# Patient Record
Sex: FEMALE | Race: WHITE | Employment: PART TIME | URBAN - METROPOLITAN AREA
[De-identification: names, ages, dates, MRNs, and addresses within clinical notes are randomized per-mention and may not be internally consistent; named-entity substitution may affect disease eponyms.]

---

## 2017-01-10 ENCOUNTER — OFFICE VISIT (OUTPATIENT)
Dept: INTERNAL MEDICINE | Age: 42
End: 2017-01-10

## 2017-01-10 VITALS
WEIGHT: 251 LBS | SYSTOLIC BLOOD PRESSURE: 172 MMHG | HEART RATE: 73 BPM | HEIGHT: 65 IN | TEMPERATURE: 97.1 F | DIASTOLIC BLOOD PRESSURE: 88 MMHG | BODY MASS INDEX: 41.82 KG/M2

## 2017-01-10 DIAGNOSIS — I10 HYPERTENSION, UNCONTROLLED: Primary | Chronic | ICD-10-CM

## 2017-01-10 DIAGNOSIS — E78.5 HYPERLIPIDEMIA WITH TARGET LDL LESS THAN 130: ICD-10-CM

## 2017-01-10 PROCEDURE — 99213 OFFICE O/P EST LOW 20 MIN: CPT | Performed by: INTERNAL MEDICINE

## 2017-01-10 RX ORDER — LORATADINE 10 MG/1
TABLET ORAL
Qty: 30 TABLET | Refills: 3 | Status: SHIPPED | OUTPATIENT
Start: 2017-01-10 | End: 2017-06-12

## 2017-01-10 RX ORDER — ALBUTEROL SULFATE 90 UG/1
2 AEROSOL, METERED RESPIRATORY (INHALATION) EVERY 6 HOURS PRN
Qty: 1 INHALER | Refills: 3 | Status: SHIPPED | OUTPATIENT
Start: 2017-01-10 | End: 2017-06-09 | Stop reason: SDUPTHER

## 2017-01-10 RX ORDER — LOSARTAN POTASSIUM AND HYDROCHLOROTHIAZIDE 25; 100 MG/1; MG/1
TABLET ORAL
Qty: 30 TABLET | Refills: 3 | Status: SHIPPED | OUTPATIENT
Start: 2017-01-10 | End: 2017-06-08 | Stop reason: SDUPTHER

## 2017-01-10 ASSESSMENT — ENCOUNTER SYMPTOMS
RESPIRATORY NEGATIVE: 1
TROUBLE SWALLOWING: 0
ABDOMINAL PAIN: 0
BLOOD IN STOOL: 0
GASTROINTESTINAL NEGATIVE: 1
SHORTNESS OF BREATH: 0

## 2017-01-12 DIAGNOSIS — I10 HYPERTENSION, UNCONTROLLED: Chronic | ICD-10-CM

## 2017-01-12 LAB
ANION GAP SERPL CALCULATED.3IONS-SCNC: 13 MEQ/L (ref 7–13)
BILIRUBIN URINE: NEGATIVE
BLOOD, URINE: NEGATIVE
BUN BLDV-MCNC: 13 MG/DL (ref 6–20)
CALCIUM SERPL-MCNC: 9.1 MG/DL (ref 8.6–10.2)
CHLORIDE BLD-SCNC: 99 MEQ/L (ref 98–107)
CHOLESTEROL, TOTAL: 227 MG/DL (ref 0–199)
CLARITY: CLEAR
CO2: 23 MEQ/L (ref 22–29)
COLOR: YELLOW
CREAT SERPL-MCNC: 0.69 MG/DL (ref 0.5–0.9)
GFR AFRICAN AMERICAN: >60
GFR NON-AFRICAN AMERICAN: >60
GLUCOSE BLD-MCNC: 98 MG/DL (ref 74–109)
GLUCOSE URINE: NEGATIVE MG/DL
HDLC SERPL-MCNC: 34 MG/DL (ref 40–59)
KETONES, URINE: NEGATIVE MG/DL
LDL CHOLESTEROL CALCULATED: ABNORMAL MG/DL (ref 0–129)
LEUKOCYTE ESTERASE, URINE: NEGATIVE
NITRITE, URINE: NEGATIVE
PH UA: 6 (ref 5–9)
POTASSIUM SERPL-SCNC: 4.6 MEQ/L (ref 3.5–5.1)
PROTEIN UA: NEGATIVE MG/DL
SODIUM BLD-SCNC: 135 MEQ/L (ref 132–144)
SPECIFIC GRAVITY UA: 1 (ref 1–1.03)
TRIGL SERPL-MCNC: 590 MG/DL (ref 0–200)
TSH SERPL DL<=0.05 MIU/L-ACNC: 1.62 UIU/ML (ref 0.27–4.2)
UROBILINOGEN, URINE: 0.2 E.U./DL

## 2017-01-31 DIAGNOSIS — E78.5 HYPERLIPIDEMIA WITH TARGET LDL LESS THAN 130: Primary | ICD-10-CM

## 2017-01-31 RX ORDER — PRAVASTATIN SODIUM 40 MG
40 TABLET ORAL EVERY EVENING
Qty: 30 TABLET | Refills: 3 | Status: SHIPPED | OUTPATIENT
Start: 2017-01-31 | End: 2017-05-09

## 2017-02-07 ENCOUNTER — OFFICE VISIT (OUTPATIENT)
Dept: INTERNAL MEDICINE | Age: 42
End: 2017-02-07

## 2017-02-07 VITALS
DIASTOLIC BLOOD PRESSURE: 82 MMHG | OXYGEN SATURATION: 97 % | SYSTOLIC BLOOD PRESSURE: 138 MMHG | HEART RATE: 97 BPM | TEMPERATURE: 97.9 F | HEIGHT: 65 IN | BODY MASS INDEX: 41.15 KG/M2 | WEIGHT: 247 LBS

## 2017-02-07 DIAGNOSIS — E78.5 HYPERLIPIDEMIA WITH TARGET LDL LESS THAN 130: Primary | Chronic | ICD-10-CM

## 2017-02-07 DIAGNOSIS — F43.23 ADJUSTMENT DISORDER WITH MIXED ANXIETY AND DEPRESSED MOOD: Chronic | ICD-10-CM

## 2017-02-07 PROCEDURE — 99214 OFFICE O/P EST MOD 30 MIN: CPT | Performed by: INTERNAL MEDICINE

## 2017-02-07 RX ORDER — FLUOXETINE HYDROCHLORIDE 20 MG/1
CAPSULE ORAL
Qty: 30 CAPSULE | Refills: 5 | Status: SHIPPED | OUTPATIENT
Start: 2017-02-07 | End: 2017-07-11

## 2017-02-07 ASSESSMENT — PATIENT HEALTH QUESTIONNAIRE - PHQ9
2. FEELING DOWN, DEPRESSED OR HOPELESS: 0
1. LITTLE INTEREST OR PLEASURE IN DOING THINGS: 0
SUM OF ALL RESPONSES TO PHQ QUESTIONS 1-9: 0
SUM OF ALL RESPONSES TO PHQ9 QUESTIONS 1 & 2: 0

## 2017-02-07 ASSESSMENT — ENCOUNTER SYMPTOMS
ABDOMINAL PAIN: 0
SHORTNESS OF BREATH: 1
GASTROINTESTINAL NEGATIVE: 1
TROUBLE SWALLOWING: 0
BLOOD IN STOOL: 0

## 2017-05-09 ENCOUNTER — OFFICE VISIT (OUTPATIENT)
Dept: INTERNAL MEDICINE | Age: 42
End: 2017-05-09

## 2017-05-09 VITALS
HEIGHT: 65 IN | TEMPERATURE: 96.9 F | HEART RATE: 88 BPM | WEIGHT: 247 LBS | BODY MASS INDEX: 41.15 KG/M2 | DIASTOLIC BLOOD PRESSURE: 80 MMHG | OXYGEN SATURATION: 98 % | SYSTOLIC BLOOD PRESSURE: 130 MMHG

## 2017-05-09 DIAGNOSIS — E78.5 HYPERLIPIDEMIA WITH TARGET LDL LESS THAN 130: ICD-10-CM

## 2017-05-09 DIAGNOSIS — E78.5 HYPERLIPIDEMIA WITH TARGET LDL LESS THAN 130: Primary | Chronic | ICD-10-CM

## 2017-05-09 DIAGNOSIS — Z23 NEED FOR 23-POLYVALENT PNEUMOCOCCAL POLYSACCHARIDE VACCINE: ICD-10-CM

## 2017-05-09 DIAGNOSIS — J45.20 MILD INTERMITTENT ASTHMA WITHOUT COMPLICATION: Chronic | ICD-10-CM

## 2017-05-09 LAB
CHOLESTEROL, TOTAL: 229 MG/DL (ref 0–199)
HDLC SERPL-MCNC: 26 MG/DL (ref 40–59)
LDL CHOLESTEROL CALCULATED: ABNORMAL MG/DL (ref 0–129)
TRIGL SERPL-MCNC: 1299 MG/DL (ref 0–200)

## 2017-05-09 PROCEDURE — 90471 IMMUNIZATION ADMIN: CPT | Performed by: INTERNAL MEDICINE

## 2017-05-09 PROCEDURE — 90732 PPSV23 VACC 2 YRS+ SUBQ/IM: CPT | Performed by: INTERNAL MEDICINE

## 2017-05-09 PROCEDURE — 99214 OFFICE O/P EST MOD 30 MIN: CPT | Performed by: INTERNAL MEDICINE

## 2017-05-09 RX ORDER — ROSUVASTATIN CALCIUM 10 MG/1
10 TABLET, COATED ORAL DAILY
Qty: 30 TABLET | Refills: 3 | Status: SHIPPED | OUTPATIENT
Start: 2017-05-09 | End: 2017-07-11 | Stop reason: SDUPTHER

## 2017-05-09 RX ORDER — FLUTICASONE FUROATE AND VILANTEROL 100; 25 UG/1; UG/1
1 POWDER RESPIRATORY (INHALATION) DAILY
Qty: 28 EACH | Refills: 3 | Status: SHIPPED | OUTPATIENT
Start: 2017-05-09 | End: 2017-08-10 | Stop reason: SDUPTHER

## 2017-05-09 ASSESSMENT — ENCOUNTER SYMPTOMS
SORE THROAT: 0
GASTROINTESTINAL NEGATIVE: 1
BLOOD IN STOOL: 0
HEARTBURN: 0
SINUS PRESSURE: 0
ABDOMINAL PAIN: 0
TROUBLE SWALLOWING: 0
SHORTNESS OF BREATH: 1

## 2017-06-08 RX ORDER — LOSARTAN POTASSIUM AND HYDROCHLOROTHIAZIDE 25; 100 MG/1; MG/1
TABLET ORAL
Qty: 30 TABLET | Refills: 3 | Status: SHIPPED | OUTPATIENT
Start: 2017-06-08 | End: 2017-09-06 | Stop reason: SDUPTHER

## 2017-06-12 RX ORDER — LORATADINE 10 MG/1
TABLET ORAL
Qty: 30 TABLET | Refills: 3 | Status: SHIPPED | OUTPATIENT
Start: 2017-06-12 | End: 2017-10-07 | Stop reason: SDUPTHER

## 2017-07-11 ENCOUNTER — OFFICE VISIT (OUTPATIENT)
Dept: INTERNAL MEDICINE | Age: 42
End: 2017-07-11

## 2017-07-11 VITALS
OXYGEN SATURATION: 98 % | DIASTOLIC BLOOD PRESSURE: 90 MMHG | TEMPERATURE: 98.1 F | BODY MASS INDEX: 42.49 KG/M2 | HEART RATE: 97 BPM | SYSTOLIC BLOOD PRESSURE: 162 MMHG | HEIGHT: 65 IN | WEIGHT: 255 LBS

## 2017-07-11 DIAGNOSIS — F17.200 SMOKING: ICD-10-CM

## 2017-07-11 DIAGNOSIS — E78.5 HYPERLIPIDEMIA WITH TARGET LDL LESS THAN 130: Chronic | ICD-10-CM

## 2017-07-11 DIAGNOSIS — I10 HYPERTENSION, UNCONTROLLED: Primary | Chronic | ICD-10-CM

## 2017-07-11 DIAGNOSIS — E78.5 HYPERLIPIDEMIA WITH TARGET LDL LESS THAN 130: ICD-10-CM

## 2017-07-11 LAB
CHOLESTEROL, TOTAL: 317 MG/DL (ref 0–199)
HDLC SERPL-MCNC: 25 MG/DL (ref 40–59)
LDL CHOLESTEROL CALCULATED: ABNORMAL MG/DL (ref 0–129)
TRIGL SERPL-MCNC: 1299 MG/DL (ref 0–200)

## 2017-07-11 PROCEDURE — 99214 OFFICE O/P EST MOD 30 MIN: CPT | Performed by: INTERNAL MEDICINE

## 2017-07-11 RX ORDER — ROSUVASTATIN CALCIUM 20 MG/1
20 TABLET, COATED ORAL DAILY
Qty: 30 TABLET | Refills: 2 | Status: SHIPPED | OUTPATIENT
Start: 2017-07-11 | End: 2017-08-10

## 2017-07-11 RX ORDER — FLUOXETINE HYDROCHLORIDE 20 MG/1
CAPSULE ORAL
COMMUNITY
Start: 2017-07-04 | End: 2017-08-10

## 2017-07-11 RX ORDER — VARENICLINE TARTRATE 25 MG
KIT ORAL
Qty: 1 EACH | Refills: 0 | Status: SHIPPED | OUTPATIENT
Start: 2017-07-11 | End: 2018-09-27

## 2017-07-11 ASSESSMENT — ENCOUNTER SYMPTOMS
SORE THROAT: 0
SHORTNESS OF BREATH: 0
BLOOD IN STOOL: 0
TROUBLE SWALLOWING: 0
GASTROINTESTINAL NEGATIVE: 1
ABDOMINAL PAIN: 0
SINUS PRESSURE: 0

## 2017-08-10 RX ORDER — FLUTICASONE FUROATE AND VILANTEROL TRIFENATATE 100; 25 UG/1; UG/1
POWDER RESPIRATORY (INHALATION)
Qty: 28 EACH | Refills: 3 | Status: SHIPPED | OUTPATIENT
Start: 2017-08-10 | End: 2017-12-01 | Stop reason: SDUPTHER

## 2017-08-10 RX ORDER — FLUOXETINE HYDROCHLORIDE 20 MG/1
CAPSULE ORAL
Qty: 30 CAPSULE | Refills: 3 | Status: SHIPPED | OUTPATIENT
Start: 2017-08-10 | End: 2017-12-01 | Stop reason: SDUPTHER

## 2017-08-10 RX ORDER — ROSUVASTATIN CALCIUM 20 MG/1
20 TABLET, COATED ORAL DAILY
Qty: 30 TABLET | Refills: 3 | Status: SHIPPED | OUTPATIENT
Start: 2017-08-10 | End: 2017-09-06

## 2017-09-07 RX ORDER — LOSARTAN POTASSIUM AND HYDROCHLOROTHIAZIDE 25; 100 MG/1; MG/1
TABLET ORAL
Qty: 30 TABLET | Refills: 3 | Status: SHIPPED | OUTPATIENT
Start: 2017-09-07 | End: 2018-03-29 | Stop reason: SDUPTHER

## 2017-11-07 ENCOUNTER — OFFICE VISIT (OUTPATIENT)
Dept: INTERNAL MEDICINE | Age: 42
End: 2017-11-07

## 2017-11-07 VITALS
OXYGEN SATURATION: 99 % | TEMPERATURE: 97.7 F | HEIGHT: 65 IN | SYSTOLIC BLOOD PRESSURE: 122 MMHG | BODY MASS INDEX: 43.32 KG/M2 | HEART RATE: 78 BPM | WEIGHT: 260 LBS | DIASTOLIC BLOOD PRESSURE: 80 MMHG

## 2017-11-07 DIAGNOSIS — E78.5 HYPERLIPIDEMIA WITH TARGET LDL LESS THAN 130: Chronic | ICD-10-CM

## 2017-11-07 DIAGNOSIS — E66.01 OBESITY, MORBID (MORE THAN 100 LBS OVER IDEAL WEIGHT OR BMI > 40) (HCC): Primary | Chronic | ICD-10-CM

## 2017-11-07 DIAGNOSIS — I10 ESSENTIAL HYPERTENSION, BENIGN: Chronic | ICD-10-CM

## 2017-11-07 PROCEDURE — G8484 FLU IMMUNIZE NO ADMIN: HCPCS | Performed by: INTERNAL MEDICINE

## 2017-11-07 PROCEDURE — G8427 DOCREV CUR MEDS BY ELIG CLIN: HCPCS | Performed by: INTERNAL MEDICINE

## 2017-11-07 PROCEDURE — 99213 OFFICE O/P EST LOW 20 MIN: CPT | Performed by: INTERNAL MEDICINE

## 2017-11-07 PROCEDURE — G8417 CALC BMI ABV UP PARAM F/U: HCPCS | Performed by: INTERNAL MEDICINE

## 2017-11-07 PROCEDURE — 4004F PT TOBACCO SCREEN RCVD TLK: CPT | Performed by: INTERNAL MEDICINE

## 2017-11-07 ASSESSMENT — ENCOUNTER SYMPTOMS
GASTROINTESTINAL NEGATIVE: 1
COUGH: 0
SORE THROAT: 0
BLOOD IN STOOL: 0
SINUS PRESSURE: 0
RESPIRATORY NEGATIVE: 1
ABDOMINAL PAIN: 0
SHORTNESS OF BREATH: 0
TROUBLE SWALLOWING: 0
EYE PAIN: 0

## 2017-11-07 NOTE — PROGRESS NOTES
Irving Mansfield is a 43 y.o. female with history of migraines, asthma, who presents with     Chief Complaint   Patient presents with    Weight Management     requesting referral to [facility], states she has tried diets, exercise, all unsuccessful    Hyperlipidemia     takes statin daily, is a blood donor, no recent red flags, no side effects     Hypertension       The following laboratory reports since the past visit were reviewed (the ones pertinent to today's visit were discussed with the patient):    No visits with results within 3 Month(s) from this visit. Latest known visit with results is:   Orders Only on 07/11/2017   Component Date Value Ref Range Status    Cholesterol, Total 07/11/2017 317* 0 - 199 mg/dL Final    Triglycerides 07/11/2017 1299* 0 - 200 mg/dL Final    HDL 07/11/2017 25* 40 - 59 mg/dL Final    Comment: ATP III HDL Cholestrol Classification is low. Expected Values:    Males:    >55 = No Risk            35-55 = Moderate Risk            <35 = High Risk    Females:  >65 = No Risk            45-65 = Moderate Risk            <45 = High Risk    NCEP Guidelines: Third Report May 2001  >59 = negative risk factor for CHD  <40 = major risk factor for CHD      LDL Calculated 07/11/2017 see below  0 - 129 mg/dL Final    Comment: When the triglyceride is >400 mg/dL the calculated LDL and  VLDL are not valid. HPI:    Hyperlipidemia   This is a chronic problem. The current episode started more than 1 year ago. Recent lipid tests were reviewed and are high. Exacerbating diseases include obesity. She has no history of chronic renal disease, diabetes or hypothyroidism. Factors aggravating her hyperlipidemia include fatty foods and smoking. Pertinent negatives include no chest pain, leg pain, myalgias or shortness of breath. Current antihyperlipidemic treatment includes statins.  Compliance problems include adherence to exercise, adherence to diet and psychosocial issues. Risk factors for coronary artery disease include a sedentary lifestyle, hypertension, obesity and dyslipidemia. Hypertension   This is a chronic problem. The current episode started more than 1 year ago. The problem has been waxing and waning since onset. The problem is controlled. Associated symptoms include anxiety. Pertinent negatives include no chest pain, headaches, neck pain, palpitations, peripheral edema, PND or shortness of breath. There are no associated agents to hypertension. Risk factors for coronary artery disease include sedentary lifestyle, stress, obesity and dyslipidemia. Past treatments include angiotensin blockers and diuretics. The current treatment provides moderate improvement. Compliance problems include exercise, diet and psychosocial issues. There is no history of angina, CVA, PVD, renovascular disease or a thyroid problem. Identifiable causes of hypertension include sleep apnea. There is no history of chronic renal disease. More detail above in the chief complaint(s) and below in the review of systems. Past Medical History:   Diagnosis Date    Allergic rhinitis     several allergens    Asthma     since shildhood    Brachial neuralgia 1/23/2014    Chronic left shoulder pain     H/O colonoscopy 2014    Dr. Courtney Bruce History of sleep apnea     did not tolerate CPAP, test normalized    Hyperlipidemia     Hypertension 2010    Migraine headache     Obesity, morbid (more than 100 lbs over ideal weight or BMI > 40) ([facility] [address]        Past Surgical History:   Procedure Laterality Date    BREAST SURGERY  2007    reduction, Dr Johny Pinto  10/20/15    DR. Goldy Murillo    TUBAL LIGATION         Social History     Social History    Marital status: Single     Spouse name: N/A    Number of children: 2    Years of education: N/A     Occupational History    dog grooming business, self employed      Social History Main Topics    Smoking status: Current Every Day Smoker     Packs/day: 0.50     Types: Cigarettes    Smokeless tobacco: Never Used      Comment: uses electronic cigarettes & trying to cut back    Alcohol use 0.6 oz/week     1 Standard drinks or equivalent per week      Comment: socially    Drug use:      Frequency: 1.0 time per week      Comment: marijuana, quit July 2013    Sexual activity: Yes     Other Topics Concern    Not on file     Social History Narrative    Born in [place], has 1 brother one sister    Never , was in a bad relationship    Lives with daughter age 16 (2017) in a house in [facility]    2 daughters, one daughter with problems, one in college    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 ALLERGIES    Review of patient's allergies indicates no known allergies. Current Outpatient Prescriptions on File Prior to Visit   Medication Sig Dispense Refill    VENTOLIN  (90 Base) MCG/ACT inhaler Inhale 2 puffs into the lungs every 6 hours as needed for Wheezing or Shortness of Breath 18 g 2    loratadine (CLARITIN) 10 MG tablet Take 1 tablet by mouth daily as needed (allergies) 30 tablet 2    rosuvastatin (CRESTOR) 20 MG tablet Take 1 tablet by mouth daily  3    losartan-hydrochlorothiazide (HYZAAR) 100-25 MG per tablet Take 1 tablet by mouth daily 30 tablet 3    FLUoxetine (PROZAC) 20 MG capsule TAKE 1 CAPSULE DAILY 30 capsule 3    BREO ELLIPTA 100-25 MCG/INH AEPB inhaler Inhale 1 puff into the lungs daily 28 each 3    varenicline (CHANTIX STARTING MONTH PAK) 0.5 MG X 11 & 1 MG X 42 tablet Take po as directed on pack 1 each 0     No current facility-administered medications on file prior to visit.         Review of Systems Constitutional: Positive for unexpected weight change (weight gain). Negative for activity change, appetite change and fatigue. HENT: Negative for congestion, nosebleeds, postnasal drip, sinus pressure, sneezing, sore throat and trouble swallowing. Eyes: Negative for pain and visual disturbance. Respiratory: Negative. Negative for cough and shortness of breath. Cardiovascular: Negative. Negative for chest pain, palpitations, leg swelling and PND. Gastrointestinal: Negative. Negative for abdominal pain and blood in stool. Endocrine: Negative. Negative for cold intolerance, heat intolerance, polydipsia and polyuria. Genitourinary: Negative for hematuria. Musculoskeletal: Negative for myalgias, neck pain and neck stiffness. Skin: Negative for rash. Neurological: Negative for dizziness, weakness, light-headedness and headaches. Psychiatric/Behavioral: Negative for decreased concentration, dysphoric mood and sleep disturbance. The patient is nervous/anxious. Vitals:    11/07/17 1111   BP: 122/80   Site: Right Arm   Position: Sitting   Cuff Size: Medium Adult   Pulse: 78   Temp: 97.7 °F (36.5 °C)   TempSrc: Tympanic   SpO2: 99%   Weight: 260 lb (117.9 kg)   Height: 5' 5\" (1.651 m)       Physical Exam   Constitutional: She is oriented to person, place, and time. No distress. Obese, age appropriate, well groomed     HENT:   Head: Atraumatic. Eyes: Conjunctivae are normal. No scleral icterus. Neck: Neck supple. No JVD present. No thyromegaly present. Cardiovascular: Regular rhythm, normal heart sounds and intact distal pulses. Exam reveals no gallop. No murmur heard. Pulmonary/Chest: Effort normal and breath sounds normal. She has no wheezes. Abdominal: Soft. She exhibits no distension. Exam limited due to abdominal adiposity      Musculoskeletal: Normal range of motion. Neurological: She is alert and oriented to person, place, and time. Skin: Skin is warm.  No rash

## 2017-12-01 RX ORDER — FLUOXETINE HYDROCHLORIDE 20 MG/1
CAPSULE ORAL
Qty: 30 CAPSULE | Refills: 3 | Status: SHIPPED | OUTPATIENT
Start: 2017-12-01 | End: 2018-03-29 | Stop reason: SDUPTHER

## 2017-12-01 RX ORDER — FLUTICASONE FUROATE AND VILANTEROL TRIFENATATE 100; 25 UG/1; UG/1
POWDER RESPIRATORY (INHALATION)
Qty: 30 EACH | Refills: 3 | Status: SHIPPED | OUTPATIENT
Start: 2017-12-01 | End: 2018-03-29 | Stop reason: SDUPTHER

## 2017-12-29 RX ORDER — LORATADINE 10 MG/1
TABLET ORAL
Qty: 30 TABLET | Refills: 1 | Status: SHIPPED | OUTPATIENT
Start: 2017-12-29 | End: 2018-02-26 | Stop reason: SDUPTHER

## 2018-02-28 RX ORDER — LORATADINE 10 MG/1
TABLET ORAL
Qty: 30 TABLET | Refills: 3 | Status: SHIPPED | OUTPATIENT
Start: 2018-02-28 | End: 2018-08-24 | Stop reason: SDUPTHER

## 2018-03-08 ENCOUNTER — OFFICE VISIT (OUTPATIENT)
Dept: INTERNAL MEDICINE CLINIC | Age: 43
End: 2018-03-08
Payer: COMMERCIAL

## 2018-03-08 VITALS
DIASTOLIC BLOOD PRESSURE: 70 MMHG | OXYGEN SATURATION: 99 % | WEIGHT: 260 LBS | BODY MASS INDEX: 43.32 KG/M2 | TEMPERATURE: 98.4 F | HEIGHT: 65 IN | SYSTOLIC BLOOD PRESSURE: 140 MMHG | HEART RATE: 109 BPM

## 2018-03-08 DIAGNOSIS — E78.5 HYPERLIPIDEMIA WITH TARGET LDL LESS THAN 130: Chronic | ICD-10-CM

## 2018-03-08 DIAGNOSIS — E66.01 OBESITY, MORBID (MORE THAN 100 LBS OVER IDEAL WEIGHT OR BMI > 40) (HCC): Primary | Chronic | ICD-10-CM

## 2018-03-08 DIAGNOSIS — Z86.69 HISTORY OF OBSTRUCTIVE SLEEP APNEA: ICD-10-CM

## 2018-03-08 PROCEDURE — G8484 FLU IMMUNIZE NO ADMIN: HCPCS | Performed by: INTERNAL MEDICINE

## 2018-03-08 PROCEDURE — G8417 CALC BMI ABV UP PARAM F/U: HCPCS | Performed by: INTERNAL MEDICINE

## 2018-03-08 PROCEDURE — 99213 OFFICE O/P EST LOW 20 MIN: CPT | Performed by: INTERNAL MEDICINE

## 2018-03-08 PROCEDURE — G8427 DOCREV CUR MEDS BY ELIG CLIN: HCPCS | Performed by: INTERNAL MEDICINE

## 2018-03-08 PROCEDURE — 4004F PT TOBACCO SCREEN RCVD TLK: CPT | Performed by: INTERNAL MEDICINE

## 2018-03-08 ASSESSMENT — ENCOUNTER SYMPTOMS
WHEEZING: 0
TROUBLE SWALLOWING: 0
CHOKING: 0
ABDOMINAL PAIN: 0
CHEST TIGHTNESS: 0
SHORTNESS OF BREATH: 0
COUGH: 0
VOICE CHANGE: 0
BLOOD IN STOOL: 0
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
## 2018-03-25 RX ORDER — ROSUVASTATIN CALCIUM 20 MG/1
20 TABLET, COATED ORAL DAILY
Qty: 30 TABLET | Refills: 3 | Status: SHIPPED | OUTPATIENT
Start: 2018-03-25 | End: 2018-07-27

## 2018-03-29 DIAGNOSIS — E66.01 OBESITY, MORBID (MORE THAN 100 LBS OVER IDEAL WEIGHT OR BMI > 40) (HCC): Chronic | ICD-10-CM

## 2018-03-29 DIAGNOSIS — E78.5 HYPERLIPIDEMIA WITH TARGET LDL LESS THAN 130: Chronic | ICD-10-CM

## 2018-03-29 LAB
CHOLESTEROL, TOTAL: 176 MG/DL (ref 0–199)
HBA1C MFR BLD: 6 % (ref 4.8–5.9)
HDLC SERPL-MCNC: 32 MG/DL (ref 40–59)
LDL CHOLESTEROL CALCULATED: ABNORMAL MG/DL (ref 0–129)
TRIGL SERPL-MCNC: 603 MG/DL (ref 0–200)

## 2018-03-30 RX ORDER — FLUOXETINE HYDROCHLORIDE 20 MG/1
CAPSULE ORAL
Qty: 30 CAPSULE | Refills: 3 | Status: SHIPPED | OUTPATIENT
Start: 2018-03-30 | End: 2018-07-25 | Stop reason: SDUPTHER

## 2018-03-30 RX ORDER — LOSARTAN POTASSIUM AND HYDROCHLOROTHIAZIDE 25; 100 MG/1; MG/1
TABLET ORAL
Qty: 30 TABLET | Refills: 3 | Status: SHIPPED | OUTPATIENT
Start: 2018-03-30 | End: 2018-07-25 | Stop reason: SDUPTHER

## 2018-03-30 RX ORDER — FLUTICASONE FUROATE AND VILANTEROL TRIFENATATE 100; 25 UG/1; UG/1
POWDER RESPIRATORY (INHALATION)
Qty: 30 EACH | Refills: 3 | Status: SHIPPED | OUTPATIENT
Start: 2018-03-30 | End: 2018-07-25 | Stop reason: SDUPTHER

## 2018-04-04 DIAGNOSIS — R73.03 PREDIABETES: Primary | ICD-10-CM

## 2018-04-04 RX ORDER — ROSUVASTATIN CALCIUM 5 MG/1
5 TABLET, COATED ORAL DAILY
Qty: 30 TABLET | Refills: 3 | Status: SHIPPED | OUTPATIENT
Start: 2018-04-04 | End: 2018-05-10 | Stop reason: SDUPTHER

## 2018-05-10 ENCOUNTER — OFFICE VISIT (OUTPATIENT)
Dept: INTERNAL MEDICINE CLINIC | Age: 43
End: 2018-05-10
Payer: COMMERCIAL

## 2018-05-10 VITALS
TEMPERATURE: 98.3 F | BODY MASS INDEX: 43.65 KG/M2 | DIASTOLIC BLOOD PRESSURE: 70 MMHG | SYSTOLIC BLOOD PRESSURE: 110 MMHG | HEIGHT: 65 IN | WEIGHT: 262 LBS

## 2018-05-10 DIAGNOSIS — R10.12 ABDOMINAL PAIN, LEFT UPPER QUADRANT: Primary | ICD-10-CM

## 2018-05-10 DIAGNOSIS — Z86.69 HISTORY OF SLEEP APNEA: ICD-10-CM

## 2018-05-10 DIAGNOSIS — Z01.89 NEED FOR ASSESSMENT FOR SLEEP APNEA: ICD-10-CM

## 2018-05-10 DIAGNOSIS — I10 ESSENTIAL HYPERTENSION: ICD-10-CM

## 2018-05-10 PROCEDURE — G8417 CALC BMI ABV UP PARAM F/U: HCPCS | Performed by: INTERNAL MEDICINE

## 2018-05-10 PROCEDURE — 4004F PT TOBACCO SCREEN RCVD TLK: CPT | Performed by: INTERNAL MEDICINE

## 2018-05-10 PROCEDURE — 99213 OFFICE O/P EST LOW 20 MIN: CPT | Performed by: INTERNAL MEDICINE

## 2018-05-10 PROCEDURE — G8427 DOCREV CUR MEDS BY ELIG CLIN: HCPCS | Performed by: INTERNAL MEDICINE

## 2018-05-10 RX ORDER — ROSUVASTATIN CALCIUM 10 MG/1
TABLET, COATED ORAL
Qty: 30 TABLET | Refills: 3
Start: 2018-05-10 | End: 2018-07-25

## 2018-05-10 ASSESSMENT — ENCOUNTER SYMPTOMS
ABDOMINAL DISTENTION: 0
VOICE CHANGE: 0
HEMATOCHEZIA: 0
COUGH: 0
ABDOMINAL PAIN: 1
CONSTIPATION: 0
CHEST TIGHTNESS: 0
SHORTNESS OF BREATH: 0
WHEEZING: 0
DIARRHEA: 0
VOMITING: 0
FLATUS: 0
BLOOD IN STOOL: 0
NAUSEA: 0
TROUBLE SWALLOWING: 0
CHOKING: 0

## 2018-05-10 ASSESSMENT — PATIENT HEALTH QUESTIONNAIRE - PHQ9
1. LITTLE INTEREST OR PLEASURE IN DOING THINGS: 0
SUM OF ALL RESPONSES TO PHQ QUESTIONS 1-9: 0
2. FEELING DOWN, DEPRESSED OR HOPELESS: 0
SUM OF ALL RESPONSES TO PHQ9 QUESTIONS 1 & 2: 0

## 2018-07-25 ENCOUNTER — OFFICE VISIT (OUTPATIENT)
Dept: CARDIOLOGY CLINIC | Age: 43
End: 2018-07-25
Payer: COMMERCIAL

## 2018-07-25 VITALS
BODY MASS INDEX: 44.65 KG/M2 | WEIGHT: 268 LBS | HEIGHT: 65 IN | HEART RATE: 107 BPM | DIASTOLIC BLOOD PRESSURE: 62 MMHG | OXYGEN SATURATION: 99 % | RESPIRATION RATE: 16 BRPM | SYSTOLIC BLOOD PRESSURE: 104 MMHG

## 2018-07-25 DIAGNOSIS — R06.02 SHORTNESS OF BREATH: ICD-10-CM

## 2018-07-25 DIAGNOSIS — I10 ESSENTIAL HYPERTENSION, BENIGN: ICD-10-CM

## 2018-07-25 DIAGNOSIS — E78.5 HYPERLIPIDEMIA WITH TARGET LDL LESS THAN 130: ICD-10-CM

## 2018-07-25 DIAGNOSIS — R07.9 CHEST PAIN, UNSPECIFIED TYPE: Primary | ICD-10-CM

## 2018-07-25 PROCEDURE — G8427 DOCREV CUR MEDS BY ELIG CLIN: HCPCS | Performed by: INTERNAL MEDICINE

## 2018-07-25 PROCEDURE — G8417 CALC BMI ABV UP PARAM F/U: HCPCS | Performed by: INTERNAL MEDICINE

## 2018-07-25 PROCEDURE — 99204 OFFICE O/P NEW MOD 45 MIN: CPT | Performed by: INTERNAL MEDICINE

## 2018-07-25 PROCEDURE — 1036F TOBACCO NON-USER: CPT | Performed by: INTERNAL MEDICINE

## 2018-07-25 PROCEDURE — 93000 ELECTROCARDIOGRAM COMPLETE: CPT | Performed by: INTERNAL MEDICINE

## 2018-07-25 RX ORDER — FAMOTIDINE 20 MG/1
20 TABLET, FILM COATED ORAL 2 TIMES DAILY
COMMUNITY
End: 2019-11-13 | Stop reason: ALTCHOICE

## 2018-07-25 RX ORDER — FENOFIBRATE 145 MG/1
145 TABLET, COATED ORAL DAILY
Qty: 30 TABLET | Refills: 3 | Status: SHIPPED | OUTPATIENT
Start: 2018-07-25 | End: 2018-10-21 | Stop reason: SDUPTHER

## 2018-07-25 ASSESSMENT — ENCOUNTER SYMPTOMS
GASTROINTESTINAL NEGATIVE: 1
NAUSEA: 0
SHORTNESS OF BREATH: 1
CHEST TIGHTNESS: 0
EYES NEGATIVE: 1
STRIDOR: 0
WHEEZING: 0
BLOOD IN STOOL: 0
COUGH: 0

## 2018-07-25 NOTE — PROGRESS NOTES
NEW PATIENT        Patient: Lashanda Avilez  YOB: 1975  MRN: [id number]    Chief Complaint: preop bariatric surgery, HTN tired , cp  Chief Complaint   Patient presents with    Cardiac Clearance     Bariatric surgery       CV Data:    Subjective/HPI   recently stopped smoking 2ppd. Has rojas and had cp.  + FH   No bleed no falls    EKG: SR    Past Medical History:   Diagnosis Date    Allergic rhinitis     several allergens    Asthma     since shildhood    Brachial neuralgia 1/23/2014    Chronic left shoulder pain     H/O colonoscopy 2014    Dr. Bertrand Story History of sleep apnea     did not tolerate CPAP, test normalized    Hyperlipidemia     Hypertension 2010    Migraine headache     Obesity, morbid (more than 100 lbs over ideal weight or BMI > 40) ([facility] [address]        Past Surgical History:   Procedure Laterality Date    BREAST SURGERY  2007    reduction, Dr Partha Lind  10/20/15    DR. 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 Hematological: Negative. Psychiatric/Behavioral: Negative. Physical Examination:    /62 (Site: Left Arm, Position: Sitting, Cuff Size: Large Adult)   Pulse 107   Resp 16   Ht 5' 5\" (1.651 m)   Wt 268 lb (121.6 kg)   LMP 07/08/2018   SpO2 99%   BMI 44.60 kg/m²    Physical Exam   Constitutional: She appears healthy. No distress. HENT:   Normal cephalic and Atraumatic   Eyes: Pupils are equal, round, and reactive to light. Neck: Normal range of motion and thyroid normal. Neck supple. No JVD present. No neck adenopathy. No thyromegaly present. Cardiovascular: Normal rate, regular rhythm, normal heart sounds, intact distal pulses and normal pulses. Pulmonary/Chest: Effort normal and breath sounds normal. She has no wheezes. She has no rales. She exhibits no tenderness. Abdominal: Soft. Bowel sounds are normal. There is no tenderness. Musculoskeletal: Normal range of motion. She exhibits no edema or tenderness. Neurological: She is alert and oriented to person, place, and time. Skin: Skin is warm. No cyanosis. Nails show no clubbing.        LABS:  CBC:   Lab Results   Component Value Date    WBC 8.4 08/07/2015    RBC 4.27 08/07/2015    HGB 13.5 08/07/2015    HCT 40.0 08/07/2015    MCV 93.7 08/07/2015    MCH 31.5 08/07/2015    MCHC 33.7 08/07/2015    RDW 13.4 08/07/2015     08/07/2015    MPV 10.1 08/07/2015     Lipids:  Lab Results   Component Value Date    CHOL 176 03/29/2018    CHOL 317 (H) 07/11/2017    CHOL 229 (H) 05/09/2017     Lab Results   Component Value Date    TRIG 603 (H) 03/29/2018    TRIG 1,299 (H) 07/11/2017    TRIG 1,299 (H) 05/09/2017     Lab Results   Component Value Date    HDL 32 (L) 03/29/2018    HDL 25 (L) 07/11/2017    HDL 26 (L) 05/09/2017     Lab Results   Component Value Date    LDLCALC see below 03/29/2018    LDLCALC see below 07/11/2017    LDLCALC see below 05/09/2017     No results found for: LABVLDL,

## 2018-07-27 RX ORDER — ROSUVASTATIN CALCIUM 20 MG/1
TABLET, COATED ORAL
Qty: 30 TABLET | Refills: 3 | Status: SHIPPED | OUTPATIENT
Start: 2018-07-27 | End: 2018-11-20 | Stop reason: SDUPTHER

## 2018-07-27 RX ORDER — FLUOXETINE HYDROCHLORIDE 20 MG/1
CAPSULE ORAL
Qty: 30 CAPSULE | Refills: 3 | Status: SHIPPED | OUTPATIENT
Start: 2018-07-27 | End: 2018-11-20 | Stop reason: SDUPTHER

## 2018-07-27 RX ORDER — LOSARTAN POTASSIUM AND HYDROCHLOROTHIAZIDE 25; 100 MG/1; MG/1
TABLET ORAL
Qty: 30 TABLET | Refills: 3 | Status: SHIPPED | OUTPATIENT
Start: 2018-07-27 | End: 2018-11-20 | Stop reason: SDUPTHER

## 2018-07-27 RX ORDER — FLUTICASONE FUROATE AND VILANTEROL TRIFENATATE 100; 25 UG/1; UG/1
POWDER RESPIRATORY (INHALATION)
Qty: 30 EACH | Refills: 3 | Status: SHIPPED | OUTPATIENT
Start: 2018-07-27 | End: 2018-09-27

## 2018-08-02 ENCOUNTER — OFFICE VISIT (OUTPATIENT)
Dept: PULMONOLOGY | Age: 43
End: 2018-08-02
Payer: COMMERCIAL

## 2018-08-02 VITALS
HEIGHT: 65 IN | HEART RATE: 86 BPM | OXYGEN SATURATION: 99 % | WEIGHT: 267.8 LBS | BODY MASS INDEX: 44.62 KG/M2 | TEMPERATURE: 97.7 F | DIASTOLIC BLOOD PRESSURE: 62 MMHG | SYSTOLIC BLOOD PRESSURE: 112 MMHG

## 2018-08-02 DIAGNOSIS — G47.33 OBSTRUCTIVE SLEEP APNEA: ICD-10-CM

## 2018-08-02 DIAGNOSIS — J44.9 CHRONIC OBSTRUCTIVE PULMONARY DISEASE, UNSPECIFIED COPD TYPE (HCC): Primary | ICD-10-CM

## 2018-08-02 DIAGNOSIS — E66.3 OVERWEIGHT: ICD-10-CM

## 2018-08-02 PROCEDURE — 1036F TOBACCO NON-USER: CPT | Performed by: INTERNAL MEDICINE

## 2018-08-02 PROCEDURE — G8417 CALC BMI ABV UP PARAM F/U: HCPCS | Performed by: INTERNAL MEDICINE

## 2018-08-02 PROCEDURE — G8926 SPIRO NO PERF OR DOC: HCPCS | Performed by: INTERNAL MEDICINE

## 2018-08-02 PROCEDURE — 99204 OFFICE O/P NEW MOD 45 MIN: CPT | Performed by: INTERNAL MEDICINE

## 2018-08-02 PROCEDURE — 3023F SPIROM DOC REV: CPT | Performed by: INTERNAL MEDICINE

## 2018-08-02 PROCEDURE — G8427 DOCREV CUR MEDS BY ELIG CLIN: HCPCS | Performed by: INTERNAL MEDICINE

## 2018-08-02 ASSESSMENT — ENCOUNTER SYMPTOMS
SORE THROAT: 0
SINUS PRESSURE: 0
NAUSEA: 0
ABDOMINAL PAIN: 0
DIARRHEA: 0
VOMITING: 0
WHEEZING: 1
COUGH: 1
SHORTNESS OF BREATH: 1
RHINORRHEA: 0
CHEST TIGHTNESS: 0

## 2018-08-02 NOTE — PROGRESS NOTES
Main Topics    Smoking status: Former Smoker     Packs/day: 0.50     Types: Cigarettes     Quit date: 7/11/2018    Smokeless tobacco: Never Used      Comment: uses electronic cigarettes & trying to cut back    Alcohol use 0.6 oz/week     1 Standard drinks or equivalent per week      Comment: socially    Drug use: Yes     Frequency: 1.0 time per week      Comment: marijuana, quit July 2013    Sexual activity: Yes     Other Topics Concern    Not on file     Social History Narrative    Born in [facility], has 1 brother one sister    Never , was in a bad relationship    Lives with daughter age 16 (2017) in a house in [facility]    2 daughters, one daughter with problems, one in college    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 Negative for appetite change, chills, diaphoresis and fever. HENT: Negative for congestion, nosebleeds, postnasal drip, rhinorrhea, sinus pressure, sneezing and sore throat. Eyes: Negative for visual disturbance. Respiratory: Positive for cough, shortness of breath and wheezing. Negative for chest tightness. She has had chronic cough, shortness breath and wheezing episodes which seemed to improve since smoking cessation 3 weeks ago   Cardiovascular: Negative for chest pain, palpitations and leg swelling. Gastrointestinal: Negative for abdominal pain, diarrhea, nausea and vomiting. Genitourinary: Negative for dysuria and urgency. Musculoskeletal: Positive for arthralgias. Negative for joint swelling and myalgias. Skin: Negative for rash. Allergic/Immunologic: Negative for environmental allergies. Neurological: Negative for tremors, weakness, light-headedness and headaches. Psychiatric/Behavioral: Positive for sleep disturbance. Negative for behavioral problems.          Objective:     Vitals:    08/02/18 1036   BP: 112/62   Site: Left Arm about 4 weeks (around 8/30/2018) for after sleep study, after PFTs, re-evaluation.       Wale Franklin MD

## 2018-08-06 ENCOUNTER — HOSPITAL ENCOUNTER (OUTPATIENT)
Dept: SLEEP CENTER | Age: 43
Discharge: HOME | End: 2018-08-08
Payer: COMMERCIAL

## 2018-08-06 PROCEDURE — 95810 POLYSOM 6/> YRS 4/> PARAM: CPT

## 2018-08-16 ENCOUNTER — HOSPITAL ENCOUNTER (OUTPATIENT)
Dept: NON INVASIVE DIAGNOSTICS | Age: 43
Discharge: HOME | End: 2018-08-16
Payer: COMMERCIAL

## 2018-08-16 ENCOUNTER — HOSPITAL ENCOUNTER (OUTPATIENT)
Dept: PULMONOLOGY | Age: 43
Discharge: HOME | End: 2018-08-16
Payer: COMMERCIAL

## 2018-08-16 ENCOUNTER — HOSPITAL ENCOUNTER (OUTPATIENT)
Dept: NUCLEAR MEDICINE | Age: 43
Discharge: HOME | End: 2018-08-18
Payer: COMMERCIAL

## 2018-08-16 DIAGNOSIS — J44.9 CHRONIC OBSTRUCTIVE PULMONARY DISEASE, UNSPECIFIED COPD TYPE (HCC): ICD-10-CM

## 2018-08-16 DIAGNOSIS — R07.9 CHEST PAIN, UNSPECIFIED TYPE: ICD-10-CM

## 2018-08-16 DIAGNOSIS — R06.02 SHORTNESS OF BREATH: ICD-10-CM

## 2018-08-16 LAB
LV EF: 60 %
LVEF MODALITY: NORMAL

## 2018-08-16 PROCEDURE — 94060 EVALUATION OF WHEEZING: CPT

## 2018-08-16 PROCEDURE — 93306 TTE W/DOPPLER COMPLETE: CPT

## 2018-08-16 PROCEDURE — 6360000002 HC RX W HCPCS: Performed by: INTERNAL MEDICINE

## 2018-08-16 PROCEDURE — 94060 EVALUATION OF WHEEZING: CPT | Performed by: INTERNAL MEDICINE

## 2018-08-16 PROCEDURE — A9502 TC99M TETROFOSMIN: HCPCS | Performed by: INTERNAL MEDICINE

## 2018-08-16 PROCEDURE — 94729 DIFFUSING CAPACITY: CPT | Performed by: INTERNAL MEDICINE

## 2018-08-16 PROCEDURE — 2580000003 HC RX 258: Performed by: INTERNAL MEDICINE

## 2018-08-16 PROCEDURE — 93017 CV STRESS TEST TRACING ONLY: CPT

## 2018-08-16 PROCEDURE — 94729 DIFFUSING CAPACITY: CPT

## 2018-08-16 PROCEDURE — 94726 PLETHYSMOGRAPHY LUNG VOLUMES: CPT

## 2018-08-16 PROCEDURE — 3430000000 HC RX DIAGNOSTIC RADIOPHARMACEUTICAL: Performed by: INTERNAL MEDICINE

## 2018-08-16 PROCEDURE — 94726 PLETHYSMOGRAPHY LUNG VOLUMES: CPT | Performed by: INTERNAL MEDICINE

## 2018-08-16 RX ORDER — ALBUTEROL SULFATE 2.5 MG/3ML
2.5 SOLUTION RESPIRATORY (INHALATION) ONCE
Status: COMPLETED | OUTPATIENT
Start: 2018-08-16 | End: 2018-08-16

## 2018-08-16 RX ORDER — SODIUM CHLORIDE 0.9 % (FLUSH) 0.9 %
10 SYRINGE (ML) INJECTION PRN
Status: DISCONTINUED | OUTPATIENT
Start: 2018-08-16 | End: 2018-08-19 | Stop reason: HOSPADM

## 2018-08-16 RX ADMIN — Medication 10 ML: at 09:29

## 2018-08-16 RX ADMIN — ALBUTEROL SULFATE 2.5 MG: 2.5 SOLUTION RESPIRATORY (INHALATION) at 09:54

## 2018-08-16 RX ADMIN — TETROFOSMIN 32.5 MILLICURIE: 0.23 INJECTION, POWDER, LYOPHILIZED, FOR SOLUTION INTRAVENOUS at 09:30

## 2018-08-16 RX ADMIN — REGADENOSON 0.4 MG: 0.08 INJECTION, SOLUTION INTRAVENOUS at 09:30

## 2018-08-16 RX ADMIN — Medication 10 ML: at 09:30

## 2018-08-16 NOTE — PROGRESS NOTES
1st saline admin 929  930 desire admin  2nd saline 0930    Pt states she is feeling sob nausea    0932 nausea and sob resolved headache at this time iv to remain in at this time    129/69 hr 94 0934    0935 procedure complete pt advised headache is better and denies any other complaints at this time      [phone] pt sent for something to eat

## 2018-08-17 ENCOUNTER — TELEPHONE (OUTPATIENT)
Dept: CARDIOLOGY CLINIC | Age: 43
End: 2018-08-17

## 2018-08-17 PROCEDURE — A9502 TC99M TETROFOSMIN: HCPCS | Performed by: INTERNAL MEDICINE

## 2018-08-17 PROCEDURE — 3430000000 HC RX DIAGNOSTIC RADIOPHARMACEUTICAL: Performed by: INTERNAL MEDICINE

## 2018-08-17 RX ADMIN — TETROFOSMIN 34.4 MILLICURIE: 0.23 INJECTION, POWDER, LYOPHILIZED, FOR SOLUTION INTRAVENOUS at 08:10

## 2018-08-23 ENCOUNTER — OFFICE VISIT (OUTPATIENT)
Dept: CARDIOLOGY CLINIC | Age: 43
End: 2018-08-23
Payer: COMMERCIAL

## 2018-08-23 ENCOUNTER — OFFICE VISIT (OUTPATIENT)
Dept: INTERNAL MEDICINE CLINIC | Age: 43
End: 2018-08-23
Payer: COMMERCIAL

## 2018-08-23 VITALS
DIASTOLIC BLOOD PRESSURE: 80 MMHG | SYSTOLIC BLOOD PRESSURE: 138 MMHG | BODY MASS INDEX: 44.82 KG/M2 | HEIGHT: 65 IN | HEART RATE: 94 BPM | RESPIRATION RATE: 16 BRPM | WEIGHT: 269 LBS | OXYGEN SATURATION: 98 %

## 2018-08-23 VITALS
BODY MASS INDEX: 44.82 KG/M2 | TEMPERATURE: 97.9 F | HEIGHT: 65 IN | DIASTOLIC BLOOD PRESSURE: 82 MMHG | SYSTOLIC BLOOD PRESSURE: 138 MMHG | WEIGHT: 269 LBS | OXYGEN SATURATION: 98 % | HEART RATE: 102 BPM

## 2018-08-23 DIAGNOSIS — E78.5 HYPERLIPIDEMIA WITH TARGET LDL LESS THAN 130: Primary | ICD-10-CM

## 2018-08-23 DIAGNOSIS — R07.9 CHEST PAIN, UNSPECIFIED TYPE: ICD-10-CM

## 2018-08-23 DIAGNOSIS — F43.23 SITUATIONAL MIXED ANXIETY AND DEPRESSIVE DISORDER: Primary | ICD-10-CM

## 2018-08-23 DIAGNOSIS — K59.09 OTHER CONSTIPATION: ICD-10-CM

## 2018-08-23 DIAGNOSIS — R06.02 SHORTNESS OF BREATH: ICD-10-CM

## 2018-08-23 DIAGNOSIS — I10 ESSENTIAL HYPERTENSION, BENIGN: ICD-10-CM

## 2018-08-23 PROCEDURE — G8417 CALC BMI ABV UP PARAM F/U: HCPCS | Performed by: INTERNAL MEDICINE

## 2018-08-23 PROCEDURE — G8427 DOCREV CUR MEDS BY ELIG CLIN: HCPCS | Performed by: INTERNAL MEDICINE

## 2018-08-23 PROCEDURE — 1036F TOBACCO NON-USER: CPT | Performed by: INTERNAL MEDICINE

## 2018-08-23 PROCEDURE — 99214 OFFICE O/P EST MOD 30 MIN: CPT | Performed by: INTERNAL MEDICINE

## 2018-08-23 PROCEDURE — 99213 OFFICE O/P EST LOW 20 MIN: CPT | Performed by: INTERNAL MEDICINE

## 2018-08-23 RX ORDER — SENNA AND DOCUSATE SODIUM 50; 8.6 MG/1; MG/1
1 TABLET, FILM COATED ORAL DAILY
Qty: 30 TABLET | Refills: 2 | Status: SHIPPED | OUTPATIENT
Start: 2018-08-23 | End: 2018-09-27

## 2018-08-23 RX ORDER — BUSPIRONE HYDROCHLORIDE 5 MG/1
5 TABLET ORAL 3 TIMES DAILY PRN
Qty: 60 TABLET | Refills: 1 | Status: SHIPPED | OUTPATIENT
Start: 2018-08-23 | End: 2018-09-24 | Stop reason: SDUPTHER

## 2018-08-23 ASSESSMENT — ENCOUNTER SYMPTOMS
GASTROINTESTINAL NEGATIVE: 1
ABDOMINAL PAIN: 0
CHEST TIGHTNESS: 1
CHOKING: 0
CONSTIPATION: 1
NAUSEA: 0
COUGH: 0
CHEST TIGHTNESS: 0
NAUSEA: 1
STRIDOR: 0
SHORTNESS OF BREATH: 0
SHORTNESS OF BREATH: 1
HEMATOCHEZIA: 0
EYES NEGATIVE: 1
BLOATING: 1
WHEEZING: 0
BLOOD IN STOOL: 0

## 2018-08-23 NOTE — PROGRESS NOTES
here for evaluation of anxiety. She has the following anxiety symptoms: chest pain, difficulty concentrating, fatigue, feelings of losing control, shortness of breath, sweating. Onset of symptoms was approximately a few weeks ago. Symptoms have been gradually worsening since that time. She denies current suicidal and homicidal ideation. Family history significant for no psychiatric illness. Possible organic causes contributing are: none. Risk factors: negative life event imminent eviction and previous episode of depression Previous treatment includes medication Prozac. She complains of the following medication side effects: none. Constipation   This is a new problem. The current episode started in the past 7 days. The problem is unchanged. The patient is not on a high fiber diet. She does not exercise regularly. There has not been adequate water intake. Associated symptoms include bloating and nausea. Pertinent negatives include no abdominal pain, anorexia, difficulty urinating, fever, hematochezia, hemorrhoids or melena. Risk factors include change in medication usage/dosage, obesity and stress. She has tried nothing for the symptoms. There is no history of endocrine disease, inflammatory bowel disease, irritable bowel syndrome or psychiatric history. More detail above in the chief complaint(s), interim history and below in the review of systems.      Past Medical History:   Diagnosis Date    Allergic rhinitis     several allergens    Asthma     since shildhood    Brachial neuralgia 1/23/2014    Chronic left shoulder pain     H/O colonoscopy 2014    Dr. Shayla Garzon History of sleep apnea     did not tolerate CPAP, test normalized    Hyperlipidemia     Hypertension 2010    Migraine headache     Obesity, morbid (more than 100 lbs over ideal weight or BMI > 40) ([facility] [address]        Past Surgical History:   Procedure Laterality Date    BREAST SURGERY  2007    reduction, Dr Casi Betancourt  10/20/15 exercise habits (more aerobic exercise as tolerated), better stress management, will result in improved health and help in better management of the current chronic conditions in the long run. Side effects, adverse effects of the medication prescribed (or refilled) today, treatment plan/ rationale and result expectations have (again) been discussed with the patient who expresses understanding and consents to proceed as outlined above. Additional advise included in the \"after visit summary\". Orders Placed This Encounter   Medications    busPIRone (BUSPAR) 5 MG tablet     Sig: Take 1 tablet by mouth 3 times daily as needed (anxiety)     Dispense:  60 tablet     Refill:  1    sennosides-docusate sodium (SENOKOT-S) 8.6-50 MG tablet     Sig: Take 1 tablet by mouth daily     Dispense:  30 tablet     Refill:  2       Further workup and plan will be determined based on clinical progression and follow up test/ treatment results. Close follow up needed to evaluate treatment results and for care coordination. Return if symptoms worsen or fail to improve. I have reviewed the patient's medical and surgical, family and social history, health maintenance schedule, and updated the computerized patient record. Please note this report has been partially produced by using speech recognition hardware. It may contain errors related to the system, including grammar, punctuation and spelling as well as words and phrases that may seem inaccurate. For any questions or concerns, please feel free to contact me for clarification.         Electronically signed by Noemi Mccloud MD

## 2018-08-23 NOTE — PROGRESS NOTES
Subsequent Progress Note  Patient: Mike Duarte  YOB: 1975  MRN: [id number]    Chief Complaint: preop bariatric cp sob  Chief Complaint   Patient presents with    Chest Pain     Review Echo and Stress-not completed    Shortness of Breath    Hypertension       CV Data:  8/2018 spect- negative  8/2018 echo 60%  Subjective/HPI: still having cp and rojas despite  above   Stopped meds due to constipation     EKG:    Past Medical History:   Diagnosis Date    Allergic rhinitis     several allergens    Asthma     since shildhood    Brachial neuralgia 1/23/2014    Chronic left shoulder pain     H/O colonoscopy 2014    Dr. Alas Pulse History of sleep apnea     did not tolerate CPAP, test normalized    Hyperlipidemia     Hypertension 2010    Migraine headache     Obesity, morbid (more than 100 lbs over ideal weight or BMI > 40) ([facility])        Past Surgical History:   Procedure Laterality Date    BREAST SURGERY  2007    reduction, Dr Nell Jalloh  10/20/15    DR. 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, self employed      Social History Main Topics    Smoking status: Former Smoker     Packs/day: 0.50     Types: Cigarettes     Quit date: 7/11/2018    Smokeless tobacco: Never Used      Comment: uses electronic cigarettes & trying to cut back    Alcohol use 0.6 oz/week     1 Standard drinks or equivalent per week      Comment: socially    Drug use: Yes     Frequency: 1.0 time per week      Comment: marijuana, quit July 2013    Sexual activity: Yes     Other Topics Concern    None     Social History Narrative    Born in [place], has 1 brother one sister    Never , was in a bad relationship    Lives with daughter age 16 (2017) in a house in [place]    2 daughters, one daughter with problems, one in college    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)    Hobbies reading, puzzles, games, outdoor activities     [facility]        No Known Allergies    Current Outpatient Prescriptions   Medication Sig Dispense Refill    rosuvastatin (CRESTOR) 20 MG tablet Take 1 tab po nightly 30 tablet 3    FLUoxetine (PROZAC) 20 MG capsule TAKE 1 CAPSULE BY MOUTH DAILY 30 capsule 3    VENTOLIN  (90 Base) MCG/ACT inhaler INHALE 2 PUFFS into the lungs EVERY 6 HOURS AS NEEDED FOR WHEEZING or SHORTNESS OF BREATH 18 g 3    BREO ELLIPTA 100-25 MCG/INH AEPB inhaler INHALE 1 PUFF into the lungs DAILY 30 each 3    losartan-hydrochlorothiazide (HYZAAR) 100-25 MG per tablet TAKE 1 TABLET BY MOUTH DAILY 30 tablet 3    famotidine (PEPCID) 20 MG tablet Take 20 mg by mouth 2 times daily      fenofibrate (TRICOR) 145 MG tablet Take 1 tablet by mouth daily 30 tablet 3    metFORMIN (GLUCOPHAGE) 500 MG tablet Take 1 tablet by mouth nightly 30 tablet 5    loratadine (CLARITIN) 10 MG tablet TAKE 1 TABLET BY MOUTH DAILY AS NEEDED for allergies 30 tablet 3    varenicline (CHANTIX STARTING MONTH PAK) 0.5 MG X 11 & 1 MG X 42 tablet Take po as directed on pack 1 each 0     No current facility-administered medications for this visit. Review of Systems:   Review of Systems   Constitutional: Negative. Negative for diaphoresis and fatigue. HENT: Negative. Eyes: Negative. Respiratory: Positive for shortness of breath. Negative for cough, chest tightness, wheezing and stridor. Cardiovascular: Positive for chest pain. Negative for palpitations and leg swelling. Gastrointestinal: Negative. Negative for blood in stool and nausea. Genitourinary: Negative. Musculoskeletal: Negative.     Skin: Negative. Neurological: Negative. Negative for dizziness, syncope, weakness and light-headedness. Hematological: Negative. Psychiatric/Behavioral: Negative. Physical Examination:    /80 (Site: Left Arm, Position: Sitting, Cuff Size: Large Adult)   Pulse 94   Resp 16   Ht 5' 5\" (1.651 m)   Wt 269 lb (122 kg)   LMP 08/08/2018 (Approximate)   SpO2 98%   BMI 44.76 kg/m²    Physical Exam   Constitutional: She appears healthy. No distress. HENT:   Normal cephalic and Atraumatic   Eyes: Pupils are equal, round, and reactive to light. Neck: Normal range of motion and thyroid normal. Neck supple. No JVD present. No neck adenopathy. No thyromegaly present. Cardiovascular: Normal rate, regular rhythm, normal heart sounds, intact distal pulses and normal pulses. Pulmonary/Chest: Effort normal and breath sounds normal. She has no wheezes. She has no rales. She exhibits no tenderness. Abdominal: Soft. Bowel sounds are normal. There is no tenderness. Musculoskeletal: Normal range of motion. She exhibits no edema or tenderness. Neurological: She is alert and oriented to person, place, and time. Skin: Skin is warm. No cyanosis. Nails show no clubbing.        LABS:  CBC:   Lab Results   Component Value Date    WBC 8.4 08/07/2015    RBC 4.27 08/07/2015    HGB 13.5 08/07/2015    HCT 40.0 08/07/2015    MCV 93.7 08/07/2015    MCH 31.5 08/07/2015    MCHC 33.7 08/07/2015    RDW 13.4 08/07/2015     08/07/2015    MPV 10.1 08/07/2015     Lipids:  Lab Results   Component Value Date    CHOL 176 03/29/2018    CHOL 317 (H) 07/11/2017    CHOL 229 (H) 05/09/2017     Lab Results   Component Value Date    TRIG 603 (H) 03/29/2018    TRIG 1,299 (H) 07/11/2017    TRIG 1,299 (H) 05/09/2017     Lab Results   Component Value Date    HDL 32 (L) 03/29/2018    HDL 25 (L) 07/11/2017    HDL 26 (L) 05/09/2017     Lab Results   Component Value Date    LDLCALC see below 03/29/2018    LDLCALC see below Take Precautions to Prevent Falls, Regular Exercise and Walk Daily    Return in about 1 month (around 9/23/2018) for Cardiovascular care. .      Electronically signed by Ginny Javed MD on 8/23/2018 at 2:47 PM

## 2018-08-28 RX ORDER — LORATADINE 10 MG/1
TABLET ORAL
Qty: 30 TABLET | Refills: 3 | Status: SHIPPED | OUTPATIENT
Start: 2018-08-28 | End: 2018-12-23 | Stop reason: SDUPTHER

## 2018-09-20 ENCOUNTER — TELEPHONE (OUTPATIENT)
Dept: CARDIOLOGY CLINIC | Age: 43
End: 2018-09-20

## 2018-09-27 ENCOUNTER — OFFICE VISIT (OUTPATIENT)
Dept: CARDIOLOGY CLINIC | Age: 43
End: 2018-09-27
Payer: COMMERCIAL

## 2018-09-27 VITALS
HEART RATE: 104 BPM | SYSTOLIC BLOOD PRESSURE: 122 MMHG | BODY MASS INDEX: 44.48 KG/M2 | OXYGEN SATURATION: 99 % | WEIGHT: 267 LBS | RESPIRATION RATE: 16 BRPM | HEIGHT: 65 IN | DIASTOLIC BLOOD PRESSURE: 66 MMHG

## 2018-09-27 DIAGNOSIS — I10 ESSENTIAL HYPERTENSION, BENIGN: Primary | ICD-10-CM

## 2018-09-27 DIAGNOSIS — R07.9 CHEST PAIN, UNSPECIFIED TYPE: ICD-10-CM

## 2018-09-27 DIAGNOSIS — R06.02 SHORTNESS OF BREATH: ICD-10-CM

## 2018-09-27 DIAGNOSIS — E78.5 HYPERLIPIDEMIA WITH TARGET LDL LESS THAN 130: ICD-10-CM

## 2018-09-27 PROCEDURE — 99213 OFFICE O/P EST LOW 20 MIN: CPT | Performed by: INTERNAL MEDICINE

## 2018-09-27 PROCEDURE — G8417 CALC BMI ABV UP PARAM F/U: HCPCS | Performed by: INTERNAL MEDICINE

## 2018-09-27 PROCEDURE — 1036F TOBACCO NON-USER: CPT | Performed by: INTERNAL MEDICINE

## 2018-09-27 PROCEDURE — G8427 DOCREV CUR MEDS BY ELIG CLIN: HCPCS | Performed by: INTERNAL MEDICINE

## 2018-09-27 ASSESSMENT — ENCOUNTER SYMPTOMS
COUGH: 0
GASTROINTESTINAL NEGATIVE: 1
WHEEZING: 0
STRIDOR: 0
SHORTNESS OF BREATH: 0
NAUSEA: 0
RESPIRATORY NEGATIVE: 1
BLOOD IN STOOL: 0
CHEST TIGHTNESS: 0
EYES NEGATIVE: 1

## 2018-09-27 NOTE — PROGRESS NOTES
Subsequent Progress Note  Patient: Krystal Waldron  YOB: 1975  MRN: [id number]    Chief Complaint: Preop Bariatric CP sob  Chief Complaint   Patient presents with    Shortness of Breath     1 m f/u    Chest Pain    Hypertension       CV Data:  8/2018 spect- incomplete. Pt could not complete 2nd part  8/2018 echo 60%  Subjective/HPI:   SOB and CP gone since stopping smoking. Feels fine. EKG:SR    Past Medical History:   Diagnosis Date    Allergic rhinitis     several allergens    Asthma     since shildhood    Brachial neuralgia 1/23/2014    Chronic left shoulder pain     H/O colonoscopy 2014    Dr. Dea Cruz History of sleep apnea     did not tolerate CPAP, test normalized    Hyperlipidemia     Hypertension 2010    Migraine headache     Obesity, morbid (more than 100 lbs over ideal weight or BMI > 40) ([facility] [address]        Past Surgical History:   Procedure Laterality Date    BREAST SURGERY  2007    reduction, Dr Stefanie Campos  10/20/15    DR. 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, self employed      Social History Main Topics    Smoking status: Former Smoker     Packs/day: 0.50     Types: Cigarettes     Quit date: 7/11/2018    Smokeless tobacco: Never Used      Comment: uses electronic cigarettes & trying to cut back    Alcohol use 0.6 oz/week     1 Standard drinks or equivalent per week      Comment: socially    Drug use: Yes     Frequency: 1.0 time per week      Comment: marijuana, quit July 2013    Sexual activity: Yes     Other Topics Concern    None     Social History Narrative    Born in [place], has 1 brother one sister    Never , was in a bad relationship    Lives with daughter age 16 (2017) in a house in [place]    2 daughters, one daughter with problems, one in college    Works at [employer] ([employer])    Hobbies reading, puzzles, games, outdoor activities     [place]        No Known Allergies    Current Outpatient Prescriptions   Medication Sig Dispense Refill    busPIRone (BUSPAR) 5 MG tablet Take 1 tablet by mouth 3 times daily as needed (anxiety) 90 tablet 3    metFORMIN (GLUCOPHAGE) 500 MG tablet Take 1 tablet by mouth nightly 30 tablet 3    loratadine (CLARITIN) 10 MG tablet TAKE 1 TABLET BY MOUTH DAILY AS NEEDED for allergies 30 tablet 3    rosuvastatin (CRESTOR) 20 MG tablet Take 1 tab po nightly 30 tablet 3    FLUoxetine (PROZAC) 20 MG capsule TAKE 1 CAPSULE BY MOUTH DAILY 30 capsule 3    VENTOLIN  (90 Base) MCG/ACT inhaler INHALE 2 PUFFS into the lungs EVERY 6 HOURS AS NEEDED FOR WHEEZING or SHORTNESS OF BREATH 18 g 3    losartan-hydrochlorothiazide (HYZAAR) 100-25 MG per tablet TAKE 1 TABLET BY MOUTH DAILY 30 tablet 3    famotidine (PEPCID) 20 MG tablet Take 20 mg by mouth 2 times daily      fenofibrate (TRICOR) 145 MG tablet Take 1 tablet by mouth daily 30 tablet 3     No current facility-administered medications for this visit. Review of Systems:   Review of Systems   Constitutional: Negative. Negative for diaphoresis and fatigue. HENT: Negative. Eyes: Negative. Respiratory: Negative. Negative for cough, chest tightness, shortness of breath, wheezing and stridor. Cardiovascular: Negative. Negative for chest pain, palpitations and leg swelling. Gastrointestinal: Negative. Negative for blood in stool and nausea. Genitourinary: Negative. Musculoskeletal: Negative. Skin: Negative. Neurological: Negative. Negative for dizziness, syncope, weakness and light-headedness.

## 2018-10-10 ENCOUNTER — OFFICE VISIT (OUTPATIENT)
Dept: PULMONOLOGY | Age: 43
End: 2018-10-10
Payer: COMMERCIAL

## 2018-10-10 VITALS
SYSTOLIC BLOOD PRESSURE: 108 MMHG | BODY MASS INDEX: 44.24 KG/M2 | TEMPERATURE: 97.8 F | HEIGHT: 65 IN | WEIGHT: 265.5 LBS | OXYGEN SATURATION: 99 % | DIASTOLIC BLOOD PRESSURE: 60 MMHG | HEART RATE: 77 BPM

## 2018-10-10 DIAGNOSIS — G47.33 OBSTRUCTIVE SLEEP APNEA: Primary | ICD-10-CM

## 2018-10-10 DIAGNOSIS — E66.3 OVERWEIGHT: ICD-10-CM

## 2018-10-10 PROCEDURE — 99214 OFFICE O/P EST MOD 30 MIN: CPT | Performed by: INTERNAL MEDICINE

## 2018-10-10 PROCEDURE — 1036F TOBACCO NON-USER: CPT | Performed by: INTERNAL MEDICINE

## 2018-10-10 PROCEDURE — G8427 DOCREV CUR MEDS BY ELIG CLIN: HCPCS | Performed by: INTERNAL MEDICINE

## 2018-10-10 PROCEDURE — G8417 CALC BMI ABV UP PARAM F/U: HCPCS | Performed by: INTERNAL MEDICINE

## 2018-10-10 PROCEDURE — G8484 FLU IMMUNIZE NO ADMIN: HCPCS | Performed by: INTERNAL MEDICINE

## 2018-10-10 ASSESSMENT — ENCOUNTER SYMPTOMS
NAUSEA: 0
VOMITING: 0
COUGH: 0
WHEEZING: 0
SINUS PRESSURE: 0
RHINORRHEA: 0
SORE THROAT: 0
ABDOMINAL PAIN: 0
DIARRHEA: 0
SHORTNESS OF BREATH: 0
CHEST TIGHTNESS: 0

## 2018-10-10 NOTE — PROGRESS NOTES
Subjective:     Laureano Beebe is a 37 y.o. female who complains today of:     Chief Complaint   Patient presents with    Follow-up     surgery clearance       HPI  Patient is here for a follow-up visit regarding sleep apnea, suspected COPD, and preoperative clearance for bariatric procedure. Since her initial visit patient had a sleep study done which showed mild sleep-disordered breathing with an AHI of 5.4 per hour associated with moderate hypoxic episodes down to 84% as well as moderate snoring. PFTs were unremarkable for significant obstructive, restrictive, or diffusion impairment. Allergies:  Patient has no known allergies. Past Medical History:   Diagnosis Date    Allergic rhinitis     several allergens    Asthma     since shildhood    Brachial neuralgia 1/23/2014    Chronic left shoulder pain     H/O colonoscopy 2014    Dr. Norma Salas History of sleep apnea     did not tolerate CPAP, test normalized    Hyperlipidemia     Hypertension 2010    Migraine headache     Obesity, morbid (more than 100 lbs over ideal weight or BMI > 40) ([facility] [address]      Past Surgical History:   Procedure Laterality Date    BREAST SURGERY  2007    reduction, Dr Ana Baez  10/20/15    DR. Kaila Major Right     Dr Stella Becker, [place] Kulkarni    TUBAL LIGATION       Family History   Problem Relation Age of Onset    Osteoarthritis Mother     Other Mother         hyperlipidemia    Hypertension Mother     High Cholesterol Mother     No Known Problems Father      Social History     Social History    Marital status: Single     Spouse name: N/A    Number of children: 2    Years of education: N/A     Occupational History    dog grooming business, self employed      Social History Main Topics    Smoking status: Former Smoker     Packs/day: 0.50     Types: Cigarettes     Quit date: 7/11/2018    Smokeless tobacco: Never Used      Comment: uses electronic cigarettes & trying to cut back    Alcohol use 0.6

## 2018-10-11 DIAGNOSIS — G47.33 OBSTRUCTIVE SLEEP APNEA: Primary | ICD-10-CM

## 2018-10-19 ENCOUNTER — HOSPITAL ENCOUNTER (OUTPATIENT)
Dept: SLEEP CENTER | Age: 43
Discharge: HOME | End: 2018-10-21
Payer: COMMERCIAL

## 2018-10-19 PROCEDURE — 95811 POLYSOM 6/>YRS CPAP 4/> PARM: CPT

## 2018-10-19 PROCEDURE — 95811 POLYSOM 6/>YRS CPAP 4/> PARM: CPT | Performed by: INTERNAL MEDICINE

## 2018-10-23 DIAGNOSIS — G47.33 OBSTRUCTIVE SLEEP APNEA: ICD-10-CM

## 2018-12-24 RX ORDER — LORATADINE 10 MG/1
TABLET ORAL
Qty: 30 TABLET | Refills: 3 | Status: SHIPPED | OUTPATIENT
Start: 2018-12-24 | End: 2019-03-05 | Stop reason: SDUPTHER

## 2019-01-28 RX ORDER — BUSPIRONE HYDROCHLORIDE 5 MG/1
5 TABLET ORAL 3 TIMES DAILY PRN
Qty: 90 TABLET | Refills: 1 | Status: SHIPPED | OUTPATIENT
Start: 2019-01-28 | End: 2019-04-01 | Stop reason: SDUPTHER

## 2019-03-05 ENCOUNTER — OFFICE VISIT (OUTPATIENT)
Dept: INTERNAL MEDICINE CLINIC | Age: 44
End: 2019-03-05
Payer: COMMERCIAL

## 2019-03-05 VITALS
DIASTOLIC BLOOD PRESSURE: 78 MMHG | SYSTOLIC BLOOD PRESSURE: 152 MMHG | OXYGEN SATURATION: 98 % | WEIGHT: 281 LBS | HEART RATE: 77 BPM | BODY MASS INDEX: 46.82 KG/M2 | TEMPERATURE: 97.1 F | HEIGHT: 65 IN

## 2019-03-05 DIAGNOSIS — R63.5 WEIGHT GAIN: ICD-10-CM

## 2019-03-05 DIAGNOSIS — I10 HYPERTENSION, UNCONTROLLED: Primary | ICD-10-CM

## 2019-03-05 PROCEDURE — G8427 DOCREV CUR MEDS BY ELIG CLIN: HCPCS | Performed by: INTERNAL MEDICINE

## 2019-03-05 PROCEDURE — 99214 OFFICE O/P EST MOD 30 MIN: CPT | Performed by: INTERNAL MEDICINE

## 2019-03-05 PROCEDURE — G8484 FLU IMMUNIZE NO ADMIN: HCPCS | Performed by: INTERNAL MEDICINE

## 2019-03-05 PROCEDURE — 1036F TOBACCO NON-USER: CPT | Performed by: INTERNAL MEDICINE

## 2019-03-05 PROCEDURE — G8417 CALC BMI ABV UP PARAM F/U: HCPCS | Performed by: INTERNAL MEDICINE

## 2019-03-05 RX ORDER — FLUTICASONE FUROATE AND VILANTEROL 100; 25 UG/1; UG/1
POWDER RESPIRATORY (INHALATION)
Qty: 28 EACH | Refills: 3 | Status: SHIPPED | OUTPATIENT
Start: 2019-03-05 | End: 2019-04-09 | Stop reason: ALTCHOICE

## 2019-03-05 RX ORDER — LORATADINE 10 MG/1
TABLET ORAL
Qty: 30 TABLET | Refills: 3 | Status: SHIPPED | OUTPATIENT
Start: 2019-03-05 | End: 2019-07-01 | Stop reason: SDUPTHER

## 2019-03-05 RX ORDER — ROSUVASTATIN CALCIUM 20 MG/1
TABLET, COATED ORAL
Qty: 30 TABLET | Refills: 3 | Status: SHIPPED | OUTPATIENT
Start: 2019-03-05 | End: 2019-07-29 | Stop reason: SDUPTHER

## 2019-03-05 RX ORDER — LOSARTAN POTASSIUM AND HYDROCHLOROTHIAZIDE 25; 100 MG/1; MG/1
TABLET ORAL
Qty: 30 TABLET | Refills: 3 | Status: SHIPPED | OUTPATIENT
Start: 2019-03-05 | End: 2019-08-23 | Stop reason: SDUPTHER

## 2019-03-05 ASSESSMENT — ENCOUNTER SYMPTOMS
ABDOMINAL PAIN: 0
TROUBLE SWALLOWING: 0
SHORTNESS OF BREATH: 0
BACK PAIN: 1
BLOOD IN STOOL: 0
SORE THROAT: 0
SINUS PRESSURE: 0

## 2019-03-05 ASSESSMENT — PATIENT HEALTH QUESTIONNAIRE - PHQ9
SUM OF ALL RESPONSES TO PHQ QUESTIONS 1-9: 0
2. FEELING DOWN, DEPRESSED OR HOPELESS: 0
SUM OF ALL RESPONSES TO PHQ9 QUESTIONS 1 & 2: 0
1. LITTLE INTEREST OR PLEASURE IN DOING THINGS: 0
SUM OF ALL RESPONSES TO PHQ QUESTIONS 1-9: 0

## 2019-04-02 RX ORDER — ALBUTEROL SULFATE 90 UG/1
AEROSOL, METERED RESPIRATORY (INHALATION)
Qty: 18 G | Refills: 3 | Status: SHIPPED | OUTPATIENT
Start: 2019-04-02 | End: 2020-08-24 | Stop reason: SDUPTHER

## 2019-04-02 RX ORDER — FLUOXETINE HYDROCHLORIDE 20 MG/1
CAPSULE ORAL
Qty: 30 CAPSULE | Refills: 3 | Status: SHIPPED | OUTPATIENT
Start: 2019-04-02 | End: 2019-07-29 | Stop reason: SDUPTHER

## 2019-04-02 RX ORDER — BUSPIRONE HYDROCHLORIDE 5 MG/1
5 TABLET ORAL 3 TIMES DAILY PRN
Qty: 90 TABLET | Refills: 3 | Status: SHIPPED | OUTPATIENT
Start: 2019-04-02 | End: 2019-07-29 | Stop reason: SDUPTHER

## 2019-04-09 ENCOUNTER — OFFICE VISIT (OUTPATIENT)
Dept: INTERNAL MEDICINE CLINIC | Age: 44
End: 2019-04-09
Payer: COMMERCIAL

## 2019-04-09 VITALS
HEART RATE: 76 BPM | BODY MASS INDEX: 46.72 KG/M2 | TEMPERATURE: 97.4 F | SYSTOLIC BLOOD PRESSURE: 142 MMHG | OXYGEN SATURATION: 99 % | HEIGHT: 65 IN | WEIGHT: 280.4 LBS | RESPIRATION RATE: 16 BRPM | DIASTOLIC BLOOD PRESSURE: 92 MMHG

## 2019-04-09 DIAGNOSIS — E66.01 OBESITY, MORBID, BMI 40.0-49.9 (HCC): ICD-10-CM

## 2019-04-09 DIAGNOSIS — J44.9 CHRONIC OBSTRUCTIVE PULMONARY DISEASE, UNSPECIFIED COPD TYPE (HCC): ICD-10-CM

## 2019-04-09 DIAGNOSIS — E78.5 HYPERLIPIDEMIA, UNSPECIFIED HYPERLIPIDEMIA TYPE: ICD-10-CM

## 2019-04-09 DIAGNOSIS — I10 ESSENTIAL HYPERTENSION: Primary | ICD-10-CM

## 2019-04-09 PROCEDURE — 3023F SPIROM DOC REV: CPT | Performed by: INTERNAL MEDICINE

## 2019-04-09 PROCEDURE — 1036F TOBACCO NON-USER: CPT | Performed by: INTERNAL MEDICINE

## 2019-04-09 PROCEDURE — 99213 OFFICE O/P EST LOW 20 MIN: CPT | Performed by: INTERNAL MEDICINE

## 2019-04-09 PROCEDURE — G8417 CALC BMI ABV UP PARAM F/U: HCPCS | Performed by: INTERNAL MEDICINE

## 2019-04-09 PROCEDURE — G8926 SPIRO NO PERF OR DOC: HCPCS | Performed by: INTERNAL MEDICINE

## 2019-04-09 PROCEDURE — G8427 DOCREV CUR MEDS BY ELIG CLIN: HCPCS | Performed by: INTERNAL MEDICINE

## 2019-04-09 ASSESSMENT — ENCOUNTER SYMPTOMS
BACK PAIN: 1
ABDOMINAL PAIN: 0
TROUBLE SWALLOWING: 0
SHORTNESS OF BREATH: 0
BLOOD IN STOOL: 0
SORE THROAT: 0
SINUS PRESSURE: 0

## 2019-04-09 NOTE — PROGRESS NOTES
Radha Alfaro is a 37 y.o. female who recently quit smoking, history of morbid obesity, sleep apnea on CPAP, asthma, who presents with     Chief Complaint   Patient presents with    Hypertension    Hyperlipidemia       Interim history: Since her last office visit with me one month ago, the patient has been well, has not been able to lose weight but is planning to do so in 3 weeks since her appointment for bariatric surgery is due at that time. Medication refills requests were received by the office and done electronically. The patient remains very motivated to undergo bariatric surgery.     The following laboratory reports since the past visit were reviewed (the ones pertinent to today's visit were discussed with the patient/ caregiver):    Orders Only on 03/01/2019   Component Date Value Ref Range Status    WBC 03/01/2019 8.3  4.4 - 9.9 10*3/uL Final    RBC 03/01/2019 4.23  3.85 - 5.10 10*6/uL Final    Hemoglobin 03/01/2019 12.6  11.8 - 15.3 g/dL Final    Hematocrit 03/01/2019 38.0  36.5 - 46.6 % Final    MCV 03/01/2019 89.8  85.4 - 100.0 fL Final    MCH 03/01/2019 29.8  27.5 - 33.0 pg Final    MCHC 03/01/2019 33.2  30.1 - 35.0 g/dL Final    RDW-CV 03/01/2019 12.4  12.0 - 15.4 % Final    RDW-SD 03/01/2019 40.9  39.3 - 48.6 fL Final    Platelets 04/51/3650 286  155 - 404 10*3/uL Final    MPV 03/01/2019 10.3  9.9 - 12.1 fL Final    Neutrophils # 03/01/2019 5.15  1.95 - 6.85 10*3/uL Final    Lymphocytes # 03/01/2019 2.28  0.40 - 2.84 10*3/uL Final    Monocytes # 03/01/2019 0.56  0.25 - 0.83 10*3/uL Final    Eosinophils # 03/01/2019 0.21  0.04 - 0.50 10*3/uL Final    Basophils # 03/01/2019 0.07  0.01 - 0.07 10*3/uL Final    Immature Granulocytes 03/01/2019 0.0  % Final    Immature Grans (Abs) 03/01/2019 0.00  10*3/uL Final    Neutrophils 03/01/2019 62.3  36.8 - 73.2 % Final    Lymphocytes 03/01/2019 27.6  15.7 - 50.5 % Final    Monocytes 03/01/2019 6.8  4.8 - 12.7 % Final    Eosinophils current episode started more than 1 year ago. The problem has been waxing and waning since onset. The problem is resistant. Associated symptoms include anxiety. Pertinent negatives include no chest pain, headaches, neck pain, palpitations, peripheral edema, PND or shortness of breath. There are no associated agents to hypertension. Risk factors for coronary artery disease include sedentary lifestyle, stress, obesity and dyslipidemia. Past treatments include angiotensin blockers and diuretics. The current treatment provides moderate improvement. Compliance problems include exercise, diet and psychosocial issues. There is no history of angina, CVA or PVD. Identifiable causes of hypertension include sleep apnea. There is no history of chronic renal disease, renovascular disease or a thyroid problem. Hyperlipidemia   This is a chronic problem. The current episode started more than 1 year ago. Recent lipid tests were reviewed and are variable. Exacerbating diseases include obesity. She has no history of chronic renal disease, diabetes or hypothyroidism. Factors aggravating her hyperlipidemia include fatty foods and smoking. Pertinent negatives include no chest pain, leg pain, myalgias or shortness of breath. Current antihyperlipidemic treatment includes statins. Compliance problems include adherence to exercise, adherence to diet and psychosocial issues. Risk factors for coronary artery disease include a sedentary lifestyle, hypertension, obesity and dyslipidemia. More detail above in the chiefcomplaint(s), interim history and below in the review of systems.      Past Medical History:   Diagnosis Date    Allergic rhinitis     several allergens    Asthma     since shildhood    Brachial neuralgia 1/23/2014    Chronic left shoulder pain     H/O colonoscopy 2014    Dr. Khris Garrido History of sleep apnea     did not tolerate CPAP, test normalized    Hyperlipidemia     Hypertension 2010    Migraine headache  Obesity, morbid (more than 100 lbs over ideal weight or BMI > 40) ([facility])     SEJAL on CPAP 10/2018       Past Surgical History:   Procedure Laterality Date    BREAST SURGERY  2007    reduction, Dr Marin Hernandez  10/20/15    DR. Kathren Denver Right     Ana Anderson TUBAL LIGATION         Social History     Socioeconomic History    Marital status: Single     Spouse name: Not on file    Number of children: 2    Years of education: Not on file    Highest education level: Not on file   Occupational History    Occupation: dog grooming business, self employed   Social Needs    Financial resource strain: Not on file    Food insecurity:     Worry: Not on file     Inability: Not on file   [employer] needs:     Medical: Not on file     Non-medical: Not on file   Tobacco Use    Smoking status: Former Smoker     Packs/day: 0.50     Types: Cigarettes     Last attempt to quit: 2018     Years since quittin.7    Smokeless tobacco: Never Used    Tobacco comment: uses electronic cigarettes & trying to cut back   Substance and Sexual Activity    Alcohol use:  Yes     Alcohol/week: 0.6 oz     Types: 1 Standard drinks or equivalent per week     Comment: socially    Drug use: Yes     Frequency: 1.0 times per week     Comment: marijuana, quit 2013    Sexual activity: Yes   Lifestyle    Physical activity:     Days per week: Not on file     Minutes per session: Not on file    Stress: Not on file   Relationships    Social connections:     Talks on phone: Not on file     Gets together: Not on file     Attends Rastafari service: Not on file     Active member of club or organization: Not on file     Attends meetings of clubs or organizations: Not on file     Relationship status: Not on file    Intimate partner violence:     Fear of current or ex partner: Not on file     Emotionally abused: Not on file     Physically abused: Not on file     Forced sexual activity: Not on file unexpected weight change. HENT: Negative for congestion, nosebleeds, postnasal drip, sinus pressure, sneezing, sore throat and trouble swallowing. Eyes: Negative for visual disturbance. Respiratory: Negative for shortness of breath. Cardiovascular: Negative for chest pain, palpitations, leg swelling and PND. Gastrointestinal: Negative for abdominal pain and blood in stool. Endocrine: Negative for cold intolerance, heat intolerance, polydipsia and polyuria. Genitourinary: Negative for dysuria and hematuria. Musculoskeletal: Positive for arthralgias and back pain. Negative for myalgias, neck pain and neck stiffness. Skin: Negative for rash. Neurological: Negative for dizziness, weakness, light-headedness and headaches. Hematological: Does not bruise/bleed easily. Psychiatric/Behavioral: Positive for dysphoric mood. Negative for decreased concentration, sleep disturbance and suicidal ideas. The patient is nervous/anxious. Vitals:    04/09/19 1156 04/09/19 1209   BP: (!) 140/92 (!) 142/92   Site: Left Upper Arm    Position: Sitting    Cuff Size: Large Adult    Pulse: 76    Resp: 16    Temp: 97.4 °F (36.3 °C)    TempSrc: Tympanic    SpO2: 99%    Weight: 280 lb 6.4 oz (127.2 kg)    Height: 5' 5\" (1.651 m)        Physical Exam   Constitutional: She is oriented to person, place, and time. No distress. Obese, age appropriate, well groomed     HENT:   Head: Atraumatic. Eyes: Conjunctivae are normal. No scleral icterus. Neck: Neck supple. No thyromegaly present. Cardiovascular: Regular rhythm, normal heart sounds and intact distal pulses. Exam reveals no gallop. Pulmonary/Chest: Effort normal and breath sounds normal.   Abdominal: Soft. There is no tenderness. There is no rigidity. Exam limited due to abdominal adiposity        Musculoskeletal: Normal range of motion. Neurological: She is alert and oriented to person, place, and time. Skin: Skin is warm. No rash noted. Psychiatric: Her speech is normal. Her mood appears anxious. Her affect is not labile. She is not slowed and not withdrawn. Cognition and memory are not impaired. She does not express inappropriate judgment. She does not exhibit a depressed mood. Appears calm and all denial is resolved She is attentive. Assessment:    Trice Kirby was seen today for hypertension and hyperlipidemia. Diagnoses and all orders for this visit:    Essential hypertension              Stable on the current medication, expect further improvement as the patient becomes more able to loose weight and eventually undergoes bariatric surgery        Hyperlipidemia, unspecified hyperlipidemia type               Severe, continue statin which is well tolerated, continue to monitor lipids          Obesity, morbid, BMI 40.0-49.9 ([facility] [address]        Plan:    Reviewed with the patient (/and caregiver if present): current health status, medications, activities and diet. See also orders and comments in the assessment section. Today's diagnosis (in the context ofchronic problems) was discussed with patient (/and caregiver if present), questions answered. Patient counseled and encouraged re: daily effort to improve diet (a high fiber, low calorie, low sodium and caffeine diet, divided into 3 main meals daily) and exercise habits (more aerobic exercise as tolerated), better stress management, will result in improved health and help in better management of the current chronic conditions in the long run. The current medical regimen is effective;  continue present plan and medications. Close follow up needed to evaluate treatment results and for care coordination. Return in about 4 months (around 8/9/2019). I have reviewed the patient's medical and surgical, family and social history, health maintenance schedule, and updated the computerized patient record.     Please note this report has been partially produced by using speech recognition hardware. It may contain errors related to the system, including grammar, punctuation and spelling as well as words and phrases that may seem inaccurate. For anyquestions or concerns, please feel free to contact me for clarification.         Electronically signed by Herve Saldivar MD

## 2019-04-10 ENCOUNTER — OFFICE VISIT (OUTPATIENT)
Dept: CARDIOLOGY CLINIC | Age: 44
End: 2019-04-10
Payer: COMMERCIAL

## 2019-04-10 VITALS
HEIGHT: 65 IN | DIASTOLIC BLOOD PRESSURE: 76 MMHG | HEART RATE: 92 BPM | SYSTOLIC BLOOD PRESSURE: 138 MMHG | WEIGHT: 278 LBS | OXYGEN SATURATION: 98 % | BODY MASS INDEX: 46.32 KG/M2 | RESPIRATION RATE: 16 BRPM

## 2019-04-10 DIAGNOSIS — Z01.818 PRE-OP EXAMINATION: ICD-10-CM

## 2019-04-10 DIAGNOSIS — E78.5 HYPERLIPIDEMIA WITH TARGET LDL LESS THAN 130: ICD-10-CM

## 2019-04-10 DIAGNOSIS — R07.9 CHEST PAIN, UNSPECIFIED TYPE: ICD-10-CM

## 2019-04-10 DIAGNOSIS — R06.02 SHORTNESS OF BREATH: ICD-10-CM

## 2019-04-10 DIAGNOSIS — I10 ESSENTIAL HYPERTENSION, BENIGN: Primary | ICD-10-CM

## 2019-04-10 PROCEDURE — 99214 OFFICE O/P EST MOD 30 MIN: CPT | Performed by: INTERNAL MEDICINE

## 2019-04-10 PROCEDURE — 93000 ELECTROCARDIOGRAM COMPLETE: CPT | Performed by: INTERNAL MEDICINE

## 2019-04-10 PROCEDURE — G8417 CALC BMI ABV UP PARAM F/U: HCPCS | Performed by: INTERNAL MEDICINE

## 2019-04-10 PROCEDURE — G8427 DOCREV CUR MEDS BY ELIG CLIN: HCPCS | Performed by: INTERNAL MEDICINE

## 2019-04-10 PROCEDURE — 1036F TOBACCO NON-USER: CPT | Performed by: INTERNAL MEDICINE

## 2019-04-10 ASSESSMENT — ENCOUNTER SYMPTOMS
EYES NEGATIVE: 1
COUGH: 0
WHEEZING: 0
CHEST TIGHTNESS: 0
RESPIRATORY NEGATIVE: 1
GASTROINTESTINAL NEGATIVE: 1
STRIDOR: 0
SHORTNESS OF BREATH: 0
BLOOD IN STOOL: 0
NAUSEA: 0

## 2019-04-10 NOTE — PROGRESS NOTES
Subsequent Progress Note  Patient: Misty Loya  YOB: 1975  MRN: [id number]    Chief Complaint:Bariatric Sx  Chief Complaint   Patient presents with    Cardiac Clearance     Pre-op Bariatric surgery    Hypertension    Hyperlipidemia       CV Data:  8/2018 spect- incomplete. Pt could not complete 2nd part  8/2018 echo 60    Subjective/HPI: no cp no sob. Breathing is better since stopped smoke in July 2018. No falls no bleed. Takes meds    Stopped smoking 7/2018  Works [employer]. EKG:    Past Medical History:   Diagnosis Date    Allergic rhinitis     several allergens    Asthma     since shildhood    Brachial neuralgia 1/23/2014    Chronic left shoulder pain     COPD (chronic obstructive pulmonary disease) ([facility] 2019    Dr Clay Choudhary H/O colonoscopy 2014    Dr. Daniela German Hyperlipidemia     Hypertension 2010    Migraine headache     Obesity, morbid (more than 100 lbs over ideal weight or BMI > 40) ([facility]     SEJAL on CPAP 10/2018       Past Surgical History:   Procedure Laterality Date    BREAST SURGERY  2007    reduction, Dr Yaneth Vargas  10/20/15    DR. 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, self employed   Social Needs    Financial resource strain: None    Food insecurity:     Worry: None     Inability: None    Transportation needs:     Medical: None     Non-medical: None   Tobacco Use    Smoking status: Former Smoker     Packs/day: 0.50     Types: Cigarettes     Last attempt to quit: 2018     Years since quittin.7    Smokeless tobacco: Never Used    Tobacco comment: uses electronic cigarettes & trying to cut back   Substance and Sexual Activity    Alcohol use:  Yes     Alcohol/week: 0.6 oz     Types: 1 Standard drinks or equivalent per week     Comment: socially    Drug use: Yes     Frequency: 1.0 times per week     Comment: marijuana, quit 2013    Sexual activity: Yes   Lifestyle    Physical activity:     Days per week: None     Minutes per session: None    Stress: None   Relationships    Social connections:     Talks on phone: None     Gets together: None     Attends Restorationism service: None     Active member of club or organization: None     Attends meetings of clubs or organizations: None     Relationship status: None    Intimate partner violence:     Fear of current or ex partner: None     Emotionally abused: None     Physically abused: None     Forced sexual activity: None   Other Topics Concern    None   Social History Narrative    Born in [place], has 1 brother one sister    Never , was in a bad relationship    Lives with daughter age 16 () in a house in [facility]    2 daughters, one daughter with problems, one in college    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)    Hobbies reading, puzzles, games, outdoor activities     [place]        No Known Allergies    Current Outpatient Medications   Medication Sig Dispense Refill    busPIRone (BUSPAR) 5 MG tablet Take 1 tablet by mouth 3 times daily as needed (anxiety) 90 tablet 3    FLUoxetine (PROZAC) 20 MG capsule TAKE 1 CAPSULE BY MOUTH DAILY 30 capsule 3    albuterol sulfate  (90 Base) MCG/ACT inhaler INHALE 2 PUFFS into the lungs EVERY 6 HOURS AS NEEDED FOR WHEEZING or SHORTNESS OF BREATH 18 g 3    rosuvastatin (CRESTOR) 20 MG tablet TAKE 1 TABLET BY MOUTH NIGHTLY 30 tablet 3    losartan-hydrochlorothiazide (HYZAAR) 100-25 MG per tablet TAKE 1 TABLET BY MOUTH DAILY 30 tablet 3    metFORMIN (GLUCOPHAGE) 500 MG tablet Take 2 tablets by mouth nightly 60 tablet 2    loratadine (CLARITIN) 10 MG tablet TAKE 1 TABLET BY MOUTH DAILY AS NEEDED for allergies 30 tablet 3    fenofibrate (TRICOR) 145 MG tablet Take 1 tablet by mouth daily 30 tablet 5    CPAP Machine MISC by Does not apply route Start auto CPAP with minimum of 5 maximum of 10 1 each 0    famotidine (PEPCID) 20 MG tablet Take 20 mg by mouth 2 times daily       No current facility-administered medications for this visit. Review of Systems:   Review of Systems   Constitutional: Negative. Negative for diaphoresis and fatigue. HENT: Negative. Eyes: Negative. Respiratory: Negative. Negative for cough, chest tightness, shortness of breath, wheezing and stridor. Cardiovascular: Negative. Negative for chest pain, palpitations and leg swelling. Gastrointestinal: Negative. Negative for blood in stool and nausea. Genitourinary: Negative. Musculoskeletal: Negative. Skin: Negative. Neurological: Negative. Negative for dizziness, syncope, weakness and light-headedness. Hematological: Negative. Psychiatric/Behavioral: Negative. Physical Examination:    /76 (Site: Left Upper Arm, Position: Sitting, Cuff Size: Large Adult)   Pulse 92   Resp 16   Ht 5' 5\" (1.651 m)   Wt 278 lb (126.1 kg)   LMP 04/09/2019 (Exact Date)   SpO2 98%   BMI 46.26 kg/m²    Physical Exam   Constitutional: She appears healthy. No distress. HENT:   Normal cephalic and Atraumatic   Eyes: Pupils are equal, round, and reactive to light. Neck: Normal range of motion and thyroid normal. Neck supple. No JVD present. No neck adenopathy. No thyromegaly present. Cardiovascular: Normal rate, regular rhythm, normal heart sounds, intact distal pulses and normal pulses. Pulmonary/Chest: Effort normal and breath sounds normal. She has no wheezes. She has no rales. She exhibits no tenderness. Abdominal: Soft.  Bowel sounds are normal. There is no for: MG  TSH:  Lab Results   Component Value Date    TSH 1.620 01/12/2017       Patient Active Problem List   Diagnosis    History of sleep apnea    Obesity, morbid (more than 100 lbs over ideal weight or BMI > 40) (HCC)    Keratosis pilaris    Bilateral bunions    Chronic left shoulder pain    Brachial neuralgia    Migraine headache    Essential hypertension, benign    Hyperlipidemia with target LDL less than 130    Chest pain    Shortness of breath    Chronic obstructive pulmonary disease (HCC)    SEJAL (obstructive sleep apnea)    Overweight    Pre-op examination       There are no discontinued medications. Modified Medications    No medications on file       No orders of the defined types were placed in this encounter. Assessment/Plan:    1. Essential hypertension, benign   stable   - EKG 12 lead    2. Hyperlipidemia with target LDL less than 130   statin     3. Chest pain, unspecified type       4. Shortness of breath       5. Pre-op examination   Pt is cleared for Planned Weight reduction surgery. No further CV testing required. - EKG 12 lead       Counseling:  Heart Healthy Lifestyle, Improve BMI, Low Salt Diet, Take Precautions to Prevent Falls, Regular Exercise and Walk Daily    Return in about 6 months (around 10/10/2019) for Cardiovascular care. .      Electronically signed by Dorothy Ni MD on 4/10/2019 at 4:15 PM

## 2019-05-10 PROBLEM — Z01.818 PRE-OP EXAMINATION: Status: RESOLVED | Noted: 2019-04-10 | Resolved: 2019-05-10

## 2019-05-13 ENCOUNTER — OFFICE VISIT (OUTPATIENT)
Dept: PULMONOLOGY | Age: 44
End: 2019-05-13
Payer: COMMERCIAL

## 2019-05-13 VITALS
RESPIRATION RATE: 16 BRPM | WEIGHT: 281.4 LBS | SYSTOLIC BLOOD PRESSURE: 122 MMHG | HEIGHT: 65 IN | TEMPERATURE: 96.1 F | BODY MASS INDEX: 46.88 KG/M2 | DIASTOLIC BLOOD PRESSURE: 68 MMHG | HEART RATE: 91 BPM | OXYGEN SATURATION: 99 %

## 2019-05-13 DIAGNOSIS — G47.33 OBSTRUCTIVE SLEEP APNEA: Primary | ICD-10-CM

## 2019-05-13 PROCEDURE — 99213 OFFICE O/P EST LOW 20 MIN: CPT | Performed by: INTERNAL MEDICINE

## 2019-05-13 PROCEDURE — G8427 DOCREV CUR MEDS BY ELIG CLIN: HCPCS | Performed by: INTERNAL MEDICINE

## 2019-05-13 PROCEDURE — 1036F TOBACCO NON-USER: CPT | Performed by: INTERNAL MEDICINE

## 2019-05-13 PROCEDURE — G8417 CALC BMI ABV UP PARAM F/U: HCPCS | Performed by: INTERNAL MEDICINE

## 2019-05-13 ASSESSMENT — ENCOUNTER SYMPTOMS
CHEST TIGHTNESS: 0
RHINORRHEA: 0
WHEEZING: 0
DIARRHEA: 0
NAUSEA: 0
ABDOMINAL PAIN: 0
VOMITING: 0
SORE THROAT: 0
SHORTNESS OF BREATH: 0
SINUS PRESSURE: 0
COUGH: 0

## 2019-05-13 NOTE — PROGRESS NOTES
Subjective:     Winnie Pacheco is a 37 y.o. female who complains today of:     Chief Complaint   Patient presents with    Follow-up     PreOP Clearance Gastric Bypass       HPI  Patient is here for a follow-up visit regarding obstructive sleep apnea and for preoperative clearance. Since the last visit patient was started on auto CPAP minimum of 5 maximum of 10 which she used for about 3 months but started having nosebleeds and sinus headaches hence she stopped using it. She has gained about 15 pounds since the last visit. She is going for bariatric surgery soon. She was asked to get reevaluated for preoperative clearance. Patient reports quitting smoking as well and her bronchitic symptoms have improved very recent PFTs before her last visit here were within normal limits    Allergies:  Patient has no known allergies. Past Medical History:   Diagnosis Date    Allergic rhinitis     several allergens    Asthma     since shildhood    Brachial neuralgia 1/23/2014    Chronic left shoulder pain     COPD (chronic obstructive [facility] disease) [facility] 2019    Dr Praneeth Davis H/O colonoscopy 2014    Dr. Mario Hyperlipidemia     Hypertension 2010    Migraine headache     Obesity, morbid (more than 100 lbs over ideal weight or BMI > 40) ([facility]     SEJAL on CPAP 10/2018     Past Surgical History:   Procedure Laterality Date    BREAST SURGERY  2007    reduction, Dr Magnolia Christensen  10/20/15    DR. Florencio Juan Right     Dr Linda Alcocer    TUBAL LIGATION       Family History   Problem Relation Age of Onset    Osteoarthritis Mother     Other Mother         hyperlipidemia    Hypertension Mother     High Cholesterol Mother     No Known Problems Father      Social History     Socioeconomic History    Marital status: Single     Spouse name: Not on file    Number of children: 2    Years of education: Not on file    Highest education level: Not on file   Occupational History    Occupation: dog grooming business, self employed   Social Needs    Financial resource strain: Not on file    Food insecurity:     Worry: Not on file     Inability: Not on file   Sarbari needs:     Medical: Not on file     Non-medical: Not on file   Tobacco Use    Smoking status: Former Smoker     Packs/day: 0.50     Types: Cigarettes     Last attempt to quit: 2018     Years since quittin.8    Smokeless tobacco: Never Used    Tobacco comment: uses electronic cigarettes & trying to cut back   Substance and Sexual Activity    Alcohol use: Yes     Alcohol/week: 0.6 oz     Types: 1 Standard drinks or equivalent per week     Comment: socially    Drug use: Yes     Frequency: 1.0 times per week     Comment: marijuana, quit 2013    Sexual activity: Yes   Lifestyle    Physical activity:     Days per week: Not on file     Minutes per session: Not on file    Stress: Not on file   Relationships    Social connections:     Talks on phone: Not on file     Gets together: Not on file     Attends Voodoo service: Not on file     Active member of club or organization: Not on file     Attends meetings of clubs or organizations: Not on file     Relationship status: Not on file    Intimate partner violence:     Fear of current or ex partner: Not on file     Emotionally abused: Not on file     Physically abused: Not on file     Forced sexual activity: Not on file   Other Topics Concern    Not on file   Social History Narrative    Born in [place], has 1 brother one sister    Never , was in a bad relationship    Lives with daughter age 16 (2017) in a house in [facility]    2 daughters, one daughter with problems, one in college    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    HENT: Negative for congestion, nosebleeds, postnasal drip, rhinorrhea, sinus pressure, sneezing and sore throat. Eyes: Negative for visual disturbance. Respiratory: Negative for cough, chest tightness, shortness of breath and wheezing. Cardiovascular: Negative for chest pain, palpitations and leg swelling. Gastrointestinal: Negative for abdominal pain, diarrhea, nausea and vomiting. Genitourinary: Negative for dysuria and urgency. Musculoskeletal: Negative for arthralgias, joint swelling and myalgias. Skin: Negative for rash. Allergic/Immunologic: Negative for environmental allergies. Neurological: Negative for tremors, weakness, light-headedness and headaches. Psychiatric/Behavioral: Negative for behavioral problems and sleep disturbance. Objective:     Vitals:    05/13/19 1305   BP: 122/68   Site: Left Upper Arm   Position: Sitting   Cuff Size: Large Adult   Pulse: 91   Resp: 16   Temp: 96.1 °F (35.6 °C)   TempSrc: Tympanic   SpO2: 99%   Weight: 281 lb 6.4 oz (127.6 kg)   Height: 5' 5\" (1.651 m)         Physical Exam   Constitutional: She is oriented to person, place, and time. She appears well-developed and well-nourished. HENT:   Head: Normocephalic and atraumatic. Mouth/Throat: Oropharynx is clear and moist.   Eyes: Pupils are equal, round, and reactive to light. EOM are normal.   Neck: No JVD present. No tracheal deviation present. No thyromegaly present. Cardiovascular: Normal rate and regular rhythm. Exam reveals no gallop. No murmur heard. Pulmonary/Chest: She has no wheezes. She has no rales. She exhibits no tenderness. Abdominal: She exhibits no distension. Musculoskeletal: Normal range of motion. She exhibits no edema. Neurological: She is alert and oriented to person, place, and time. Coordination normal.   Skin: Skin is warm and dry. No rash noted. Psychiatric: She has a normal mood and affect. Assessment:      Diagnosis Orders   1.  Obstructive sleep apnea       Discussed with patient the importance of use of CPAP in the perioperative period, she has mild COPD but it would be safer to use her machine around surgery especially postop, and with weight loss she would be able to discontinue treatment. I will see her for follow-up in 6 months and as needed      Plan:     No orders of the defined types were placed in this encounter. No orders of the defined types were placed in this encounter. Patient is cleared to undergo her per reactive surgery any time with regular use of CPAP. She was given directions to use saline nasal spray and adequate humidification was CPAP      Return in about 6 months (around 11/13/2019) for re-evaluation.        Sol Rodriguez MD

## 2019-07-02 RX ORDER — LORATADINE 10 MG/1
TABLET ORAL
Qty: 30 TABLET | Refills: 3 | Status: SHIPPED | OUTPATIENT
Start: 2019-07-02 | End: 2019-10-22 | Stop reason: SDUPTHER

## 2019-07-30 RX ORDER — BUSPIRONE HYDROCHLORIDE 5 MG/1
TABLET ORAL
Qty: 90 TABLET | Refills: 3 | Status: SHIPPED | OUTPATIENT
Start: 2019-07-30 | End: 2019-11-13 | Stop reason: ALTCHOICE

## 2019-07-30 RX ORDER — ROSUVASTATIN CALCIUM 20 MG/1
TABLET, COATED ORAL
Qty: 30 TABLET | Refills: 3 | Status: SHIPPED | OUTPATIENT
Start: 2019-07-30 | End: 2019-12-16 | Stop reason: SDUPTHER

## 2019-07-30 RX ORDER — FLUOXETINE HYDROCHLORIDE 20 MG/1
CAPSULE ORAL
Qty: 30 CAPSULE | Refills: 3 | Status: SHIPPED | OUTPATIENT
Start: 2019-07-30 | End: 2019-12-16 | Stop reason: SDUPTHER

## 2019-08-27 RX ORDER — LOSARTAN POTASSIUM AND HYDROCHLOROTHIAZIDE 25; 100 MG/1; MG/1
TABLET ORAL
Qty: 30 TABLET | Refills: 3 | Status: SHIPPED | OUTPATIENT
Start: 2019-08-27 | End: 2019-12-20

## 2019-10-25 RX ORDER — LORATADINE 10 MG/1
TABLET ORAL
Qty: 30 TABLET | Refills: 3 | Status: SHIPPED | OUTPATIENT
Start: 2019-10-25 | End: 2020-02-18

## 2019-11-13 ENCOUNTER — OFFICE VISIT (OUTPATIENT)
Dept: CARDIOLOGY CLINIC | Age: 44
End: 2019-11-13
Payer: COMMERCIAL

## 2019-11-13 VITALS
OXYGEN SATURATION: 98 % | DIASTOLIC BLOOD PRESSURE: 64 MMHG | RESPIRATION RATE: 18 BRPM | SYSTOLIC BLOOD PRESSURE: 116 MMHG | HEART RATE: 105 BPM | BODY MASS INDEX: 43.73 KG/M2 | WEIGHT: 262.8 LBS

## 2019-11-13 DIAGNOSIS — E78.5 HYPERLIPIDEMIA WITH TARGET LDL LESS THAN 130: ICD-10-CM

## 2019-11-13 DIAGNOSIS — I10 ESSENTIAL HYPERTENSION, BENIGN: ICD-10-CM

## 2019-11-13 DIAGNOSIS — Z01.810 ENCOUNTER FOR PRE-OPERATIVE CARDIOVASCULAR CLEARANCE: Primary | ICD-10-CM

## 2019-11-13 PROCEDURE — G8427 DOCREV CUR MEDS BY ELIG CLIN: HCPCS | Performed by: INTERNAL MEDICINE

## 2019-11-13 PROCEDURE — 93000 ELECTROCARDIOGRAM COMPLETE: CPT | Performed by: INTERNAL MEDICINE

## 2019-11-13 PROCEDURE — 99214 OFFICE O/P EST MOD 30 MIN: CPT | Performed by: INTERNAL MEDICINE

## 2019-11-13 PROCEDURE — 1036F TOBACCO NON-USER: CPT | Performed by: INTERNAL MEDICINE

## 2019-11-13 PROCEDURE — G8417 CALC BMI ABV UP PARAM F/U: HCPCS | Performed by: INTERNAL MEDICINE

## 2019-11-13 PROCEDURE — G8484 FLU IMMUNIZE NO ADMIN: HCPCS | Performed by: INTERNAL MEDICINE

## 2019-11-13 ASSESSMENT — ENCOUNTER SYMPTOMS
GASTROINTESTINAL NEGATIVE: 1
BLOOD IN STOOL: 0
RESPIRATORY NEGATIVE: 1
STRIDOR: 0
EYES NEGATIVE: 1
CHEST TIGHTNESS: 0
NAUSEA: 0
SHORTNESS OF BREATH: 0
WHEEZING: 0
COUGH: 0

## 2019-11-27 ENCOUNTER — OFFICE VISIT (OUTPATIENT)
Dept: PULMONOLOGY | Age: 44
End: 2019-11-27
Payer: COMMERCIAL

## 2019-11-27 VITALS
TEMPERATURE: 97.1 F | SYSTOLIC BLOOD PRESSURE: 118 MMHG | WEIGHT: 260.2 LBS | HEIGHT: 65 IN | DIASTOLIC BLOOD PRESSURE: 74 MMHG | RESPIRATION RATE: 16 BRPM | BODY MASS INDEX: 43.35 KG/M2 | HEART RATE: 96 BPM | OXYGEN SATURATION: 99 %

## 2019-11-27 DIAGNOSIS — G47.33 OBSTRUCTIVE SLEEP APNEA: Primary | ICD-10-CM

## 2019-11-27 PROCEDURE — 99213 OFFICE O/P EST LOW 20 MIN: CPT | Performed by: INTERNAL MEDICINE

## 2019-11-27 PROCEDURE — G8427 DOCREV CUR MEDS BY ELIG CLIN: HCPCS | Performed by: INTERNAL MEDICINE

## 2019-11-27 PROCEDURE — G8417 CALC BMI ABV UP PARAM F/U: HCPCS | Performed by: INTERNAL MEDICINE

## 2019-11-27 PROCEDURE — G8484 FLU IMMUNIZE NO ADMIN: HCPCS | Performed by: INTERNAL MEDICINE

## 2019-11-27 PROCEDURE — 1036F TOBACCO NON-USER: CPT | Performed by: INTERNAL MEDICINE

## 2019-11-27 ASSESSMENT — ENCOUNTER SYMPTOMS
WHEEZING: 0
SINUS PRESSURE: 0
SORE THROAT: 0
ABDOMINAL PAIN: 0
CHEST TIGHTNESS: 0
COUGH: 0
NAUSEA: 0
VOMITING: 0
RHINORRHEA: 0
DIARRHEA: 0
SHORTNESS OF BREATH: 0

## 2019-12-13 PROBLEM — Z01.810 ENCOUNTER FOR PRE-OPERATIVE CARDIOVASCULAR CLEARANCE: Status: RESOLVED | Noted: 2019-11-13 | Resolved: 2019-12-13

## 2019-12-17 RX ORDER — FLUOXETINE HYDROCHLORIDE 20 MG/1
CAPSULE ORAL
Qty: 30 CAPSULE | Refills: 1 | Status: SHIPPED | OUTPATIENT
Start: 2019-12-17 | End: 2020-01-17

## 2019-12-17 RX ORDER — ROSUVASTATIN CALCIUM 20 MG/1
TABLET, COATED ORAL
Qty: 30 TABLET | Refills: 1 | Status: SHIPPED | OUTPATIENT
Start: 2019-12-17 | End: 2020-01-17

## 2019-12-17 RX ORDER — BUSPIRONE HYDROCHLORIDE 5 MG/1
TABLET ORAL
Qty: 90 TABLET | Refills: 1 | Status: SHIPPED | OUTPATIENT
Start: 2019-12-17 | End: 2020-01-17

## 2019-12-20 RX ORDER — LOSARTAN POTASSIUM AND HYDROCHLOROTHIAZIDE 25; 100 MG/1; MG/1
TABLET ORAL
Qty: 30 TABLET | Refills: 3 | Status: SHIPPED | OUTPATIENT
Start: 2019-12-20 | End: 2020-01-24

## 2020-01-17 RX ORDER — FLUOXETINE HYDROCHLORIDE 20 MG/1
CAPSULE ORAL
Qty: 30 CAPSULE | Refills: 0 | Status: SHIPPED | OUTPATIENT
Start: 2020-01-17 | End: 2020-02-18

## 2020-01-17 RX ORDER — BUSPIRONE HYDROCHLORIDE 5 MG/1
TABLET ORAL
Qty: 90 TABLET | Refills: 0 | Status: SHIPPED | OUTPATIENT
Start: 2020-01-17 | End: 2020-01-24

## 2020-01-17 RX ORDER — ROSUVASTATIN CALCIUM 20 MG/1
TABLET, COATED ORAL
Qty: 30 TABLET | Refills: 0 | Status: SHIPPED | OUTPATIENT
Start: 2020-01-17 | End: 2020-02-18

## 2020-01-24 ENCOUNTER — OFFICE VISIT (OUTPATIENT)
Dept: INTERNAL MEDICINE CLINIC | Age: 45
End: 2020-01-24
Payer: COMMERCIAL

## 2020-01-24 VITALS
TEMPERATURE: 98.1 F | HEIGHT: 65 IN | WEIGHT: 245 LBS | HEART RATE: 77 BPM | RESPIRATION RATE: 12 BRPM | DIASTOLIC BLOOD PRESSURE: 62 MMHG | BODY MASS INDEX: 40.82 KG/M2 | SYSTOLIC BLOOD PRESSURE: 94 MMHG

## 2020-01-24 PROCEDURE — 99213 OFFICE O/P EST LOW 20 MIN: CPT | Performed by: INTERNAL MEDICINE

## 2020-01-24 PROCEDURE — G8427 DOCREV CUR MEDS BY ELIG CLIN: HCPCS | Performed by: INTERNAL MEDICINE

## 2020-01-24 PROCEDURE — G8417 CALC BMI ABV UP PARAM F/U: HCPCS | Performed by: INTERNAL MEDICINE

## 2020-01-24 PROCEDURE — 1036F TOBACCO NON-USER: CPT | Performed by: INTERNAL MEDICINE

## 2020-01-24 PROCEDURE — G8484 FLU IMMUNIZE NO ADMIN: HCPCS | Performed by: INTERNAL MEDICINE

## 2020-01-24 RX ORDER — CHOLECALCIFEROL (VITAMIN D3) 125 MCG
CAPSULE ORAL
COMMUNITY
Start: 2020-01-14 | End: 2020-03-24 | Stop reason: SDUPTHER

## 2020-01-24 RX ORDER — FERROUS GLUCONATE 240(27)MG
TABLET ORAL
COMMUNITY
Start: 2020-01-14 | End: 2020-03-24 | Stop reason: SDUPTHER

## 2020-01-24 RX ORDER — LOSARTAN POTASSIUM 50 MG/1
50 TABLET ORAL DAILY
Qty: 90 TABLET | Refills: 0 | Status: SHIPPED | OUTPATIENT
Start: 2020-01-24 | End: 2020-03-24

## 2020-01-24 RX ORDER — PANTOPRAZOLE SODIUM 40 MG/1
TABLET, DELAYED RELEASE ORAL
COMMUNITY
Start: 2020-01-14 | End: 2020-03-24 | Stop reason: SDUPTHER

## 2020-01-24 RX ORDER — LOSARTAN POTASSIUM 100 MG/1
TABLET ORAL
COMMUNITY
Start: 2020-01-03 | End: 2020-01-24 | Stop reason: SDUPTHER

## 2020-01-24 ASSESSMENT — ENCOUNTER SYMPTOMS
BLOOD IN STOOL: 0
ABDOMINAL PAIN: 1
TROUBLE SWALLOWING: 0
BACK PAIN: 1
SORE THROAT: 0
SINUS PRESSURE: 0
SHORTNESS OF BREATH: 0

## 2020-01-24 ASSESSMENT — PATIENT HEALTH QUESTIONNAIRE - PHQ9
1. LITTLE INTEREST OR PLEASURE IN DOING THINGS: 0
SUM OF ALL RESPONSES TO PHQ9 QUESTIONS 1 & 2: 0
2. FEELING DOWN, DEPRESSED OR HOPELESS: 0
SUM OF ALL RESPONSES TO PHQ QUESTIONS 1-9: 0
SUM OF ALL RESPONSES TO PHQ QUESTIONS 1-9: 0

## 2020-01-24 NOTE — PROGRESS NOTES
Sherry Edmonds is a 40 y.o. female, smoker, history of asthma, COPD, hypertension, migraines, sleep apnea, who presents for evaluation of:     Chief Complaint   Patient presents with    Hyperlipidemia    Hypertension  Has been without blood pressure medication for several days    Discuss Medications  Adjustments after bariatric surgery       Interim history: Since her last office visit with me 9 months ago, the patient has been enrolled in the bariatric program which she completed and after all appropriate clearance, underwent laparoscopic sleeve gastrectomy on January 2, 2020 at [address] [place] [address], Dr. Julianne Couch. No postoperative complications so far, patient feels well but has occasional abdominal wall pains with certain movements and worries about not being able to lose weight at the rate she was hoping to. She did lose a lot of weight prior to the surgery. She has been taken off some of her medication including metformin and hydrochlorothiazide. Overall, she now feels well. The following laboratory reports since the past visit were reviewed (the ones pertinent to today's visit were discussed with the patient/ caregiver): No visits with results within 3 Month(s) from this visit.    Latest known visit with results is:   Orders Only on 03/01/2019   Component Date Value Ref Range Status    WBC 03/01/2019 8.3  4.4 - 9.9 10*3/uL Final    RBC 03/01/2019 4.23  3.85 - 5.10 10*6/uL Final    Hemoglobin 03/01/2019 12.6  11.8 - 15.3 g/dL Final    Hematocrit 03/01/2019 38.0  36.5 - 46.6 % Final    MCV 03/01/2019 89.8  85.4 - 100.0 fL Final    MCH 03/01/2019 29.8  27.5 - 33.0 pg Final    MCHC 03/01/2019 33.2  30.1 - 35.0 g/dL Final    RDW-CV 03/01/2019 12.4  12.0 - 15.4 % Final    RDW-SD 03/01/2019 40.9  39.3 - 48.6 fL Final    Platelets 49/60/5391 286  155 - 404 10*3/uL Final    MPV 03/01/2019 10.3  9.9 - 12.1 fL Final    Neutrophils Absolute 03/01/2019 5.15  1.95 - 6.85 10*3/uL Final    chiefcomplaint(s), interim history and below in the review of systems. Past Medical History:   Diagnosis Date    Allergic rhinitis     several allergens    Asthma     since shildhood    Bariatric surgery status 2020    Dr Batool Crystal     Brachial neuralgia 2014    Chronic left shoulder pain     COPD (chronic obstructive pulmonary disease) ([facility] 2019    Dr Frank López H/O colonoscopy     Dr. James Rai Hyperlipidemia     Hypertension 2010    Migraine headache     Obesity, morbid (more than 100 lbs over ideal weight or BMI > 40) [facility]     SEJAL on CPAP 10/2018       Past Surgical History:   Procedure Laterality Date    BREAST SURGERY  2007    reduction, Dr Ramirez Care  10/20/15    DR. Lakhwinder Griffin Right     Dr Moran Allelachelle    SLEEVE GASTRECTOMY  2020    [facility], Dr Gabo Maya History     Socioeconomic History    Marital status: Single     Spouse name: Not on file    Number of children: 2    Years of education: Not on file    Highest education level: Not on file   Occupational History    Occupation: dog grooming business, self employed   Social Needs    Financial resource strain: Not on file    Food insecurity:     Worry: Not on file     Inability: Not on file   [employer] needs:     Medical: Not on file     Non-medical: Not on file   Tobacco Use    Smoking status: Former Smoker     Packs/day: 0.50     Types: Cigarettes     Last attempt to quit: 2018     Years since quittin.5    Smokeless tobacco: Never Used    Tobacco comment: uses electronic cigarettes & trying to cut back   Substance and Sexual Activity    Alcohol use:  Yes     Alcohol/week: 1.0 standard drinks     Types: 1 Standard drinks or equivalent per week     Comment: socially    Drug use: Yes     Frequency: 1.0 times per week     Comment: marijuana, quit 2013    Sexual activity: Yes   Lifestyle    Physical activity:     Days per week: Not on file     Minutes per session: Not on file    Stress: Not on file   Relationships    Social connections:     Talks on phone: Not on file     Gets together: Not on file     Attends Religion service: Not on file     Active member of club or organization: Not on file     Attends meetings of clubs or organizations: Not on file     Relationship status: Not on file    Intimate partner violence:     Fear of current or ex partner: Not on file     Emotionally abused: Not on file     Physically abused: Not on file     Forced sexual activity: Not on file   Other Topics Concern    Not on file   Social History Narrative    Born in [place], has 1 brother one sister    Never , was in a bad relationship    Lives with daughter age 16 (2017) in a house in [place]    2 daughters, one daughter with problems, one in college    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 ALLERGIES    Patient has no known allergies.     Current Outpatient Medications on File Prior to Visit   Medication Sig Dispense Refill    Cholecalciferol (VITAMIN D) 125 MCG (5000 UT) CAPS TAKE 1 CAPSULE DAILY      FERATE 240 (27 Fe) MG tablet TAKE 1 TABLET TWICE DAILY      pantoprazole (PROTONIX) 40 MG tablet TAKE 1 TABLET BY MOUTH EVERY DAY      FLUoxetine (PROZAC) 20 MG capsule TAKE 1 CAPSULE BY MOUTH DAILY 30 capsule 0    rosuvastatin (CRESTOR) 20 MG tablet TAKE 1 TABLET BY MOUTH NIGHTLY 30 tablet 0    loratadine (CLARITIN) 10 MG tablet TAKE 1 TABLET BY MOUTH DAILY AS NEEDED for allergies 30 tablet 3    albuterol sulfate  (90 Base) MCG/ACT inhaler INHALE 2 PUFFS into the lungs EVERY 6 HOURS AS NEEDED FOR WHEEZING or SHORTNESS OF BREATH 18 g 3     No current facility-administered medications on file prior to visit. Review of Systems   Constitutional: Negative for activity change, appetite change, fatigue and unexpected weight change. HENT: Negative for congestion, nosebleeds, postnasal drip, sinus pressure, sneezing, sore throat and trouble swallowing. Eyes: Negative for visual disturbance. Respiratory: Negative for shortness of breath. Cardiovascular: Negative for chest pain, palpitations, leg swelling and PND. Gastrointestinal: Positive for abdominal pain (around the surgery sites). Negative for blood in stool. Endocrine: Negative for cold intolerance, heat intolerance, polydipsia and polyuria. Genitourinary: Negative for dysuria and hematuria. Musculoskeletal: Positive for arthralgias and back pain. Negative for myalgias, neck pain and neck stiffness. Skin: Negative for rash and wound. Neurological: Negative for dizziness, syncope, speech difficulty, weakness, light-headedness and headaches. Hematological: Does not bruise/bleed easily. Psychiatric/Behavioral: Negative for decreased concentration, dysphoric mood, sleep disturbance and suicidal ideas. The patient is nervous/anxious. Vitals:    01/24/20 1026   BP: 94/62   Site: Left Upper Arm   Position: Sitting   Cuff Size: Large Adult   Pulse: 77   Resp: 12   Temp: 98.1 °F (36.7 °C)   TempSrc: Oral   Weight: 245 lb (111.1 kg)   Height: 5' 5\" (1.651 m)       Physical Exam  Constitutional:       General: She is not in acute distress. Appearance: Normal appearance. She is obese. She is not ill-appearing. HENT:      Head: Atraumatic. Nose: Nose normal. No rhinorrhea. Eyes:      General: No scleral icterus. Conjunctiva/sclera: Conjunctivae normal.   Neck:      Musculoskeletal: Neck supple. Thyroid: No thyromegaly. Cardiovascular:      Rate and Rhythm: Regular rhythm.       Pulses: Normal ADVANTAGE MULTI-FORMULA, CHEW TAB); Take 1 tablet by mouth daily        Plan:    See all orders and comments in the assessment section. Reviewed with the patient (/and caregiver if present): current health status, medications, activities and diet. Today's diagnosis (in the context ofchronic problems) was discussed with patient (/and caregiver if present), questions answered. Patient given positive feedback and encouraged to continue the current healthy lifestyle, call with any concerns. Side effects, adverse effects of the medication prescribed (or refilled) today, treatment plan/ rationale and result expectations have (again) been discussed with the patient who expresses understanding and consents to proceed as outlined above. Additional advise included in the \"after visit summary\". Orders Placed This Encounter   Medications    losartan (COZAAR) 50 MG tablet     Sig: Take 1 tablet by mouth daily     Dispense:  90 tablet     Refill:  0    Multiple Vitamin (MVI, BARIATRIC ADVANTAGE MULTI-FORMULA, CHEW TAB)     Sig: Take 1 tablet by mouth daily     Dispense:  90 tablet     Refill:  1       Orders Placed This Encounter   Procedures    Lipid Panel     Standing Status:   Future     Standing Expiration Date:   1/23/2021     Order Specific Question:   Is Patient Fasting?/# of Hours     Answer:   Yes, 12 Hours       Close follow up needed to evaluate treatment results and for care coordination. Return in about 2 months (around 3/24/2020). I have reviewed the patient's medical and surgical, family and social history, health maintenance schedule, and updated the computerized patient record. Please note this report has been partially produced by using speech recognition hardware. It may contain errors related to the system, including grammar, punctuation and spelling as well as words and phrases that may seem inaccurate.  For anyquestions or concerns, please feel free to contact me for clarification.         Electronically signed by Chaya Daniel MD

## 2020-03-24 ENCOUNTER — VIRTUAL VISIT (OUTPATIENT)
Dept: INTERNAL MEDICINE CLINIC | Age: 45
End: 2020-03-24
Payer: COMMERCIAL

## 2020-03-24 VITALS — BODY MASS INDEX: 38.11 KG/M2 | WEIGHT: 229 LBS

## 2020-03-24 PROCEDURE — 99213 OFFICE O/P EST LOW 20 MIN: CPT | Performed by: INTERNAL MEDICINE

## 2020-03-24 RX ORDER — PEDI MULTIVIT NO.128/VITAMIN K 500 MCG/ML
LIQUID (ML) ORAL
COMMUNITY
Start: 2020-03-16 | End: 2020-03-24 | Stop reason: SDUPTHER

## 2020-03-24 RX ORDER — ROSUVASTATIN CALCIUM 20 MG/1
TABLET, COATED ORAL
Qty: 90 TABLET | Refills: 1 | Status: SHIPPED | OUTPATIENT
Start: 2020-03-24 | End: 2021-02-03 | Stop reason: SDUPTHER

## 2020-03-24 RX ORDER — CHOLECALCIFEROL (VITAMIN D3) 125 MCG
CAPSULE ORAL
Qty: 90 CAPSULE | Refills: 1 | Status: SHIPPED | OUTPATIENT
Start: 2020-03-24 | End: 2020-10-06

## 2020-03-24 RX ORDER — FENOFIBRATE 145 MG/1
TABLET, COATED ORAL
Qty: 90 TABLET | Refills: 1 | Status: SHIPPED | OUTPATIENT
Start: 2020-03-24 | End: 2020-09-18

## 2020-03-24 RX ORDER — PEDI MULTIVIT NO.128/VITAMIN K 500 MCG/ML
LIQUID (ML) ORAL
Qty: 90 TABLET | Refills: 3 | Status: SHIPPED | OUTPATIENT
Start: 2020-03-24 | End: 2021-05-03

## 2020-03-24 RX ORDER — PANTOPRAZOLE SODIUM 40 MG/1
TABLET, DELAYED RELEASE ORAL
Qty: 45 TABLET | Refills: 1 | Status: SHIPPED | OUTPATIENT
Start: 2020-03-24 | End: 2020-08-14

## 2020-03-24 RX ORDER — FENOFIBRATE 145 MG/1
TABLET, COATED ORAL
COMMUNITY
Start: 2020-03-11 | End: 2020-03-24 | Stop reason: SDUPTHER

## 2020-03-24 RX ORDER — FLUOXETINE HYDROCHLORIDE 20 MG/1
CAPSULE ORAL
Qty: 90 CAPSULE | Refills: 1 | Status: SHIPPED | OUTPATIENT
Start: 2020-03-24 | End: 2021-02-03 | Stop reason: SDUPTHER

## 2020-03-24 RX ORDER — LORATADINE 10 MG/1
TABLET ORAL
Qty: 90 TABLET | Refills: 1 | Status: SHIPPED | OUTPATIENT
Start: 2020-03-24 | End: 2021-02-03 | Stop reason: SDUPTHER

## 2020-03-24 RX ORDER — FERROUS GLUCONATE 240(27)MG
TABLET ORAL
Qty: 180 TABLET | Refills: 1 | Status: SHIPPED | OUTPATIENT
Start: 2020-03-24 | End: 2020-11-11

## 2020-03-24 ASSESSMENT — ENCOUNTER SYMPTOMS
ABDOMINAL DISTENTION: 0
COUGH: 0
TROUBLE SWALLOWING: 0
SHORTNESS OF BREATH: 0
ABDOMINAL PAIN: 0

## 2020-03-24 NOTE — PROGRESS NOTES
3/24/2020    TELEHEALTH EVALUATION -- Audio/Visual (During [place]IBHN-69 public health emergency)    Due to [place] outbreak, patient's office visit was converted to a virtual visit. Patient was contacted and agreed to proceed with a virtual visit via Telephone Visit  The risks and benefits of converting to a virtual visit were discussed in light of the current infectious disease epidemic. Patient also understood that insurance coverage and co-pays are up to their individual insurance plans. HPI:    Alyson Sweet (:  1975) has requested an audio/video evaluation for the following concern(s):    Chief Complaint   Patient presents with    Hypertension    Hyperlipidemia     Interim history:    Since her last office visit with me 2 months ago the patient has been well, no emergencies. She has seen the nurse practitioner covering for her bariatric surgeon in the office on . She had laboratories which were discussed to her at the time of the visit and which included cholesterol. Reportedly, her triglycerides remain slightly elevated at 178 but other fractions of the cholesterol were looking\" really good\". I requested a copy of her lipid profile to help me in managing her medication which includes rosuvastatin and fenofibrate. Once I get the copy, it may be discontinuing fenofibrate. The patient did continue to lose weight. Her last A1c was also better, 5.7 compared to 6.2 in 2019. She has been checking her blood pressure at home and finds systolics averaging 684, diastolics 70F. Review of Systems   Constitutional: Negative for fatigue and unexpected weight change. HENT: Negative for nosebleeds and trouble swallowing. Eyes: Negative for visual disturbance. Respiratory: Negative for cough and shortness of breath. Cardiovascular: Negative for chest pain, palpitations and leg swelling. Gastrointestinal: Negative for abdominal distention and abdominal pain. Endocrine: Negative for cold intolerance, heat intolerance, polydipsia and polyuria. Genitourinary: Negative for dysuria and hematuria. Musculoskeletal: Negative for gait problem and myalgias. Skin: Negative for rash. Allergic/Immunologic: Negative for immunocompromised state. Neurological: Negative for tremors, syncope, weakness and light-headedness. Hematological: Does not bruise/bleed easily. Psychiatric/Behavioral: Negative for decreased concentration and dysphoric mood. The patient is not nervous/anxious. Prior to Visit Medications    Medication Sig Taking? Authorizing Provider   pantoprazole (PROTONIX) 40 MG tablet Take 1 tab po AC, every other day Yes Chaya Daniel MD   fenofibrate (TRICOR) 145 MG tablet Take 1 tab po daily AM, AC Yes Chaya Daniel MD   Multiple Vitamins-Minerals (DEKAS BARIATRIC) CHEW CHEW AND SWALLOW 1 (ONE) TABLET BY MOUTH DAILY Yes Chaya Daniel MD   FLUoxetine (PROZAC) 20 MG capsule TAKE 1 CAPSULE BY MOUTH DAILY Yes Chaya Daniel MD   rosuvastatin (CRESTOR) 20 MG tablet TAKE 1 TABLET BY MOUTH NIGHTLY Yes Chaya Daniel MD   loratadine (CLARITIN) 10 MG tablet Take 1 tab po daily as needed for allergies Yes Chaya Daniel MD   Cholecalciferol (VITAMIN D) 125 MCG (5000 UT) CAPS TAKE 1 CAPSULE DAILY with food Yes Chaya Daniel MD   FERATE 240 (27 Fe) MG tablet TAKE 1 TABLET po bid AC Yes Chaya Daniel MD   albuterol sulfate  (90 Base) MCG/ACT inhaler INHALE 2 PUFFS into the lungs EVERY 6 HOURS AS NEEDED FOR WHEEZING or SHORTNESS OF BREATH  Chaya Daniel MD       Social History     Tobacco Use    Smoking status: Former Smoker     Packs/day: 0.50     Types: Cigarettes     Last attempt to quit: 2018     Years since quittin.7    Smokeless tobacco: Never Used    Tobacco comment: uses electronic cigarettes & trying to cut back   Substance Use Topics    Alcohol use:  Yes     Alcohol/week: 1.0 standard drinks     Types: 1 Standard drinks or diet.    Follow-up in 6 months with PCP (myself or new PCP of choice). Duration of today's phone visit was of 12 minutes. An  electronic signature was used to authenticate this note. --Evan Haile MD on 3/24/2020 at 3:28 PM        Pursuant to the emergency declaration under the [address] [place] [address] waiver authority and the [employer] Act, this Virtual  Visit was conducted, with patient's consent, to reduce the patient's risk of exposure to COVID-19 and provide continuity of care for an established patient. Services were provided through a video synchronous discussion virtually to substitute for in-person clinic visit.

## 2020-06-04 RX ORDER — BUSPIRONE HYDROCHLORIDE 5 MG/1
TABLET ORAL
COMMUNITY
Start: 2020-05-06 | End: 2020-07-07

## 2020-06-04 RX ORDER — LOSARTAN POTASSIUM 50 MG/1
TABLET ORAL
COMMUNITY
Start: 2020-05-06 | End: 2020-06-04 | Stop reason: SDUPTHER

## 2020-06-04 RX ORDER — LOSARTAN POTASSIUM 50 MG/1
TABLET ORAL
Qty: 90 TABLET | Refills: 1 | Status: SHIPPED | OUTPATIENT
Start: 2020-06-04 | End: 2020-11-03

## 2020-06-12 RX ORDER — LOSARTAN POTASSIUM 50 MG/1
50 TABLET ORAL DAILY
Qty: 90 TABLET | Refills: 0 | OUTPATIENT
Start: 2020-06-12

## 2020-08-10 ENCOUNTER — OFFICE VISIT (OUTPATIENT)
Dept: FAMILY MEDICINE CLINIC | Age: 45
End: 2020-08-10
Payer: COMMERCIAL

## 2020-08-10 ENCOUNTER — NURSE TRIAGE (OUTPATIENT)
Dept: OTHER | Facility: CLINIC | Age: 45
End: 2020-08-10

## 2020-08-10 VITALS
SYSTOLIC BLOOD PRESSURE: 108 MMHG | TEMPERATURE: 97.6 F | WEIGHT: 229 LBS | HEART RATE: 105 BPM | DIASTOLIC BLOOD PRESSURE: 78 MMHG | OXYGEN SATURATION: 98 % | BODY MASS INDEX: 38.15 KG/M2 | HEIGHT: 65 IN

## 2020-08-10 PROCEDURE — 99213 OFFICE O/P EST LOW 20 MIN: CPT | Performed by: NURSE PRACTITIONER

## 2020-08-10 PROCEDURE — 1036F TOBACCO NON-USER: CPT | Performed by: NURSE PRACTITIONER

## 2020-08-10 PROCEDURE — G8427 DOCREV CUR MEDS BY ELIG CLIN: HCPCS | Performed by: NURSE PRACTITIONER

## 2020-08-10 PROCEDURE — G8417 CALC BMI ABV UP PARAM F/U: HCPCS | Performed by: NURSE PRACTITIONER

## 2020-08-10 RX ORDER — AMOXICILLIN AND CLAVULANATE POTASSIUM 875; 125 MG/1; MG/1
1 TABLET, FILM COATED ORAL 2 TIMES DAILY
Qty: 14 TABLET | Refills: 0 | Status: SHIPPED | OUTPATIENT
Start: 2020-08-10 | End: 2020-08-17

## 2020-08-10 NOTE — PROGRESS NOTES
Subjective     Stephon Shaw 39 y.o. female presents 8/14/20 with   Chief Complaint   Patient presents with    Rectal Bleeding     Patient here today 2 days ago past a mucus looking discharge out butocks, she feels a lump on the inside        HPI     Patient noticed that she has a lump in the side of her rectum. States when she is sitting it is about a golf ball size. Patient states she had a BM yesterday, it was clear mucous, and had a foul odor. Patient states there was mucous and bloody drainage that was draining out of her rectum, when she woke up this morning noticed the drainage. Patient states she is having a lot of pain with sitting etc. Patient states she has chronic constipation. Patient thought she may have an internal hemorrhoid. Believes that passing stool would be extremely painful right now. Patient states last night she had abdominal pain on the right and left side. Denies any abdominal pain today. Last BM was 3 days ago. States she usually goes every 2-3 days. Denies fever/chills    Reviewed the following history:    Past Medical History:   Diagnosis Date    Allergic rhinitis     several allergens    Asthma     since shildChelsea    Bariatric surgery status 01/02/2020    [facility], Dr Jackelyn Dodd     Brachial neuralgia 1/23/2014    Chronic left shoulder pain     COPD [facility]ch[facility] obstructive [facility] disease) [facility] 2019    Dr Stephanie Whiet H/O colonoscopy 2014    Dr. Makayla August Hyperlipidemia     Hypertension 2010    Migraine headache     Obesity, morbid (more than 100 lbs over ideal weight or BMI > 40) ([facility]     SEJAL on CPAP 10/2018     Past Surgical History:   Procedure Laterality Date    BREAST SURGERY  2007    reduction, Dr Glenny Reardon  10/20/15    DR. Mark Carr Right     Dr Xochitl Sierra    SLEEVE GASTRECTOMY  01/20/2020    [facility], Dr Germán Lozada History   Problem Relation Age of Onset    Osteoarthritis Mother    Erikanany Beckford Other Mother         hyperlipidemia    Hypertension Mother     High Cholesterol Mother     No Known Problems Father        No Known Allergies    Current Outpatient Medications on File Prior to Visit   Medication Sig Dispense Refill    busPIRone (BUSPAR) 5 MG tablet TAKE 1 TABLET BY MOUTH THREE TIMES DAILY AS NEEDED FOR ANXIETY 90 tablet 3    metFORMIN (GLUCOPHAGE) 500 MG tablet TAKE 2 TABLETS BY MOUTH NIGHTLY      losartan (COZAAR) 50 MG tablet Take 1 tablet by mouth daily 90 tablet 1    pantoprazole (PROTONIX) 40 MG tablet Take 1 tab po AC, every other day 45 tablet 1    fenofibrate (TRICOR) 145 MG tablet Take 1 tab po daily AM, AC 90 tablet 1    Multiple Vitamins-Minerals (DEKAS BARIATRIC) CHEW CHEW AND SWALLOW 1 (ONE) TABLET BY MOUTH DAILY 90 tablet 3    FLUoxetine (PROZAC) 20 MG capsule TAKE 1 CAPSULE BY MOUTH DAILY 90 capsule 1    rosuvastatin (CRESTOR) 20 MG tablet TAKE 1 TABLET BY MOUTH NIGHTLY 90 tablet 1    loratadine (CLARITIN) 10 MG tablet Take 1 tab po daily as needed for allergies 90 tablet 1    Cholecalciferol (VITAMIN D) 125 MCG (5000 UT) CAPS TAKE 1 CAPSULE DAILY with food 90 capsule 1    FERATE 240 (27 Fe) MG tablet TAKE 1 TABLET po bid  tablet 1    albuterol sulfate  (90 Base) MCG/ACT inhaler INHALE 2 PUFFS into the lungs EVERY 6 HOURS AS NEEDED FOR WHEEZING or SHORTNESS OF BREATH 18 g 3     No current facility-administered medications on file prior to visit. Review of Systems   Constitutional: Negative for chills and fever. Skin:        Perirectal pain with hardened area       Objective    Vitals:    08/10/20 1624   BP: 108/78   Site: Right Upper Arm   Position: Sitting   Cuff Size: Large Adult   Pulse: 105   Temp: 97.6 °F (36.4 °C)   TempSrc: Temporal   SpO2: 98%   Weight: 229 lb (103.9 kg)   Height: 5' 5\" (1.651 m)       Physical Exam  Constitutional:       General: She is not in acute distress. Appearance: She is not ill-appearing or toxic-appearing.    HENT: Head: Normocephalic and atraumatic. Right Ear: Hearing and external ear normal.      Left Ear: Hearing and external ear normal.   Eyes:      General: Lids are normal.      Conjunctiva/sclera: Conjunctivae normal.   Cardiovascular:      Rate and Rhythm: Tachycardia present. Pulmonary:      Effort: Pulmonary effort is normal. No respiratory distress. Genitourinary:      Skin:     General: Skin is warm and dry. Neurological:      General: No focal deficit present. Mental Status: She is alert and oriented to person, place, and time. Assessment and Plan    Quincy frias was seen today for rectal bleeding. Diagnoses and all orders for this visit:    Perirectal abscess  -     amoxicillin-clavulanate (AUGMENTIN) 875-125 MG per tablet; Take 1 tablet by mouth 2 times daily for 7 days  -     Ambulatory referral to Jean-Pierre Witt MA present as chaperone for  exam.    Return if symptoms worsen or fail to improve, for follow up with PCP. Probable perirectal abscess. VSS. In no acute distress. Advised to call general surgery STAT tomorrow for evaluation. ED if symptoms worsen. Side effects and adverse effects of any medication prescribed today, as well as treatment plan/rationale, follow-up care, and result expectations have been discussed with the patient. Expresses understanding and desires to proceed with treatment plan. The patient was reminded that if an antibiotic has been prescribed the predicted course is improvement to cure with no persistent issues. Take antibiotics as directed. If any problems occur, an appointment should be made or ER visit if severe. Because of the risk with ANY antibiotic of C. Difficile colitis if persistent diarrhea or abdominal pain or any concerning symptoms, we should be notified. To reduce this risk, a probiotic pill, yogurt or other preparations containing active cultures should be ingested daily -particularly while on the antibiotic.  If any persistent symptoms of illness, follow up appointment should be made in a timely fashion with a physician. Discussed signs and symptoms which require immediate follow-up in ED/call to 911. Understanding verbalized. I have reviewed and updated the electronic medical record.     Nya Dumont, DIRK - CNP

## 2020-08-14 RX ORDER — PANTOPRAZOLE SODIUM 40 MG/1
TABLET, DELAYED RELEASE ORAL
Qty: 45 TABLET | Refills: 1 | Status: SHIPPED | OUTPATIENT
Start: 2020-08-14 | End: 2021-02-03 | Stop reason: SDUPTHER

## 2020-08-24 ENCOUNTER — OFFICE VISIT (OUTPATIENT)
Dept: FAMILY MEDICINE CLINIC | Age: 45
End: 2020-08-24
Payer: COMMERCIAL

## 2020-08-24 VITALS
DIASTOLIC BLOOD PRESSURE: 82 MMHG | HEART RATE: 76 BPM | BODY MASS INDEX: 38.15 KG/M2 | TEMPERATURE: 98 F | WEIGHT: 229 LBS | OXYGEN SATURATION: 98 % | SYSTOLIC BLOOD PRESSURE: 120 MMHG | HEIGHT: 65 IN

## 2020-08-24 PROCEDURE — 99214 OFFICE O/P EST MOD 30 MIN: CPT | Performed by: FAMILY MEDICINE

## 2020-08-24 PROCEDURE — G8427 DOCREV CUR MEDS BY ELIG CLIN: HCPCS | Performed by: FAMILY MEDICINE

## 2020-08-24 PROCEDURE — 1036F TOBACCO NON-USER: CPT | Performed by: FAMILY MEDICINE

## 2020-08-24 PROCEDURE — G8417 CALC BMI ABV UP PARAM F/U: HCPCS | Performed by: FAMILY MEDICINE

## 2020-08-24 RX ORDER — HYDROCODONE BITARTRATE AND ACETAMINOPHEN 5; 325 MG/1; MG/1
TABLET ORAL
COMMUNITY
Start: 2020-08-12 | End: 2020-08-24 | Stop reason: SINTOL

## 2020-08-24 RX ORDER — ALBUTEROL SULFATE 90 UG/1
AEROSOL, METERED RESPIRATORY (INHALATION)
Qty: 18 G | Refills: 0 | Status: SHIPPED | OUTPATIENT
Start: 2020-08-24 | End: 2020-10-02

## 2020-08-24 RX ORDER — CHOLECALCIFEROL (VITAMIN D3) 1250 MCG
1 CAPSULE ORAL
Qty: 24 CAPSULE | Refills: 3 | Status: CANCELLED | COMMUNITY
Start: 2020-08-24

## 2020-08-24 RX ORDER — CIPROFLOXACIN 500 MG/1
TABLET, FILM COATED ORAL
COMMUNITY
Start: 2020-08-14 | End: 2020-08-24 | Stop reason: ALTCHOICE

## 2020-08-24 ASSESSMENT — ENCOUNTER SYMPTOMS
SHORTNESS OF BREATH: 0
DIARRHEA: 0
CONSTIPATION: 1
COUGH: 0
VOMITING: 0

## 2020-08-24 ASSESSMENT — PATIENT HEALTH QUESTIONNAIRE - PHQ9
SUM OF ALL RESPONSES TO PHQ9 QUESTIONS 1 & 2: 0
SUM OF ALL RESPONSES TO PHQ QUESTIONS 1-9: 0
SUM OF ALL RESPONSES TO PHQ QUESTIONS 1-9: 0
1. LITTLE INTEREST OR PLEASURE IN DOING THINGS: 0
2. FEELING DOWN, DEPRESSED OR HOPELESS: 0

## 2020-08-24 NOTE — PROGRESS NOTES
Socioeconomic History    Marital status: Single     Spouse name: None    Number of children: 2    Years of education: None    Highest education level: None   Occupational History    Occupation: dog grooming business, self employed   Social Needs    Financial resource strain: None    Food insecurity     Worry: None     Inability: None    Transportation needs     Medical: None     Non-medical: None   Tobacco Use    Smoking status: Former Smoker     Packs/day: 0.50     Types: Cigarettes     Last attempt to quit: 2018     Years since quittin.1    Smokeless tobacco: Never Used    Tobacco comment: uses electronic cigarettes & trying to cut back   Substance and Sexual Activity    Alcohol use:  Yes     Alcohol/week: 1.0 standard drinks     Types: 1 Standard drinks or equivalent per week     Comment: socially    Drug use: Yes     Frequency: 1.0 times per week     Comment: marijuana, quit 2013    Sexual activity: Yes   Lifestyle    Physical activity     Days per week: None     Minutes per session: None    Stress: None   Relationships    Social connections     Talks on phone: None     Gets together: None     Attends Rastafarian service: None     Active member of club or organization: None     Attends meetings of clubs or organizations: None     Relationship status: None    Intimate partner violence     Fear of current or ex partner: None     Emotionally abused: None     Physically abused: None     Forced sexual activity: None   Other Topics Concern    None   Social History Narrative    Born in [place], has 1 brother one sister    Never , was in a bad relationship    Lives with daughter age 16 (2017) in a house in [place]    2 daughters, one daughter with problems, one in college    Works at [employer]    Hobbies reading, puzzles, games, outdoor activities     [facility]      Current Outpatient Medications   Medication Sig Dispense Refill    pantoprazole ([address]) 40 MG tablet TAKE 1 TABLET before a meal EVERY OTHER DAY 45 tablet 1    losartan (COZAAR) 50 MG tablet Take 1 tablet by mouth daily 90 tablet 1    fenofibrate (TRICOR) 145 MG tablet Take 1 tab po daily AM, AC 90 tablet 1    Multiple Vitamins-Minerals (DEKAS BARIATRIC) CHEW CHEW AND SWALLOW 1 (ONE) TABLET BY MOUTH DAILY 90 tablet 3    FLUoxetine (PROZAC) 20 MG capsule TAKE 1 CAPSULE BY MOUTH DAILY 90 capsule 1    rosuvastatin (CRESTOR) 20 MG tablet TAKE 1 TABLET BY MOUTH NIGHTLY 90 tablet 1    loratadine (CLARITIN) 10 MG tablet Take 1 tab po daily as needed for allergies 90 tablet 1    Cholecalciferol (VITAMIN D) 125 MCG (5000 UT) CAPS TAKE 1 CAPSULE DAILY with food 90 capsule 1    FERATE 240 (27 Fe) MG tablet TAKE 1 TABLET po bid  tablet 1    albuterol sulfate  (90 Base) MCG/ACT inhaler INHALE 2 PUFFS into the lungs EVERY 6 HOURS AS NEEDED FOR WHEEZING or SHORTNESS OF BREATH 18 g 3     No current facility-administered medications for this visit. Family History   Problem Relation Age of Onset    Osteoarthritis Mother     Other Mother         hyperlipidemia    Hypertension Mother     High Cholesterol Mother     No Known Problems Father      Past Medical History:   Diagnosis Date    Allergic rhinitis     several allergens    Asthma     since shildhood    Bariatric surgery status 01/02/2020    [facility], Dr Angelene Kanner Brachial neuralgia 1/23/2014    Chronic left shoulder pain     [facility] 2019    Dr Noemí Vazquez H/O colonoscopy 2014    Dr. Caleb Guillen Hyperlipidemia     Hypertension 2010    Migraine headache     Obesity, morbid (more than 100 lbs over ideal weight or BMI > 40) ([facility])     SEJAL on CPAP 10/2018     Objective:   /82   Pulse 76   Temp 98 °F (36.7 °C)   Ht 5' 5\" (1.651 m)   Wt 229 lb (103.9 kg)   SpO2 98%   BMI 38.11 kg/m²     Physical Exam  Neck:no carotid bruits. No masses. No adenopathy. No thyroid asymmetry. Lungs:clear and equal breath sounds. No wheezes or rales. Heart:rate reg. No murmur. No gallops   Pulses:Radials 2+ equal               Poster tib 1+ equal  Extremities:no edema in either leg  Gen: In no acute distress  Abdomen; B.S present. Soft  Non tender. No hepatosplenomegaly. No masses   Assessment:       Diagnosis Orders   1. Essential hypertension     2. Pure hypercholesterolemia     3. Gastroesophageal reflux disease without esophagitis     4. Mild asthma without complication, unspecified whether persistent     5.  History of bariatric surgery           Plan:      Orders Placed This Encounter   Medications    albuterol sulfate  (90 Base) MCG/ACT inhaler     Sig: INHALE 2 PUFFS into the lungs EVERY 6 HOURS AS NEEDED FOR WHEEZING or SHORTNESS OF BREATH     Dispense:  18 g     Refill:  0   fasting blood work soon and f/u after above

## 2020-09-11 ENCOUNTER — HOSPITAL ENCOUNTER (EMERGENCY)
Age: 45
Discharge: HOME | End: 2020-09-11
Payer: COMMERCIAL

## 2020-09-11 ENCOUNTER — APPOINTMENT (OUTPATIENT)
Dept: GENERAL RADIOLOGY | Age: 45
End: 2020-09-11
Payer: COMMERCIAL

## 2020-09-11 VITALS
HEIGHT: 65 IN | TEMPERATURE: 97.8 F | DIASTOLIC BLOOD PRESSURE: 88 MMHG | HEART RATE: 90 BPM | SYSTOLIC BLOOD PRESSURE: 155 MMHG | RESPIRATION RATE: 17 BRPM | BODY MASS INDEX: 37.32 KG/M2 | OXYGEN SATURATION: 99 % | WEIGHT: 224 LBS

## 2020-09-11 DIAGNOSIS — I10 ESSENTIAL HYPERTENSION: ICD-10-CM

## 2020-09-11 DIAGNOSIS — E78.00 PURE HYPERCHOLESTEROLEMIA: ICD-10-CM

## 2020-09-11 DIAGNOSIS — Z98.84 HISTORY OF BARIATRIC SURGERY: ICD-10-CM

## 2020-09-11 LAB
ALBUMIN SERPL-MCNC: 3.6 G/DL (ref 3.5–4.6)
ALBUMIN SERPL-MCNC: 3.8 G/DL (ref 3.5–4.6)
ALP BLD-CCNC: 51 U/L (ref 40–130)
ALP BLD-CCNC: 58 U/L (ref 40–130)
ALT SERPL-CCNC: 10 U/L (ref 0–33)
ALT SERPL-CCNC: 9 U/L (ref 0–33)
ANION GAP SERPL CALCULATED.3IONS-SCNC: 10 MEQ/L (ref 9–15)
ANION GAP SERPL CALCULATED.3IONS-SCNC: 12 MEQ/L (ref 9–15)
AST SERPL-CCNC: 6 U/L (ref 0–35)
AST SERPL-CCNC: 9 U/L (ref 0–35)
BASOPHILS ABSOLUTE: 0.1 K/UL (ref 0–0.2)
BASOPHILS ABSOLUTE: 0.1 K/UL (ref 0–0.2)
BASOPHILS RELATIVE PERCENT: 1.3 %
BASOPHILS RELATIVE PERCENT: 1.5 %
BILIRUB SERPL-MCNC: 0.7 MG/DL (ref 0.2–0.7)
BILIRUB SERPL-MCNC: 0.9 MG/DL (ref 0.2–0.7)
BUN BLDV-MCNC: 11 MG/DL (ref 6–20)
BUN BLDV-MCNC: 13 MG/DL (ref 6–20)
CALCIUM SERPL-MCNC: 8.9 MG/DL (ref 8.5–9.9)
CALCIUM SERPL-MCNC: 9.1 MG/DL (ref 8.5–9.9)
CHLORIDE BLD-SCNC: 106 MEQ/L (ref 95–107)
CHLORIDE BLD-SCNC: 106 MEQ/L (ref 95–107)
CHOLESTEROL, TOTAL: 142 MG/DL (ref 0–199)
CHP ED QC CHECK: NORMAL
CO2: 22 MEQ/L (ref 20–31)
CO2: 24 MEQ/L (ref 20–31)
CREAT SERPL-MCNC: 0.62 MG/DL (ref 0.5–0.9)
CREAT SERPL-MCNC: 0.69 MG/DL (ref 0.5–0.9)
EOSINOPHILS ABSOLUTE: 0.5 K/UL (ref 0–0.7)
EOSINOPHILS ABSOLUTE: 0.6 K/UL (ref 0–0.7)
EOSINOPHILS RELATIVE PERCENT: 6.2 %
EOSINOPHILS RELATIVE PERCENT: 6.8 %
FOLATE: >20 NG/ML (ref 7.3–26.1)
GFR AFRICAN AMERICAN: >60
GFR AFRICAN AMERICAN: >60
GFR NON-AFRICAN AMERICAN: >60
GFR NON-AFRICAN AMERICAN: >60
GLOBULIN: 2.4 G/DL (ref 2.3–3.5)
GLOBULIN: 2.5 G/DL (ref 2.3–3.5)
GLUCOSE BLD-MCNC: 133 MG/DL (ref 70–99)
GLUCOSE BLD-MCNC: 136 MG/DL (ref 70–99)
HCT VFR BLD CALC: 43.3 % (ref 37–47)
HCT VFR BLD CALC: 43.4 % (ref 37–47)
HDLC SERPL-MCNC: 31 MG/DL (ref 40–59)
HEMOGLOBIN: 14.5 G/DL (ref 12–16)
HEMOGLOBIN: 14.6 G/DL (ref 12–16)
LDL CHOLESTEROL CALCULATED: 54 MG/DL (ref 0–129)
LYMPHOCYTES ABSOLUTE: 1.9 K/UL (ref 1–4.8)
LYMPHOCYTES ABSOLUTE: 2.3 K/UL (ref 1–4.8)
LYMPHOCYTES RELATIVE PERCENT: 24.9 %
LYMPHOCYTES RELATIVE PERCENT: 25.2 %
MCH RBC QN AUTO: 29.4 PG (ref 27–31.3)
MCH RBC QN AUTO: 29.8 PG (ref 27–31.3)
MCHC RBC AUTO-ENTMCNC: 33.4 % (ref 33–37)
MCHC RBC AUTO-ENTMCNC: 33.7 % (ref 33–37)
MCV RBC AUTO: 87.3 FL (ref 82–100)
MCV RBC AUTO: 89.2 FL (ref 82–100)
MONOCYTES ABSOLUTE: 0.4 K/UL (ref 0.2–0.8)
MONOCYTES ABSOLUTE: 0.5 K/UL (ref 0.2–0.8)
MONOCYTES RELATIVE PERCENT: 5.8 %
MONOCYTES RELATIVE PERCENT: 5.9 %
NEUTROPHILS ABSOLUTE: 4.6 K/UL (ref 1.4–6.5)
NEUTROPHILS ABSOLUTE: 5.7 K/UL (ref 1.4–6.5)
NEUTROPHILS RELATIVE PERCENT: 61.1 %
NEUTROPHILS RELATIVE PERCENT: 61.3 %
PDW BLD-RTO: 12.9 % (ref 11.5–14.5)
PDW BLD-RTO: 13.1 % (ref 11.5–14.5)
PLATELET # BLD: 259 K/UL (ref 130–400)
PLATELET # BLD: 265 K/UL (ref 130–400)
POTASSIUM SERPL-SCNC: 4.1 MEQ/L (ref 3.4–4.9)
POTASSIUM SERPL-SCNC: 4.9 MEQ/L (ref 3.4–4.9)
PREGNANCY TEST URINE, POC: NORMAL
RBC # BLD: 4.87 M/UL (ref 4.2–5.4)
RBC # BLD: 4.96 M/UL (ref 4.2–5.4)
SODIUM BLD-SCNC: 140 MEQ/L (ref 135–144)
SODIUM BLD-SCNC: 140 MEQ/L (ref 135–144)
TOTAL PROTEIN: 6.1 G/DL (ref 6.3–8)
TOTAL PROTEIN: 6.2 G/DL (ref 6.3–8)
TRIGL SERPL-MCNC: 283 MG/DL (ref 0–150)
VITAMIN B-12: 758 PG/ML (ref 232–1245)
WBC # BLD: 7.5 K/UL (ref 4.8–10.8)
WBC # BLD: 9.3 K/UL (ref 4.8–10.8)

## 2020-09-11 PROCEDURE — 36415 COLL VENOUS BLD VENIPUNCTURE: CPT

## 2020-09-11 PROCEDURE — 6370000000 HC RX 637 (ALT 250 FOR IP): Performed by: PHYSICIAN ASSISTANT

## 2020-09-11 PROCEDURE — 99284 EMERGENCY DEPT VISIT MOD MDM: CPT

## 2020-09-11 PROCEDURE — 96374 THER/PROPH/DIAG INJ IV PUSH: CPT

## 2020-09-11 PROCEDURE — 87040 BLOOD CULTURE FOR BACTERIA: CPT

## 2020-09-11 PROCEDURE — 71046 X-RAY EXAM CHEST 2 VIEWS: CPT

## 2020-09-11 PROCEDURE — 6360000002 HC RX W HCPCS: Performed by: PHYSICIAN ASSISTANT

## 2020-09-11 PROCEDURE — 85025 COMPLETE CBC W/AUTO DIFF WBC: CPT

## 2020-09-11 PROCEDURE — 94640 AIRWAY INHALATION TREATMENT: CPT

## 2020-09-11 PROCEDURE — 80053 COMPREHEN METABOLIC PANEL: CPT

## 2020-09-11 RX ORDER — TRAMADOL HYDROCHLORIDE 50 MG/1
50 TABLET ORAL EVERY 6 HOURS PRN
Qty: 12 TABLET | Refills: 0 | Status: SHIPPED | OUTPATIENT
Start: 2020-09-11 | End: 2020-09-14

## 2020-09-11 RX ORDER — IPRATROPIUM BROMIDE AND ALBUTEROL SULFATE 2.5; .5 MG/3ML; MG/3ML
1 SOLUTION RESPIRATORY (INHALATION)
Status: DISCONTINUED | OUTPATIENT
Start: 2020-09-11 | End: 2020-09-11 | Stop reason: HOSPADM

## 2020-09-11 RX ORDER — AZITHROMYCIN 250 MG/1
TABLET, FILM COATED ORAL
Qty: 1 PACKET | Refills: 0 | Status: SHIPPED | OUTPATIENT
Start: 2020-09-11 | End: 2020-09-21

## 2020-09-11 RX ORDER — PREDNISONE 20 MG/1
40 TABLET ORAL DAILY
Qty: 10 TABLET | Refills: 0 | Status: SHIPPED | OUTPATIENT
Start: 2020-09-11 | End: 2020-09-16

## 2020-09-11 RX ORDER — METHYLPREDNISOLONE SODIUM SUCCINATE 125 MG/2ML
125 INJECTION, POWDER, LYOPHILIZED, FOR SOLUTION INTRAMUSCULAR; INTRAVENOUS ONCE
Status: COMPLETED | OUTPATIENT
Start: 2020-09-11 | End: 2020-09-11

## 2020-09-11 RX ADMIN — IPRATROPIUM BROMIDE AND ALBUTEROL SULFATE 1 AMPULE: .5; 3 SOLUTION RESPIRATORY (INHALATION) at 16:32

## 2020-09-11 RX ADMIN — METHYLPREDNISOLONE SODIUM SUCCINATE 125 MG: 125 INJECTION, POWDER, FOR SOLUTION INTRAMUSCULAR; INTRAVENOUS at 16:33

## 2020-09-11 ASSESSMENT — ENCOUNTER SYMPTOMS
SHORTNESS OF BREATH: 1
COLOR CHANGE: 0
SORE THROAT: 0
WHEEZING: 1
RHINORRHEA: 0
CONSTIPATION: 0
EYE DISCHARGE: 0
VOMITING: 0
COUGH: 1
NAUSEA: 0
ABDOMINAL DISTENTION: 0
DIARRHEA: 0
ABDOMINAL PAIN: 0

## 2020-09-11 NOTE — ED TRIAGE NOTES
Pt presents to ED from home with c/o SOB. Pt states that this has been ongoing for a couple of days. Pt has hx of asthma and states that she has been utilizing her inhaler more than norm. Pt did have recent surgery as well to remove perirectal abscess. Upon assessment, pt is A/Ox4, skin p/w/d, resp even and unlabored, msp's intact. Pt able to speak in full sentences. Pt denies cp, n/v/d, fever, or chills; pt admits to cough. During assessment, pt states that she visited her chiropractor who told her that she has a cracked rib.

## 2020-09-11 NOTE — ED PROVIDER NOTES
[address] [facility] ED  eMERGENCY dEPARTMENT eNCOUnter      Pt Name: Bianka León  MRN: [id number]  [place] 1975  Date of evaluation: 9/11/2020  Provider: Patricia Kearns PA-C    CHIEF COMPLAINT       Chief Complaint   Patient presents with    Shortness of Breath         HISTORY OF PRESENT ILLNESS   (Location/Symptom, Timing/Onset,Context/Setting, Quality, Duration, Modifying Factors, Severity)  Note limiting factors. Bianka León is a 39 y.o. female who presents to the emergency department with a complaint of cough and shortness of breath which patient states been ongoing for approximately 1 week. She states she does have a past history of asthma and has been using her metered-dose inhaler 7-8 times at least daily. She states this only helps briefly, she states she has a cough which is productive sometimes for thick yellow sputum. She also complains of right-sided chest pain which she isolates between her fourth and fifth ribs and anterior area does increase with deep inspiration. She rates her current pain as a 5 out of 10.  denies any fevers, no nausea vomiting. HPI    NursingNotes were reviewed. REVIEW OF SYSTEMS    (2-9 systems for level 4, 10 or more for level 5)     Review of Systems   Constitutional: Negative for activity change, appetite change, chills, fatigue and fever. HENT: Negative for congestion, ear discharge, ear pain, nosebleeds, rhinorrhea and sore throat. Eyes: Negative for discharge. Respiratory: Positive for cough, shortness of breath and wheezing. Cardiovascular: Positive for chest pain. Negative for palpitations and leg swelling. Gastrointestinal: Negative for abdominal distention, abdominal pain, constipation, diarrhea, nausea and vomiting. Genitourinary: Negative for difficulty urinating and dysuria. Musculoskeletal: Negative for arthralgias. Skin: Negative for color change, pallor, rash and wound.    Neurological: Negative for dizziness, tremors, syncope, weakness, numbness and headaches. Psychiatric/Behavioral: Negative for agitation and confusion. Except as noted above the remainder of the review of systems was reviewed and negative. PAST MEDICAL HISTORY     Past Medical History:   Diagnosis Date    Allergic rhinitis     several allergens    Asthma     since shildhood    Bariatric surgery status 01/02/2020    [facility], Dr Wayne Peters     Brachial neuralgia 1/23/2014    Chronic left shoulder pain     COPD (chronic obstructive pulmonary disease) ([facility] [address] 2019    Dr Shiv Wright H/O colonoscopy 2014    Dr. Guillermina Vences Hyperlipidemia     Hypertension 2010    Migraine headache     Obesity, morbid (more than 100 lbs over ideal weight or BMI > 40) ([facility] [address]     SEJAL on CPAP 10/2018         SURGICALHISTORY       Past Surgical History:   Procedure Laterality Date    BREAST SURGERY  2007    reduction, Dr Marianne Baxter  10/20/15    DR. Jamie Camilo Right     Dr Ta Mcnair    SLEEVE GASTRECTOMY  01/20/2020    [facility], Dr Etta Dubin       Discharge Medication List as of 9/11/2020  5:22 PM      CONTINUE these medications which have NOT CHANGED    Details   albuterol sulfate  (90 Base) MCG/ACT inhaler INHALE 2 PUFFS into the lungs EVERY 6 HOURS AS NEEDED FOR WHEEZING or SHORTNESS OF BREATH, Disp-18 g,R-0Normal      pantoprazole (PROTONIX) 40 MG tablet TAKE 1 TABLET before a meal EVERY OTHER DAY, Disp-45 tablet,R-1Normal      losartan (COZAAR) 50 MG tablet Take 1 tablet by mouth daily, Disp-90 tablet,R-1Normal      fenofibrate (TRICOR) 145 MG tablet Take 1 tab po daily AM, AC, Disp-90 tablet, R-1Normal      Multiple Vitamins-Minerals (DEKAS BARIATRIC) CHEW CHEW AND SWALLOW 1 (ONE) TABLET BY MOUTH DAILY, Disp-90 tablet, R-3Normal      FLUoxetine (PROZAC) 20 MG capsule TAKE 1 CAPSULE BY MOUTH DAILY, Disp-90 capsule, R-1Normal      rosuvastatin (CRESTOR) 20 MG tablet TAKE 1 TABLET BY MOUTH NIGHTLY, Disp-90 tablet, R-1Normal      loratadine (CLARITIN) 10 MG tablet Take 1 tab po daily as needed for allergies, Disp-90 tablet, R-1Normal      Cholecalciferol (VITAMIN D) 125 MCG (5000 UT) CAPS TAKE 1 CAPSULE DAILY with food, Disp-90 capsule, R-1Normal      FERATE 240 (27 Fe) MG tablet TAKE 1 TABLET po bid AC, Disp-180 tablet, R-1, DAWNormal             ALLERGIES     Vicodin [hydrocodone-acetaminophen]    FAMILY HISTORY       Family History   Problem Relation Age of Onset    Osteoarthritis Mother     Other Mother         hyperlipidemia    Hypertension Mother     High Cholesterol Mother     No Known Problems Father           SOCIAL HISTORY       Social History     Socioeconomic History    Marital status: Single     Spouse name: None    Number of children: 2    Years of education: None    Highest education level: None   Occupational History    Occupation: dog grooming business, self employed   Social Needs    Financial resource strain: None    Food insecurity     Worry: None     Inability: None    Transportation needs     Medical: None     Non-medical: None   Tobacco Use    Smoking status: Former Smoker     Packs/day: 0.50     Types: Cigarettes     Last attempt to quit: 2018     Years since quittin.1    Smokeless tobacco: Never Used    Tobacco comment: uses electronic cigarettes & trying to cut back   Substance and Sexual Activity    Alcohol use:  Yes     Alcohol/week: 1.0 standard drinks     Types: 1 Standard drinks or equivalent per week     Comment: socially    Drug use: Yes     Frequency: 1.0 times per week     Comment: marijuana, quit 2013    Sexual activity: Yes   Lifestyle    Physical activity     Days per week: None     Minutes per session: None    Stress: None   Relationships    Social connections     Talks on phone: None     Gets together: None     Attends Islam service: None     Active member of club or organization: None Attends meetings of clubs or organizations: None     Relationship status: None    Intimate partner violence     Fear of current or ex partner: None     Emotionally abused: None     Physically abused: None     Forced sexual activity: None   Other Topics Concern    None   Social History Narrative    Born in [place], has 1 brother one sister    Never , was in a bad relationship    Lives with daughter age 16 (2017) in a house in [facility]    2 daughters, one daughter with problems, one in college    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 Constitutional:       General: She is not in acute distress. Appearance: She is well-developed. She is not ill-appearing, toxic-appearing or diaphoretic. HENT:      Head: Normocephalic. Nose: No congestion. Mouth/Throat:      Mouth: Mucous membranes are moist.      Pharynx: No oropharyngeal exudate or posterior oropharyngeal erythema. Eyes:      Extraocular Movements: Extraocular movements intact. Conjunctiva/sclera: Conjunctivae normal.      Pupils: Pupils are equal, round, and reactive to light. Neck:      Musculoskeletal: Normal range of motion and neck supple. No neck rigidity. Vascular: No JVD. Trachea: No tracheal deviation. Cardiovascular:      Rate and Rhythm: Normal rate. Pulses: Normal pulses. Heart sounds: Normal heart sounds. No murmur. No friction rub. No gallop.     Pulmonary:      Effort: Pulmonary effort is normal. No tachypnea, accessory muscle usage, respiratory distress or retractions. Breath sounds: No stridor. Examination of the right-upper field reveals decreased breath sounds and wheezing. Examination of the left-upper field reveals decreased breath sounds and wheezing. Examination of the right-middle field reveals decreased breath sounds and wheezing. Examination of the left-middle field reveals decreased breath sounds and wheezing. Examination of the right-lower field reveals decreased breath sounds and wheezing. Examination of the left-lower field reveals decreased breath sounds and wheezing. Decreased breath sounds and wheezing present. No rhonchi or rales. Chest:      Chest wall: No tenderness. Abdominal:      General: Abdomen is flat. Bowel sounds are normal. There is no distension or abdominal bruit. Palpations: There is no shifting dullness, fluid wave, hepatomegaly, splenomegaly, mass or pulsatile mass. Tenderness: There is no abdominal tenderness. There is no right CVA tenderness, left CVA tenderness, guarding or rebound. Negative signs include Goff's sign, Rovsing's sign and McBurney's sign. Musculoskeletal: Normal range of motion. General: No deformity. Right lower leg: No edema. Left lower leg: No edema. Skin:     General: Skin is warm and dry. Capillary Refill: Capillary refill takes less than 2 seconds. Coloration: Skin is not jaundiced. Neurological:      General: No focal deficit present. Mental Status: She is alert and oriented to person, place, and time. Mental status is at baseline. Cranial Nerves: No cranial nerve deficit. Sensory: No sensory deficit. Motor: No weakness.       Coordination: Coordination normal.   Psychiatric:         Mood and Affect: Mood normal.         DIAGNOSTIC RESULTS     EKG: All EKG's are interpreted by the Emergency Department Physician who either signs or Co-signsthis chart in the absence of a cardiologist.        RADIOLOGY:   Franko Aldrich such as CT, Ultrasound and MRI are read by the radiologist. Plain radiographic images are visualized and preliminarily interpreted by the emergency physician with the below findings:    Chest x-ray shows no acute pulmonary process    Interpretation per the Radiologist below, if available at the time ofthis note:    XR CHEST (2 VW)   Final Result      No acute radiographic abnormality. ED BEDSIDE ULTRASOUND:   Performed by ED Physician - none    LABS:  Labs Reviewed   COMPREHENSIVE METABOLIC PANEL - Abnormal; Notable for the following components:       Result Value    Glucose 136 (*)     Total Protein 6.2 (*)     All other components within normal limits   POCT URINE PREGNANCY - Normal   CULTURE, BLOOD 1    Narrative:     ORDER#: [id number]                          ORDERED BY: Manny Farrell  SOURCE: Blood                              COLLECTED:  09/11/20 16:32  ANTIBIOTICS AT NUNO.:                      RECEIVED :  09/11/20 16:32   CULTURE, BLOOD 2    Narrative:     ORDER#: [id number]                          ORDERED BY: Manny Farrell  SOURCE: Blood                              COLLECTED:  09/11/20 16:47  ANTIBIOTICS AT NUNO.:                      RECEIVED :  09/11/20 16:52   CBC WITH AUTO DIFFERENTIAL       All other labs were within normal range or not returned as of this dictation. EMERGENCY DEPARTMENT COURSE and DIFFERENTIAL DIAGNOSIS/MDM:   Vitals:    Vitals:    09/11/20 1559 09/11/20 1601 09/11/20 1632 09/11/20 1635   BP:  (!) 159/84  (!) 155/88   Pulse: 104   90   Resp: 19  18 17   Temp: 97.8 °F (36.6 °C)      TempSrc: Temporal      SpO2: 95%  95% 99%   Weight: 224 lb (101.6 kg)      Height: 5' 5\" (1.651 m)               MDM    CRITICAL CARE TIME   Total Critical Care time was  minutes, excluding separately reportableprocedures.   There was a high probability of clinicallysignificant/life threatening deterioration in the patient's condition which required my urgent intervention. CONSULTS:  None    PROCEDURES:  Unless otherwise noted below, none     Procedures    FINAL IMPRESSION      1. Mild intermittent asthmatic bronchitis with acute exacerbation          DISPOSITION/PLAN   DISPOSITION Decision To Discharge 09/11/2020 05:20:10 PM      PATIENT REFERRED TO:  Vj Saavedra MD  [address] [place] [address]  [phone]    In 3 days        DISCHARGE MEDICATIONS:  Discharge Medication List as of 9/11/2020  5:22 PM      START taking these medications    Details   predniSONE (DELTASONE) 20 MG tablet Take 2 tablets by mouth daily for 5 doses, Disp-10 tablet,R-0Print      azithromycin (ZITHROMAX) 250 MG tablet Take 2 tablets (500 mg) on Day 1, followed by 1 tablet (250 mg) once daily on Days 2 through 5., Disp-1 packet,R-0Print      traMADol (ULTRAM) 50 MG tablet Take 1 tablet by mouth every 6 hours as needed for Pain for up to 3 days. , Disp-12 tablet,R-0Print                (Please note that portions of this note were completed with a voice recognition program.  Efforts were made to edit the dictations but occasionally words are mis-transcribed.)    Gissel Cano PA-C (electronically signed)  Attending Emergency Physician         Gissel Cano PA-C  09/15/20 5932

## 2020-09-12 ENCOUNTER — CARE COORDINATION (OUTPATIENT)
Dept: CARE COORDINATION | Age: 45
End: 2020-09-12

## 2020-09-12 ENCOUNTER — HOSPITAL ENCOUNTER (EMERGENCY)
Age: 45
Discharge: HOME | End: 2020-09-12
Attending: EMERGENCY MEDICINE
Payer: COMMERCIAL

## 2020-09-12 VITALS
RESPIRATION RATE: 18 BRPM | WEIGHT: 225 LBS | SYSTOLIC BLOOD PRESSURE: 115 MMHG | HEIGHT: 65 IN | DIASTOLIC BLOOD PRESSURE: 75 MMHG | HEART RATE: 91 BPM | BODY MASS INDEX: 37.49 KG/M2 | TEMPERATURE: 97.9 F | OXYGEN SATURATION: 97 %

## 2020-09-12 PROCEDURE — 6360000002 HC RX W HCPCS: Performed by: EMERGENCY MEDICINE

## 2020-09-12 PROCEDURE — 96374 THER/PROPH/DIAG INJ IV PUSH: CPT

## 2020-09-12 PROCEDURE — 99283 EMERGENCY DEPT VISIT LOW MDM: CPT

## 2020-09-12 PROCEDURE — 96375 TX/PRO/DX INJ NEW DRUG ADDON: CPT

## 2020-09-12 PROCEDURE — 6370000000 HC RX 637 (ALT 250 FOR IP): Performed by: EMERGENCY MEDICINE

## 2020-09-12 PROCEDURE — 94640 AIRWAY INHALATION TREATMENT: CPT

## 2020-09-12 PROCEDURE — 94664 DEMO&/EVAL PT USE INHALER: CPT

## 2020-09-12 PROCEDURE — 2500000003 HC RX 250 WO HCPCS: Performed by: EMERGENCY MEDICINE

## 2020-09-12 RX ORDER — METHYLPREDNISOLONE SODIUM SUCCINATE 125 MG/2ML
125 INJECTION, POWDER, LYOPHILIZED, FOR SOLUTION INTRAMUSCULAR; INTRAVENOUS ONCE
Status: COMPLETED | OUTPATIENT
Start: 2020-09-12 | End: 2020-09-12

## 2020-09-12 RX ORDER — IPRATROPIUM BROMIDE AND ALBUTEROL SULFATE 2.5; .5 MG/3ML; MG/3ML
1 SOLUTION RESPIRATORY (INHALATION)
Status: DISCONTINUED | OUTPATIENT
Start: 2020-09-12 | End: 2020-09-12 | Stop reason: HOSPADM

## 2020-09-12 RX ADMIN — IPRATROPIUM BROMIDE AND ALBUTEROL SULFATE 1 AMPULE: .5; 3 SOLUTION RESPIRATORY (INHALATION) at 19:48

## 2020-09-12 RX ADMIN — METHYLPREDNISOLONE SODIUM SUCCINATE 125 MG: 125 INJECTION, POWDER, FOR SOLUTION INTRAMUSCULAR; INTRAVENOUS at 19:45

## 2020-09-12 RX ADMIN — FAMOTIDINE 20 MG: 10 INJECTION INTRAVENOUS at 19:45

## 2020-09-12 ASSESSMENT — ENCOUNTER SYMPTOMS
ABDOMINAL PAIN: 0
CHEST TIGHTNESS: 0
NAUSEA: 0
SHORTNESS OF BREATH: 0
EYE DISCHARGE: 0
PHOTOPHOBIA: 0
COUGH: 0
VOMITING: 0
ABDOMINAL DISTENTION: 0
SORE THROAT: 0
WHEEZING: 0

## 2020-09-12 NOTE — ED PROVIDER NOTES
[address] [facility] ED  eMERGENCY dEPARTMENT eNCOUnter      Pt Name: Scarlett Pathak  MRN: [id number]  [place] 1975  Date of evaluation: 9/12/2020  Provider: Kit Hinton MD    CHIEF COMPLAINT       Chief Complaint   Patient presents with    Allergic Reaction     possible allergic reaction to either zithromax, prednisone, or tramadol; pt reports scratching in throat, chest tightness, SOB, and redness noted to face         HISTORY OF PRESENT ILLNESS   (Location/Symptom, Timing/Onset,Context/Setting, Quality, Duration, Modifying Factors, Severity)  Note limiting factors. Scarlett Pathak is a 39 y.o. female who presents to the emergency department for possible allergic reaction. Patient was seen here yesterday for what was diagnosed as asthmatic bronchitis. She was started on Zithromax along with prednisone and Ultram.  Beginning last night a couple hours after taking the medication she started getting what she thought was a mild rash to the face and a burning in her throat. Today she had a rash to her arms and felt like she was having allergic reaction. She took Benadryl at home and comes in for evaluation. HPI    NursingNotes were reviewed. REVIEW OF SYSTEMS    (2-9 systems for level 4, 10 or more for level 5)     Review of Systems   Constitutional: Negative for chills and diaphoresis. HENT: Negative for congestion, ear pain, mouth sores and sore throat. Eyes: Negative for photophobia and discharge. Respiratory: Negative for cough, chest tightness, shortness of breath and wheezing. Cardiovascular: Negative for chest pain and palpitations. Gastrointestinal: Negative for abdominal distention, abdominal pain, nausea and vomiting. Endocrine: Negative for cold intolerance. Genitourinary: Negative for difficulty urinating and dysuria. Musculoskeletal: Negative for arthralgias and myalgias. Skin: Positive for rash. Negative for pallor.    Allergic/Immunologic: Negative for (COZAAR) 50 MG TABLET    Take 1 tablet by mouth daily    MULTIPLE VITAMINS-MINERALS (DEKAS BARIATRIC) CHEW    CHEW AND SWALLOW 1 (ONE) TABLET BY MOUTH DAILY    PANTOPRAZOLE (PROTONIX) 40 MG TABLET    TAKE 1 TABLET before a meal EVERY OTHER DAY    PREDNISONE (DELTASONE) 20 MG TABLET    Take 2 tablets by mouth daily for 5 doses    ROSUVASTATIN (CRESTOR) 20 MG TABLET    TAKE 1 TABLET BY MOUTH NIGHTLY    TRAMADOL (ULTRAM) 50 MG TABLET    Take 1 tablet by mouth every 6 hours as needed for Pain for up to 3 days. ALLERGIES     Vicodin [hydrocodone-acetaminophen]    FAMILY HISTORY       Family History   Problem Relation Age of Onset    Osteoarthritis Mother     Other Mother         hyperlipidemia    Hypertension Mother     High Cholesterol Mother     No Known Problems Father           SOCIAL HISTORY       Social History     Socioeconomic History    Marital status: Single     Spouse name: None    Number of children: 2    Years of education: None    Highest education level: None   Occupational History    Occupation: dog grooming business, self employed   Social Needs    Financial resource strain: None    Food insecurity     Worry: None     Inability: None    Transportation needs     Medical: None     Non-medical: None   Tobacco Use    Smoking status: Former Smoker     Packs/day: 0.50     Types: Cigarettes     Last attempt to quit: 2018     Years since quittin.1    Smokeless tobacco: Never Used    Tobacco comment: uses electronic cigarettes & trying to cut back   Substance and Sexual Activity    Alcohol use:  Yes     Alcohol/week: 1.0 standard drinks     Types: 1 Standard drinks or equivalent per week     Comment: socially    Drug use: Yes     Frequency: 1.0 times per week     Comment: marijuana, quit 2013    Sexual activity: Yes   Lifestyle    Physical activity     Days per week: None     Minutes per session: None    Stress: None   Relationships    Social connections     Talks on phone: None     Gets together: None     Attends Christianity service: None     Active member of club or organization: None     Attends meetings of clubs or organizations: None     Relationship status: None    Intimate partner violence     Fear of current or ex partner: None     Emotionally abused: None     Physically abused: None     Forced sexual activity: None   Other Topics Concern    None   Social History Narrative    Born in [place], has 1 brother one sister    Never , was in a bad relationship    Lives with daughter age 16 (2017) in a house in [facility] [place]    2 daughters, one daughter with problems, one in college    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 Constitutional:       General: She is not in acute distress. Appearance: She is well-developed. She is not ill-appearing. HENT:      Head: Normocephalic. Right Ear: Tympanic membrane normal.      Left Ear: Tympanic membrane normal.      Nose: Nose normal.      Mouth/Throat:      Mouth: Mucous membranes are moist.   Eyes:      Conjunctiva/sclera: Conjunctivae normal.      Pupils: Pupils are equal, round, and reactive to light. Neck:      Musculoskeletal: Normal range of motion and neck supple. Cardiovascular:      Rate and Rhythm: Normal rate and regular rhythm. Heart sounds: Normal heart sounds. Pulmonary:      Effort: Pulmonary effort is normal. No respiratory distress.       Breath sounds: Normal breath sounds. No stridor. No wheezing or rhonchi. Abdominal:      General: Bowel sounds are normal.      Palpations: Abdomen is soft. Tenderness: There is no abdominal tenderness. There is no guarding. Musculoskeletal: Normal range of motion. Skin:     General: Skin is warm and dry. Capillary Refill: Capillary refill takes less than 2 seconds. Comments: Patient with mild facial redness and then left arm mild reddened slightly urticarial rash consistent with allergic reaction. Neurological:      Mental Status: She is alert and oriented to person, place, and time. Psychiatric:         Mood and Affect: Mood normal.         DIAGNOSTIC RESULTS     EKG: All EKG's are interpreted by the Emergency Department Physician who either signs or Co-signsthis chart in the absence of a cardiologist.      RADIOLOGY:   Grace Tony such as CT, Ultrasound and MRI are read by the radiologist. Plain radiographic images are visualized and preliminarily interpreted by the emergency physician with the below findings:      Interpretation per the Radiologist below, if available at the time ofthis note:    No orders to display         ED BEDSIDE ULTRASOUND:   Performed by ED Physician - none    LABS:  Labs Reviewed - No data to display    All other labs were within normal range or not returned as of this dictation. EMERGENCY DEPARTMENT COURSE and DIFFERENTIAL DIAGNOSIS/MDM:   Vitals:    Vitals:    09/12/20 1858 09/12/20 1919 09/12/20 1921 09/12/20 1948   BP: 136/83 115/75     Pulse: 98 68 91    Resp: 17 20  18   Temp: 97.9 °F (36.6 °C)      TempSrc: Temporal      SpO2: 94% 96%  97%   Weight: 225 lb (102.1 kg)      Height: 5' 5\" (1.651 m)             MDM patient will be treated with aerosol machine at home continue the prednisone and discontinue the Ultram and Zithromax. Fortunately unable to know if it was the Ultram or Zithromax as the allergic reaction.   I think neither agent is required right now.      CONSULTS:  None    PROCEDURES:  Unless otherwise noted below, none     Procedures    FINAL IMPRESSION      1. Intermittent asthma with acute exacerbation, unspecified asthma severity    2.  Allergic reaction to drug, initial encounter          DISPOSITION/PLAN   DISPOSITION Decision To Discharge 09/12/2020 08:19:23 PM      PATIENT REFERRED TO:  Sydnie Hernández MD  [address] [place] [phone]    In 3 days        DISCHARGE MEDICATIONS:  New Prescriptions    No medications on file          (Please note that portions of this note were completed with a voice recognition program.  Efforts were made to edit the dictations but occasionally words are mis-transcribed.)    Ashvin Love MD (electronically signed)  Attending Emergency Physician          Ashvin Love MD  09/12/20 2020

## 2020-09-12 NOTE — PROGRESS NOTES
Patient states their breathing has improved with 1 breathing treatment. States they do not need another one at this time and will take one at home with the home neb if she needs one. No questions about home neb at this time.

## 2020-09-13 NOTE — ED NOTES
DC instructions reviewed with patient. Patient aware when to follow up with PCP and when to come back to ED. Patient educated on prescriptions. Patient verbalized understanding. Patient states she is feeling better. No distress observed. Patient dc'd with family. Patient ambulated out of ED with steady gait.      Michael Gonzalez RN  09/12/20 2031

## 2020-09-14 ENCOUNTER — CARE COORDINATION (OUTPATIENT)
Dept: CARE COORDINATION | Age: 45
End: 2020-09-14

## 2020-09-14 NOTE — CARE COORDINATION
Patient contacted regarding recent discharge and COVID-19 risk. Discussed COVID-19 related testing which was not done at this time. Test results were not done. Patient informed of results, if available? N/A     Care Transition Nurse/ Ambulatory Care Manager contacted the patient by telephone to perform post discharge assessment. Verified name and  with patient as identifiers. Patient has following risk factors of: COPD. CTN/ACM reviewed discharge instructions, medical action plan and red flags related to discharge diagnosis. Reviewed and educated them on any new and changed medications related to discharge diagnosis. Advised obtaining a 90-day supply of all daily and as-needed medications. Education provided regarding infection prevention, and signs and symptoms of COVID-19 and when to seek medical attention with patient who verbalized understanding. Discussed exposure protocols and quarantine from [facility] you at higher risk for severe illness  and given an opportunity for questions and concerns. The patient agrees to contact the COVID-19 hotline [phone] or PCP office for questions related to their healthcare. CTN/ACM provided contact information for future reference. From CDC: Are you at higher risk for severe illness?  Wash your hands often.  Avoid close contact (6 feet, which is about two arm lengths) with people who are sick.  Put distance between yourself and other people if COVID-19 is spreading in your community.  Clean and disinfect frequently touched surfaces.  Avoid all cruise travel and non-essential air travel.  Call your healthcare professional if you have concerns about COVID-19 and your underlying condition or if you are sick. For more information on steps you can take to protect yourself, see CDC's How to [address] [place] [address] for follow-up call in 3-5 days based on severity of symptoms and risk factors.     Patient states she has not taken any of the medication prescribed at previous ER visit due to not knowing which one caused allergic reaction. Patient states she stopped Azithromycin, prednisone, and tramadol. Inquired on ER physician directions about medications, patient stated she was instructed to continue Prednisone. Instructed the patient on purpose of medication and S/S of allergic reaction.

## 2020-09-16 LAB
BLOOD CULTURE, ROUTINE: NORMAL
CULTURE, BLOOD 2: NORMAL

## 2020-09-17 ENCOUNTER — CARE COORDINATION (OUTPATIENT)
Dept: CARE COORDINATION | Age: 45
End: 2020-09-17

## 2020-09-17 NOTE — CARE COORDINATION
Patient contacted regarding recent discharge and COVID-19 risk. Discussed COVID-19 related testing which was not done at this time. Test results were not done. Patient informed of results, if available? N/A     Care Transition Nurse/ Ambulatory Care Manager contacted the patient by telephone to perform post discharge assessment. Verified name and  with patient as identifiers. Patient has following risk factors of: asthma. CTN/ACM reviewed discharge instructions, medical action plan and red flags related to discharge diagnosis. Reviewed and educated them on any new and changed medications related to discharge diagnosis. Education provided regarding infection prevention, and signs and symptoms of COVID-19 and when to seek medical attention with patient who verbalized understanding. Discussed exposure protocols and quarantine from [address] you at higher risk for severe illness  and given an opportunity for questions and concerns. The patient agrees to contact the COVID-19 hotline [phone] or PCP office for questions related to their healthcare. CTN/ACM provided contact information for future reference. From CDC: Are you at higher risk for severe illness?  Wash your hands often.  Avoid close contact (6 feet, which is about two arm lengths) with people who are sick.  Put distance between yourself and other people if COVID-19 is spreading in your community.  Clean and disinfect frequently touched surfaces.  Avoid all cruise travel and non-essential air travel.  Call your healthcare professional if you have concerns about COVID-19 and your underlying condition or if you are sick. For more information on steps you can take to protect yourself, see CDC's How to [address] [place] [address] for follow-up call in 7-14 days based on severity of symptoms and risk factors. Patient states that she felt \"sick\" after taking the prednisone.  She states she has stopped taking it as

## 2020-09-18 ENCOUNTER — VIRTUAL VISIT (OUTPATIENT)
Dept: FAMILY MEDICINE CLINIC | Age: 45
End: 2020-09-18
Payer: COMMERCIAL

## 2020-09-18 PROCEDURE — 99441 PR PHYS/QHP TELEPHONE EVALUATION 5-10 MIN: CPT | Performed by: FAMILY MEDICINE

## 2020-09-18 ASSESSMENT — ENCOUNTER SYMPTOMS: VOMITING: 0

## 2020-09-18 NOTE — PROGRESS NOTES
Subjective:      Patient ID: Manuel Rosales is a 39 y.o. female    HPI  Here in follow up from recent blood work. Admits has not been taking tricor or crestor over the last few months. Review of Systems   Constitutional: Negative for chills and fever. Cardiovascular: Negative for chest pain. Gastrointestinal: Negative for vomiting. Neurological: Negative for dizziness. Reviewed allergy, medical, social, surgical, family and med list changes and updated   Files-reviewed blood work with pre diabetes and elevated triglycerides      Social History     Socioeconomic History    Marital status: Single     Spouse name: Not on file    Number of children: 2    Years of education: Not on file    Highest education level: Not on file   Occupational History    Occupation: dog grooming business, self employed   Social Needs    Financial resource strain: Not on file    Food insecurity     Worry: Not on file     Inability: Not on file   [employer] needs     Medical: Not on file     Non-medical: Not on file   Tobacco Use    Smoking status: Former Smoker     Packs/day: 0.50     Types: Cigarettes     Last attempt to quit: 2018     Years since quittin.1    Smokeless tobacco: Never Used    Tobacco comment: uses electronic cigarettes & trying to cut back   Substance and Sexual Activity    Alcohol use:  Yes     Alcohol/week: 1.0 standard drinks     Types: 1 Standard drinks or equivalent per week     Comment: socially    Drug use: Yes     Frequency: 1.0 times per week     Comment: marijuana, quit 2013    Sexual activity: Yes   Lifestyle    Physical activity     Days per week: Not on file     Minutes per session: Not on file    Stress: Not on file   Relationships    Social connections     Talks on phone: Not on file     Gets together: Not on file     Attends Orthodoxy service: Not on file     Active member of club or organization: Not on file     Attends meetings of clubs or Pure hypercholesterolemia     3.  Chronic obstructive pulmonary disease, unspecified COPD type ([address]     4. Obesity, morbid, BMI 40.0-49.9 ([address]           Plan:   Copd still having intermittent problems-continue current tx for now   Obesity improved-continue current tx   Restart crestor 10 mg daily  Fasting blood work in 4 weeks and f/u after above

## 2020-10-01 ENCOUNTER — CARE COORDINATION (OUTPATIENT)
Dept: CARE COORDINATION | Age: 45
End: 2020-10-01

## 2020-10-01 NOTE — CARE COORDINATION
Call placed to patient for COVID-19 Risk Monitoring follow-up. Unable to reach patient by phone. Message left regarding the purpose of the call. Requested call back. Provided call back number.

## 2020-10-02 ENCOUNTER — CARE COORDINATION (OUTPATIENT)
Dept: CARE COORDINATION | Age: 45
End: 2020-10-02

## 2020-10-02 RX ORDER — ALBUTEROL SULFATE 90 UG/1
AEROSOL, METERED RESPIRATORY (INHALATION)
Qty: 18 G | Refills: 0 | Status: SHIPPED | OUTPATIENT
Start: 2020-10-02 | End: 2020-10-06

## 2020-10-02 NOTE — CARE COORDINATION
You Patient resolved from the Care Transitions episode on 10/2/2020  Discussed COVID-19 related testing which was not done at this time. Test results were not done. Patient informed of results, if available? N/A    Patient/family has been provided the following resources and education related to COVID-19:                         Signs, symptoms and red flags related to COVID-19            CDC exposure and quarantine guidelines            [facility] exposure contact - [phone]            Contact for their local Department of Health                 Patient currently reports that the following symptoms have improved:  cough, shortness of breath and no new/worsening symptoms     Patient has scheduled appointment with Pulmonologist on Monday, October 5. No further outreach scheduled with this CTN/ACM. Episode of Care resolved. Patient has this CTN/ACM contact information if future needs arise.

## 2020-10-06 RX ORDER — ALBUTEROL SULFATE 90 UG/1
AEROSOL, METERED RESPIRATORY (INHALATION)
Qty: 18 G | Refills: 0 | Status: SHIPPED | OUTPATIENT
Start: 2020-10-06 | End: 2020-11-03 | Stop reason: SDUPTHER

## 2020-10-14 DIAGNOSIS — R73.9 HYPERGLYCEMIA: ICD-10-CM

## 2020-10-14 DIAGNOSIS — E78.00 PURE HYPERCHOLESTEROLEMIA: ICD-10-CM

## 2020-10-14 LAB
ALBUMIN SERPL-MCNC: 3.8 G/DL (ref 3.5–4.6)
ALP BLD-CCNC: 54 U/L (ref 40–130)
ALT SERPL-CCNC: 11 U/L (ref 0–33)
ANION GAP SERPL CALCULATED.3IONS-SCNC: 9 MEQ/L (ref 9–15)
AST SERPL-CCNC: 8 U/L (ref 0–35)
BILIRUB SERPL-MCNC: 0.9 MG/DL (ref 0.2–0.7)
BUN BLDV-MCNC: 10 MG/DL (ref 6–20)
CALCIUM SERPL-MCNC: 8.9 MG/DL (ref 8.5–9.9)
CHLORIDE BLD-SCNC: 104 MEQ/L (ref 95–107)
CHOLESTEROL, TOTAL: 149 MG/DL (ref 0–199)
CO2: 26 MEQ/L (ref 20–31)
CREAT SERPL-MCNC: 0.73 MG/DL (ref 0.5–0.9)
GFR AFRICAN AMERICAN: >60
GFR NON-AFRICAN AMERICAN: >60
GLOBULIN: 2.1 G/DL (ref 2.3–3.5)
GLUCOSE BLD-MCNC: 129 MG/DL (ref 70–99)
HBA1C MFR BLD: 6 % (ref 4.8–5.9)
HDLC SERPL-MCNC: 37 MG/DL (ref 40–59)
LDL CHOLESTEROL CALCULATED: 67 MG/DL (ref 0–129)
POTASSIUM SERPL-SCNC: 4.3 MEQ/L (ref 3.4–4.9)
SODIUM BLD-SCNC: 139 MEQ/L (ref 135–144)
TOTAL PROTEIN: 5.9 G/DL (ref 6.3–8)
TRIGL SERPL-MCNC: 224 MG/DL (ref 0–150)

## 2020-10-20 ENCOUNTER — VIRTUAL VISIT (OUTPATIENT)
Dept: FAMILY MEDICINE CLINIC | Age: 45
End: 2020-10-20
Payer: COMMERCIAL

## 2020-10-20 PROCEDURE — 99214 OFFICE O/P EST MOD 30 MIN: CPT | Performed by: FAMILY MEDICINE

## 2020-10-20 RX ORDER — CEFUROXIME AXETIL 250 MG/1
250 TABLET ORAL 2 TIMES DAILY
Qty: 20 TABLET | Refills: 0 | Status: SHIPPED | OUTPATIENT
Start: 2020-10-20 | End: 2020-10-30

## 2020-10-20 ASSESSMENT — ENCOUNTER SYMPTOMS
ABDOMINAL PAIN: 0
COUGH: 1

## 2020-10-20 ASSESSMENT — PATIENT HEALTH QUESTIONNAIRE - PHQ9
SUM OF ALL RESPONSES TO PHQ QUESTIONS 1-9: 2
SUM OF ALL RESPONSES TO PHQ9 QUESTIONS 1 & 2: 2
2. FEELING DOWN, DEPRESSED OR HOPELESS: 1
SUM OF ALL RESPONSES TO PHQ QUESTIONS 1-9: 2
SUM OF ALL RESPONSES TO PHQ QUESTIONS 1-9: 2
1. LITTLE INTEREST OR PLEASURE IN DOING THINGS: 1

## 2020-10-20 NOTE — PROGRESS NOTES
Subjective:      Patient ID: Cici Cortez is a 39 y.o. female    Cough   This is a recurrent problem. The current episode started more than 1 month ago. Pertinent negatives include no chills or rash. She has tried a beta-agonist inhaler for the symptoms. The treatment provided mild relief. Her past medical history is significant for asthma. Hyperlipidemia   This is a chronic problem. The current episode started more than 1 year ago. The problem is resistant. Current antihyperlipidemic treatment includes statins. Risk factors for coronary artery disease include obesity, dyslipidemia, stress and a sedentary lifestyle. Other   Associated symptoms include coughing. Pertinent negatives include no abdominal pain, chills, rash or weakness. Here with cough over the last 2 weeks or so and recent blood work and lipids. Admits taking crestor 10 mg daily-not taking tricor at this point . Cough is mostly dry. No fever. Slight sore throat. Has had some headache and nasal congestion. Using albuterol which has been somewhat helpful. No known covid exposure. Does have follow up with pulmonary within 2 weeks    Review of Systems   Constitutional: Negative for chills. Respiratory: Positive for cough. Gastrointestinal: Negative for abdominal pain. Skin: Negative for rash. Neurological: Negative for weakness.      Reviewed allergy, medical, social, surgical, family and med list changes and updated   Files--reviewed labs with impending diabetes and acceptable lipids      Social History     Socioeconomic History    Marital status: Single     Spouse name: None    Number of children: 2    Years of education: None    Highest education level: None   Occupational History    Occupation: dog grooming business, self employed   Social Needs    Financial resource strain: None    Food insecurity     Worry: None     Inability: None    Transportation needs     Medical: None     Non-medical: None   Tobacco Use  Smoking status: Former Smoker     Packs/day: 0.50     Types: Cigarettes     Last attempt to quit: 2018     Years since quittin.2    Smokeless tobacco: Never Used    Tobacco comment: uses electronic cigarettes & trying to cut back   Substance and Sexual Activity    Alcohol use:  Yes     Alcohol/week: 1.0 standard drinks     Types: 1 Standard drinks or equivalent per week     Comment: socially    Drug use: Yes     Frequency: 1.0 times per week     Comment: marijuana, quit 2013    Sexual activity: Yes   Lifestyle    Physical activity     Days per week: None     Minutes per session: None    Stress: None   Relationships    Social connections     Talks on phone: None     Gets together: None     Attends Holiness service: None     Active member of club or organization: None     Attends meetings of clubs or organizations: None     Relationship status: None    Intimate partner violence     Fear of current or ex partner: None     Emotionally abused: None     Physically abused: None     Forced sexual activity: None   Other Topics Concern    None   Social History Narrative    Born in [place], has 1 brother one sister    Never , was in a bad relationship    Lives with daughter age 16 () in a house in [facility]    2 daughters, one daughter with problems, one in college    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)    Hobbies reading, puzzles, games, outdoor activities     Elia      Current Outpatient Medications   Medication Sig Dispense Refill    vitamin D (D-3-5) 125 MCG (5000 UT) CAPS capsule TAKE 1 CAPSULE DAILY with food 90 capsule 0    fenofibrate (TRICOR) 145 MG tablet TAKE 1 TABLET DAILY IN THE MORNING before a meal 90 tablet 0    albuterol sulfate  (90 Base) MCG/ACT inhaler INHALE 2 PUFFS into the lungs EVERY 6 HOURS AS NEEDED FOR WHEEZING or SHORTNESS OF BREATH 18 g 0    albuterol (PROVENTIL) (5 MG/ML) 0.5% nebulizer solution Take 0.5 mLs by nebulization every 6 hours as needed for Wheezing 30 vial 0    pantoprazole (PROTONIX) 40 MG tablet TAKE 1 TABLET before a meal EVERY OTHER DAY 45 tablet 1    losartan (COZAAR) 50 MG tablet Take 1 tablet by mouth daily 90 tablet 1    Multiple Vitamins-Minerals (DEKAS BARIATRIC) CHEW CHEW AND SWALLOW 1 (ONE) TABLET BY MOUTH DAILY 90 tablet 3    FLUoxetine (PROZAC) 20 MG capsule TAKE 1 CAPSULE BY MOUTH DAILY 90 capsule 1    rosuvastatin (CRESTOR) 20 MG tablet TAKE 1 TABLET BY MOUTH NIGHTLY 90 tablet 1    loratadine (CLARITIN) 10 MG tablet Take 1 tab po daily as needed for allergies 90 tablet 1    FERATE 240 (27 Fe) MG tablet TAKE 1 TABLET po bid  tablet 1     No current facility-administered medications for this visit. Family History   Problem Relation Age of Onset    Osteoarthritis Mother     Other Mother         hyperlipidemia    Hypertension Mother     High Cholesterol Mother     No Known Problems Father      Past Medical History:   Diagnosis Date    Allergic rhinitis     several allergens    Asthma     since shildhood    Bariatric surgery status 01/02/2020    [facility] Dukes, Dr Krystina Galvez Brachial neuralgia 1/23/2014    Chronic left shoulder pain     COPD (chronic obstructive pulmonary disease) ([facility] 2019    Dr Declan Calhoun H/O colonoscopy 2014    Dr. Eddie Julian Hyperlipidemia     Hypertension 2010    Migraine headache     Obesity, morbid (more than 100 lbs over ideal weight or BMI > 40) ([facility]     SEJAL on CPAP 10/2018   Cici Cortez is a 39 y.o. female evaluated via telephone on 10/20/2020.       Consent:  She and/or health care decision maker is aware that that she may receive a bill for this telephone service, depending on her insurance coverage, and has provided verbal consent to proceed: Yes  Patient accepts tele health phone visit and associated charges  Visit about 22 min     Documentation:  I communicated with the patient and/or health care decision maker about    Details of this discussion including any medical advice provided: This visit was a telephone encounter. Patient was located at their home. Provider was located at their ___x home or        ____ office. I affirm this is a Patient Initiated Episode with an Established Patient who has not had a related appointment within my department in the past 7 days or scheduled within the next 24 hours. Total Time: minutes: 21-30 minutes    Note: not billable if this call serves to triage the patient into an appointment for the relevant concern      [place] [place]   Objective: There were no vitals taken for this visit. Physical Exam  No exam done  Assessment:       Diagnosis Orders   1. Pre-diabetes  Glucose    Hemoglobin A1C   2. Acute bronchitis, unspecified organism     3.  Pure hypercholesterolemia           Plan:     Orders Placed This Encounter   Procedures    Glucose     Standing Status:   Future     Standing Expiration Date:   10/20/2021    Hemoglobin A1C     Standing Status:   Future     Standing Expiration Date:   10/20/2021     Orders Placed This Encounter   Medications    cefUROXime (CEFTIN) 250 MG tablet     Sig: Take 1 tablet by mouth 2 times daily for 10 days     Dispense:  20 tablet     Refill:  0   f/u with pulmonary within next 2 weeks as already arranged   Will work on diet and exercise  Fasting blood work in [facility] and f/u after that

## 2020-10-23 ENCOUNTER — APPOINTMENT (OUTPATIENT)
Dept: GENERAL RADIOLOGY | Age: 45
End: 2020-10-23
Payer: COMMERCIAL

## 2020-10-23 ENCOUNTER — HOSPITAL ENCOUNTER (EMERGENCY)
Age: 45
Discharge: HOME | End: 2020-10-23
Payer: COMMERCIAL

## 2020-10-23 VITALS
DIASTOLIC BLOOD PRESSURE: 77 MMHG | RESPIRATION RATE: 16 BRPM | HEIGHT: 65 IN | WEIGHT: 225 LBS | OXYGEN SATURATION: 94 % | HEART RATE: 93 BPM | TEMPERATURE: 98.6 F | BODY MASS INDEX: 37.49 KG/M2 | SYSTOLIC BLOOD PRESSURE: 128 MMHG

## 2020-10-23 LAB
ALBUMIN SERPL-MCNC: 3.8 G/DL (ref 3.5–4.6)
ALP BLD-CCNC: 47 U/L (ref 40–130)
ALT SERPL-CCNC: 15 U/L (ref 0–33)
ANION GAP SERPL CALCULATED.3IONS-SCNC: 11 MEQ/L (ref 9–15)
AST SERPL-CCNC: 20 U/L (ref 0–35)
BASOPHILS ABSOLUTE: 0.2 K/UL (ref 0–0.2)
BASOPHILS RELATIVE PERCENT: 1.3 %
BILIRUB SERPL-MCNC: 0.6 MG/DL (ref 0.2–0.7)
BUN BLDV-MCNC: 10 MG/DL (ref 6–20)
CALCIUM SERPL-MCNC: 8.8 MG/DL (ref 8.5–9.9)
CHLORIDE BLD-SCNC: 102 MEQ/L (ref 95–107)
CO2: 24 MEQ/L (ref 20–31)
CREAT SERPL-MCNC: 0.55 MG/DL (ref 0.5–0.9)
EOSINOPHILS ABSOLUTE: 0.6 K/UL (ref 0–0.7)
EOSINOPHILS RELATIVE PERCENT: 4.7 %
GFR AFRICAN AMERICAN: >60
GFR NON-AFRICAN AMERICAN: >60
GLOBULIN: 2.1 G/DL (ref 2.3–3.5)
GLUCOSE BLD-MCNC: 119 MG/DL (ref 70–99)
HCT VFR BLD CALC: 46.9 % (ref 37–47)
HEMOGLOBIN: 15.7 G/DL (ref 12–16)
LYMPHOCYTES ABSOLUTE: 2.6 K/UL (ref 1–4.8)
LYMPHOCYTES RELATIVE PERCENT: 21.2 %
MCH RBC QN AUTO: 29.7 PG (ref 27–31.3)
MCHC RBC AUTO-ENTMCNC: 33.5 % (ref 33–37)
MCV RBC AUTO: 88.7 FL (ref 82–100)
MONOCYTES ABSOLUTE: 0.7 K/UL (ref 0.2–0.8)
MONOCYTES RELATIVE PERCENT: 5.3 %
NEUTROPHILS ABSOLUTE: 8.3 K/UL (ref 1.4–6.5)
NEUTROPHILS RELATIVE PERCENT: 67.5 %
PDW BLD-RTO: 13.4 % (ref 11.5–14.5)
PLATELET # BLD: 295 K/UL (ref 130–400)
POTASSIUM SERPL-SCNC: 4 MEQ/L (ref 3.4–4.9)
RBC # BLD: 5.29 M/UL (ref 4.2–5.4)
SODIUM BLD-SCNC: 137 MEQ/L (ref 135–144)
TOTAL PROTEIN: 5.9 G/DL (ref 6.3–8)
WBC # BLD: 12.4 K/UL (ref 4.8–10.8)

## 2020-10-23 PROCEDURE — 94640 AIRWAY INHALATION TREATMENT: CPT

## 2020-10-23 PROCEDURE — 99284 EMERGENCY DEPT VISIT MOD MDM: CPT

## 2020-10-23 PROCEDURE — 94761 N-INVAS EAR/PLS OXIMETRY MLT: CPT

## 2020-10-23 PROCEDURE — 96375 TX/PRO/DX INJ NEW DRUG ADDON: CPT

## 2020-10-23 PROCEDURE — 85025 COMPLETE CBC W/AUTO DIFF WBC: CPT

## 2020-10-23 PROCEDURE — 96365 THER/PROPH/DIAG IV INF INIT: CPT

## 2020-10-23 PROCEDURE — 36415 COLL VENOUS BLD VENIPUNCTURE: CPT

## 2020-10-23 PROCEDURE — 6360000002 HC RX W HCPCS: Performed by: PHYSICIAN ASSISTANT

## 2020-10-23 PROCEDURE — 71045 X-RAY EXAM CHEST 1 VIEW: CPT

## 2020-10-23 PROCEDURE — 6370000000 HC RX 637 (ALT 250 FOR IP): Performed by: PHYSICIAN ASSISTANT

## 2020-10-23 PROCEDURE — 80053 COMPREHEN METABOLIC PANEL: CPT

## 2020-10-23 RX ORDER — IPRATROPIUM BROMIDE AND ALBUTEROL SULFATE 2.5; .5 MG/3ML; MG/3ML
SOLUTION RESPIRATORY (INHALATION)
Status: DISCONTINUED
Start: 2020-10-23 | End: 2020-10-23 | Stop reason: HOSPADM

## 2020-10-23 RX ORDER — IPRATROPIUM BROMIDE AND ALBUTEROL SULFATE 2.5; .5 MG/3ML; MG/3ML
1 SOLUTION RESPIRATORY (INHALATION)
Status: DISCONTINUED | OUTPATIENT
Start: 2020-10-23 | End: 2020-10-23 | Stop reason: HOSPADM

## 2020-10-23 RX ORDER — MAGNESIUM SULFATE IN WATER 40 MG/ML
2 INJECTION, SOLUTION INTRAVENOUS ONCE
Status: COMPLETED | OUTPATIENT
Start: 2020-10-23 | End: 2020-10-23

## 2020-10-23 RX ORDER — METHYLPREDNISOLONE 4 MG/1
TABLET ORAL
Qty: 1 KIT | Refills: 0 | Status: SHIPPED | OUTPATIENT
Start: 2020-10-23 | End: 2020-10-29

## 2020-10-23 RX ORDER — METHYLPREDNISOLONE SODIUM SUCCINATE 125 MG/2ML
125 INJECTION, POWDER, LYOPHILIZED, FOR SOLUTION INTRAMUSCULAR; INTRAVENOUS ONCE
Status: COMPLETED | OUTPATIENT
Start: 2020-10-23 | End: 2020-10-23

## 2020-10-23 RX ADMIN — IPRATROPIUM BROMIDE AND ALBUTEROL SULFATE 1 AMPULE: .5; 3 SOLUTION RESPIRATORY (INHALATION) at 16:48

## 2020-10-23 RX ADMIN — MAGNESIUM SULFATE HEPTAHYDRATE 2 G: 40 INJECTION, SOLUTION INTRAVENOUS at 16:50

## 2020-10-23 RX ADMIN — METHYLPREDNISOLONE SODIUM SUCCINATE 125 MG: 125 INJECTION, POWDER, FOR SOLUTION INTRAMUSCULAR; INTRAVENOUS at 16:52

## 2020-10-23 ASSESSMENT — PAIN DESCRIPTION - DESCRIPTORS: DESCRIPTORS: SHARP;STABBING

## 2020-10-23 ASSESSMENT — ENCOUNTER SYMPTOMS
NAUSEA: 0
VOMITING: 0
ANAL BLEEDING: 0
SHORTNESS OF BREATH: 1
ABDOMINAL DISTENTION: 0
BACK PAIN: 0
PHOTOPHOBIA: 0
APNEA: 0
EYE DISCHARGE: 0
COUGH: 1
WHEEZING: 1
VOICE CHANGE: 0

## 2020-10-23 ASSESSMENT — PAIN SCALES - GENERAL: PAINLEVEL_OUTOF10: 6

## 2020-10-23 ASSESSMENT — PAIN DESCRIPTION - PAIN TYPE: TYPE: ACUTE PAIN

## 2020-10-23 ASSESSMENT — PAIN DESCRIPTION - LOCATION: LOCATION: CHEST

## 2020-10-23 NOTE — ED NOTES
Pt sitting in bed with no signs of distress  Call light within reach  Denies needs  Will continue to monitor      [address] V, RN  10/23/20 6280

## 2020-10-23 NOTE — ED PROVIDER NOTES
[facility] ED  eMERGENCY dEPARTMENT eNCOUnter      Pt Name: Jean Spence  MRN: [id number]  [place] 1975  Date of evaluation: 10/23/2020  Provider: Saranya Frances PA-C    CHIEF COMPLAINT       Chief Complaint   Patient presents with    Shortness of Breath         HISTORY OF PRESENT ILLNESS   (Location/Symptom, Timing/Onset,Context/Setting, Quality, Duration, Modifying Factors, Severity)  Note limiting factors. Jean Spence is a 39 y.o. female who presents to the emergency department planes of cough shortness of breath and wheezing x1 month she does have asthma has been using her inhaler nebulizer she notes that she had an issue with Zithromax and prednisone after prior visit she is contacted her primary care physician as well and he started her on Ceftin 2 days ago. She notes that coughing makes her chest back and head hurt. she does get some relief with her inhaler but not resolution of her symptoms. Notes symptoms have been present over a month denies any known exposures to coronavirus. There is mild to moderate severity cough worsens symptoms inhaler improves symptoms    HPI    NursingNotes were reviewed. REVIEW OF SYSTEMS    (2-9 systems for level 4, 10 or more for level 5)     Review of Systems   Constitutional: Negative for activity change, appetite change, chills, fever and unexpected weight change. HENT: Positive for congestion. Negative for ear discharge, nosebleeds and voice change. Eyes: Negative for photophobia and discharge. Respiratory: Positive for cough, shortness of breath and wheezing. Negative for apnea. Cardiovascular: Negative for chest pain and leg swelling. Gastrointestinal: Negative for abdominal distention, anal bleeding, nausea and vomiting. Endocrine: Negative for cold intolerance, heat intolerance and polyphagia. Genitourinary: Negative for dysuria and genital sores.    Musculoskeletal: Negative for back pain, joint swelling and neck pain. Skin: Negative for pallor. Allergic/Immunologic: Negative for immunocompromised state. Neurological: Positive for headaches. Negative for seizures and facial asymmetry. Hematological: Does not bruise/bleed easily. Psychiatric/Behavioral: Negative for behavioral problems, self-injury and sleep disturbance. All other systems reviewed and are negative. Except as noted above the remainder of the review of systems was reviewed and negative. PAST MEDICAL HISTORY     Past Medical History:   Diagnosis Date    Allergic rhinitis     several allergens    Asthma     since shildhood    Bariatric surgery status 01/02/2020    [facility], Dr Kalyani Smith     Brachial neuralgia 1/23/2014    Chronic left shoulder pain     COPD (chronic obstructive pulmonary disease) ([facility] [address] 2019    Dr Lamar Luna H/O colonoscopy 2014    Dr. Abdiaziz Kenny Hyperlipidemia     Hypertension 2010    Migraine headache     Obesity, morbid (more than 100 lbs over ideal weight or BMI > 40) ([facility] [address]     SEJAL on CPAP 10/2018         SURGICALHISTORY       Past Surgical History:   Procedure Laterality Date    BREAST SURGERY  2007    reduction, Dr Hay Linker  10/20/15    DR. Mayela Cain Right     Dr Felix Espinosa    SLEEVE GASTRECTOMY  01/20/2020    [facility], Dr Bess Butler       Previous Medications    ALBUTEROL (PROVENTIL) (5 MG/ML) 0.5% NEBULIZER SOLUTION    Take 0.5 mLs by nebulization every 6 hours as needed for Wheezing    ALBUTEROL SULFATE  (90 BASE) MCG/ACT INHALER    INHALE 2 PUFFS into the lungs EVERY 6 HOURS AS NEEDED FOR WHEEZING or SHORTNESS OF BREATH    CEFUROXIME (CEFTIN) 250 MG TABLET    Take 1 tablet by mouth 2 times daily for 10 days    FENOFIBRATE (TRICOR) 145 MG TABLET    TAKE 1 TABLET DAILY IN THE MORNING before a meal    FERATE 240 (27 FE) MG TABLET    TAKE 1 TABLET po bid AC    FLUOXETINE (PROZAC) 20 MG CAPSULE    TAKE 1 CAPSULE BY MOUTH DAILY    LORATADINE (CLARITIN) 10 MG TABLET    Take 1 tab po daily as needed for allergies    LOSARTAN (COZAAR) 50 MG TABLET    Take 1 tablet by mouth daily    MULTIPLE VITAMINS-MINERALS (DEKAS BARIATRIC) CHEW    CHEW AND SWALLOW 1 (ONE) TABLET BY MOUTH DAILY    PANTOPRAZOLE (PROTONIX) 40 MG TABLET    TAKE 1 TABLET before a meal EVERY OTHER DAY    ROSUVASTATIN (CRESTOR) 20 MG TABLET    TAKE 1 TABLET BY MOUTH NIGHTLY       ALLERGIES     Vicodin [hydrocodone-acetaminophen]    FAMILY HISTORY       Family History   Problem Relation Age of Onset    Osteoarthritis Mother     Other Mother         hyperlipidemia    Hypertension Mother     High Cholesterol Mother     No Known Problems Father           SOCIAL HISTORY       Social History     Socioeconomic History    Marital status: Single     Spouse name: None    Number of children: 2    Years of education: None    Highest education level: None   Occupational History    Occupation: dog grooming business, self employed   Social Needs    Financial resource strain: None    Food insecurity     Worry: None     Inability: None    Transportation needs     Medical: None     Non-medical: None   Tobacco Use    Smoking status: Former Smoker     Packs/day: 0.50     Types: Cigarettes     Last attempt to quit: 2018     Years since quittin.2    Smokeless tobacco: Never Used    Tobacco comment: uses electronic cigarettes & trying to cut back   Substance and Sexual Activity    Alcohol use:  Yes     Alcohol/week: 1.0 standard drinks     Types: 1 Standard drinks or equivalent per week     Comment: socially    Drug use: Yes     Frequency: 1.0 times per week     Comment: marijuana, quit 2013    Sexual activity: Yes   Lifestyle    Physical activity     Days per week: None     Minutes per session: None    Stress: None   Relationships    Social connections     Talks on phone: None     Gets together: None     Attends Gnosticist service: None     Active member Examination of the left-middle field reveals wheezing. Examination of the right-lower field reveals wheezing. Examination of the left-lower field reveals wheezing. Wheezing present. No rhonchi or rales. Abdominal:      General: Bowel sounds are normal. There is no distension. Palpations: Abdomen is soft. Musculoskeletal: Normal range of motion. Right lower leg: No edema. Left lower leg: No edema. Skin:     General: Skin is warm. Findings: No erythema. Neurological:      Mental Status: She is alert and oriented to person, place, and time. Psychiatric:         Mood and Affect: Mood normal.         DIAGNOSTIC RESULTS     EKG: All EKG's are interpreted by the Emergency Department Physician who either signs or Co-signsthis chart in the absence of a cardiologist.         RADIOLOGY:   Veronia Mage such as CT, Ultrasound and MRI are read by the radiologist. Plain radiographic images are visualized and preliminarily interpreted by the emergency physician with the below findings:    See rad report    Interpretation per the Radiologist below, if available at the time ofthis note:    XR CHEST PORTABLE    (Results Pending)         ED BEDSIDE ULTRASOUND:   Performed by ED Physician - none    LABS:  Labs Reviewed   COMPREHENSIVE METABOLIC PANEL - Abnormal; Notable for the following components:       Result Value    Glucose 119 (*)     Total Protein 5.9 (*)     Globulin 2.1 (*)     All other components within normal limits   CBC WITH AUTO DIFFERENTIAL - Abnormal; Notable for the following components:    WBC 12.4 (*)     Neutrophils Absolute 8.3 (*)     All other components within normal limits       All other labs were within normal range or not returned as of this dictation.     EMERGENCY DEPARTMENT COURSE and DIFFERENTIAL DIAGNOSIS/MDM:   Vitals:    Vitals:    10/23/20 1650 10/23/20 1700 10/23/20 1730 10/23/20 1800   BP:  113/76 127/65 118/72   Pulse:  102 89 90   Resp:  17 16 17   Temp: TempSrc:       SpO2: 97% 93% 94% 93%   Weight:       Height:                MDM  Number of Diagnoses or Management Options  Cough:   Exacerbation of asthma, unspecified asthma severity, unspecified whether persistent:   Diagnosis management comments: Tolerated Solu-Medrol on a subsequent visit we will use Solu-Medrol magnesium. We will add respiratory treatments patient had symptoms for a month. No known exposures. Proved with medications wheezing has diminished she feels better we discussed medication we will use Medrol Dosepak as she tolerates Solu-Medrol. She is to continue the Ceftin and switch her medication to Mucinex DM as directed on packaging follow-up with her primary care and her follow-up appointment with her new pulmonary specialist return to if any symptoms worsen or new symptom develop. She is to take Tylenol only as directed on packaging for any headache or discomfort. Amount and/or Complexity of Data Reviewed  Clinical lab tests: ordered  Tests in the radiology section of CPT®: ordered        CRITICAL CARE TIME       CONSULTS:  None    PROCEDURES:  Unless otherwise noted below, none     Procedures    FINAL IMPRESSION      1. Exacerbation of asthma, unspecified asthma severity, unspecified whether persistent    2.  Cough          DISPOSITION/PLAN   DISPOSITION        PATIENT REFERRED TO:  Pura Funez MD  [address] [facility]  [phone]    Call in 2 days      Bull Hammer MD  [address] [facility]  [address] [address]  [phone]    Call in 2 days      [facility]) ED  [address] [facility]  [phone]    If symptoms worsen      DISCHARGE MEDICATIONS:  New Prescriptions    No medications on file          (Please note that portions of this note were completed with a voice recognition program.  Efforts were made to edit the dictations but occasionally words are mis-transcribed.)    Marjan Cheung PA-C (electronically

## 2020-10-23 NOTE — ED TRIAGE NOTES
Pt has been having SOB on/off for 5-7 days; worse with physical activity. HA, chest pain from coughing. States has HA currently 8/10. PA in to see pt.

## 2020-10-26 ENCOUNTER — CARE COORDINATION (OUTPATIENT)
Dept: CARE COORDINATION | Age: 45
End: 2020-10-26

## 2020-10-26 ASSESSMENT — ENCOUNTER SYMPTOMS: DYSPNEA ASSOCIATED WITH: EXERTION

## 2020-10-26 NOTE — CARE COORDINATION
Ambulatory Care Coordination Note  10/26/2020  CM Risk Score: 7  Charlson 10 Year Mortality Risk Score: 4%     ACC: Carl Quintanilla RN    Summary Note: call to [facility] to discuss care coordination. She is in agreement with the program. She reports improvement in symptoms since rx started. She states she was not feeling well and then went to clean out a barn, which really exacerbated her symptoms. She is still experiencing slight chest pain and will return to er if worsens. She has appt with pumonology on 11/3. Medications reconciled. Will mail copd zone sheet  COPD Assessment    Does the patient understand envrionmental exposure?:  Yes  Is the patient able to verbalize Rescue vs. Long Acting medications?:  Yes  Does the patient have a nebulizer?:  Yes  Does the patient use a space with inhaled medications?:  No     Shortness of breath (worse than baseline), Other symptoms causing concern         Symptoms:   COPD associated chest pain: Pos      Symptom course:  improving  Breathlessness:  exertion  Increase use of rapid acting/rescue inhaled medications?:  No  Change in chronic cough?:  Increased  Change in sputum?:  Increased  Sputum characteristics:  White, Clear  Have you had a recent diagnosis of pneumonia either by PCP or at a hospital?:  No         Ambulatory Care Coordination Assessment    Care Coordination Protocol  Program Enrollment:  Complex Care  Referral from Primary Care Provider:  No  Week 1 - Initial Assessment     Do you have all of your prescriptions and are they filled?:  Yes  Barriers to medication adherence:  None  Are you able to afford your medications?:  Yes  How often do you have trouble taking your medications the way you have been told to take them?:  I always take them as prescribed. Do you have Home O2 Therapy?:  No      Ability to seek help/take action for Emergent Urgent situations i.e. fire, crime, inclement weather or health crisis. :  Independent  Ability to ambulate to restroom: Independent  Ability handle personal hygeine needs (bathing/dressing/grooming): Independent  Ability to manage Medications: Independent  Ability to prepare Food Preparation:  Independent  Ability to maintain home (clean home, laundry): Independent  Ability to drive and/or has transportation:  Independent  Ability to do shopping:  Independent  Ability to manage finances: Independent  Is patient able to live independently?:  Yes     Current Housing:  Apartment        Per the Fall Risk Screening, did the patient have 2 or more falls or 1 fall with injury in the past year?:  No     Frequent urination at night?:  No  Do you use rails/bars?:  No  Do you have a non-slip tub mat?:  No     Are you experiencing loss of meaning?:  No  Are you experiencing loss of hope and peace?:  No     Thinking about your patient's physical health needs, are there any symptoms or problems (risk indicators) you are unsure about that require further investigation?:  No identified areas of uncertainly or problems already being investigated   Are the patients physical health problems impacting on their mental well-being?:  Mild impact on mental well-being e.g. \"\"feeling fed-up\"\", \"\"reduced enjoyment\"\"   Are there any problems with your patients lifestyle behaviors (alcohol, drugs, diet, exercise) that are impacting on physical or mental well-being?:  Some mild concern of potential negative impact on well-being   Do you have any other concerns about your patients mental well-being?  How would you rate their severity and impact on the patient?:  Mild problems - don't interfere with function   How would you rate their home environment in terms of safety and stability (including domestic violence, insecure housing, neighbor harassment)?:  Consistently safe, supportive, stable, no identified problems   How do daily activities impact on the patient's well-being? (include current or anticipated unemployment, work, caregiving, access to transportation or other):  Some general dissatisfaction but no concern   How would you rate their social network (family, work, friends)?:  Adequate participation with social networks   How would you rate their financial resources (including ability to afford all required medical care)?:  Financially secure, resources adequate, no identified problems   How wells does the patient now understand their health and well-being (symptoms, signs or risk factors) and what they need to do to manage their health?:  Reasonable to good understanding and already engages in managing health or is willing to undertake better management   How well do you think your patient can engage in healthcare discussions? (Barriers include language, deafness, aphasia, alcohol or drug problems, learning difficulties, concentration):  Clear and open communication, no identified barriers   Are current services involved with this patient well-coordinated? (Include coordination with other services you are now recommendation): All required care/services in place and well-coordinated   Suggested Interventions and Community Resources   Pharmacist:  Not Started   Pulmonary Rehab:  Not Started   Zone Management Tools: In Process         Set up/Review Goals, Set up/Review an Education Plan              Prior to Admission medications    Medication Sig Start Date End Date Taking? Authorizing Provider   methylPREDNISolone (MEDROL, SHEEBA,) 4 MG tablet Take by mouth.  10/23/20 10/29/20 Yes Malcolm Pastrana PA-C   cefUROXime (CEFTIN) 250 MG tablet Take 1 tablet by mouth 2 times daily for 10 days 10/20/20 10/30/20 Yes Debbie Alcocer MD   albuterol sulfate  (90 Base) MCG/ACT inhaler INHALE 2 PUFFS into the lungs EVERY 6 HOURS AS NEEDED FOR WHEEZING or SHORTNESS OF BREATH 10/6/20  Yes Debbie Alcocer MD   albuterol (PROVENTIL) (5 MG/ML) 0.5% nebulizer solution Take 0.5 mLs by nebulization every 6 hours as needed for Wheezing 9/12/20  Yes Lynsey ESTRELLA Jennifer Parikh MD   pantoprazole (PROTONIX) 40 MG tablet TAKE 1 TABLET before a meal EVERY OTHER DAY 8/14/20  Yes Marvin Barrientos MD   losartan (COZAAR) 50 MG tablet Take 1 tablet by mouth daily 6/4/20  Yes Marvin Barrientos MD   Multiple Vitamins-Minerals (DEKAS BARIATRIC) CHEW CHEW AND SWALLOW 1 (ONE) TABLET BY MOUTH DAILY 3/24/20  Yes Marvin Barrientos MD   FLUoxetine (PROZAC) 20 MG capsule TAKE 1 CAPSULE BY MOUTH DAILY 3/24/20  Yes Marvin Barrientos MD   rosuvastatin (CRESTOR) 20 MG tablet TAKE 1 TABLET BY MOUTH NIGHTLY 3/24/20  Yes Marvin Barrientos MD   loratadine (CLARITIN) 10 MG tablet Take 1 tab po daily as needed for allergies 3/24/20  Yes Marvin Barrientos MD   FERATE 240 (27 Fe) MG tablet TAKE 1 TABLET po bid [facility] 3/24/20  Yes Marvin Barrientos MD       Future Appointments   Date Time Provider Donald Jones   11/3/2020  8:30 AM Bethanie Man MD [facility]

## 2020-10-26 NOTE — LETTER
10/26/2020    Marquise Padilla  [facility]. [place] [address]    Dear Danika White,    I enjoyed speaking with you and wanted to send some additional information on copd zone to assist in knowing when to contact the doctor. Kirstin Baez MD and I will work together to ensure your needs are met and help you achieve your health goals. We are committed to walk with you on this journey and look forward to working with you. Please feel free to contact me with any questions or concerns. I am available by phone. You can reach me at [phone].       In good health,     Pricila Krishnamurthy RN       Encl: copd zone

## 2020-11-03 ENCOUNTER — OFFICE VISIT (OUTPATIENT)
Dept: PULMONOLOGY | Age: 45
End: 2020-11-03
Payer: COMMERCIAL

## 2020-11-03 VITALS
TEMPERATURE: 96.7 F | OXYGEN SATURATION: 99 % | SYSTOLIC BLOOD PRESSURE: 126 MMHG | HEIGHT: 65 IN | HEART RATE: 73 BPM | WEIGHT: 230.2 LBS | BODY MASS INDEX: 38.35 KG/M2 | RESPIRATION RATE: 16 BRPM | DIASTOLIC BLOOD PRESSURE: 68 MMHG

## 2020-11-03 PROCEDURE — 99214 OFFICE O/P EST MOD 30 MIN: CPT | Performed by: INTERNAL MEDICINE

## 2020-11-03 PROCEDURE — G8427 DOCREV CUR MEDS BY ELIG CLIN: HCPCS | Performed by: INTERNAL MEDICINE

## 2020-11-03 PROCEDURE — 1036F TOBACCO NON-USER: CPT | Performed by: INTERNAL MEDICINE

## 2020-11-03 PROCEDURE — 3023F SPIROM DOC REV: CPT | Performed by: INTERNAL MEDICINE

## 2020-11-03 PROCEDURE — G8417 CALC BMI ABV UP PARAM F/U: HCPCS | Performed by: INTERNAL MEDICINE

## 2020-11-03 PROCEDURE — G8926 SPIRO NO PERF OR DOC: HCPCS | Performed by: INTERNAL MEDICINE

## 2020-11-03 PROCEDURE — G8484 FLU IMMUNIZE NO ADMIN: HCPCS | Performed by: INTERNAL MEDICINE

## 2020-11-03 RX ORDER — ALBUTEROL SULFATE 90 UG/1
2 AEROSOL, METERED RESPIRATORY (INHALATION) EVERY 6 HOURS PRN
Qty: 18 G | Refills: 5 | Status: SHIPPED | OUTPATIENT
Start: 2020-11-03 | End: 2021-07-27 | Stop reason: SDUPTHER

## 2020-11-03 RX ORDER — LOSARTAN POTASSIUM 50 MG/1
TABLET ORAL
Qty: 90 TABLET | Refills: 0 | Status: SHIPPED | OUTPATIENT
Start: 2020-11-03 | End: 2021-01-27

## 2020-11-03 RX ORDER — ALBUTEROL SULFATE 90 UG/1
AEROSOL, METERED RESPIRATORY (INHALATION)
Qty: 18 G | Refills: 0 | Status: SHIPPED | OUTPATIENT
Start: 2020-11-03 | End: 2020-11-23

## 2020-11-03 ASSESSMENT — ENCOUNTER SYMPTOMS
VOMITING: 0
SORE THROAT: 0
ABDOMINAL PAIN: 0
COUGH: 1
SHORTNESS OF BREATH: 1
DIARRHEA: 0
RHINORRHEA: 0
WHEEZING: 1
SINUS PRESSURE: 0
NAUSEA: 0
CHEST TIGHTNESS: 1

## 2020-11-03 NOTE — PROGRESS NOTES
Subjective:     Eliezer Munguia is a 39 y.o. female who complains today of:     Chief Complaint   Patient presents with    Follow-up     6 Month F/U for SEJAL    Shortness of Breath       HPI  Patient is here for a follow-up evaluation regarding mild COPD and sleep apnea. Since the last visit patient reports significant increase in her breathing difficulties in the last 2 months. She states that she already had her bariatric surgery in January, 2 months ago she had another surgical procedure after which she noticed as difficulty with her breathing associated with cough and wheezing. She has had episodes where she felt extremely short of breath especially early mornings. She is already been evaluated in ER, she was started on albuterol inhaler then nebulized treatments were added. Her symptoms improve with systemic steroid therapy. She had a chest x-ray done on her last visit which was unremarkable. Compliance report from CPAP showed infrequent usage and inadequate compliance continues to have difficulty tolerating treatment but she had mild apneas before her surgery she has lost 30 pounds since her surgery. Allergies:  Vicodin [hydrocodone-acetaminophen]  Past Medical History:   Diagnosis Date    Allergic rhinitis     several allergens    Asthma     since [facility]    Bariatric surgery status 01/02/2020    Dr Gena Kirkpatrick     Brachial neuralgia 1/23/2014    Chronic left shoulder pain     COPD (chronic obstructive pulmonary disease) ([address] 2019    Dr Torie Escamilla H/O colonoscopy 2014    Dr. Bladimir Britton Hyperlipidemia     Hypertension 2010    Migraine headache     Obesity, morbid (more than 100 lbs over ideal weight or BMI > 40) ([address]     SEJAL on CPAP 10/2018     Past Surgical History:   Procedure Laterality Date    BREAST SURGERY  2007    reduction, Dr Meredith Benjamin  10/20/15    DR. Eva Vazquez [facility]     Dr Webber, [address] [place] [address] GASTRECTOMY  01/20/2020     Annette Saldana, Dr Andrew Meza History   Problem Relation Age of Onset    Osteoarthritis Mother     Other Mother         hyperlipidemia    Hypertension Mother     High Cholesterol Mother     No Known Problems Father      Social History     Socioeconomic History    Marital status: Single     Spouse name: Not on file    Number of children: 2    Years of education: Not on file    Highest education level: Not on file   Occupational History    Occupation: dog grooming business, self employed   Social Needs    Financial resource strain: Not on file    Food insecurity     Worry: Not on file     Inability: Not on file   [employer] needs     Medical: Not on file     Non-medical: Not on file   Tobacco Use    Smoking status: Former Smoker     Packs/day: 0.50     Types: Cigarettes     Last attempt to quit: 2018     Years since quittin.3    Smokeless tobacco: Never Used    Tobacco comment: uses electronic cigarettes & trying to cut back   Substance and Sexual Activity    Alcohol use:  Yes     Alcohol/week: 1.0 standard drinks     Types: 1 Standard drinks or equivalent per week     Comment: socially    Drug use: Yes     Frequency: 1.0 times per week     Comment: marijuana, quit 2013    Sexual activity: Yes   Lifestyle    Physical activity     Days per week: Not on file     Minutes per session: Not on file    Stress: Not on file   Relationships    Social connections     Talks on phone: Not on file     Gets together: Not on file     Attends Religion service: Not on file     Active member of club or organization: Not on file     Attends meetings of clubs or organizations: Not on file     Relationship status: Not on file    Intimate partner violence     Fear of current or ex partner: Not on file     Emotionally abused: Not on file     Physically abused: Not on file     Forced sexual activity: Not on file   Other Topics Concern    Not on file   Social History Narrative Born in [place], has 1 brother one sister    Never , was in a bad relationship    Lives with daughter age 16 (2017) in a house in [facility]    2 daughters, one daughter with problems, one in college    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)    Hobbies reading, puzzles, games, outdoor activities     [facility]          Review of Systems   Constitutional: Positive for fatigue. Negative for appetite change, chills, diaphoresis and fever. HENT: Positive for congestion and postnasal drip. Negative for nosebleeds, rhinorrhea, sinus pressure, sneezing and sore throat. Eyes: Negative for visual disturbance. Respiratory: Positive for cough, chest tightness, shortness of breath and wheezing. Cardiovascular: Negative for chest pain, palpitations and leg swelling. Gastrointestinal: Negative for abdominal pain, diarrhea, nausea and vomiting. Genitourinary: Negative for dysuria and urgency. Musculoskeletal: Negative for arthralgias, joint swelling and myalgias. Skin: Negative for rash. Allergic/Immunologic: Negative for environmental allergies. Neurological: Negative for tremors, weakness, light-headedness and headaches. Psychiatric/Behavioral: Positive for sleep disturbance. Negative for behavioral problems. :     Vitals:    11/03/20 0829   BP: 126/68   Site: Left Upper Arm   Position: Sitting   Cuff Size: Large Adult   Pulse: 73   Resp: 16   Temp: 96.7 °F (35.9 °C)   TempSrc: Tympanic   SpO2: 99%   Weight: 230 lb 3.2 oz (104.4 kg)   Height: 5' 5\" (1.651 m)         Physical Exam  Constitutional:       Appearance: She is well-developed. HENT:      Head: Normocephalic and atraumatic. Eyes:      Pupils: Pupils are equal, round, and reactive to light. Neck:      Thyroid: No thyromegaly. Vascular: No JVD. Trachea: No tracheal deviation. Cardiovascular:      Rate and Rhythm: Normal rate and regular rhythm. Heart sounds: No murmur. No gallop. Pulmonary:      Breath sounds:  No wheezing or rales. Comments: Diminished but clear  Chest:      Chest wall: No tenderness. Abdominal:      General: There is no distension. Musculoskeletal: Normal range of motion. Skin:     General: Skin is warm and dry. Findings: No rash. Neurological:      Mental Status: She is alert and oriented to person, place, and time. Coordination: Coordination normal.             Assessment:      Diagnosis Orders   1. Moderate persistent asthma, unspecified whether complicated  Full PFT Study With Bronchodilator   2. Chronic obstructive pulmonary disease, unspecified COPD type ([address]     3. SEJAL on CPAP       Discussed her baseline PFTs showing very mild obstructive abnormality without reversibility but current symptoms are suggestive of active asthma or persistent asthma. Patient states that she has had asthma \"all her life\" but it has not been active in the last few years. We will proceed with pulmonary function testing for evaluation, meanwhile will initiate maintenance therapy with Breo 200-25, and continue albuterol inhaler or nebulized treatments as needed we will see her for follow-up in 6 weeks      Plan:     Orders Placed This Encounter   Procedures    Full PFT Study With Bronchodilator     Standing Status:   Future     Standing Expiration Date:   12/3/2020     Orders Placed This Encounter   Medications    Fluticasone furoate-vilanterol (BREO ELLIPTA) 200-25 MCG/INH AEPB inhaler     Sig: Inhale 1 puff into the lungs daily     Dispense:  1 each     Refill:  5    albuterol sulfate  (90 Base) MCG/ACT inhaler     Sig: Inhale 2 puffs into the lungs every 6 hours as needed for Wheezing     Dispense:  18 g     Refill:  5    albuterol (PROVENTIL) (5 MG/ML) 0.5% nebulizer solution     Sig: Take 0.5 mLs by nebulization every 6 hours as needed for Wheezing     Dispense:  120 vial     Refill:  5           Return in about 6 weeks (around 12/15/2020) for re-evaluation, after PFTs.       Masood Rocha Karen Ray MD

## 2020-11-05 ENCOUNTER — CARE COORDINATION (OUTPATIENT)
Dept: CARE COORDINATION | Age: 45
End: 2020-11-05

## 2020-11-05 NOTE — CARE COORDINATION
Call to [facility] to [facility] health coaching.  Left message to return call

## 2020-11-11 RX ORDER — QUINIDINE GLUCONATE 324 MG
240 TABLET, EXTENDED RELEASE ORAL
Qty: 180 TABLET | Refills: 3 | Status: SHIPPED | OUTPATIENT
Start: 2020-11-11 | End: 2021-10-25

## 2020-11-11 RX ORDER — BUSPIRONE HYDROCHLORIDE 5 MG/1
5 TABLET ORAL 3 TIMES DAILY PRN
Qty: 270 TABLET | Refills: 1 | Status: SHIPPED | OUTPATIENT
Start: 2020-11-11 | End: 2021-04-26

## 2020-11-12 ENCOUNTER — CARE COORDINATION (OUTPATIENT)
Dept: CARE COORDINATION | Age: 45
End: 2020-11-12

## 2020-11-23 ENCOUNTER — TELEPHONE (OUTPATIENT)
Dept: PULMONOLOGY | Age: 45
End: 2020-11-23

## 2020-11-23 RX ORDER — DILTIAZEM HYDROCHLORIDE 60 MG/1
2 TABLET, FILM COATED ORAL 2 TIMES DAILY
Qty: 1 INHALER | Refills: 3 | Status: SHIPPED | OUTPATIENT
Start: 2020-11-23 | End: 2021-02-24 | Stop reason: SDUPTHER

## 2020-11-23 RX ORDER — ALBUTEROL SULFATE 90 UG/1
AEROSOL, METERED RESPIRATORY (INHALATION)
Qty: 18 G | Refills: 0 | Status: SHIPPED | OUTPATIENT
Start: 2020-11-23 | End: 2020-12-31

## 2020-11-25 ENCOUNTER — HOSPITAL ENCOUNTER (OUTPATIENT)
Dept: PULMONOLOGY | Age: 45
Discharge: HOME | End: 2020-11-25
Payer: COMMERCIAL

## 2020-11-25 PROCEDURE — 94729 DIFFUSING CAPACITY: CPT | Performed by: INTERNAL MEDICINE

## 2020-11-25 PROCEDURE — 94726 PLETHYSMOGRAPHY LUNG VOLUMES: CPT | Performed by: INTERNAL MEDICINE

## 2020-11-25 PROCEDURE — 94729 DIFFUSING CAPACITY: CPT

## 2020-11-25 PROCEDURE — 94060 EVALUATION OF WHEEZING: CPT

## 2020-11-25 PROCEDURE — 94726 PLETHYSMOGRAPHY LUNG VOLUMES: CPT

## 2020-11-25 PROCEDURE — 94060 EVALUATION OF WHEEZING: CPT | Performed by: INTERNAL MEDICINE

## 2020-11-25 PROCEDURE — 6360000002 HC RX W HCPCS: Performed by: INTERNAL MEDICINE

## 2020-11-25 RX ORDER — ALBUTEROL SULFATE 2.5 MG/3ML
2.5 SOLUTION RESPIRATORY (INHALATION) ONCE
Status: COMPLETED | OUTPATIENT
Start: 2020-11-25 | End: 2020-11-25

## 2020-11-25 RX ADMIN — ALBUTEROL SULFATE 2.5 MG: 2.5 SOLUTION RESPIRATORY (INHALATION) at 10:25

## 2020-11-29 ENCOUNTER — CARE COORDINATION (OUTPATIENT)
Dept: CARE COORDINATION | Age: 45
End: 2020-11-29

## 2020-11-29 NOTE — CARE COORDINATION
Ambulatory Care Coordination Note  11/29/2020  CM Risk Score: 7  Charlson 10 Year Mortality Risk Score: 4%     ACC: Carl Quintanilla RN    Summary Note: call to [facility] to d/c from care coordination. She reports no decline in resp status. She is compliant with medications and will follow up with pulmonology. She is co severe pain to left hip and back and today states her arms and legs are edematous. Advised eval in ER but she declined and requested appt with pcp instead. appt scheduled per her request. She is in agreement with d/c from care coordination at this time  COPD Assessment    Does the patient understand envrionmental exposure?:  Yes  Is the patient able to verbalize Rescue vs. Long Acting medications?:  Yes  Does the patient have a nebulizer?:  Yes  Does the patient use a space with inhaled medications?:  No     No patient-reported symptoms         Symptoms:      Have you had a recent diagnosis of pneumonia either by PCP or at a hospital?:  No             Care Coordination Interventions    Program Enrollment:  Complex Care  Referral from Primary Care Provider:  No  Suggested Interventions and Community Resources  Pharmacist:  Not Started  Pulmonary Rehab:  Not Started  Zone Management Tools:  Completed (Comment: copd)         Goals Addressed                 This Visit's Progress     COMPLETED: Conditions and Symptoms        I will follow my Zone Management tool to seek urgent or emergent care. I will notify my provider of any symptoms that indicate a worsening of my condition. Barriers: impairment:  physical: short of breath  Plan for overcoming my barriers: care coordination  Confidence: 7/10  Anticipated Goal Completion Date: 1/26/21              Prior to Admission medications    Medication Sig Start Date End Date Taking?  Authorizing Provider   albuterol sulfate  (90 Base) MCG/ACT inhaler Inhale 2 (TWO) puffs into the lungs EVERY 6 HOURS AS NEEDED FOR WHEEZING or SHORTNESS OF BREATH 11/23/20 Migel Martínez MD   SYMBICORT 80-4.5 MCG/ACT AERO Inhale 2 puffs into the lungs 2 times daily 11/23/20   Cece Vasquez MD   ferrous gluconate (FERATE) 240 (27 Fe) MG tablet Take 1 tablet by mouth 2 times daily (before meals) 11/11/20   Dariana Curiel MD   busPIRone (BUSPAR) 5 MG tablet Take 1 tablet by mouth 3 times daily as needed (anxiety) 11/11/20 12/11/20  Dariana Curiel MD   losartan (COZAAR) 50 MG tablet Take 1 tablet by mouth daily 11/3/20   Migel Martínez MD   Fluticasone furoate-vilanterol (BREO ELLIPTA) 200-25 MCG/INH AEPB inhaler Inhale 1 puff into the lungs daily 11/3/20   Cece Vasquez MD   albuterol sulfate  (90 Base) MCG/ACT inhaler Inhale 2 puffs into the lungs every 6 hours as needed for Wheezing 11/3/20   Cece Vasquez MD   albuterol (PROVENTIL) (5 MG/ML) 0.5% nebulizer solution Take 0.5 mLs by nebulization every 6 hours as needed for Wheezing 11/3/20 12/3/20  Cece Vasquez MD   Cholecalciferol (VITAMIN D3) 125 MCG (5000 UT) TABS Take 5,000 Units by mouth daily    Historical Provider, MD   pantoprazole (PROTONIX) 40 MG tablet TAKE 1 TABLET before a meal EVERY OTHER DAY 8/14/20   Dariana Curiel MD   Multiple Vitamins-Minerals (DEKAS BARIATRIC) CHEW CHEW AND SWALLOW 1 (ONE) TABLET BY MOUTH DAILY 3/24/20   Dariana Curiel MD   FLUoxetine (PROZAC) 20 MG capsule TAKE 1 CAPSULE BY MOUTH DAILY 3/24/20   Dariana Curiel MD   rosuvastatin (CRESTOR) 20 MG tablet TAKE 1 TABLET BY MOUTH NIGHTLY 3/24/20   Dariana Curiel MD   loratadine (CLARITIN) 10 MG tablet Take 1 tab po daily as needed for allergies 3/24/20   Dariana Curiel MD       Future Appointments   Date Time Provider Donald Jones   11/30/2020  3:30 PM Migel Martínez  [place]   1/26/2021  9:15 AM Cece Vasquez MD [facility]

## 2020-11-30 ENCOUNTER — OFFICE VISIT (OUTPATIENT)
Dept: FAMILY MEDICINE CLINIC | Age: 45
End: 2020-11-30
Payer: COMMERCIAL

## 2020-11-30 VITALS
HEART RATE: 69 BPM | SYSTOLIC BLOOD PRESSURE: 130 MMHG | HEIGHT: 65 IN | TEMPERATURE: 97.9 F | DIASTOLIC BLOOD PRESSURE: 80 MMHG | WEIGHT: 230 LBS | RESPIRATION RATE: 16 BRPM | BODY MASS INDEX: 38.32 KG/M2 | OXYGEN SATURATION: 96 %

## 2020-11-30 LAB
BILIRUBIN, POC: NORMAL
BLOOD URINE, POC: NORMAL
CLARITY, POC: CLEAR
COLOR, POC: YELLOW
GLUCOSE URINE, POC: NORMAL
KETONES, POC: NORMAL
LEUKOCYTE EST, POC: NORMAL
NITRITE, POC: NORMAL
PH, POC: 6
PROTEIN, POC: NORMAL
SPECIFIC GRAVITY, POC: 1.02
UROBILINOGEN, POC: 0.2

## 2020-11-30 PROCEDURE — G8484 FLU IMMUNIZE NO ADMIN: HCPCS | Performed by: FAMILY MEDICINE

## 2020-11-30 PROCEDURE — G8427 DOCREV CUR MEDS BY ELIG CLIN: HCPCS | Performed by: FAMILY MEDICINE

## 2020-11-30 PROCEDURE — 81002 URINALYSIS NONAUTO W/O SCOPE: CPT | Performed by: FAMILY MEDICINE

## 2020-11-30 PROCEDURE — 99213 OFFICE O/P EST LOW 20 MIN: CPT | Performed by: FAMILY MEDICINE

## 2020-11-30 PROCEDURE — G8417 CALC BMI ABV UP PARAM F/U: HCPCS | Performed by: FAMILY MEDICINE

## 2020-11-30 PROCEDURE — 1036F TOBACCO NON-USER: CPT | Performed by: FAMILY MEDICINE

## 2020-11-30 RX ORDER — HYDROCODONE BITARTRATE AND ACETAMINOPHEN 5; 325 MG/1; MG/1
1 TABLET ORAL EVERY 6 HOURS PRN
Qty: 28 TABLET | Refills: 0 | Status: SHIPPED | OUTPATIENT
Start: 2020-11-30 | End: 2020-12-07

## 2020-11-30 RX ORDER — CYCLOBENZAPRINE HCL 10 MG
10 TABLET ORAL NIGHTLY PRN
Qty: 10 TABLET | Refills: 0 | Status: SHIPPED | OUTPATIENT
Start: 2020-11-30 | End: 2020-12-14 | Stop reason: SDUPTHER

## 2020-11-30 ASSESSMENT — ENCOUNTER SYMPTOMS
BACK PAIN: 1
VOMITING: 0

## 2020-11-30 NOTE — PROGRESS NOTES
Subjective:      Patient ID: Moose Jaquez is a 39 y.o. female    HPI  Here with 1 week hx of right sided back pain. No injury. Took left over 1/2  vicodin which was not helpful. No numbness of leg. Does have appt with chiropractor on wednesday   Review of Systems   Constitutional: Negative for chills and fever. Gastrointestinal: Negative for vomiting. Musculoskeletal: Positive for arthralgias and back pain. Skin: Negative for rash. Neurological: Negative for tremors. Reviewed allergy, medical, social, surgical, family and med list changes and updated   Files   Controlled substances monitoring: no signs of potential drug abuse or diversion identified and OARRS report reviewed today- activity consistent with treatment plan. Social History     Socioeconomic History    Marital status: Single     Spouse name: None    Number of children: 2    Years of education: None    Highest education level: None   Occupational History    Occupation: dog grooming business, self employed   Social Needs    Financial resource strain: None    Food insecurity     Worry: None     Inability: None    Transportation needs     Medical: None     Non-medical: None   Tobacco Use    Smoking status: Former Smoker     Packs/day: 0.50     Types: Cigarettes     Last attempt to quit: 2018     Years since quittin.3    Smokeless tobacco: Never Used    Tobacco comment: uses electronic cigarettes & trying to cut back   Substance and Sexual Activity    Alcohol use:  Yes     Alcohol/week: 1.0 standard drinks     Types: 1 Standard drinks or equivalent per week     Comment: socially    Drug use: Yes     Frequency: 1.0 times per week     Comment: marijuana, quit 2013    Sexual activity: Yes   Lifestyle    Physical activity     Days per week: None     Minutes per session: None    Stress: None   Relationships    Social connections     Talks on phone: None     Gets together: None     Attends Rastafari service: None     Active member of club or organization: None     Attends meetings of clubs or organizations: None     Relationship status: None    Intimate partner violence     Fear of current or ex partner: None     Emotionally abused: None     Physically abused: None     Forced sexual activity: None   Other Topics Concern    None   Social History Narrative    [place], has 1 brother one sister    Never , was in a bad relationship    Lives with daughter age 16 (2017) in a house in [place]    2 daughters, one daughter with problems, one in college    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)    [employer] reading, puzzles, games, outdoor activities     [facility]      Current Outpatient Medications   Medication Sig Dispense Refill    albuterol sulfate  (90 Base) MCG/ACT inhaler Inhale 2 (TWO) puffs into the lungs EVERY 6 HOURS AS NEEDED FOR WHEEZING or SHORTNESS OF BREATH 18 g 0    SYMBICORT 80-4.5 MCG/ACT AERO Inhale 2 puffs into the lungs 2 times daily 1 Inhaler 3    ferrous gluconate (FERATE) 240 (27 Fe) MG tablet Take 1 tablet by mouth 2 times daily (before meals) 180 tablet 3    busPIRone (BUSPAR) 5 MG tablet Take 1 tablet by mouth 3 times daily as needed (anxiety) 270 tablet 1    losartan (COZAAR) 50 MG tablet Take 1 tablet by mouth daily 90 tablet 0    Fluticasone furoate-vilanterol (BREO ELLIPTA) 200-25 MCG/INH AEPB inhaler Inhale 1 puff into the lungs daily 1 each 5    albuterol sulfate  (90 Base) MCG/ACT inhaler Inhale 2 puffs into the lungs every 6 hours as needed for Wheezing 18 g 5    albuterol (PROVENTIL) (5 MG/ML) 0.5% nebulizer solution Take 0.5 mLs by nebulization every 6 hours as needed for Wheezing 120 vial 5    Cholecalciferol (VITAMIN D3) 125 MCG (5000 UT) TABS Take 5,000 Units by mouth daily      pantoprazole (PROTONIX) 40 MG tablet TAKE 1 TABLET before a meal EVERY OTHER DAY 45 tablet 1    Multiple Vitamins-Minerals (DEKAS BARIATRIC) CHEW CHEW AND SWALLOW 1 (ONE) TABLET BY MOUTH DAILY 90 tablet 3    FLUoxetine (PROZAC) 20 MG capsule TAKE 1 CAPSULE BY MOUTH DAILY 90 capsule 1    rosuvastatin (CRESTOR) 20 MG tablet TAKE 1 TABLET BY MOUTH NIGHTLY 90 tablet 1    loratadine (CLARITIN) 10 MG tablet Take 1 tab po daily as needed for allergies 90 tablet 1     No current facility-administered medications for this visit. Family History   Problem Relation Age of Onset    Osteoarthritis Mother     Other Mother         hyperlipidemia    Hypertension Mother     High Cholesterol Mother     No Known Problems Father      Past Medical History:   Diagnosis Date    Allergic rhinitis     several allergens    Asthma     since shildhood    Bariatric surgery status 01/02/2020    [employer], Dr Neal Slaughter Brachial neuralgia 1/23/2014    Chronic left shoulder pain     COPD (chronic obstructive pulmonary disease) ([facility] 2019    Dr Leonardo Zavala H/O colonoscopy 2014    Dr. Susan Gamez Hyperlipidemia     Hypertension 2010    Migraine headache     Obesity, morbid (more than 100 lbs over ideal weight or BMI > 40) ([facility]     SEJAL on CPAP 10/2018   xray of back showing multi level spurring-mild and hip with mild arthritic changes   Objective:   /80   Pulse 69   Temp 97.9 °F (36.6 °C)   Resp 16   Ht 5' 5\" (1.651 m)   Wt 230 lb (104.3 kg)   SpO2 96%   BMI 38.27 kg/m²     Physical Exam     Mild  Tenderness at L-S junction and right SI joint                                           No Paraspinal spasm                                  No  CVA tenderness   Neuro:       Patellar  L   1+     Ankle   L  1+                    Patellar  R  1+     Ankle   R  1+                    Dorsi/Plantar flexion -extension   R  5/5                     Dorsi/Plantar flexion- extension   L  5/5                    Quad strength    R  5/5                     Quad strength    L  5/5                      Straight leg raising neg on right   Pulses:    D.P.  Pulses   1+ equal  Extremities:  No edema of either leg. Normal internal and external rotation of right t hip. No irritability of hip  Lung:  Clear and equal breath sounds bilaterally. No wheezes or rales  Heart:   Rate reg. No murmur or gallop   Assessment:       Diagnosis Orders   1. Acute right-sided low back pain with right-sided sciatica  POCT Urinalysis no Micro   2. Hip pain, acute, right  POCT Urinalysis no Micro         Plan:    f/u with chiropractor as planned   Orders Placed This Encounter   Procedures    XR HIP RIGHT (2-3 VIEWS)     Standing Status:   Future     Number of Occurrences:   1     Standing Expiration Date:   11/30/2021    XR LUMBAR SPINE (MIN 4 VIEWS)     Standing Status:   Future     Number of Occurrences:   1     Standing Expiration Date:   11/30/2021    POCT Urinalysis no Micro     Orders Placed This Encounter   Medications    cyclobenzaprine (FLEXERIL) 10 MG tablet     Sig: Take 1 tablet by mouth nightly as needed for Muscle spasms     Dispense:  10 tablet     Refill:  0    HYDROcodone-acetaminophen (NORCO) 5-325 MG per tablet     Sig: Take 1 tablet by mouth every 6 hours as needed for Pain for up to 7 days. Intended supply: 7 days.  Take lowest dose possible to manage pain     Dispense:  28 tablet     Refill:  0     Reduce doses taken as pain becomes manageable   f/u in 10-14 days

## 2020-12-04 NOTE — PROCEDURES
[address]                      [facility], [address] [facility]                    PULMONARY FUNCTION  Claire Reaub   39 y.o.   female  Height 65 in  Weight 230 lb      Referring provider   Blanquita Raymond MD    Reading provider   Erna Mena    Test meets ATS criteria for acceptability and reproducibility Yes    Diagnosis: WEN: Yes  Cough   Yes, wheezing Yes  Smoking   quit 2 years ago    Spirometry   FVC            2.70 L   71%  Post bronchodilator 3.25 L  86% Change 20 %  FEV1          1.67 L  55%   Post bronchodilator  2.27 L  75%   Change 36%  FEV1/FVC  62  %             Post bronchodilator  70 %  [id number]% 0.88 L  29%  Post bronchodilator  2.21 L  73%   Change 152%    Lung volume   SVC           2.80 L  74%   RV              3.21 L 184%   TLC            6.00  L 115%   RV/TLC      53 %    DLCO           118 %     Test interpretation     Spirometry meets ATS criteria for moderate to severe obstructive airway disease with positive response to bronchodilator.     Lung volume shows air trapping  Diffusion capacity is normal       Clinical correlation is recommended     Clarisa Curry [facility], 12/4/2020 1:55 PM

## 2020-12-14 ENCOUNTER — OFFICE VISIT (OUTPATIENT)
Dept: FAMILY MEDICINE CLINIC | Age: 45
End: 2020-12-14
Payer: COMMERCIAL

## 2020-12-14 VITALS
DIASTOLIC BLOOD PRESSURE: 70 MMHG | WEIGHT: 233 LBS | TEMPERATURE: 98 F | SYSTOLIC BLOOD PRESSURE: 134 MMHG | HEIGHT: 65 IN | BODY MASS INDEX: 38.82 KG/M2 | RESPIRATION RATE: 16 BRPM | HEART RATE: 69 BPM | OXYGEN SATURATION: 96 %

## 2020-12-14 PROCEDURE — 1036F TOBACCO NON-USER: CPT | Performed by: FAMILY MEDICINE

## 2020-12-14 PROCEDURE — G8427 DOCREV CUR MEDS BY ELIG CLIN: HCPCS | Performed by: FAMILY MEDICINE

## 2020-12-14 PROCEDURE — G8484 FLU IMMUNIZE NO ADMIN: HCPCS | Performed by: FAMILY MEDICINE

## 2020-12-14 PROCEDURE — 99213 OFFICE O/P EST LOW 20 MIN: CPT | Performed by: FAMILY MEDICINE

## 2020-12-14 PROCEDURE — G8417 CALC BMI ABV UP PARAM F/U: HCPCS | Performed by: FAMILY MEDICINE

## 2020-12-14 RX ORDER — CYCLOBENZAPRINE HCL 10 MG
10 TABLET ORAL NIGHTLY PRN
Qty: 10 TABLET | Refills: 0 | Status: SHIPPED | OUTPATIENT
Start: 2020-12-14 | End: 2020-12-24

## 2020-12-14 ASSESSMENT — ENCOUNTER SYMPTOMS
DIARRHEA: 0
VOMITING: 0
BACK PAIN: 1

## 2020-12-14 NOTE — PROGRESS NOTES
Subjective:      Patient ID: Aracely Cortes is a 39 y.o. female    HPI  Here in follow up for back and leg pain. Pain still going down to level of knee. No numbness of back. Seeing chiropractor. Doing slightly better than last time since seeing chiropractor and using flexeril   Review of Systems   Constitutional: Positive for activity change. Gastrointestinal: Negative for diarrhea and vomiting. Musculoskeletal: Positive for arthralgias and back pain. Skin: Negative for rash. Reviewed allergy, medical, social, surgical, family and med list changes and updated   Files     Social History     Socioeconomic History    Marital status: Single     Spouse name: None    Number of children: 2    Years of education: None    Highest education level: None   Occupational History    Occupation: dog grooming business, self employed   Social Needs    Financial resource strain: None    Food insecurity     Worry: None     Inability: None    Transportation needs     Medical: None     Non-medical: None   Tobacco Use    Smoking status: Former Smoker     Packs/day: 0.50     Types: Cigarettes     Quit date: 2018     Years since quittin.4    Smokeless tobacco: Never Used    Tobacco comment: uses electronic cigarettes & trying to cut back   Substance and Sexual Activity    Alcohol use:  Yes     Alcohol/week: 1.0 standard drinks     Types: 1 Standard drinks or equivalent per week     Comment: socially    Drug use: Yes     Frequency: 1.0 times per week     Comment: marijuana, quit 2013    Sexual activity: Yes   Lifestyle    Physical activity     Days per week: None     Minutes per session: None    Stress: None   Relationships    Social connections     Talks on phone: None     Gets together: None     Attends Anglican service: None     Active member of club or organization: None     Attends meetings of clubs or organizations: None     Relationship status: None    Intimate partner violence Fear of current or ex partner: None     Emotionally abused: None     Physically abused: None     Forced sexual activity: None   Other Topics Concern    None   Social History Narrative    Born in [place], has 1 brother one sister    Never , was in a bad relationship    Lives with daughter age 16 (2017) in a house in [place]    2 daughters, one daughter with problems, one in college    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)    Hobbies reading, puzzles, games, outdoor activities     [facility]      Current Outpatient Medications   Medication Sig Dispense Refill    albuterol sulfate  (90 Base) MCG/ACT inhaler Inhale 2 (TWO) puffs into the lungs EVERY 6 HOURS AS NEEDED FOR WHEEZING or SHORTNESS OF BREATH 18 g 0    SYMBICORT 80-4.5 MCG/ACT AERO Inhale 2 puffs into the lungs 2 times daily 1 Inhaler 3    ferrous gluconate (FERATE) 240 (27 Fe) MG tablet Take 1 tablet by mouth 2 times daily (before meals) 180 tablet 3    losartan (COZAAR) 50 MG tablet Take 1 tablet by mouth daily 90 tablet 0    Fluticasone furoate-vilanterol (BREO ELLIPTA) 200-25 MCG/INH AEPB inhaler Inhale 1 puff into the lungs daily 1 each 5    albuterol sulfate  (90 Base) MCG/ACT inhaler Inhale 2 puffs into the lungs every 6 hours as needed for Wheezing 18 g 5    Cholecalciferol (VITAMIN D3) 125 MCG (5000 UT) TABS Take 5,000 Units by mouth daily      pantoprazole (PROTONIX) 40 MG tablet TAKE 1 TABLET before a meal EVERY OTHER DAY 45 tablet 1    Multiple Vitamins-Minerals (DEKAS BARIATRIC) CHEW CHEW AND SWALLOW 1 (ONE) TABLET BY MOUTH DAILY 90 tablet 3    FLUoxetine (PROZAC) 20 MG capsule TAKE 1 CAPSULE BY MOUTH DAILY 90 capsule 1    rosuvastatin (CRESTOR) 20 MG tablet TAKE 1 TABLET BY MOUTH NIGHTLY 90 tablet 1    loratadine (CLARITIN) 10 MG tablet Take 1 tab po daily as needed for allergies 90 tablet 1  albuterol (PROVENTIL) (5 MG/ML) 0.5% nebulizer solution Take 0.5 mLs by nebulization every 6 hours as needed for Wheezing 120 vial 5     No current facility-administered medications for this visit. Family History   Problem Relation Age of Onset    Osteoarthritis Mother     Other Mother         hyperlipidemia    Hypertension Mother     High Cholesterol Mother     No Known Problems Father      Past Medical History:   Diagnosis Date    Allergic rhinitis     several allergens    Asthma     since shildhood    Bariatric surgery status 01/02/2020    [facility], Dr Wyatt Vega Brachial neuralgia 1/23/2014    Chronic left shoulder pain     COPD (chronic obstructive pulmonary disease) ([facility] [address] 2019    Dr Sukhwinder Medellin H/O colonoscopy 2014    Dr. Kristen Malin Hyperlipidemia     Hypertension 2010    Migraine headache     Obesity, morbid (more than 100 lbs over ideal weight or BMI > 40) ([facility])     SEJAL on CPAP 10/2018     Objective:   /70   Pulse 69   Temp 98 °F (36.7 °C)   Resp 16   Ht 5' 5\" (1.651 m)   Wt 233 lb (105.7 kg)   SpO2 96%   BMI 38.77 kg/m²     Physical Exam     Mild  Tenderness at L-S junction                                           No Paraspinal spasm                                  No  CVA tenderness   Neuro:       Patellar  L   1+     Ankle   L  1+                    Patellar  R  1+     Ankle   R  1+                    Dorsi/Plantar flexion -extension   R  5/5                     Dorsi/Plantar flexion- extension   L  5/5                    Quad strength    R  5/5                     Quad strength    L  5/5  Pulses: P.T Pulses   1+ equal  Extremities:  No edema of either leg. Good internal and external rotation of right hip      Assessment:       Diagnosis Orders   1. Acute right-sided low back pain with right-sided sciatica     2.  Acute pain of right hip           Plan:      Orders Placed This Encounter   Medications    cyclobenzaprine (FLEXERIL) 10 MG tablet Sig: Take 1 tablet by mouth nightly as needed for Muscle spasms     Dispense:  10 tablet     Refill:  0     Orders Placed This Encounter   Procedures    External Referral to Physical Therapy     Referral Priority:   Routine     Referral Type:   Eval and Treat     Referral Reason:   Specialty Services Required     Requested Specialty:   Acute Care     Number of Visits Requested:   1   f/u in after trial of PT

## 2021-01-12 ENCOUNTER — HOSPITAL ENCOUNTER (EMERGENCY)
Age: 46
Discharge: HOME | End: 2021-01-13
Attending: EMERGENCY MEDICINE
Payer: COMMERCIAL

## 2021-01-12 VITALS
HEART RATE: 98 BPM | OXYGEN SATURATION: 96 % | SYSTOLIC BLOOD PRESSURE: 137 MMHG | WEIGHT: 228 LBS | HEIGHT: 65 IN | BODY MASS INDEX: 37.99 KG/M2 | TEMPERATURE: 98.3 F | RESPIRATION RATE: 20 BRPM | DIASTOLIC BLOOD PRESSURE: 90 MMHG

## 2021-01-12 DIAGNOSIS — M54.30 SCIATICA, UNSPECIFIED LATERALITY: Primary | ICD-10-CM

## 2021-01-12 PROCEDURE — 6360000002 HC RX W HCPCS: Performed by: EMERGENCY MEDICINE

## 2021-01-12 PROCEDURE — 81003 URINALYSIS AUTO W/O SCOPE: CPT

## 2021-01-12 PROCEDURE — 96372 THER/PROPH/DIAG INJ SC/IM: CPT

## 2021-01-12 PROCEDURE — 99283 EMERGENCY DEPT VISIT LOW MDM: CPT

## 2021-01-12 PROCEDURE — 6370000000 HC RX 637 (ALT 250 FOR IP): Performed by: EMERGENCY MEDICINE

## 2021-01-12 RX ORDER — HYDROCODONE BITARTRATE AND ACETAMINOPHEN 5; 325 MG/1; MG/1
1 TABLET ORAL ONCE
Status: COMPLETED | OUTPATIENT
Start: 2021-01-12 | End: 2021-01-12

## 2021-01-12 RX ORDER — KETOROLAC TROMETHAMINE 30 MG/ML
60 INJECTION, SOLUTION INTRAMUSCULAR; INTRAVENOUS ONCE
Status: COMPLETED | OUTPATIENT
Start: 2021-01-12 | End: 2021-01-12

## 2021-01-12 RX ADMIN — HYDROCODONE BITARTRATE AND ACETAMINOPHEN 1 TABLET: 5; 325 TABLET ORAL at 23:38

## 2021-01-12 RX ADMIN — KETOROLAC TROMETHAMINE 60 MG: 30 INJECTION, SOLUTION INTRAMUSCULAR at 23:38

## 2021-01-13 LAB
BILIRUBIN URINE: NEGATIVE
BLOOD, URINE: NEGATIVE
CLARITY: CLEAR
COLOR: YELLOW
GLUCOSE URINE: NEGATIVE MG/DL
KETONES, URINE: ABNORMAL MG/DL
LEUKOCYTE ESTERASE, URINE: NEGATIVE
NITRITE, URINE: NEGATIVE
PH UA: 5 (ref 5–9)
PROTEIN UA: NEGATIVE MG/DL
SPECIFIC GRAVITY UA: 1.03 (ref 1–1.03)
URINE REFLEX TO CULTURE: ABNORMAL
UROBILINOGEN, URINE: 0.2 E.U./DL

## 2021-01-13 RX ORDER — METHYLPREDNISOLONE 4 MG/1
TABLET ORAL
Qty: 1 KIT | Refills: 0 | Status: SHIPPED | OUTPATIENT
Start: 2021-01-13 | End: 2021-01-19

## 2021-01-13 RX ORDER — HYDROCODONE BITARTRATE AND ACETAMINOPHEN 5; 325 MG/1; MG/1
1 TABLET ORAL EVERY 6 HOURS PRN
Qty: 12 TABLET | Refills: 0 | Status: SHIPPED | OUTPATIENT
Start: 2021-01-13 | End: 2021-01-16

## 2021-01-13 ASSESSMENT — ENCOUNTER SYMPTOMS
ABDOMINAL PAIN: 0
VOMITING: 0
PHOTOPHOBIA: 0
CHEST TIGHTNESS: 0
ABDOMINAL DISTENTION: 0
SHORTNESS OF BREATH: 0
BACK PAIN: 1
COUGH: 0
WHEEZING: 0
SORE THROAT: 0
EYE DISCHARGE: 0

## 2021-01-13 ASSESSMENT — PAIN SCALES - GENERAL: PAINLEVEL_OUTOF10: 3

## 2021-01-13 NOTE — ED PROVIDER NOTES
[facility] ED  eMERGENCY dEPARTMENT eNCOUnter      Pt Name: Azeem Gonzalez  MRN: [id number]  [place] 1975  Date of evaluation: 1/12/2021  Provider: Moose King MD    CHIEF COMPLAINT       Chief Complaint   Patient presents with    Back Pain         HISTORY OF PRESENT ILLNESS   (Location/Symptom, Timing/Onset,Context/Setting, Quality, Duration, Modifying Factors, Severity)  Note limiting factors. Azeem Gonzalez is a 39 y.o. female who presents to the emergency department duration of back pain. Patient has complaints of lower back pain with radiation to her right leg. History of sciatica that is been treated by her primary care physician and has counter waxed and waned over the past 3 weeks. She is already had x-rays of the area. Has not yet been approved for an MRI. Her leg pain stops above the knee. She has been on limited narcotic in the past but is out of those. She also has been on muscle relaxer in the past.  She currently denies chest pain or shortness of breath. There is no fever chills. No related abdominal pain. No urinary complaints. No signs of urinary tract infection or kidney stone. HPI    NursingNotes were reviewed. REVIEW OF SYSTEMS    (2-9 systems for level 4, 10 or more for level 5)     Review of Systems   Constitutional: Negative for chills and diaphoresis. HENT: Negative for congestion, ear pain, mouth sores and sore throat. Eyes: Negative for photophobia and discharge. Respiratory: Negative for cough, chest tightness, shortness of breath and wheezing. Cardiovascular: Negative for chest pain and palpitations. Gastrointestinal: Negative for abdominal distention, abdominal pain and vomiting. Endocrine: Negative for cold intolerance. Genitourinary: Negative for difficulty urinating, dysuria and pelvic pain. Musculoskeletal: Positive for back pain. Negative for arthralgias and gait problem. Skin: Negative for pallor and rash. Allergic/Immunologic: Negative for immunocompromised state. Neurological: Negative for dizziness and syncope. Hematological: Negative for adenopathy. Psychiatric/Behavioral: Negative for agitation and hallucinations. All other systems reviewed and are negative. Except as noted above the remainder of the review of systems was reviewed and negative. PAST MEDICAL HISTORY     Past Medical History:   Diagnosis Date    Allergic rhinitis     several allergens    Asthma     since shildhood    Bariatric surgery status 01/02/2020    [facility], Dr Karolina Benítez     Brachial neuralgia 1/23/2014    Chronic left shoulder pain     COPD (chronic obstructive pulmonary disease) ([facility] [address] 2019    Dr Irving Rizvi H/O colonoscopy 2014    Dr. Vicenta Garg Hyperlipidemia     Hypertension 2010    Migraine headache     Obesity, morbid (more than 100 lbs over ideal weight or BMI > 40) ([facility] [address]     SEJAL on CPAP 10/2018         SURGICALHISTORY       Past Surgical History:   Procedure Laterality Date    BREAST SURGERY  2007    reduction, Dr Alena Kennedy  10/20/15    DR. Gurwinder Luevano Right     Dr London Mcdanielory    SLEEVE GASTRECTOMY  01/20/2020    [facility], Dr Latonya Becerra       Previous Medications    ALBUTEROL (PROVENTIL) (5 MG/ML) 0.5% NEBULIZER SOLUTION    Take 0.5 mLs by nebulization every 6 hours as needed for Wheezing    ALBUTEROL SULFATE  (90 BASE) MCG/ACT INHALER    Inhale 2 puffs into the lungs every 6 hours as needed for Wheezing    ALBUTEROL SULFATE  (90 BASE) MCG/ACT INHALER    Inhale 2 (TWO) puffs into the lungs EVERY 6 HOURS AS NEEDED FOR WHEEZING or SHORTNESS OF BREATH    CHOLECALCIFEROL (VITAMIN D3) 125 MCG (5000 UT) TABS    Take 5,000 Units by mouth daily    FERROUS GLUCONATE (FERATE) 240 (27 FE) MG TABLET    Take 1 tablet by mouth 2 times daily (before meals)    FLUOXETINE (PROZAC) 20 MG CAPSULE    TAKE 1 CAPSULE BY MOUTH DAILY FLUTICASONE FUROATE-VILANTEROL (BREO ELLIPTA) 200-25 MCG/INH AEPB INHALER    Inhale 1 puff into the lungs daily    LORATADINE (CLARITIN) 10 MG TABLET    Take 1 tab po daily as needed for allergies    LOSARTAN (COZAAR) 50 MG TABLET    Take 1 tablet by mouth daily    MULTIPLE VITAMINS-MINERALS (DEKAS BARIATRIC) CHEW    CHEW AND SWALLOW 1 (ONE) TABLET BY MOUTH DAILY    PANTOPRAZOLE (PROTONIX) 40 MG TABLET    TAKE 1 TABLET before a meal EVERY OTHER DAY    ROSUVASTATIN (CRESTOR) 20 MG TABLET    TAKE 1 TABLET BY MOUTH NIGHTLY    SYMBICORT 80-4.5 MCG/ACT AERO    Inhale 2 puffs into the lungs 2 times daily       ALLERGIES     Patient has no known allergies. 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, self employed   Social Needs    Financial resource strain: None    Food insecurity     Worry: None     Inability: None    Transportation needs     Medical: None     Non-medical: None   Tobacco Use    Smoking status: Former Smoker     Packs/day: 0.50     Types: Cigarettes     Quit date: 2018     Years since quittin.5    Smokeless tobacco: Never Used    Tobacco comment: uses electronic cigarettes & trying to cut back   Substance and Sexual Activity    Alcohol use:  Yes     Alcohol/week: 1.0 standard drinks     Types: 1 Standard drinks or equivalent per week     Comment: socially    Drug use: Yes     Frequency: 1.0 times per week     Comment: marijuana, quit 2013    Sexual activity: Yes   Lifestyle    Physical activity     Days per week: None     Minutes per session: None    Stress: None   Relationships    Social connections     Talks on phone: None     Gets together: None     Attends Moravian service: None     Active member of club or organization: None     Attends meetings of clubs or organizations: None     Relationship status: None    Intimate partner violence     Fear of current or ex partner: None     Emotionally abused: None     Physically abused: None     Forced sexual activity: None   Other Topics Concern    None   Social History Narrative    Born in [place], has 1 brother one sister    Never , was in a bad relationship    Lives with daughter age 16 (2017) in a house in [place]    2 daughters, one daughter with problems, one in college    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 Constitutional:       Appearance: She is well-developed. HENT:      Head: Normocephalic. Right Ear: Tympanic membrane normal.      Left Ear: Tympanic membrane normal.      Nose: Nose normal.      Mouth/Throat:      Mouth: Mucous membranes are moist.   Eyes:      Conjunctiva/sclera: Conjunctivae normal.      Pupils: Pupils are equal, round, and reactive to light. Neck:      Musculoskeletal: Normal range of motion and neck supple. Cardiovascular:      Rate and Rhythm: Normal rate and regular rhythm. Heart sounds: Normal heart sounds. Pulmonary:      Effort: Pulmonary effort is normal.      Breath sounds: Normal breath sounds.    Abdominal:      General: Bowel sounds are normal.      Palpations: Abdomen is soft. Tenderness: There is no abdominal tenderness. There is no guarding. Musculoskeletal: Normal range of motion. Lumbar back: She exhibits no swelling and no deformity. Back:    Skin:     General: Skin is warm and dry. Capillary Refill: Capillary refill takes less than 2 seconds. Neurological:      Mental Status: She is alert and oriented to person, place, and time. Psychiatric:         Mood and Affect: Mood normal.         DIAGNOSTIC RESULTS     EKG: All EKG's are interpreted by the Emergency Department Physician who either signs or Co-signsthis chart in the absence of a cardiologist.      RADIOLOGY:   Ellaree Ivanhoe such as CT, Ultrasound and MRI are read by the radiologist. Plain radiographic images are visualized and preliminarily interpreted by the emergency physician with the below findings:      Interpretation per the Radiologist below, if available at the time ofthis note:    No orders to display         ED BEDSIDE ULTRASOUND:   Performed by ED Physician - none    LABS:  Labs Reviewed   URINE RT REFLEX TO CULTURE - Abnormal; Notable for the following components:       Result Value    Ketones, Urine TRACE (*)     All other components within normal limits       All other labs were within normal range or not returned as of this dictation. EMERGENCY DEPARTMENT COURSE and DIFFERENTIAL DIAGNOSIS/MDM:   Vitals:    Vitals:    01/12/21 2316 01/12/21 2319   BP:  (!) 137/90   Pulse: 98    Resp: 20    Temp: 98.3 °F (36.8 °C)    TempSrc: Oral    SpO2: 96%    Weight: 228 lb (103.4 kg)    Height: 5' 5\" (1.651 m)         MDM patient has sciatica with upper leg radicular symptoms. She is put on a steroid taper along with limited pain medication. Follow-up with neurosurgery for further opinion and evaluation      CONSULTS:  None    PROCEDURES:  Unless otherwise noted below, none     Procedures    FINAL IMPRESSION      1.  Sciatica, unspecified laterality          DISPOSITION/PLAN   DISPOSITION Decision To Discharge 01/13/2021 12:28:55 AM      PATIENT REFERRED TO:  José Deluna MD  [address] [place] [address] [facility]  [phone]    In 3 days        DISCHARGE MEDICATIONS:  New Prescriptions    HYDROCODONE-ACETAMINOPHEN (NORCO) 5-325 MG PER TABLET    Take 1 tablet by mouth every 6 hours as needed for Pain for up to 3 days. METHYLPREDNISOLONE (MEDROL, SHEEBA,) 4 MG TABLET    Take by mouth.           (Please note that portions of this note were completed with a voice recognition program.  Efforts were made to edit the dictations but occasionally words are mis-transcribed.)    Rajeev Quesada MD (electronically signed)  Attending Emergency Physician         Rajeev Quesada MD  01/13/21 0030

## 2021-01-14 ENCOUNTER — CARE COORDINATION (OUTPATIENT)
Dept: CARE COORDINATION | Age: 46
End: 2021-01-14

## 2021-01-14 NOTE — CARE COORDINATION
Date/Time:  1/14/2021 12:53 PM  Attempted to reach patient by telephone. Call within 2 business days of discharge: Yes    Left HIPPA compliant message requesting a return call. Will attempt to reach patient again.

## 2021-01-15 ENCOUNTER — CARE COORDINATION (OUTPATIENT)
Dept: CARE COORDINATION | Age: 46
End: 2021-01-15

## 2021-01-15 NOTE — CARE COORDINATION
Attempted to contact patient for post ED COVID-19 Monitoring. Second attempt-Unable to reach patient by phone. Message left regarding the purpose of this call. Number provided and call back requested. Patient resolved from the Care Transitions episode on 1/15/21  No further outreach scheduled with this CTN/ACM. Episode of Care resolved. Patient has this CTN/ACM contact information if future needs arise.

## 2021-01-27 RX ORDER — LOSARTAN POTASSIUM 50 MG/1
TABLET ORAL
Qty: 90 TABLET | Refills: 0 | Status: SHIPPED | OUTPATIENT
Start: 2021-01-27 | End: 2021-02-03 | Stop reason: SDUPTHER

## 2021-01-27 RX ORDER — ALBUTEROL SULFATE 90 UG/1
AEROSOL, METERED RESPIRATORY (INHALATION)
Qty: 18 G | Refills: 5 | Status: SHIPPED | OUTPATIENT
Start: 2021-01-27 | End: 2021-11-22 | Stop reason: SDUPTHER

## 2021-02-01 ENCOUNTER — OFFICE VISIT (OUTPATIENT)
Dept: PULMONOLOGY | Age: 46
End: 2021-02-01
Payer: COMMERCIAL

## 2021-02-01 VITALS
SYSTOLIC BLOOD PRESSURE: 118 MMHG | RESPIRATION RATE: 16 BRPM | BODY MASS INDEX: 38.15 KG/M2 | TEMPERATURE: 96.8 F | HEIGHT: 65 IN | WEIGHT: 229 LBS | HEART RATE: 76 BPM | DIASTOLIC BLOOD PRESSURE: 72 MMHG | OXYGEN SATURATION: 98 %

## 2021-02-01 DIAGNOSIS — G47.33 OSA (OBSTRUCTIVE SLEEP APNEA): Primary | ICD-10-CM

## 2021-02-01 DIAGNOSIS — J45.40 MODERATE PERSISTENT ASTHMA, UNSPECIFIED WHETHER COMPLICATED: ICD-10-CM

## 2021-02-01 DIAGNOSIS — E66.9 OBESITY (BMI 30-39.9): ICD-10-CM

## 2021-02-01 PROCEDURE — G8484 FLU IMMUNIZE NO ADMIN: HCPCS | Performed by: INTERNAL MEDICINE

## 2021-02-01 PROCEDURE — G8427 DOCREV CUR MEDS BY ELIG CLIN: HCPCS | Performed by: INTERNAL MEDICINE

## 2021-02-01 PROCEDURE — G8417 CALC BMI ABV UP PARAM F/U: HCPCS | Performed by: INTERNAL MEDICINE

## 2021-02-01 PROCEDURE — 99214 OFFICE O/P EST MOD 30 MIN: CPT | Performed by: INTERNAL MEDICINE

## 2021-02-01 PROCEDURE — 1036F TOBACCO NON-USER: CPT | Performed by: INTERNAL MEDICINE

## 2021-02-01 NOTE — PROGRESS NOTES
Subjective:     Charity Lloyd is a 39 y.o. female who complains today of:     Chief Complaint   Patient presents with    Follow-up     former pt of Dr. Mohan Roberts. Pt has Hx. of Asthma, COPD, and SEJAL. f/u for PFT results. HPI  Patient was following with Dr. Mohan Roberts. New patient for me in office. Chart reviewed  She had PFT done in nov 2020   She is using breo ellipta , albuterol HFA and neb with albuterol   C/o shortness of breath with exertion occassional .   C/o Wheezing , on and off   No Cough with  Sputum  No Hemoptysis  No Chest tightness   No Chest pain with radiation  or pleuritic pain  No Fever or chills. No Rhinorrhea and postnasal drip. She was smoking 1 and 1/2 ppd , quit 2 yrs ago    She is not  using CPAP for last 1 year or more. She had gastric sleeve and lost 50 lbs  1 yr ago and she stop using it. She said she will do HST to find out she still SEJAL or not and she will use CPAP if she still has SEJAL. Allergies:  Patient has no known allergies. Past Medical History:   Diagnosis Date    Allergic rhinitis     several allergens    Asthma     since shildhood    Bariatric surgery status 01/02/2020    Dr Mohan Goins     Brachial neuralgia 1/23/2014    Chronic left shoulder pain     COPD (chronic obstructive pulmonary disease) ([address] 2019    Dr Jesse Michel H/O colonoscopy 2014    Dr. Ciaran Sesay Hyperlipidemia     Hypertension 2010    Migraine headache     Obesity, morbid (more than 100 lbs over ideal weight or BMI > 40) ([address]     SEJAL on CPAP 10/2018     Past Surgical History:   Procedure Laterality Date    BREAST SURGERY  2007    reduction, Dr Antonieta Escalera  10/20/15    DR. Chandu Ramires Right     Dr Cullen Fink    SLEEVE GASTRECTOMY  01/20/2020    Dr Dejah Goins History   Problem Relation Age of Onset    Osteoarthritis Mother     Other Mother         hyperlipidemia    Hypertension Mother  High Cholesterol Mother     No Known Problems Father      Social History     Socioeconomic History    Marital status: Single     Spouse name: Not on file    Number of children: 2    Years of education: Not on file    Highest education level: Not on file   Occupational History    Occupation: dog grooming business, self employed   Social Needs    Financial resource strain: Not on file    Food insecurity     Worry: Not on file     Inability: Not on file   [employer] needs     Medical: Not on file     Non-medical: Not on file   Tobacco Use    Smoking status: Former Smoker     Packs/day: 0.50     Types: Cigarettes     Quit date: 2018     Years since quittin.5    Smokeless tobacco: Never Used    Tobacco comment: uses electronic cigarettes & trying to cut back   Substance and Sexual Activity    Alcohol use:  Yes     Alcohol/week: 1.0 standard drinks     Types: 1 Standard drinks or equivalent per week     Comment: socially    Drug use: Yes     Frequency: 1.0 times per week     Comment: marijuana, quit 2013    Sexual activity: Yes   Lifestyle    Physical activity     Days per week: Not on file     Minutes per session: Not on file    Stress: Not on file   Relationships    Social connections     Talks on phone: Not on file     Gets together: Not on file     Attends Jewish service: Not on file     Active member of club or organization: Not on file     Attends meetings of clubs or organizations: Not on file     Relationship status: Not on file    Intimate partner violence     Fear of current or ex partner: Not on file     Emotionally abused: Not on file     Physically abused: Not on file     Forced sexual activity: Not on file   Other Topics Concern    Not on file   Social History Narrative    Born in [place], has 1 brother one sister    Never , was in a bad relationship    Lives with daughter age 16 (2017) in a house in [facility] 2 daughters, one daughter with problems, one in college    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)    Hobbies reading, puzzles, games, outdoor activities     [facility]   Constitutional: Negative for chills, diaphoresis, fatigue and fever. HENT: Negative for congestion, mouth sores, nosebleeds, postnasal drip, rhinorrhea, sinus pressure, sneezing, sore throat, trouble swallowing and voice change. Eyes: Negative for discharge, itching and visual disturbance. Respiratory: Positive for shortness of breath and wheezing. Negative for cough and chest tightness. Cardiovascular: Negative for chest pain, palpitations and leg swelling. Gastrointestinal: Negative for abdominal pain, diarrhea, nausea and vomiting. Genitourinary: Negative for difficulty urinating and hematuria. Musculoskeletal: Negative for arthralgias, joint swelling and myalgias. Skin: Negative for rash. Allergic/Immunologic: Negative for environmental allergies and food allergies. Neurological: Negative for dizziness, tremors, weakness and headaches. Psychiatric/Behavioral: Negative for behavioral problems and sleep disturbance.         :     Vitals:    02/01/21 0917   BP: 118/72   Pulse: 76   Resp: 16   Temp: 96.8 °F (36 °C)   TempSrc: Temporal   SpO2: 98%   Weight: 229 lb (103.9 kg)   Height: 5' 5\" (1.651 m)     Wt Readings from Last 3 Encounters:   02/01/21 229 lb (103.9 kg)   01/12/21 228 lb (103.4 kg)   12/14/20 233 lb (105.7 kg)         Physical Exam  Constitutional:       General: She is not in acute distress. Appearance: She is well-developed. She is obese. She is not diaphoretic. HENT:      Head: Normocephalic and atraumatic. Nose: Nose normal.   Eyes:      Pupils: Pupils are equal, round, and reactive to light. Neck:      Thyroid: No thyromegaly. Vascular: No JVD. Trachea: No tracheal deviation. Cardiovascular:      Rate and Rhythm: Normal rate and regular rhythm. Heart sounds: No murmur. No friction rub. No gallop. Pulmonary:      Effort: No respiratory distress. Breath sounds: No wheezing or rales. Chest:      Chest wall: No tenderness. Abdominal:      General: There is no distension. Tenderness: There is no abdominal tenderness. There is no rebound. Musculoskeletal: Normal range of motion. Lymphadenopathy:      Cervical: No cervical adenopathy. Skin:     General: Skin is warm and dry. Neurological:      Mental Status: She is alert and oriented to person, place, and time.       Coordination: Coordination normal.         Current Outpatient Medications   Medication Sig Dispense Refill    albuterol sulfate  (90 Base) MCG/ACT inhaler Inhale 2 (TWO) puffs into the lungs EVERY 6 HOURS AS NEEDED FOR WHEEZING or SHORTNESS OF BREATH 18 g 5    losartan (COZAAR) 50 MG tablet Take 1 tablet by mouth daily 90 tablet 0    SYMBICORT 80-4.5 MCG/ACT AERO Inhale 2 puffs into the lungs 2 times daily 1 Inhaler 3    ferrous gluconate (FERATE) 240 (27 Fe) MG tablet Take 1 tablet by mouth 2 times daily (before meals) 180 tablet 3    Fluticasone furoate-vilanterol (BREO ELLIPTA) 200-25 MCG/INH AEPB inhaler Inhale 1 puff into the lungs daily 1 each 5    albuterol sulfate  (90 Base) MCG/ACT inhaler Inhale 2 puffs into the lungs every 6 hours as needed for Wheezing 18 g 5    Cholecalciferol (VITAMIN D3) 125 MCG (5000 UT) TABS Take 5,000 Units by mouth daily      pantoprazole (PROTONIX) 40 MG tablet TAKE 1 TABLET before a meal EVERY OTHER DAY 45 tablet 1    Multiple Vitamins-Minerals (DEKAS BARIATRIC) CHEW CHEW AND SWALLOW 1 (ONE) TABLET BY MOUTH DAILY 90 tablet 3    FLUoxetine (PROZAC) 20 MG capsule TAKE 1 CAPSULE BY MOUTH DAILY 90 capsule 1    rosuvastatin (CRESTOR) 20 MG tablet TAKE 1 TABLET BY MOUTH NIGHTLY 90 tablet 1    loratadine (CLARITIN) 10 MG tablet Take 1 tab po daily as needed for allergies 90 tablet 1 Impression No acute radiographic abnormality.       ]  Results for orders placed during the hospital encounter of 10/23/20   XR CHEST PORTABLE    Narrative XR CHEST PORTABLE: 10/23/2020    CLINICAL HISTORY:  cough and wheezing . COMPARISON: 9/11/2020. TECHNIQUE: A portable upright AP radiograph of the chest was obtained. FINDINGS:    There is no significant pulmonary infiltrate, pleural effusion, cardiomegaly, vascular congestion, pneumothorax, or displaced fractures identified. Impression NO EVIDENCE OF ACTIVE CARDIOPULMONARY DISEASE. Assessment/Plan:     1. SEJAL (obstructive sleep apnea)  She is not  using CPAP for last 1 year or more. She had gastric sleeve and lost 50 lbs  1 yr ago and she stop using it. She said she will do HST to find out she still SEJAL or not and she will use CPAP if she still has SEJAL. Home sleep study requested    - Home Sleep Study; Future    2. Moderate persistent asthma, unspecified whether complicated  She had PFT done in nov 2020 . She is using breo ellipta , albuterol HFA and neb with albuterol   C/o shortness of breath with exertion occassional . C/o Wheezing , on and off   No Cough with  Sputum. She was smoking 1 and 1/2 ppd , quit 2 yrs ago    3. Obesity (BMI 30-39. 9)  She is advised try to lose weight. obesity related risk explained to the patient ,  Current weight:  229 lb (103.9 kg) Lbs. BMI:  Body mass index is 38.11 kg/m². Suggested weight control approaches, including dietary changes , exercise, behavioral modification. Return in about 4 weeks (around 3/1/2021) for sejal, sleep study, asthma.       Manda Gordon MD

## 2021-02-03 ENCOUNTER — OFFICE VISIT (OUTPATIENT)
Dept: FAMILY MEDICINE CLINIC | Age: 46
End: 2021-02-03
Payer: COMMERCIAL

## 2021-02-03 VITALS
OXYGEN SATURATION: 96 % | DIASTOLIC BLOOD PRESSURE: 82 MMHG | HEIGHT: 65 IN | RESPIRATION RATE: 14 BRPM | HEART RATE: 93 BPM | BODY MASS INDEX: 37.99 KG/M2 | SYSTOLIC BLOOD PRESSURE: 120 MMHG | WEIGHT: 228 LBS | TEMPERATURE: 98 F

## 2021-02-03 DIAGNOSIS — Z98.84 HISTORY OF BARIATRIC SURGERY: ICD-10-CM

## 2021-02-03 DIAGNOSIS — J44.9 CHRONIC OBSTRUCTIVE PULMONARY DISEASE, UNSPECIFIED COPD TYPE (HCC): ICD-10-CM

## 2021-02-03 DIAGNOSIS — M54.41 ACUTE RIGHT-SIDED LOW BACK PAIN WITH RIGHT-SIDED SCIATICA: Primary | ICD-10-CM

## 2021-02-03 DIAGNOSIS — F31.70 BIPOLAR DISORDER IN FULL REMISSION, MOST RECENT EPISODE UNSPECIFIED TYPE (HCC): ICD-10-CM

## 2021-02-03 DIAGNOSIS — I10 ESSENTIAL HYPERTENSION: ICD-10-CM

## 2021-02-03 DIAGNOSIS — E78.00 PURE HYPERCHOLESTEROLEMIA: ICD-10-CM

## 2021-02-03 DIAGNOSIS — R73.03 PRE-DIABETES: ICD-10-CM

## 2021-02-03 PROCEDURE — 99214 OFFICE O/P EST MOD 30 MIN: CPT | Performed by: FAMILY MEDICINE

## 2021-02-03 PROCEDURE — G8417 CALC BMI ABV UP PARAM F/U: HCPCS | Performed by: FAMILY MEDICINE

## 2021-02-03 PROCEDURE — 3023F SPIROM DOC REV: CPT | Performed by: FAMILY MEDICINE

## 2021-02-03 PROCEDURE — 1036F TOBACCO NON-USER: CPT | Performed by: FAMILY MEDICINE

## 2021-02-03 PROCEDURE — G8427 DOCREV CUR MEDS BY ELIG CLIN: HCPCS | Performed by: FAMILY MEDICINE

## 2021-02-03 PROCEDURE — G8484 FLU IMMUNIZE NO ADMIN: HCPCS | Performed by: FAMILY MEDICINE

## 2021-02-03 PROCEDURE — G8926 SPIRO NO PERF OR DOC: HCPCS | Performed by: FAMILY MEDICINE

## 2021-02-03 RX ORDER — PANTOPRAZOLE SODIUM 40 MG/1
TABLET, DELAYED RELEASE ORAL
Qty: 45 TABLET | Refills: 2 | Status: SHIPPED | OUTPATIENT
Start: 2021-02-03 | End: 2021-10-25

## 2021-02-03 RX ORDER — ROSUVASTATIN CALCIUM 10 MG/1
TABLET, COATED ORAL
Qty: 90 TABLET | Refills: 1 | Status: SHIPPED | OUTPATIENT
Start: 2021-02-03 | End: 2021-06-22

## 2021-02-03 RX ORDER — LOSARTAN POTASSIUM 50 MG/1
TABLET ORAL
Qty: 90 TABLET | Refills: 1 | Status: SHIPPED | OUTPATIENT
Start: 2021-02-03 | End: 2021-10-25

## 2021-02-03 RX ORDER — LORATADINE 10 MG/1
TABLET ORAL
Qty: 90 TABLET | Refills: 1 | Status: SHIPPED | OUTPATIENT
Start: 2021-02-03 | End: 2021-06-22

## 2021-02-03 RX ORDER — QUINIDINE GLUCONATE 324 MG
240 TABLET, EXTENDED RELEASE ORAL
Qty: 180 TABLET | Refills: 3 | Status: CANCELLED | OUTPATIENT
Start: 2021-02-03

## 2021-02-03 RX ORDER — FLUOXETINE HYDROCHLORIDE 20 MG/1
CAPSULE ORAL
Qty: 90 CAPSULE | Refills: 1 | Status: SHIPPED | OUTPATIENT
Start: 2021-02-03 | End: 2021-06-22

## 2021-02-03 ASSESSMENT — ENCOUNTER SYMPTOMS
ABDOMINAL PAIN: 0
SHORTNESS OF BREATH: 0

## 2021-02-03 ASSESSMENT — PATIENT HEALTH QUESTIONNAIRE - PHQ9
SUM OF ALL RESPONSES TO PHQ QUESTIONS 1-9: 0
2. FEELING DOWN, DEPRESSED OR HOPELESS: 0
SUM OF ALL RESPONSES TO PHQ QUESTIONS 1-9: 0
1. LITTLE INTEREST OR PLEASURE IN DOING THINGS: 0

## 2021-02-03 NOTE — PROGRESS NOTES
Subjective:      Patient ID: Magdiel Desouza is a 39 y.o. female    Back Pain  This is a recurrent problem. The current episode started more than 1 month ago. The pain is present in the lumbar spine. The pain radiates to the right knee. Pertinent negatives include no abdominal pain, chest pain or fever. Hyperlipidemia  Pertinent negatives include no chest pain or shortness of breath. Hypertension  Pertinent negatives include no chest pain or shortness of breath. Here in follow up lower back and right leg pain. Doing somewhat better from trial of PT. About 70 % better overall. Still having pain and numbness down to level of knee on right. Does have appt with neurosurgery next week. Also here in follow up for lipids and htn and bipolar disorder-stable with prozac. No missed doses of meds    Review of Systems   Constitutional: Negative for chills and fever. Respiratory: Negative for shortness of breath. Cardiovascular: Negative for chest pain. Gastrointestinal: Negative for abdominal pain. Musculoskeletal: Positive for back pain. Skin: Negative for rash. Reviewed allergy, medical, social, surgical, family and med list changes and updated   Files     Social History     Socioeconomic History    Marital status: Single     Spouse name: None    Number of children: 2    Years of education: None    Highest education level: None   Occupational History    Occupation: dog grooming business, self employed   Social Needs    Financial resource strain: None    Food insecurity     Worry: None     Inability: None    Transportation needs     Medical: None     Non-medical: None   Tobacco Use    Smoking status: Former Smoker     Packs/day: 0.50     Types: Cigarettes     Quit date: 2018     Years since quittin.5    Smokeless tobacco: Never Used    Tobacco comment: uses electronic cigarettes & trying to cut back   Substance and Sexual Activity    Alcohol use:  Yes Alcohol/week: 1.0 standard drinks     Types: 1 Standard drinks or equivalent per week     Comment: socially    Drug use: Yes     Frequency: 1.0 times per week     Comment: marijuana, quit July 2013    Sexual activity: Yes   Lifestyle    Physical activity     Days per week: None     Minutes per session: None    Stress: None   Relationships    Social connections     Talks on phone: None     Gets together: None     Attends Amish service: None     Active member of club or organization: None     Attends meetings of clubs or organizations: None     Relationship status: None    Intimate partner violence     Fear of current or ex partner: None     Emotionally abused: None     Physically abused: None     Forced sexual activity: None   Other Topics Concern    None   Social History Narrative    Born in [place], has 1 brother one sister    Never , was in a bad relationship    Lives with daughter age 16 (2017) in a house in [facility]    2 daughters, one daughter with problems, one in college    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)    [employer] reading, puzzles, games, outdoor activities     [facility]      Current Outpatient Medications   Medication Sig Dispense Refill    losartan (COZAAR) 50 MG tablet 1 po q day 90 tablet 1    pantoprazole (PROTONIX) 40 MG tablet TAKE 1 TABLET before a meal EVERY OTHER DAY 45 tablet 2    FLUoxetine (PROZAC) 20 MG capsule TAKE 1 CAPSULE BY MOUTH DAILY 90 capsule 1    rosuvastatin (CRESTOR) 10 MG tablet TAKE 1 TABLET BY MOUTH NIGHTLY 90 tablet 1    loratadine (CLARITIN) 10 MG tablet Take 1 tab po daily as needed for allergies 90 tablet 1    Cholecalciferol (VITAMIN D3) 125 MCG (5000 UT) TABS Take 1 tablet by mouth daily 30 tablet 2    albuterol sulfate  (90 Base) MCG/ACT inhaler Inhale 2 (TWO) puffs into the lungs EVERY 6 HOURS AS NEEDED FOR WHEEZING or SHORTNESS OF BREATH 18 g 5    SYMBICORT 80-4.5 MCG/ACT AERO Inhale 2 puffs into the lungs 2 times daily 1 Inhaler 3  ferrous gluconate (FERATE) 240 (27 Fe) MG tablet Take 1 tablet by mouth 2 times daily (before meals) 180 tablet 3    Fluticasone furoate-vilanterol (BREO ELLIPTA) 200-25 MCG/INH AEPB inhaler Inhale 1 puff into the lungs daily 1 each 5    albuterol sulfate  (90 Base) MCG/ACT inhaler Inhale 2 puffs into the lungs every 6 hours as needed for Wheezing 18 g 5    Multiple Vitamins-Minerals (DEKAS BARIATRIC) CHEW CHEW AND SWALLOW 1 (ONE) TABLET BY MOUTH DAILY 90 tablet 3    albuterol (PROVENTIL) (5 MG/ML) 0.5% nebulizer solution Take 0.5 mLs by nebulization every 6 hours as needed for Wheezing 120 vial 5     No current facility-administered medications for this visit. Family History   Problem Relation Age of Onset    Osteoarthritis Mother     Other Mother         hyperlipidemia    Hypertension Mother     High Cholesterol Mother     No Known Problems Father      Past Medical History:   Diagnosis Date    Allergic rhinitis     several allergens    Asthma     since shildhood    Bariatric surgery status 01/02/2020    Lion Mccartney, Dr Jean Pierre Champion     Brachial neuralgia 1/23/2014    Chronic left shoulder pain     [facility] 2019    Dr Tatyana Brown H/O colonoscopy 2014    Dr. Que Sher Hyperlipidemia     Hypertension 2010    Migraine headache     Obesity, morbid (more than 100 lbs over ideal weight or BMI > 40) ([facility])     SEJAL on CPAP 10/2018     Objective:   /82   Pulse 93   Temp 98 °F (36.7 °C)   Resp 14   Ht 5' 5\" (1.651 m)   Wt 228 lb (103.4 kg)   SpO2 96%   BMI 37.94 kg/m²     Physical Exam  Neck:no carotid bruits. No masses. No adenopathy. No thyroid asymmetry. Lungs:clear and equal breath sounds. No wheezes or rales. Heart:rate reg. 1/6 syst murmur across precordium   No gallops   Pulses:Radials 2+ equal               D.P  1+ equal  Extremities:no edema in either leg  Gen:    In no acute distress Abdomen; B.S present. Soft  Non tender. No hepatosplenomegaly. No masses      Mild  Tenderness at L-S junction                                           No Paraspinal spasm                                  No  CVA tenderness   Neuro:       Patellar  L   1+     Ankle   L  1+                    Patellar  R  1+     Ankle   R  1+                    Dorsi/Plantar flexion -extension   R  5/5                     Dorsi/Plantar flexion- extension   L  5/5                    Quad strength    R  5/5                     Quad strength    L  5/5  Pulses:    D.P.  Pulses   1+ equal    Assessment:       Diagnosis Orders   1. Acute right-sided low back pain with right-sided sciatica     2. Pure hypercholesterolemia     3. Essential hypertension     4. Pre-diabetes     5. History of bariatric surgery     6. Bipolar disorder in full remission, most recent episode unspecified type ([facility]     7.  Chronic obstructive pulmonary disease, unspecified COPD type ([facility]           Plan:   Bipolar disorder stable-continue current tx  Copd stable and followed by pulmonary-continue current tx    F/u with neurosurgery as already planned   Orders Placed This Encounter   Medications    losartan (COZAAR) 50 MG tablet     Si po q day     Dispense:  90 tablet     Refill:  1    pantoprazole (PROTONIX) 40 MG tablet     Sig: TAKE 1 TABLET before a meal EVERY OTHER DAY     Dispense:  45 tablet     Refill:  2    FLUoxetine (PROZAC) 20 MG capsule     Sig: TAKE 1 CAPSULE BY MOUTH DAILY     Dispense:  90 capsule     Refill:  1    rosuvastatin (CRESTOR) 10 MG tablet     Sig: TAKE 1 TABLET BY MOUTH NIGHTLY     Dispense:  90 tablet     Refill:  1    loratadine (CLARITIN) 10 MG tablet     Sig: Take 1 tab po daily as needed for allergies     Dispense:  90 tablet     Refill:  1    Cholecalciferol (VITAMIN D3) 125 MCG (5000 UT) TABS     Sig: Take 1 tablet by mouth daily     Dispense:  30 tablet     Refill:  2 Return for fasting blood work in april and f/u after done .

## 2021-02-04 ASSESSMENT — ENCOUNTER SYMPTOMS: BACK PAIN: 1

## 2021-02-06 ASSESSMENT — ENCOUNTER SYMPTOMS
SHORTNESS OF BREATH: 1
NAUSEA: 0
SINUS PRESSURE: 0
CHEST TIGHTNESS: 0
TROUBLE SWALLOWING: 0
COUGH: 0
VOICE CHANGE: 0
ABDOMINAL PAIN: 0
SORE THROAT: 0
EYE ITCHING: 0
EYE DISCHARGE: 0
VOMITING: 0
RHINORRHEA: 0
DIARRHEA: 0
WHEEZING: 1

## 2021-02-09 PROBLEM — M51.26 LUMBAR DISC HERNIATION: Status: ACTIVE | Noted: 2021-02-09

## 2021-02-09 PROBLEM — M54.16 LUMBAR RADICULOPATHY: Status: ACTIVE | Noted: 2021-02-09

## 2021-02-23 ENCOUNTER — HOSPITAL ENCOUNTER (OUTPATIENT)
Dept: SLEEP CENTER | Age: 46
Discharge: HOME | End: 2021-02-25
Payer: COMMERCIAL

## 2021-02-23 PROCEDURE — G0399 HOME SLEEP TEST/TYPE 3 PORTA: HCPCS

## 2021-02-23 PROCEDURE — 95806 SLEEP STUDY UNATT&RESP EFFT: CPT

## 2021-02-24 DIAGNOSIS — J45.40 MODERATE PERSISTENT ASTHMA, UNSPECIFIED WHETHER COMPLICATED: ICD-10-CM

## 2021-02-24 RX ORDER — DILTIAZEM HYDROCHLORIDE 60 MG/1
2 TABLET, FILM COATED ORAL 2 TIMES DAILY
Qty: 1 INHALER | Refills: 3 | Status: SHIPPED | OUTPATIENT
Start: 2021-02-24 | End: 2022-08-22

## 2021-03-03 DIAGNOSIS — G47.33 OSA (OBSTRUCTIVE SLEEP APNEA): ICD-10-CM

## 2021-03-07 PROCEDURE — 95806 SLEEP STUDY UNATT&RESP EFFT: CPT | Performed by: INTERNAL MEDICINE

## 2021-03-15 ENCOUNTER — OFFICE VISIT (OUTPATIENT)
Dept: PULMONOLOGY | Age: 46
End: 2021-03-15
Payer: COMMERCIAL

## 2021-03-15 VITALS
WEIGHT: 240 LBS | TEMPERATURE: 96.7 F | HEART RATE: 98 BPM | RESPIRATION RATE: 18 BRPM | OXYGEN SATURATION: 98 % | SYSTOLIC BLOOD PRESSURE: 122 MMHG | BODY MASS INDEX: 39.99 KG/M2 | HEIGHT: 65 IN | DIASTOLIC BLOOD PRESSURE: 84 MMHG

## 2021-03-15 DIAGNOSIS — E66.9 OBESITY (BMI 30-39.9): ICD-10-CM

## 2021-03-15 DIAGNOSIS — G47.33 OSA (OBSTRUCTIVE SLEEP APNEA): Primary | ICD-10-CM

## 2021-03-15 DIAGNOSIS — J44.9 CHRONIC OBSTRUCTIVE PULMONARY DISEASE, UNSPECIFIED COPD TYPE (HCC): ICD-10-CM

## 2021-03-15 DIAGNOSIS — J45.40 MODERATE PERSISTENT ASTHMA, UNSPECIFIED WHETHER COMPLICATED: ICD-10-CM

## 2021-03-15 PROCEDURE — 1036F TOBACCO NON-USER: CPT | Performed by: INTERNAL MEDICINE

## 2021-03-15 PROCEDURE — 99214 OFFICE O/P EST MOD 30 MIN: CPT | Performed by: INTERNAL MEDICINE

## 2021-03-15 PROCEDURE — G8417 CALC BMI ABV UP PARAM F/U: HCPCS | Performed by: INTERNAL MEDICINE

## 2021-03-15 PROCEDURE — 3023F SPIROM DOC REV: CPT | Performed by: INTERNAL MEDICINE

## 2021-03-15 PROCEDURE — G8427 DOCREV CUR MEDS BY ELIG CLIN: HCPCS | Performed by: INTERNAL MEDICINE

## 2021-03-15 PROCEDURE — G8926 SPIRO NO PERF OR DOC: HCPCS | Performed by: INTERNAL MEDICINE

## 2021-03-15 PROCEDURE — G8484 FLU IMMUNIZE NO ADMIN: HCPCS | Performed by: INTERNAL MEDICINE

## 2021-03-15 NOTE — PROGRESS NOTES
business, self employed   Social Needs    Financial resource strain: Not on file    Food insecurity     Worry: Not on file     Inability: Not on file   [employer] needs     Medical: Not on file     Non-medical: Not on file   Tobacco Use    Smoking status: Former Smoker     Packs/day: 0.50     Types: Cigarettes     Quit date: 2018     Years since quittin.6    Smokeless tobacco: Never Used    Tobacco comment: uses electronic cigarettes & trying to cut back   Substance and Sexual Activity    Alcohol use: Yes     Alcohol/week: 1.0 standard drinks     Types: 1 Standard drinks or equivalent per week     Comment: socially    Drug use: Not Currently     Frequency: 1.0 times per week     Comment: marijuana, quit 2013    Sexual activity: Yes   Lifestyle    Physical activity     Days per week: Not on file     Minutes per session: Not on file    Stress: Not on file   Relationships    Social connections     Talks on phone: Not on file     Gets together: Not on file     Attends Zoroastrianism service: Not on file     Active member of club or organization: Not on file     Attends meetings of clubs or organizations: Not on file     Relationship status: Not on file    Intimate partner violence     Fear of current or ex partner: Not on file     Emotionally abused: Not on file     Physically abused: Not on file     Forced sexual activity: Not on file   Other Topics Concern    Not on file   Social History Narrative    Born in [place], has 1 brother one sister    Never , was in a bad relationship    Lives with daughter age 16 (2017) in a house in [facility]    2 daughters, one daughter with problems, one in college    Works at [employer] ([employer]    Hobbies reading, puzzles, games, outdoor activities     [facility]          Review of Systems   Constitutional: Negative for chills, diaphoresis, fatigue and fever.    HENT: Negative for congestion, mouth sores, nosebleeds, postnasal drip, rhinorrhea, sinus pressure, sneezing, sore throat, trouble swallowing and voice change. Eyes: Negative for discharge, itching and visual disturbance. Respiratory: Positive for shortness of breath and wheezing. Negative for cough and chest tightness. Cardiovascular: Negative for chest pain, palpitations and leg swelling. Gastrointestinal: Negative for abdominal pain, diarrhea, nausea and vomiting. Genitourinary: Negative for difficulty urinating and hematuria. Musculoskeletal: Negative for arthralgias, joint swelling and myalgias. Skin: Negative for rash. Allergic/Immunologic: Negative for environmental allergies and food allergies. Neurological: Negative for dizziness, tremors, weakness and headaches. Psychiatric/Behavioral: Negative for behavioral problems and sleep disturbance.         :     Vitals:    03/15/21 0936   BP: 122/84   Pulse: 98   Resp: 18   Temp: 96.7 °F (35.9 °C)   SpO2: 98%   Weight: 240 lb (108.9 kg)   Height: 5' 5\" (1.651 m)     Wt Readings from Last 3 Encounters:   03/15/21 240 lb (108.9 kg)   02/09/21 234 lb (106.1 kg)   02/03/21 228 lb (103.4 kg)         Physical Exam  Constitutional:       General: She is not in acute distress. Appearance: She is well-developed. She is obese. She is not diaphoretic. HENT:      Head: Normocephalic and atraumatic. Nose: Nose normal.   Eyes:      Pupils: Pupils are equal, round, and reactive to light. Neck:      Thyroid: No thyromegaly. Vascular: No JVD. Trachea: No tracheal deviation. Cardiovascular:      Rate and Rhythm: Normal rate and regular rhythm. Heart sounds: No murmur. No friction rub. No gallop. Pulmonary:      Effort: No respiratory distress. Breath sounds: Wheezing present. No rales. Comments: diminished Breath sound bilaterally. Chest:      Chest wall: No tenderness. Abdominal:      General: There is no distension. Tenderness: There is no abdominal tenderness. There is no rebound. Musculoskeletal: Normal range of motion. Lymphadenopathy:      Cervical: No cervical adenopathy. Skin:     General: Skin is warm and dry. Neurological:      Mental Status: She is alert and oriented to person, place, and time. Coordination: Coordination normal.         Current Outpatient Medications   Medication Sig Dispense Refill    SYMBICORT 80-4.5 MCG/ACT AERO Inhale 2 puffs into the lungs 2 times daily 1 Inhaler 3    losartan (COZAAR) 50 MG tablet 1 po q day 90 tablet 1    pantoprazole (PROTONIX) 40 MG tablet TAKE 1 TABLET before a meal EVERY OTHER DAY 45 tablet 2    FLUoxetine (PROZAC) 20 MG capsule TAKE 1 CAPSULE BY MOUTH DAILY 90 capsule 1    rosuvastatin (CRESTOR) 10 MG tablet TAKE 1 TABLET BY MOUTH NIGHTLY 90 tablet 1    loratadine (CLARITIN) 10 MG tablet Take 1 tab po daily as needed for allergies 90 tablet 1    Cholecalciferol (VITAMIN D3) 125 MCG (5000 UT) TABS Take 1 tablet by mouth daily 30 tablet 2    albuterol sulfate  (90 Base) MCG/ACT inhaler Inhale 2 (TWO) puffs into the lungs EVERY 6 HOURS AS NEEDED FOR WHEEZING or SHORTNESS OF BREATH 18 g 5    ferrous gluconate (FERATE) 240 (27 Fe) MG tablet Take 1 tablet by mouth 2 times daily (before meals) 180 tablet 3    Fluticasone furoate-vilanterol (BREO ELLIPTA) 200-25 MCG/INH AEPB inhaler Inhale 1 puff into the lungs daily 1 each 5    albuterol sulfate  (90 Base) MCG/ACT inhaler Inhale 2 puffs into the lungs every 6 hours as needed for Wheezing 18 g 5    Multiple Vitamins-Minerals (DEKAS BARIATRIC) CHEW CHEW AND SWALLOW 1 (ONE) TABLET BY MOUTH DAILY 90 tablet 3    albuterol (PROVENTIL) (5 MG/ML) 0.5% nebulizer solution Take 0.5 mLs by nebulization every 6 hours as needed for Wheezing 120 vial 5     No current facility-administered medications for this visit.       Hari Khan MD     12/4/2020  1:57 PM                        [address]                 [address] [facility], [address] [facility]                    PULMONARY FUNCTION   Severiano Padilla   39 y.o.   female   Height 65 in   Weight 230 lb       Referring provider   Lencho Field MD     Reading provider   RayV     Test meets ATS criteria for acceptability and reproducibility Yes     Diagnosis: WEN: Yes  Cough   Yes, wheezing Yes   Smoking   quit 2 years ago     Spirometry   FVC            2.70 L   71%  Post bronchodilator 3.25 L  86%   Change 20 %   FEV1          1.67 L  55%   Post bronchodilator  2.27 L  75%     Change 36%   FEV1/FVC  62  %             Post bronchodilator  70 %   ZDQ02-31% 0.88 L  29%  Post bronchodilator  2.21 L  73%   Change   152%     Lung volume   SVC           2.80 L  74%   RV              3.21 L 184%   TLC            6.00  L 115%   RV/TLC      53 %     DLCO           118 %     Test interpretation       Spirometry meets ATS criteria for moderate to severe obstructive   airway disease with positive response to bronchodilator.     Lung volume shows air trapping   Diffusion capacity is normal         Clinical correlation is recommended     Clarisa Curry [facility], 12/4/2020 1:55 PM   Results for orders placed during the hospital encounter of 09/11/20   XR CHEST (2 VW)    Narrative XR CHEST (2 VW)    Clinical History:   cough and sob    Other . Comparison:  3/16/2010      RESULT:         Lungs and pleura:  No consolidation. No pleural effusion. No pneumothorax. Normal pulmonary vascular pattern. Cardiomediastinal silhouette:  Normal.    Other:  No acute osseous findings. Degenerative changes bilateral acromioclavicular joints. Impression No acute radiographic abnormality.       ]  Results for orders placed during the hospital encounter of 10/23/20   XR CHEST PORTABLE    Narrative XR CHEST PORTABLE: 10/23/2020    CLINICAL HISTORY:  cough and wheezing . COMPARISON: 9/11/2020. TECHNIQUE: A portable upright AP radiograph of the chest was obtained.       FINDINGS:    There is no significant pulmonary infiltrate, pleural effusion, cardiomegaly, vascular congestion, pneumothorax, or displaced fractures identified. Impression NO EVIDENCE OF ACTIVE CARDIOPULMONARY DISEASE. Assessment/Plan:     1. SEJAL (obstructive sleep apnea)  Patient stopped using CPAP therapy since he had a gastric bypass surgery and lost 50 pounds. She had home sleep study done which shows she still has mild sleep apnea with AHI of 7. She said she will start using CPAP therapy. 2. Moderate persistent asthma, unspecified whether complicated  She had PFT done which shows moderate to severe obstructive airway disease with positive response to bronchodilator.   Lung volume shows air trapping Diffusion capacity is normal.  PFT reviewed with patient. 3. Chronic obstructive pulmonary disease, unspecified COPD type ([address]  She is having short of breath with exertion off and on wheezing. She is using breo ellipta , albuterol HFA and neb with albuterol . Continue bronchodilator therapy as before. 4. Obesity (BMI 30-39. 9)  She is advised try to lose weight. obesity related risk explained to the patient ,  Current weight:  240 lb (108.9 kg) Lbs. BMI:  Body mass index is 39.94 kg/m². Suggested weight control approaches, including dietary changes , exercise, behavioral modification. Return in about 3 months (around 6/15/2021) for sejal, asthma.       Rob Patel MD

## 2021-03-16 ASSESSMENT — ENCOUNTER SYMPTOMS
SINUS PRESSURE: 0
ABDOMINAL PAIN: 0
VOICE CHANGE: 0
TROUBLE SWALLOWING: 0
CHEST TIGHTNESS: 0
VOMITING: 0
NAUSEA: 0
SORE THROAT: 0
WHEEZING: 1
EYE DISCHARGE: 0
EYE ITCHING: 0
DIARRHEA: 0
RHINORRHEA: 0
COUGH: 0
SHORTNESS OF BREATH: 1

## 2021-03-17 PROBLEM — F40.240 CLAUSTROPHOBIA: Status: ACTIVE | Noted: 2021-03-17

## 2021-03-28 ENCOUNTER — HOSPITAL ENCOUNTER (OUTPATIENT)
Dept: MRI IMAGING | Age: 46
Discharge: HOME | End: 2021-03-30
Payer: COMMERCIAL

## 2021-03-28 DIAGNOSIS — M54.16 LUMBAR RADICULOPATHY: ICD-10-CM

## 2021-03-28 DIAGNOSIS — M51.26 LUMBAR DISC HERNIATION: ICD-10-CM

## 2021-03-28 PROCEDURE — 72148 MRI LUMBAR SPINE W/O DYE: CPT

## 2021-03-28 RX ORDER — RESVER/WINE/BFL/GRPSD/PC/C/POM 200MG-60MG
CAPSULE ORAL
Qty: 30 TABLET | Refills: 2 | Status: SHIPPED | OUTPATIENT
Start: 2021-03-28 | End: 2022-04-20 | Stop reason: SDUPTHER

## 2021-03-30 PROBLEM — M47.816 LUMBAR SPONDYLOSIS: Status: ACTIVE | Noted: 2021-03-30

## 2021-04-08 DIAGNOSIS — R73.03 PRE-DIABETES: ICD-10-CM

## 2021-04-08 DIAGNOSIS — Z98.84 HISTORY OF BARIATRIC SURGERY: ICD-10-CM

## 2021-04-08 LAB
FOLATE: >20 NG/ML (ref 7.3–26.1)
GLUCOSE BLD-MCNC: 88 MG/DL (ref 70–99)
HBA1C MFR BLD: 5.7 % (ref 4.8–5.9)
VITAMIN B-12: 1022 PG/ML (ref 232–1245)
VITAMIN D 25-HYDROXY: 22.4 NG/ML (ref 30–100)

## 2021-04-21 ENCOUNTER — OFFICE VISIT (OUTPATIENT)
Dept: FAMILY MEDICINE CLINIC | Age: 46
End: 2021-04-21
Payer: COMMERCIAL

## 2021-04-21 VITALS
HEIGHT: 64 IN | WEIGHT: 231 LBS | SYSTOLIC BLOOD PRESSURE: 124 MMHG | TEMPERATURE: 98 F | OXYGEN SATURATION: 97 % | RESPIRATION RATE: 14 BRPM | BODY MASS INDEX: 39.44 KG/M2 | HEART RATE: 76 BPM | DIASTOLIC BLOOD PRESSURE: 82 MMHG

## 2021-04-21 DIAGNOSIS — Z98.84 HISTORY OF BARIATRIC SURGERY: ICD-10-CM

## 2021-04-21 DIAGNOSIS — E55.9 VITAMIN D DEFICIENCY: ICD-10-CM

## 2021-04-21 DIAGNOSIS — I10 ESSENTIAL HYPERTENSION: ICD-10-CM

## 2021-04-21 DIAGNOSIS — F31.70 BIPOLAR DISORDER IN FULL REMISSION, MOST RECENT EPISODE UNSPECIFIED TYPE (HCC): ICD-10-CM

## 2021-04-21 DIAGNOSIS — R73.03 PRE-DIABETES: Primary | ICD-10-CM

## 2021-04-21 DIAGNOSIS — E78.00 PURE HYPERCHOLESTEROLEMIA: ICD-10-CM

## 2021-04-21 PROCEDURE — G8417 CALC BMI ABV UP PARAM F/U: HCPCS | Performed by: FAMILY MEDICINE

## 2021-04-21 PROCEDURE — G8427 DOCREV CUR MEDS BY ELIG CLIN: HCPCS | Performed by: FAMILY MEDICINE

## 2021-04-21 PROCEDURE — 99214 OFFICE O/P EST MOD 30 MIN: CPT | Performed by: FAMILY MEDICINE

## 2021-04-21 PROCEDURE — 1036F TOBACCO NON-USER: CPT | Performed by: FAMILY MEDICINE

## 2021-04-21 RX ORDER — ERGOCALCIFEROL 1.25 MG/1
50000 CAPSULE ORAL WEEKLY
Qty: 12 CAPSULE | Refills: 0 | Status: SHIPPED | OUTPATIENT
Start: 2021-04-21 | End: 2021-06-11

## 2021-04-21 ASSESSMENT — ENCOUNTER SYMPTOMS
DIARRHEA: 0
COUGH: 0
BACK PAIN: 1
VOMITING: 0

## 2021-04-21 NOTE — PROGRESS NOTES
Subjective:      Patient ID: La Negro is a 39 y.o. female    Hyperlipidemia  This is a chronic problem. The current episode started more than 1 year ago. The problem is controlled. Pertinent negatives include no chest pain. Current antihyperlipidemic treatment includes statins. Hypertension  Pertinent negatives include no chest pain. Other  Associated symptoms include arthralgias and a rash. Pertinent negatives include no chest pain, chills, coughing, fever, vomiting or weakness. Here in follow up for htn and lipids and pre diabetes and vitamin d deficiency and depression. Depression stable with prozac. Exercising few days per week and watching diet more closely since last time    Review of Systems   Constitutional: Negative for chills and fever. Respiratory: Negative for cough. Cardiovascular: Negative for chest pain. Gastrointestinal: Negative for diarrhea and vomiting. Musculoskeletal: Positive for arthralgias and back pain. Skin: Positive for rash. Neurological: Negative for weakness. Reviewed allergy, medical, social, surgical, family and med list changes and updated   Files--reviewed blood work with low vitamin d level      Social History     Socioeconomic History    Marital status: Single     Spouse name: None    Number of children: 2    Years of education: None    Highest education level: None   Occupational History    Occupation: [employer] business, self employed   Social Needs    Financial resource strain: None    Food insecurity     Worry: None     Inability: None    Transportation needs     Medical: None     Non-medical: None   Tobacco Use    Smoking status: Former Smoker     Packs/day: 0.50     Types: Cigarettes     Quit date: 2018     Years since quittin.7    Smokeless tobacco: Never Used    Tobacco comment: uses electronic cigarettes & trying to cut back   Substance and Sexual Activity    Alcohol use:  Yes     Alcohol/week: 1.0 standard drinks Types: 1 Standard drinks or equivalent per week     Comment: socially    Drug use: Not Currently     Frequency: 1.0 times per week     Comment: marijuana, quit July 2013    Sexual activity: Yes   Lifestyle    Physical activity     Days per week: None     Minutes per session: None    Stress: None   Relationships    Social connections     Talks on phone: None     Gets together: None     Attends Scientology service: None     Active member of club or organization: None     Attends meetings of clubs or organizations: None     Relationship status: None    Intimate partner violence     Fear of current or ex partner: None     Emotionally abused: None     Physically abused: None     Forced sexual activity: None   Other Topics Concern    None   Social History Narrative    Born in [place], has 1 brother one sister    Never , was in a bad relationship    Lives with daughter age 16 (2017) in a house in [facility]    2 daughters, one daughter with problems, one in college    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)    Hobbies reading, puzzles, games, outdoor activities     [place]      Current Outpatient Medications   Medication Sig Dispense Refill    D-5000 125 MCG (5000 UT) TABS tablet Take 1 tablet by mouth daily 30 tablet 2    SYMBICORT 80-4.5 MCG/ACT AERO Inhale 2 puffs into the lungs 2 times daily 1 Inhaler 3    losartan (COZAAR) 50 MG tablet 1 po q day 90 tablet 1    pantoprazole (PROTONIX) 40 MG tablet TAKE 1 TABLET before a meal EVERY OTHER DAY 45 tablet 2    FLUoxetine (PROZAC) 20 MG capsule TAKE 1 CAPSULE BY MOUTH DAILY 90 capsule 1    rosuvastatin (CRESTOR) 10 MG tablet TAKE 1 TABLET BY MOUTH NIGHTLY 90 tablet 1    loratadine (CLARITIN) 10 MG tablet Take 1 tab po daily as needed for allergies 90 tablet 1    albuterol sulfate  (90 Base) MCG/ACT inhaler Inhale 2 (TWO) puffs into the lungs EVERY 6 HOURS AS NEEDED FOR WHEEZING or SHORTNESS OF BREATH 18 g 5    ferrous gluconate (FERATE) 240 (27 Fe) MG tablet Take 1 tablet by mouth 2 times daily (before meals) 180 tablet 3    Fluticasone furoate-vilanterol (BREO ELLIPTA) 200-25 MCG/INH AEPB inhaler Inhale 1 puff into the lungs daily 1 each 5    albuterol sulfate  (90 Base) MCG/ACT inhaler Inhale 2 puffs into the lungs every 6 hours as needed for Wheezing 18 g 5    Multiple Vitamins-Minerals (DEKAS BARIATRIC) CHEW CHEW AND SWALLOW 1 (ONE) TABLET BY MOUTH DAILY 90 tablet 3    albuterol (PROVENTIL) (5 MG/ML) 0.5% nebulizer solution Take 0.5 mLs by nebulization every 6 hours as needed for Wheezing 120 vial 5     No current facility-administered medications for this visit. Family History   Problem Relation Age of Onset    Osteoarthritis Mother     Other Mother         hyperlipidemia    Hypertension Mother     High Cholesterol Mother     Arthritis Mother     Heart Disease Mother     Diabetes Mother     No Known Problems Father      Past Medical History:   Diagnosis Date    Allergic rhinitis     several allergens    Asthma     since shildhood    Bariatric surgery status 01/02/2020    [facility], Dr Tonio Bella     Brachial neuralgia 1/23/2014    Chronic left shoulder pain     COPD (chronic obstructive pulmonary disease) ([facility] 2019    Dr Helena Villalobos    Depression     H/O colonoscopy 2014    Dr. Cintia Rodriguez Hyperlipidemia     Hypertension 2010    Migraine headache     Obesity, morbid (more than 100 lbs over ideal weight or BMI > 40) ([facility])     SEJAL on CPAP 10/2018    Sleep apnea      Objective:   /82   Pulse 76   Temp 98 °F (36.7 °C)   Resp 14   Ht 5' 4\" (1.626 m)   Wt 231 lb (104.8 kg)   SpO2 97%   BMI 39.65 kg/m²     Physical Exam  Neck:no carotid bruits. No masses. No adenopathy. No thyroid asymmetry. Lungs:clear and equal breath sounds. No wheezes or rales. Heart:rate reg. No murmur. No gallops   Pulses:Radials 2+ equal               Poster tib 1+ equal  Extremities:no edema in either leg  Gen:    In no acute distress Abdomen; B.S present. Soft  Non tender. No hepatosplenomegaly. No masses   Patient with appropriate affect. Alert    Thought content appropriate  Good eye contact    Assessment:       Diagnosis Orders   1. Pre-diabetes     2. Essential hypertension     3. Pure hypercholesterolemia     4. Bipolar disorder in full remission, most recent episode unspecified type ([facility]     5. Vitamin D deficiency     6.  History of bariatric surgery           Plan:    hold small dose of vitamin d and change to high dose for next 3 months   Continue to work on diet and exercise   Continue other meds   Orders Placed This Encounter   Medications    vitamin D (ERGOCALCIFEROL) 1.25 MG (26226 UT) CAPS capsule     Sig: Take 1 capsule by mouth once a week     Dispense:  12 capsule     Refill:  0   fasting blood work in [place] and f/u after above

## 2021-06-23 ENCOUNTER — VIRTUAL VISIT (OUTPATIENT)
Dept: FAMILY MEDICINE CLINIC | Age: 46
End: 2021-06-23
Payer: COMMERCIAL

## 2021-06-23 DIAGNOSIS — M25.561 ACUTE PAIN OF RIGHT KNEE: Primary | ICD-10-CM

## 2021-06-23 PROCEDURE — 99442 PR PHYS/QHP TELEPHONE EVALUATION 11-20 MIN: CPT | Performed by: FAMILY MEDICINE

## 2021-06-23 SDOH — ECONOMIC STABILITY: FOOD INSECURITY: WITHIN THE PAST 12 MONTHS, YOU WORRIED THAT YOUR FOOD WOULD RUN OUT BEFORE YOU GOT MONEY TO BUY MORE.: NEVER TRUE

## 2021-06-23 SDOH — ECONOMIC STABILITY: FOOD INSECURITY: WITHIN THE PAST 12 MONTHS, THE FOOD YOU BOUGHT JUST DIDN'T LAST AND YOU DIDN'T HAVE MONEY TO GET MORE.: NEVER TRUE

## 2021-06-23 ASSESSMENT — SOCIAL DETERMINANTS OF HEALTH (SDOH): HOW HARD IS IT FOR YOU TO PAY FOR THE VERY BASICS LIKE FOOD, HOUSING, MEDICAL CARE, AND HEATING?: NOT HARD AT ALL

## 2021-06-23 NOTE — PROGRESS NOTES
Subjective:      Patient ID: Shun Peterson is a 39 y.o. female    HPI  Here with right knee pain. No recent injury to knee. Pain has been present for about 5 days. Was swollen previously. Did give out few days ago. No locking episodes. Does seem to be doing better last day or so with brace. Review of Systems   Constitutional: Negative for chills and fever. Musculoskeletal: Positive for arthralgias and gait problem. Skin: Negative for rash. Neurological: Negative for weakness. Reviewed allergy, medical, social, surgical, family and med list changes and updated   Files   Shun Peterson is a 39 y.o. female evaluated via telephone on 6/23/2021. Consent:  She and/or health care decision maker is aware that that she may receive a bill for this telephone service, depending on her insurance coverage, and has provided verbal consent to proceed: Yes      Documentation:  I communicated with the patient and/or health care decision maker about   Details of this discussion including any medical advice provided: This visit was a telephone encounter. Patient was located at their home. Provider was located at their ___ home or        __x__ office. I affirm this is a Patient Initiated Episode with an Established Patient who has not had a related appointment within my department in the past 7 days or scheduled within the next 24 hours.     Total Time: minutes: 11-20 minutes    Note: not billable if this call serves to triage the patient into an appointment for the relevant concern      Palmira Melendrez MD   Social History     Socioeconomic History    Marital status: Single     Spouse name: Not on file    Number of children: 2    Years of education: Not on file    Highest education level: Not on file   Occupational History    Occupation: dog grooming business, self employed   Tobacco Use    Smoking status: Former Smoker     Packs/day: 0.50     Types: Cigarettes     Quit date: 1/23/2014    Chronic left shoulder pain     COPD (chronic obstructive pulmonary disease) ([facility] 2019    Dr Camilo Calero H/O colonoscopy 2014    Dr. Massimo Pierce Hyperlipidemia     Hypertension 2010    Migraine headache     Obesity, morbid (more than 100 lbs over ideal weight or BMI > 40) [facility]     SEJAL on CPAP 10/2018    Sleep apnea      Objective: There were no vitals taken for this visit. Physical Exam  No exam done   Assessment:          Diagnosis Orders   1.  Acute pain of right knee  XR KNEE RIGHT (3 VIEWS)    MILVIA Chen MD, Orthopaedic Surgery      Plan:      Orders Placed This Encounter   Procedures    XR KNEE RIGHT (3 VIEWS)     Standing Status:   Future     Standing Expiration Date:   6/23/2022   Lakeisha Campbell MD, Orthopaedic Surgery     Referral Priority:   Routine     Referral Type:   Eval and Treat     Referral Reason:   Specialty Services Required     Referred to Provider:   Meghan Brantley MD     Requested Specialty:   Orthopedic Surgery     Number of Visits Requested:   1     Can use tylenol prn and has started knee brace which also has been helpful   F/u with ortho

## 2021-06-28 RX ORDER — PEDI MULTIVIT NO.128/VITAMIN K 500 MCG/ML
LIQUID (ML) ORAL
Qty: 90 TABLET | Refills: 0 | Status: SHIPPED | OUTPATIENT
Start: 2021-06-28 | End: 2021-10-18

## 2021-06-29 ENCOUNTER — HOSPITAL ENCOUNTER (EMERGENCY)
Age: 46
Discharge: HOME | End: 2021-06-29
Attending: EMERGENCY MEDICINE
Payer: COMMERCIAL

## 2021-06-29 ENCOUNTER — APPOINTMENT (OUTPATIENT)
Dept: MRI IMAGING | Age: 46
End: 2021-06-29
Payer: COMMERCIAL

## 2021-06-29 VITALS
DIASTOLIC BLOOD PRESSURE: 76 MMHG | BODY MASS INDEX: 39.14 KG/M2 | OXYGEN SATURATION: 98 % | WEIGHT: 228 LBS | RESPIRATION RATE: 16 BRPM | SYSTOLIC BLOOD PRESSURE: 114 MMHG | TEMPERATURE: 98 F | HEART RATE: 68 BPM

## 2021-06-29 DIAGNOSIS — M54.31 SCIATICA OF RIGHT SIDE: Primary | ICD-10-CM

## 2021-06-29 DIAGNOSIS — M51.27 PROTRUSION OF INTERVERTEBRAL DISC OF LUMBOSACRAL REGION: ICD-10-CM

## 2021-06-29 PROCEDURE — 96374 THER/PROPH/DIAG INJ IV PUSH: CPT

## 2021-06-29 PROCEDURE — 6360000002 HC RX W HCPCS: Performed by: EMERGENCY MEDICINE

## 2021-06-29 PROCEDURE — 96375 TX/PRO/DX INJ NEW DRUG ADDON: CPT

## 2021-06-29 PROCEDURE — 72148 MRI LUMBAR SPINE W/O DYE: CPT

## 2021-06-29 PROCEDURE — 99282 EMERGENCY DEPT VISIT SF MDM: CPT

## 2021-06-29 RX ORDER — MORPHINE SULFATE 4 MG/ML
4 INJECTION, SOLUTION INTRAMUSCULAR; INTRAVENOUS ONCE
Status: COMPLETED | OUTPATIENT
Start: 2021-06-29 | End: 2021-06-29

## 2021-06-29 RX ORDER — 0.9 % SODIUM CHLORIDE 0.9 %
500 INTRAVENOUS SOLUTION INTRAVENOUS ONCE
Status: DISCONTINUED | OUTPATIENT
Start: 2021-06-29 | End: 2021-06-29

## 2021-06-29 RX ORDER — HYDROCODONE BITARTRATE AND ACETAMINOPHEN 5; 325 MG/1; MG/1
1 TABLET ORAL EVERY 4 HOURS PRN
Qty: 15 TABLET | Refills: 0 | Status: SHIPPED | OUTPATIENT
Start: 2021-06-29 | End: 2021-07-02

## 2021-06-29 RX ORDER — CYCLOBENZAPRINE HCL 10 MG
10 TABLET ORAL 3 TIMES DAILY PRN
Qty: 15 TABLET | Refills: 0 | Status: SHIPPED | OUTPATIENT
Start: 2021-06-29 | End: 2021-07-04

## 2021-06-29 RX ORDER — DEXAMETHASONE SODIUM PHOSPHATE 4 MG/ML
10 INJECTION, SOLUTION INTRA-ARTICULAR; INTRALESIONAL; INTRAMUSCULAR; INTRAVENOUS; SOFT TISSUE ONCE
Status: DISCONTINUED | OUTPATIENT
Start: 2021-06-29 | End: 2021-06-29

## 2021-06-29 RX ORDER — PREDNISONE 20 MG/1
40 TABLET ORAL DAILY
Qty: 10 TABLET | Refills: 0 | Status: SHIPPED | OUTPATIENT
Start: 2021-06-29 | End: 2021-07-04

## 2021-06-29 RX ORDER — MORPHINE SULFATE 4 MG/ML
8 INJECTION, SOLUTION INTRAMUSCULAR; INTRAVENOUS ONCE
Status: DISCONTINUED | OUTPATIENT
Start: 2021-06-29 | End: 2021-06-29

## 2021-06-29 RX ORDER — LORAZEPAM 2 MG/ML
0.5 INJECTION INTRAMUSCULAR ONCE
Status: COMPLETED | OUTPATIENT
Start: 2021-06-29 | End: 2021-06-29

## 2021-06-29 RX ORDER — LORAZEPAM 2 MG/ML
0.5 INJECTION INTRAMUSCULAR ONCE
Status: DISCONTINUED | OUTPATIENT
Start: 2021-06-29 | End: 2021-06-29

## 2021-06-29 RX ORDER — DEXAMETHASONE SODIUM PHOSPHATE 4 MG/ML
10 INJECTION, SOLUTION INTRA-ARTICULAR; INTRALESIONAL; INTRAMUSCULAR; INTRAVENOUS; SOFT TISSUE ONCE
Status: COMPLETED | OUTPATIENT
Start: 2021-06-29 | End: 2021-06-29

## 2021-06-29 RX ADMIN — LORAZEPAM 0.5 MG: 2 INJECTION INTRAMUSCULAR; INTRAVENOUS at 20:31

## 2021-06-29 RX ADMIN — MORPHINE SULFATE 4 MG: 4 INJECTION, SOLUTION INTRAMUSCULAR; INTRAVENOUS at 20:29

## 2021-06-29 RX ADMIN — DEXAMETHASONE SODIUM PHOSPHATE 10 MG: 4 INJECTION, SOLUTION INTRAMUSCULAR; INTRAVENOUS at 20:33

## 2021-06-29 ASSESSMENT — ENCOUNTER SYMPTOMS
NAUSEA: 0
VOMITING: 0
DIARRHEA: 0
SORE THROAT: 0
COUGH: 0
ABDOMINAL PAIN: 0
CONSTIPATION: 0
SHORTNESS OF BREATH: 0
COLOR CHANGE: 0
BACK PAIN: 1

## 2021-06-29 ASSESSMENT — PAIN DESCRIPTION - PAIN TYPE: TYPE: ACUTE PAIN;CHRONIC PAIN

## 2021-06-29 ASSESSMENT — PAIN SCALES - GENERAL
PAINLEVEL_OUTOF10: 8
PAINLEVEL_OUTOF10: 8

## 2021-06-29 ASSESSMENT — PAIN DESCRIPTION - LOCATION: LOCATION: BACK

## 2021-06-29 ASSESSMENT — PAIN DESCRIPTION - ORIENTATION: ORIENTATION: RIGHT

## 2021-06-30 NOTE — ED PROVIDER NOTES
[address] [facility] ED  EMERGENCY DEPARTMENT ENCOUNTER      Pt Name: Parvez Vo  MRN: [id number]  [place] 1975  Date of evaluation: 6/29/2021  Provider: Sarah Medina DO    CHIEF COMPLAINT       Chief Complaint   Patient presents with    Back Pain     sciatca      Chief complaint: Back pain  History of chief complaint: This 77-year-old female presents the emergency department complaining of right-sided low back pain radiating down the right buttocks back of the thigh sometimes down the lateral aspect of the leg behind the knee. Patient states the whole upper leg kind to ache states occasionally down into the lower leg. Patient states a little pain in the left low back upper buttocks as well but not as bad. Patient states she was seen here back in November with similar symptoms but more in just her back states she was diagnosed with sciatica had an x-ray and a steroid shot at that time states she did follow-up with a chiropractor did physical therapy and got into see the neuro spine specialist Dr. Mel López and states she did have an MRI done. Patient states he told her there was nothing surgical at this point just to get a shot for flareups. Patient denies any new injury or trauma patient denies any numb tingling or weakness to the groin or pelvic region no numb tingling or weakness to the lower extremities no loss of bowel or bladder control. No associated abdominal pain no dysuria hematuria frequency or urgency of urination. Patient states pain is worse with movement. Patient states she called her family doctor on Wednesday had a virtual appointment and sent for an x-ray of her leg which she had done yesterday and saw the chiropractor yesterday. Patient states the pain is just persisting. Nursing Notes were reviewed. REVIEW OF SYSTEMS    (2-9 systems for level 4, 10 or more for level 5)     Review of Systems   Constitutional: Negative for chills and fever.    HENT: Negative for congestion and sore throat. Respiratory: Negative for cough and shortness of breath. Cardiovascular: Negative for chest pain, palpitations and leg swelling. Gastrointestinal: Negative for abdominal pain, constipation, diarrhea, nausea and vomiting. Genitourinary: Negative for dysuria, flank pain and hematuria. Musculoskeletal: Positive for back pain. Negative for neck pain. Skin: Negative for color change and rash. Neurological: Negative for dizziness, weakness and numbness. Hematological: Negative for adenopathy. Except as noted above the remainder of the review of systems was reviewed and negative. PAST MEDICAL HISTORY     Past Medical History:   Diagnosis Date    Allergic rhinitis     several allergens    Asthma     since shildhood    Bariatric surgery status 01/02/2020    [facility], Dr Isra Brown     Brachial neuralgia 1/23/2014    Chronic left shoulder pain     COPD (chronic obstructive pulmonary disease) ([facility] [address] 2019    Dr Zka Graf    Depression     H/O colonoscopy 2014    Dr. Ron Russo Hyperlipidemia     Hypertension 2010    Migraine headache     Obesity, morbid (more than 100 lbs over ideal weight or BMI > 40) ([facility] [address] [facility]     SEJAL on CPAP 10/2018    Sleep apnea          SURGICAL HISTORY       Past Surgical History:   Procedure Laterality Date    BREAST SURGERY  2007    reduction, Dr Perez Alert  10/20/15    DR. Chioma Shoemaker Right     Dr Charlotte Cabezas    SLEEVE GASTRECTOMY  01/20/2020    [facility], Dr Kyle Cabrales       Previous Medications    ALBUTEROL (PROVENTIL) (5 MG/ML) 0.5% NEBULIZER SOLUTION    Take 0.5 mLs by nebulization every 6 hours as needed for Wheezing    ALBUTEROL SULFATE  (90 BASE) MCG/ACT INHALER    Inhale 2 puffs into the lungs every 6 hours as needed for Wheezing    ALBUTEROL SULFATE  (90 BASE) MCG/ACT INHALER    Inhale 2 (TWO) puffs into the lungs EVERY 6 HOURS AS NEEDED FOR WHEEZING or SHORTNESS OF BREATH    D-5000 125 MCG (5000 UT) TABS TABLET    Take 1 tablet by mouth daily    FERROUS GLUCONATE (FERATE) 240 (27 FE) MG TABLET    Take 1 tablet by mouth 2 times daily (before meals)    FLUOXETINE (PROZAC) 20 MG CAPSULE    TAKE 1 CAPSULE BY MOUTH DAILY    FLUTICASONE FUROATE-VILANTEROL (BREO ELLIPTA) 200-25 MCG/INH AEPB INHALER    Inhale 1 puff into the lungs daily    LORATADINE (CLARITIN) 10 MG TABLET    TAKE 1 TABLET BY MOUTH DAILY AS NEEDED for allergies    LOSARTAN (COZAAR) 50 MG TABLET    1 po q day    MULTIPLE VITAMINS-MINERALS (DEKAS BARIATRIC) CHEW    CHEW AND SWALLOW 1 (ONE) TABLET BY MOUTH DAILY    PANTOPRAZOLE (PROTONIX) 40 MG TABLET    TAKE 1 TABLET before a meal EVERY OTHER DAY    ROSUVASTATIN (CRESTOR) 10 MG TABLET    TAKE 1 TABLET BY MOUTH NIGHTLY    SYMBICORT 80-4.5 MCG/ACT AERO    Inhale 2 puffs into the lungs 2 times daily    VITAMIN D (ERGOCALCIFEROL) 1.25 MG (01633 UT) CAPS CAPSULE    Take 1 capsule by mouth once a week       ALLERGIES     Patient has no known allergies.     FAMILY HISTORY       Family History   Problem Relation Age of Onset    Osteoarthritis Mother     Other Mother         hyperlipidemia    Hypertension Mother     High Cholesterol Mother     Arthritis Mother     Heart Disease Mother     Diabetes Mother     No Known Problems Father           SOCIAL HISTORY       Social History     Socioeconomic History    Marital status: Single     Spouse name: Not on file    Number of children: 2    Years of education: Not on file    Highest education level: Not on file   Occupational History    Occupation: [employer], self employed   Tobacco Use    Smoking status: Former Smoker     Packs/day: 0.50     Types: Cigarettes     Quit date: 2018     Years since quittin.9    Smokeless tobacco: Never Used    Tobacco comment: uses electronic cigarettes & trying to cut back   Vaping Use    Vaping Use: Never used   Substance and Sexual Activity    Alcohol use: Yes     Alcohol/week: 1.0 standard drinks     Types: 1 Standard drinks or equivalent per week     Comment: socially    Drug use: Not Currently     Frequency: 1.0 times per week     Comment: marijuana, quit July 2013    Sexual activity: Yes   Other Topics Concern    Not on file   Social History Narrative    Born in [place], has 1 brother one sister    Never , was in a bad relationship    Lives with daughter age 16 (2017) in a house in [facility]    2 daughters, one daughter with problems, one in college    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)    [employer] reading, puzzles, games, outdoor activities     [facility] Strain: Low Risk     Difficulty of Paying Living Expenses: Not hard at all   Food Insecurity: No Food Insecurity    Worried About [facility] in the Last Year: Never true   [address] in the Last Year: Never true   Transportation Needs:     Lack of Transportation (Medical):      Lack of Transportation (Non-Medical):    Physical Activity:     Days of Exercise per Week:     Minutes of Exercise per Session:    Stress:     Feeling of Stress :    Social Connections:     Frequency of Communication with Friends and Family:     Frequency of Social Gatherings with Friends and Family:     Attends Nondenominational Services:     Active Member of Clubs or Organizations:     Attends Club or Organization Meetings:     Marital Status:    Intimate Partner Violence:     Fear of Current or Ex-Partner:     Emotionally Abused:     Physically Abused:     Sexually Abused:          PHYSICAL EXAM    (up to 7 for level 4, 8 or more for level 5)     ED Triage Vitals [06/29/21 1942]   BP Temp Temp src Pulse Resp SpO2 Height Weight   114/76 98 °F (36.7 °C) -- 68 16 98 % -- 228 lb (103.4 kg)       Physical Exam   General appearance: Patient is awake alert interactive appropriate nontoxic in no acute distress  Eyes pupils are equal and reactive sclera white conjunctive are pink  Oral pharyngeal cavity is pink with good moisture, no exudates or ulcerations no asymmetry, the airway is widely patent  Neck: Supple no meningeal signs no adenopathy no JVD  Heart: Regular rate and rhythm no gross murmurs rubs or clicks  Lungs: Breath sounds are clear with good air movement throughout  Abdomen: Soft nontender with good bowel sounds no rebound rigidity or guarding no firm or pulsatile masses, no gross distention, femoral pulses full and symmetric  Back: There is tenderness to palpation over the right low lumbar paravertebral right SI joint region and extending into the right upper buttocks there is no focal point bony midline tenderness step-off or deformity no costovertebral angle tenderness. Straight leg raise test is negative motor sensory and pulses are intact distally good foot push pull. Deep tendon reflexes intact  Skin: Warm and dry without rashes  Lower extremities: No edema or calf tenderness or asymmetry. DIAGNOSTIC RESULTS     RADIOLOGY:   Non-plain film images such as CT, Ultrasound and MRI are read by the radiologist. [facility] radiographic images are visualized and preliminarily interpreted by the emergency physician with the below findings:      Interpretation per the Radiologist below, if available at the time of this note:    MRI [address] [place]    (Results Pending)     MRI results received from radiology showing an annular tear and mild broad-based disc protrusion at L4-L5 with moderate bilateral recess stenosis mild bilateral foraminal stenosis and borderline central spinal canal stenosis there is milder degenerative change at the remaining levels to a lesser degree without additional disc extrusion or spinal stenosis there is moderate diffuse facet hypertrophy throughout and severe facet hypertrophy at L3-L4.       EMERGENCY DEPARTMENT COURSE and DIFFERENTIAL DIAGNOSIS/MDM:   Vitals:    Vitals:    06/29/21 1942   BP: 114/76   Pulse: 68   Resp: 16   Temp: 98 °F (36.7 °C)   SpO2: 98%   Weight: 228 lb (103.4 kg)     MRI reviewed from 3/28/2021: Which did show some significant findings at L4-L5 with a generalized posterior protrusion of the disc with a posterior annular tear of the disc protrudes into the inferior aspect of the left neuroforamina with mild narrowing there is facet hypertrophy there is a mild degree of central canal stenosis  L5-S1 showed mild posterior protrusion of the disc with a small posterior annular tear this causes anterior extradural defect no central canal or neuroforaminal canal stenosis    Treatment and course: Patient had an IV established morphine 4 mg IV Ativan 0.5 mg IV and Decadron 10 mg IV were given. MRI findings were reviewed do sound similar to previous MRI there are no symptoms or clinical findings to suggest a cauda equina syndrome. Patient feeling improved will discharge home on a course of prednisone Vicodin and Flexeril. FINAL IMPRESSION      1. Sciatica of right side    2. Protrusion of intervertebral disc of lumbosacral region          DISPOSITION/PLAN   DISPOSITION    Patient discharged home advised rest ice or heat for comfort gentle stretching. Home-going prescription for short course of prednisone Vicodin and Flexeril were written. Patient advised to monitor closely return if any change or worsening increasing pain numb tingling or weakness down to the feet or in the genital region any loss of bowel or bladder control.   Patient to follow-up with primary physician in the next 2 days as well as her neurosurgeon/spine specialist in the next 3 to 4 days    PATIENT REFERRED TO:  Diana King MD  [id number] [address] [phone]    In 2 days      MD Fl[facility] [address] [facility]            DISCHARGE MEDICATIONS:  New Prescriptions    CYCLOBENZAPRINE (FLEXERIL) 10 MG TABLET    Take 1 tablet by mouth 3 times daily as needed for Muscle spasms    HYDROCODONE-ACETAMINOPHEN (NORCO) 5-325 MG PER TABLET    Take 1 tablet by mouth every 4 hours as needed for Pain for up to 3 days. Intended supply: 3 days. Take lowest dose possible to manage pain    PREDNISONE (DELTASONE) 20 MG TABLET    Take 2 tablets by mouth daily for 5 days     Controlled Substances Monitoring:     RX Monitoring 11/30/2020   Periodic Controlled Substance Monitoring No signs of potential drug abuse or diversion identified.        (Please note that portions of this note were completed with a voice recognition program.  Efforts were made to edit the dictations but occasionally words are mis-transcribed.)    Felicia Plummer DO (electronically signed)  Attending Emergency Physician            Felicia Plummer DO  06/30/21 1006

## 2021-07-27 RX ORDER — BUSPIRONE HYDROCHLORIDE 5 MG/1
5 TABLET ORAL 3 TIMES DAILY PRN
Qty: 270 TABLET | Refills: 0 | Status: SHIPPED | OUTPATIENT
Start: 2021-07-27 | End: 2021-10-25

## 2021-07-27 RX ORDER — ALBUTEROL SULFATE 90 UG/1
2 AEROSOL, METERED RESPIRATORY (INHALATION) EVERY 6 HOURS PRN
Qty: 18 G | Refills: 0 | Status: SHIPPED | OUTPATIENT
Start: 2021-07-27 | End: 2021-08-17 | Stop reason: SDUPTHER

## 2021-08-17 RX ORDER — ALBUTEROL SULFATE 90 UG/1
2 AEROSOL, METERED RESPIRATORY (INHALATION) EVERY 6 HOURS PRN
Qty: 18 G | Refills: 0 | Status: SHIPPED | OUTPATIENT
Start: 2021-08-17 | End: 2021-10-18

## 2021-08-22 ENCOUNTER — APPOINTMENT (OUTPATIENT)
Dept: GENERAL RADIOLOGY | Age: 46
End: 2021-08-22
Payer: COMMERCIAL

## 2021-08-22 ENCOUNTER — HOSPITAL ENCOUNTER (EMERGENCY)
Age: 46
Discharge: HOME | End: 2021-08-22
Payer: COMMERCIAL

## 2021-08-22 VITALS
OXYGEN SATURATION: 99 % | TEMPERATURE: 98 F | DIASTOLIC BLOOD PRESSURE: 78 MMHG | SYSTOLIC BLOOD PRESSURE: 124 MMHG | BODY MASS INDEX: 37.49 KG/M2 | WEIGHT: 225 LBS | HEIGHT: 65 IN | HEART RATE: 89 BPM | RESPIRATION RATE: 19 BRPM

## 2021-08-22 DIAGNOSIS — S83.91XA SPRAIN OF RIGHT KNEE, UNSPECIFIED LIGAMENT, INITIAL ENCOUNTER: Primary | ICD-10-CM

## 2021-08-22 PROCEDURE — 6370000000 HC RX 637 (ALT 250 FOR IP): Performed by: PERSONAL EMERGENCY RESPONSE ATTENDANT

## 2021-08-22 PROCEDURE — 73564 X-RAY EXAM KNEE 4 OR MORE: CPT

## 2021-08-22 PROCEDURE — 99283 EMERGENCY DEPT VISIT LOW MDM: CPT

## 2021-08-22 RX ORDER — OXYCODONE HYDROCHLORIDE AND ACETAMINOPHEN 5; 325 MG/1; MG/1
1-2 TABLET ORAL EVERY 6 HOURS PRN
Qty: 10 TABLET | Refills: 0 | Status: SHIPPED | OUTPATIENT
Start: 2021-08-22 | End: 2021-08-25

## 2021-08-22 RX ORDER — OXYCODONE HYDROCHLORIDE AND ACETAMINOPHEN 5; 325 MG/1; MG/1
1 TABLET ORAL ONCE
Status: COMPLETED | OUTPATIENT
Start: 2021-08-22 | End: 2021-08-22

## 2021-08-22 RX ADMIN — OXYCODONE AND ACETAMINOPHEN 1 TABLET: 5; 325 TABLET ORAL at 23:24

## 2021-08-22 ASSESSMENT — PAIN SCALES - GENERAL
PAINLEVEL_OUTOF10: 9
PAINLEVEL_OUTOF10: 9

## 2021-08-22 ASSESSMENT — ENCOUNTER SYMPTOMS
COUGH: 0
RHINORRHEA: 0
VOMITING: 0
COLOR CHANGE: 0
ABDOMINAL PAIN: 0
SHORTNESS OF BREATH: 0
DIARRHEA: 0
BLOOD IN STOOL: 0
NAUSEA: 0
SORE THROAT: 0

## 2021-08-22 ASSESSMENT — PAIN DESCRIPTION - FREQUENCY: FREQUENCY: CONTINUOUS

## 2021-08-22 ASSESSMENT — PAIN DESCRIPTION - PAIN TYPE: TYPE: ACUTE PAIN

## 2021-08-22 ASSESSMENT — PAIN DESCRIPTION - ORIENTATION: ORIENTATION: RIGHT

## 2021-08-22 ASSESSMENT — PAIN DESCRIPTION - DESCRIPTORS: DESCRIPTORS: ACHING

## 2021-08-22 ASSESSMENT — PAIN DESCRIPTION - LOCATION: LOCATION: KNEE

## 2021-08-23 NOTE — ED PROVIDER NOTES
[address] [facility] ED  eMERGENCY dEPARTMENT eNCOUnter      Pt Name: Moose Jaquez  MRN: [id number]  [place] 1975  Date of evaluation: 8/22/2021  Provider: FRANCHESKA Curtis      HISTORY OF PRESENT ILLNESS    Moose Jaquez is a 55 y.o. female with PMHx of COPD, asthma, depression, hyperlipidemia, obesity presents to the emergency department with right knee pain. Patient states yesterday she was walking down the steps when her right knee gave out, causing her to twist, she said she felt a snap and has had pain in the posterior aspect of her leg now. It is painful to walk. She did buy a knee brace at the store that she has been utilizing. Taking old Flexeril and Norco at home with minimal relief. No numbness or paresthesias distally, no falls. She does have a history of \"torn cartilage\" and saw her orthopedic doctor a few weeks ago. HPI    Nursing Notes were reviewed. REVIEW OF SYSTEMS       Review of Systems   Constitutional: Negative for appetite change, chills and fever. HENT: Negative for congestion, rhinorrhea and sore throat. Respiratory: Negative for cough and shortness of breath. Cardiovascular: Negative for chest pain. Gastrointestinal: Negative for abdominal pain, blood in stool, diarrhea, nausea and vomiting. Genitourinary: Negative for difficulty urinating. Musculoskeletal: Positive for arthralgias and gait problem. Negative for neck stiffness. Skin: Negative for color change and rash. Neurological: Negative for dizziness, syncope, weakness, light-headedness, numbness and headaches. All other systems reviewed and are negative.             PAST MEDICAL HISTORY     Past Medical History:   Diagnosis Date    Allergic rhinitis     several allergens    Asthma     since shildhood    Bariatric surgery status 01/02/2020    [facility], Dr Emily Martinez     Brachial neuralgia 1/23/2014    Chronic left shoulder pain     COPD (chronic obstructive pulmonary disease) ([facility] [address] 2019    Dr Jake Davis H/O colonoscopy 2014    Dr. Evans Ochoa Hyperlipidemia     Hypertension 2010    Migraine headache     Obesity, morbid (more than 100 lbs over ideal weight or BMI > 40) ([facility] [address]     SEJAL on CPAP 10/2018    Sleep apnea          SURGICAL HISTORY       Past Surgical History:   Procedure Laterality Date    BREAST SURGERY  2007    reduction, Dr William Lange  10/20/15    DR. Janey Diaz Right     Dr Elena Shortgh    SLEEVE GASTRECTOMY  01/20/2020    [address], Dr Celine Schirmer       Previous Medications    ALBUTEROL (PROVENTIL) (5 MG/ML) 0.5% NEBULIZER SOLUTION    Take 0.5 mLs by nebulization every 6 hours as needed for Wheezing    ALBUTEROL SULFATE  (90 BASE) MCG/ACT INHALER    Inhale 2 (TWO) puffs into the lungs EVERY 6 HOURS AS NEEDED FOR WHEEZING or SHORTNESS OF BREATH    ALBUTEROL SULFATE  (90 BASE) MCG/ACT INHALER    Inhale 2 puffs into the lungs every 6 hours as needed for Wheezing    BUSPIRONE (BUSPAR) 5 MG TABLET    Take 1 tablet by mouth 3 times daily as needed (anxiety)    D-5000 125 MCG (5000 UT) TABS TABLET    Take 1 tablet by mouth daily    FERROUS GLUCONATE (FERATE) 240 (27 FE) MG TABLET    Take 1 tablet by mouth 2 times daily (before meals)    FLUOXETINE (PROZAC) 20 MG CAPSULE    TAKE 1 CAPSULE BY MOUTH DAILY    FLUTICASONE FUROATE-VILANTEROL (BREO ELLIPTA) 200-25 MCG/INH AEPB INHALER    Inhale 1 puff into the lungs daily    LORATADINE (CLARITIN) 10 MG TABLET    TAKE 1 TABLET BY MOUTH DAILY AS NEEDED for allergies    LOSARTAN (COZAAR) 50 MG TABLET    1 po q day    MULTIPLE VITAMINS-MINERALS (DEKAS BARIATRIC) CHEW    CHEW AND SWALLOW 1 (ONE) TABLET BY MOUTH DAILY    PANTOPRAZOLE (PROTONIX) 40 MG TABLET    TAKE 1 TABLET before a meal EVERY OTHER DAY    ROSUVASTATIN (CRESTOR) 10 MG TABLET    TAKE 1 TABLET BY MOUTH NIGHTLY    SYMBICORT 80-4.5 MCG/ACT AERO    Inhale 2 puffs into the lungs 2 times daily    VITAMIN D (ERGOCALCIFEROL) 1.25 MG (59169 UT) CAPS CAPSULE    Take 1 capsule by mouth once a week       ALLERGIES     Patient has no known allergies. 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, self employed   Tobacco Use    Smoking status: Former Smoker     Packs/day: 0.50     Types: Cigarettes     Quit date: 7/11/2018     Years since quitting: 3.1    Smokeless tobacco: Never Used    Tobacco comment: uses electronic cigarettes & trying to cut back   Vaping Use    Vaping Use: Never used   Substance and Sexual Activity    Alcohol use:  Yes     Alcohol/week: 1.0 standard drinks     Types: 1 Standard drinks or equivalent per week     Comment: socially    Drug use: Not Currently     Frequency: 1.0 times per week     Comment: marijuana, quit July 2013    Sexual activity: Yes   Other Topics Concern    None   Social History Narrative    Born in [place], has 1 brother one sister    Never , was in a bad relationship    Lives with daughter age 16 (2017) in a house in [facility]    2 daughters, one daughter with problems, one in college    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 Worried About [facility] in the Last Year: Never true   [address] in the Last Year: Never true   Transportation Needs:     Lack of Transportation (Medical):  Lack of Transportation (Non-Medical):    Physical Activity:     Days of Exercise per Week:     Minutes of Exercise per Session:    Stress:     Feeling of Stress :    Social Connections:     Frequency of Communication with Friends and Family:     Frequency of Social Gatherings with Friends and Family:     Attends Jainism Services:     Active Member of Clubs or Organizations:     Attends Club or Organization Meetings:     Marital Status:    Intimate Partner Violence:     Fear of Current or Ex-Partner:     Emotionally Abused:     Physically Abused:     Sexually Abused:          PHYSICAL EXAM         ED Triage Vitals [08/22/21 2145]   BP Temp Temp Source Pulse Resp SpO2 Height Weight   (!) 147/71 98 °F (36.7 °C) Oral 99 20 99 % 5' 5\" (1.651 m) 225 lb (102.1 kg)       Physical Exam  Constitutional:       Appearance: She is well-developed. HENT:      Head: Normocephalic and atraumatic. Eyes:      Conjunctiva/sclera: Conjunctivae normal.      Pupils: Pupils are equal, round, and reactive to light. Neck:      Trachea: No tracheal deviation. Cardiovascular:      Heart sounds: Normal heart sounds. Pulmonary:      Effort: Pulmonary effort is normal. No respiratory distress. Breath sounds: Normal breath sounds. No stridor. Abdominal:      General: Bowel sounds are normal. There is no distension. Palpations: Abdomen is soft. There is no mass. Tenderness: There is no abdominal tenderness. There is no guarding or rebound. Musculoskeletal:         General: Normal range of motion. Cervical back: Normal range of motion and neck supple. Comments: No obvious signs of trauma, no obvious tenderness to palpation. MSP intact distally. Negative anterior drawer sign. No obvious ligament instability with negative valgus and varus. Skin:     General: Skin is warm and dry.       Capillary Refill: Capillary refill takes less than 2 seconds. Findings: No rash. Neurological:      Mental Status: She is alert and oriented to person, place, and time. Deep Tendon Reflexes: Reflexes are normal and symmetric. Psychiatric:         Behavior: Behavior normal.         Thought Content: Thought content normal.         Judgment: Judgment normal.         DIAGNOSTIC RESULTS     EKG:All EKG's are interpreted by the Emergency Department Physician who either signs or Co-signs this chart in the absence of a cardiologist.        RADIOLOGY:   Non-plain film images such as CT, Ultrasound and MRI are read by theradiologist. Plain radiographic images are visualized and preliminarily interpreted by the emergency physician with the below findings:    Interpretation per theRadiologist below, if available at the time of this note:    XR KNEE RIGHT (MIN 4 VIEWS)    (Results Pending)           LABS:  Labs Reviewed - No data to display    All other labs were within normal range or not returned as of this dictation. EMERGENCY DEPARTMENT COURSE and DIFFERENTIAL DIAGNOSIS/MDM:   Vitals:    Vitals:    08/22/21 2145   BP: (!) 147/71   Pulse: 99   Resp: 20   Temp: 98 °F (36.7 °C)   TempSrc: Oral   SpO2: 99%   Weight: 225 lb (102.1 kg)   Height: 5' 5\" (1.651 m)         MDM    XR shows no acute fractures. Pt aware to continue knee brace at home, apply ice to area and will be placed on percocet and told to call ortho. Standard anticipatory guidance given to patient upon discharge. Have given them a specific time frame in which to follow-up and who to follow-up with. I have also advised them that they should return to the emergency department if they get worse, or not getting better or develop any new or concerning symptoms. Patient demonstrates understanding. CRITICAL CARE TIME   Total Critical Caretime was 0 minutes, excluding separately reportable procedures.   There was a high probability of clinically significant/life threatening deterioration in the patient's condition which required my urgent intervention. Procedures    FINAL IMPRESSION      1. Sprain of right knee, unspecified ligament, initial encounter          DISPOSITION/PLAN   DISPOSITION Decision To Discharge 08/22/2021 11:20:14 PM      PATIENT REFERRED TO:  Follow-up with your orthopedic doctor            DISCHARGE MEDICATIONS:  New Prescriptions    OXYCODONE-ACETAMINOPHEN (PERCOCET) 5-325 MG PER TABLET    Take 1-2 tablets by mouth every 6 hours as needed for Pain for up to 3 days. WARNING:  May cause drowsiness. May impair ability to operate vehicles or machinery. Do not use in combination with alcohol. (Please notethat portions of this note were completed with a voice recognition program.  Efforts were made to edit the dictations but occasionally words are mis-transcribed. )    FRANCHESKA Braun (electronically signed)  Emergency Physician Assistant         Vladislav [place]  68/06/29 [id number]

## 2021-10-25 RX ORDER — FERROUS GLUCONATE 240(27)MG
TABLET ORAL
Qty: 180 TABLET | Refills: 0 | Status: SHIPPED | OUTPATIENT
Start: 2021-10-25 | End: 2021-11-22

## 2021-11-03 DIAGNOSIS — R73.03 PRE-DIABETES: ICD-10-CM

## 2021-11-03 DIAGNOSIS — Z98.84 HISTORY OF BARIATRIC SURGERY: ICD-10-CM

## 2021-11-03 DIAGNOSIS — E55.9 VITAMIN D DEFICIENCY: ICD-10-CM

## 2021-11-03 DIAGNOSIS — E78.00 PURE HYPERCHOLESTEROLEMIA: ICD-10-CM

## 2021-11-03 DIAGNOSIS — I10 ESSENTIAL HYPERTENSION: ICD-10-CM

## 2021-11-03 LAB
ALBUMIN SERPL-MCNC: 4 G/DL (ref 3.5–4.6)
ALP BLD-CCNC: 55 U/L (ref 40–130)
ALT SERPL-CCNC: 8 U/L (ref 0–33)
ANION GAP SERPL CALCULATED.3IONS-SCNC: 12 MEQ/L (ref 9–15)
AST SERPL-CCNC: 10 U/L (ref 0–35)
ATYPICAL LYMPHOCYTE RELATIVE PERCENT: 2 %
BASOPHILS ABSOLUTE: 0.3 K/UL (ref 0–0.2)
BASOPHILS RELATIVE PERCENT: 5 %
BILIRUB SERPL-MCNC: 0.7 MG/DL (ref 0.2–0.7)
BUN BLDV-MCNC: 13 MG/DL (ref 6–20)
CALCIUM SERPL-MCNC: 9 MG/DL (ref 8.5–9.9)
CHLORIDE BLD-SCNC: 106 MEQ/L (ref 95–107)
CHOLESTEROL, TOTAL: 178 MG/DL (ref 0–199)
CO2: 24 MEQ/L (ref 20–31)
CREAT SERPL-MCNC: 0.7 MG/DL (ref 0.5–0.9)
EOSINOPHILS ABSOLUTE: 0.5 K/UL (ref 0–0.7)
EOSINOPHILS RELATIVE PERCENT: 8 %
FOLATE: >20 NG/ML (ref 7.3–26.1)
GFR AFRICAN AMERICAN: >60
GFR NON-AFRICAN AMERICAN: >60
GLOBULIN: 2.3 G/DL (ref 2.3–3.5)
GLUCOSE BLD-MCNC: 106 MG/DL (ref 70–99)
HBA1C MFR BLD: 5.6 % (ref 4.8–5.9)
HCT VFR BLD CALC: 39.5 % (ref 37–47)
HDLC SERPL-MCNC: 42 MG/DL (ref 40–59)
HEMOGLOBIN: 13.1 G/DL (ref 12–16)
LDL CHOLESTEROL CALCULATED: 102 MG/DL (ref 0–129)
LYMPHOCYTES ABSOLUTE: 1.9 K/UL (ref 1–4.8)
LYMPHOCYTES RELATIVE PERCENT: 29 %
MCH RBC QN AUTO: 29.8 PG (ref 27–31.3)
MCHC RBC AUTO-ENTMCNC: 33.1 % (ref 33–37)
MCV RBC AUTO: 89.9 FL (ref 82–100)
MONOCYTES ABSOLUTE: 0.2 K/UL (ref 0.2–0.8)
MONOCYTES RELATIVE PERCENT: 3.8 %
NEUTROPHILS ABSOLUTE: 3.3 K/UL (ref 1.4–6.5)
NEUTROPHILS RELATIVE PERCENT: 53 %
PDW BLD-RTO: 12.5 % (ref 11.5–14.5)
PLATELET # BLD: 302 K/UL (ref 130–400)
PLATELET SLIDE REVIEW: ADEQUATE
POTASSIUM SERPL-SCNC: 4.3 MEQ/L (ref 3.4–4.9)
RBC # BLD: 4.39 M/UL (ref 4.2–5.4)
RBC # BLD: NORMAL 10*6/UL
SODIUM BLD-SCNC: 142 MEQ/L (ref 135–144)
TOTAL PROTEIN: 6.3 G/DL (ref 6.3–8)
TRIGL SERPL-MCNC: 169 MG/DL (ref 0–150)
VITAMIN B-12: 815 PG/ML (ref 232–1245)
VITAMIN D 25-HYDROXY: 18.4 NG/ML (ref 30–100)
WBC # BLD: 6.2 K/UL (ref 4.8–10.8)

## 2021-11-22 ENCOUNTER — OFFICE VISIT (OUTPATIENT)
Dept: FAMILY MEDICINE CLINIC | Age: 46
End: 2021-11-22
Payer: COMMERCIAL

## 2021-11-22 VITALS
HEART RATE: 90 BPM | BODY MASS INDEX: 38.42 KG/M2 | DIASTOLIC BLOOD PRESSURE: 72 MMHG | HEIGHT: 65 IN | WEIGHT: 230.6 LBS | OXYGEN SATURATION: 98 % | SYSTOLIC BLOOD PRESSURE: 128 MMHG | TEMPERATURE: 97.5 F

## 2021-11-22 DIAGNOSIS — J45.20 MILD INTERMITTENT ASTHMA WITHOUT COMPLICATION: ICD-10-CM

## 2021-11-22 DIAGNOSIS — R20.2 PARESTHESIA OF BOTH HANDS: Primary | ICD-10-CM

## 2021-11-22 DIAGNOSIS — J30.9 ALLERGIC RHINITIS, UNSPECIFIED SEASONALITY, UNSPECIFIED TRIGGER: ICD-10-CM

## 2021-11-22 DIAGNOSIS — M47.26 OSTEOARTHRITIS OF SPINE WITH RADICULOPATHY, LUMBAR REGION: ICD-10-CM

## 2021-11-22 DIAGNOSIS — E55.9 VITAMIN D DEFICIENCY: ICD-10-CM

## 2021-11-22 PROCEDURE — 99214 OFFICE O/P EST MOD 30 MIN: CPT | Performed by: PHYSICIAN ASSISTANT

## 2021-11-22 PROCEDURE — G8417 CALC BMI ABV UP PARAM F/U: HCPCS | Performed by: PHYSICIAN ASSISTANT

## 2021-11-22 PROCEDURE — 1036F TOBACCO NON-USER: CPT | Performed by: PHYSICIAN ASSISTANT

## 2021-11-22 PROCEDURE — G8427 DOCREV CUR MEDS BY ELIG CLIN: HCPCS | Performed by: PHYSICIAN ASSISTANT

## 2021-11-22 PROCEDURE — G8484 FLU IMMUNIZE NO ADMIN: HCPCS | Performed by: PHYSICIAN ASSISTANT

## 2021-11-22 RX ORDER — METHYLPREDNISOLONE 4 MG/1
TABLET ORAL
Qty: 1 KIT | Refills: 0 | Status: SHIPPED | OUTPATIENT
Start: 2021-11-22 | End: 2021-11-28

## 2021-11-22 RX ORDER — ERGOCALCIFEROL 1.25 MG/1
50000 CAPSULE ORAL WEEKLY
Qty: 4 CAPSULE | Refills: 3 | Status: SHIPPED | OUTPATIENT
Start: 2021-11-22 | End: 2022-06-17

## 2021-11-22 RX ORDER — AZELASTINE 1 MG/ML
1 SPRAY, METERED NASAL 2 TIMES DAILY
Qty: 30 ML | Refills: 0 | Status: SHIPPED | OUTPATIENT
Start: 2021-11-22 | End: 2022-08-22

## 2021-11-22 RX ORDER — ALBUTEROL SULFATE 90 UG/1
AEROSOL, METERED RESPIRATORY (INHALATION)
Qty: 18 G | Refills: 5 | Status: SHIPPED | OUTPATIENT
Start: 2021-11-22 | End: 2022-05-18

## 2021-11-22 ASSESSMENT — ENCOUNTER SYMPTOMS
SINUS PAIN: 1
SORE THROAT: 1
EYE PAIN: 1
PHOTOPHOBIA: 1
EYE ITCHING: 1
SINUS PRESSURE: 1
RHINORRHEA: 1

## 2021-11-22 NOTE — PROGRESS NOTES
Subjective  Ulises Sabillon, 55 y.o. female presents today with:  Chief Complaint   Patient presents with    Numbness     Pt c/o of numbness/tingles in both hands, toes, Denies pain, but a burning feeling. [address] Maintenance     No to flu vaccine. HPI  Patient is here being seen acutely for complaint of numbness and tingling in both her hands and her feet-patient has known degenerative changes of the lumbar spine has had consult with Alexys Perera md  who recommended follow-up  if symptoms worsened. she has yet to schedule that follow-up  Also complaining of sinus pressure postnasal drip drainage  Recent labs  - wants to review results  Needs refill on albuterol - occ chest tightness - using nebulizer more frequently  Review of Systems   HENT: Positive for postnasal drip, rhinorrhea, sinus pressure, sinus pain and sore throat. Eyes: Positive for photophobia, pain and itching. Neurological: Positive for numbness and headaches. Psychiatric/Behavioral: Positive for sleep disturbance. All other systems reviewed and are negative. Past Medical History:   Diagnosis Date    Allergic rhinitis     several allergens    Asthma     since shildhood    Bariatric surgery status 01/02/2020    Dr Kathy Gray     Brachial neuralgia 1/23/2014    Chronic left shoulder pain     COPD (chronic obstructive pulmonary disease) ([facility] 2019    Dr Montgomery Ink    Depression     H/O colonoscopy 2014    Dr. Kaity Fernandez Hyperlipidemia     Hypertension 2010    Migraine headache     Obesity, morbid (more than 100 lbs over ideal weight or BMI > 40) ([address]     SEJAL on CPAP 10/2018    Sleep apnea      Past Surgical History:   Procedure Laterality Date    BREAST SURGERY  2007    reduction, Dr Dias Go  10/20/15    DR. Reji Pope Right     Dr Devin Ramirez    SLEEVE GASTRECTOMY  01/20/2020    Dr Betina Gray History    Marital status: Single     Spouse name: Not on file    Number of children: 2    Years of education: Not on file    Highest education level: Not on file   Occupational History    Occupation: dog grooming business, self employed   Tobacco Use    Smoking status: Former Smoker     Packs/day: 0.50     Types: Cigarettes     Quit date: 7/11/2018     Years since quitting: 3.3    Smokeless tobacco: Never Used    Tobacco comment: uses electronic cigarettes & trying to cut back   Vaping Use    Vaping Use: Never used   Substance and Sexual Activity    Alcohol use: Yes     Alcohol/week: 1.0 standard drink     Types: 1 Standard drinks or equivalent per week     Comment: socially    Drug use: Not Currently     Frequency: 1.0 times per week     Comment: marijuana, quit July 2013    Sexual activity: Yes   Other Topics Concern    Not on file   Social History Narrative    Born in [place], has 1 brother one sister    Never , was in a bad relationship    Lives with daughter age 16 (2017) in a house in [facility]    2 daughters, one daughter with problems, one in college    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)    Hobbies reading, puzzles, games, outdoor activities     [address] [facility] Strain: Low Risk     Difficulty of Paying Living Expenses: Not hard at all   Food Insecurity: No Food Insecurity    Worried About [facility] in the Last Year: Never true   [address] in the Last Year: Never true   Transportation Needs:     Lack of Transportation (Medical): Not on file    Lack of Transportation (Non-Medical):  Not on file   Physical Activity:     Days of Exercise per Week: Not on file    Minutes of Exercise per Session: Not on file   Stress:     Feeling of Stress : Not on file   Social Connections:     Frequency of Communication with Friends and Family: Not on file    Frequency of Social Gatherings with Friends and Family: Not on file    Attends Mormon Services: Not on file    Active Member of Clubs or Organizations: Not on file    Attends Club or Organization Meetings: Not on file    Marital Status: Not on file   Intimate Partner Violence:     Fear of Current or Ex-Partner: Not on file    Emotionally Abused: Not on file    Physically Abused: Not on file    Sexually Abused: Not on file   Housing Stability:     Unable to Pay for Housing in the Last Year: Not on file    Number of [place] in the Last Year: Not on file    Unstable Housing in the Last Year: Not on file     Family History   Problem Relation Age of Onset    Osteoarthritis Mother     Other Mother         hyperlipidemia    Hypertension Mother     High Cholesterol Mother     Arthritis Mother     Heart Disease Mother     Diabetes Mother     No Known Problems Father       No Known Allergies  Current Outpatient Medications   Medication Sig Dispense Refill    albuterol sulfate  (90 Base) MCG/ACT inhaler Inhale 2 (TWO) puffs into the lungs EVERY 6 HOURS AS NEEDED FOR WHEEZING or SHORTNESS OF BREATH 18 g 5    methylPREDNISolone (MEDROL DOSEPACK) 4 MG tablet Take by mouth.  1 kit 0    azelastine (ASTELIN) 0.1 % nasal spray 1 spray by Nasal route 2 times daily Use in each nostril as directed 30 mL 0    vitamin D (ERGOCALCIFEROL) 1.25 MG (75874 UT) CAPS capsule Take 1 capsule by mouth once a week 4 capsule 3    pantoprazole (PROTONIX) 40 MG tablet TAKE 1 TABLET BY MOUTH EVERY OTHER DAY before a meal 45 tablet 2    FLUoxetine (PROZAC) 20 MG capsule TAKE 1 CAPSULE BY MOUTH DAILY 90 capsule 0    Multiple Vitamins-Minerals (DEKAS BARIATRIC) CHEW CHEW AND SWALLOW 1 (ONE) TABLET BY MOUTH DAILY 90 tablet 0    rosuvastatin (CRESTOR) 10 MG tablet TAKE 1 TABLET BY MOUTH NIGHTLY 90 tablet 0    albuterol sulfate  (90 Base) MCG/ACT inhaler Inhale 2 puffs into the lungs every 6 hours as needed for Wheezing 1 each 0    loratadine (CLARITIN) 10 MG tablet TAKE 1 TABLET BY MOUTH DAILY AS NEEDED for allergies 90 tablet 0    D-5000 125 MCG (5000 UT) TABS tablet Take 1 tablet by mouth daily 30 tablet 2    SYMBICORT 80-4.5 MCG/ACT AERO Inhale 2 puffs into the lungs 2 times daily 1 Inhaler 3    Fluticasone furoate-vilanterol (BREO ELLIPTA) 200-25 MCG/INH AEPB inhaler Inhale 1 puff into the lungs daily 1 each 5    busPIRone (BUSPAR) 5 MG tablet Take 1 tablet by mouth 3 times daily as needed (anxiety) (Patient not taking: Reported on 11/22/2021) 270 tablet 0    losartan (COZAAR) 50 MG tablet TAKE 1 TABLET BY MOUTH EVERY DAY 90 tablet 0    albuterol (PROVENTIL) (5 MG/ML) 0.5% nebulizer solution Take 0.5 mLs by nebulization every 6 hours as needed for Wheezing 120 vial 5     No current facility-administered medications for this visit. Objective    Vitals:    11/22/21 1157   BP: 128/72   Pulse: 90   Temp: 97.5 °F (36.4 °C)   TempSrc: Temporal   SpO2: 98%   Weight: 230 lb 9.6 oz (104.6 kg)   Height: 5' 5\" (1.651 m)     Physical Exam  Constitutional:       General: She is not in acute distress. Appearance: She is obese. She is not ill-appearing. HENT:      Head: Normocephalic and atraumatic. Ears:      Comments: Middle ear effusion bilat     Nose: Congestion present. Mouth/Throat:      Pharynx: No oropharyngeal exudate or posterior oropharyngeal erythema. Eyes:      Extraocular Movements: Extraocular movements intact. Conjunctiva/sclera: Conjunctivae normal.      Pupils: Pupils are equal, round, and reactive to light. Neck:      Thyroid: No thyromegaly. Cardiovascular:      Rate and Rhythm: Normal rate and regular rhythm. Pulses: Normal pulses. Heart sounds: Normal heart sounds. No murmur heard. Pulmonary:      Effort: Pulmonary effort is normal. No respiratory distress. Breath sounds: Normal breath sounds. No wheezing, rhonchi or rales. Abdominal:      General: Bowel sounds are normal.      Palpations: Abdomen is soft.    Musculoskeletal: General: Normal range of motion. Cervical back: Normal range of motion and neck supple. Comments: +tinels and phalens bilat   Lymphadenopathy:      Cervical: No cervical adenopathy. Skin:     General: Skin is warm and dry. Coloration: Skin is not jaundiced or pale. Neurological:      General: No focal deficit present. Mental Status: She is alert and oriented to person, place, and time. Cranial Nerves: No cranial nerve deficit. Motor: No weakness. Coordination: Coordination normal.      Gait: Gait normal.   Psychiatric:         Mood and Affect: Mood normal.         Behavior: Behavior normal.         Thought Content: Thought content normal.         Judgment: Judgment normal.                Assessment & Plan    Diagnosis Orders   1. Paresthesia of both hands  EMG   2. Osteoarthritis of spine with radiculopathy, lumbar region      schedule f.u with Leopoldo Manual md   3. Vitamin D deficiency  vitamin D (ERGOCALCIFEROL) 1.25 MG (95314 UT) CAPS capsule   4. Mild intermittent asthma without complication     5. Allergic rhinitis, unspecified seasonality, unspecified trigger           Orders Placed This Encounter   Procedures    EMG     Standing Status:   Future     Standing Expiration Date:   2022     Order Specific Question:   Which body part? Answer:   bilat ues     Orders Placed This Encounter   Medications    albuterol sulfate  (90 Base) MCG/ACT inhaler     Sig: Inhale 2 (TWO) puffs into the lungs EVERY 6 HOURS AS NEEDED FOR WHEEZING or SHORTNESS OF BREATH     Dispense:  18 g     Refill:  5    methylPREDNISolone (MEDROL DOSEPACK) 4 MG tablet     Sig: Take by mouth.      Dispense:  1 kit     Refill:  0    azelastine (ASTELIN) 0.1 % nasal spray     Si spray by Nasal route 2 times daily Use in each nostril as directed     Dispense:  30 mL     Refill:  0    vitamin D (ERGOCALCIFEROL) 1.25 MG (92588 UT) CAPS capsule     Sig: Take 1 capsule by mouth once a week Dispense:  4 capsule     Refill:  3     Medications Discontinued During This Encounter   Medication Reason    FERATE 240 (27 Fe) MG tablet LIST CLEANUP    albuterol sulfate  (90 Base) MCG/ACT inhaler REORDER    vitamin D (ERGOCALCIFEROL) 1.25 MG (40169 UT) CAPS capsule REORDER     Return in about 3 months (around 2/22/2022), or if symptoms worsen or fail to improve, for follow up with your PCP as directed, call for results.     Kim Elias PA-C

## 2021-12-27 NOTE — TELEPHONE ENCOUNTER
Requesting medication refill.  Please approve or deny this request.    Rx requested:  Requested Prescriptions     Pending Prescriptions Disp Refills    FLUoxetine (PROZAC) 20 MG capsule [Pharmacy Med Name: fluoxetine 20 mg capsule] 30 capsule 0     Sig: TAKE 1 CAPSULE BY MOUTH DAILY    Multiple Vitamins-Minerals (DEKAS BARIATRIC) CHEW [Pharmacy Med Name: [facility] Bariatric 22.5 mg-400 mcg-500 mcg-10 mg chewable tablet] 30 tablet 0     Sig: CHEW AND SWALLOW 1 (ONE) TABLET BY MOUTH DAILY    rosuvastatin (CRESTOR) 10 MG tablet [Pharmacy Med Name: rosuvastatin 10 mg tablet] 30 tablet 0     Sig: TAKE 1 TABLET BY MOUTH NIGHTLY       Last Office Visit:   6/23/2021    Last Filled:      Last Labs:      Next Visit Date:  Future Appointments   Date Time Provider Donald Jones   1/12/2022  3:30 PM Kandace Cheung MD [address]   1/25/2022  9:15 AM Martita Sarmiento MD [facility]   2/23/2022  8:15 AM Jaqueline Shah MD [address] [facility]

## 2021-12-28 RX ORDER — ROSUVASTATIN CALCIUM 10 MG/1
TABLET, COATED ORAL
Qty: 30 TABLET | Refills: 0 | Status: SHIPPED | OUTPATIENT
Start: 2021-12-28 | End: 2022-01-20

## 2021-12-28 RX ORDER — FLUOXETINE HYDROCHLORIDE 20 MG/1
CAPSULE ORAL
Qty: 30 CAPSULE | Refills: 0 | Status: SHIPPED | OUTPATIENT
Start: 2021-12-28 | End: 2022-01-20

## 2021-12-28 RX ORDER — PEDI MULTIVIT NO.128/VITAMIN K 500 MCG/ML
LIQUID (ML) ORAL
Qty: 30 TABLET | Refills: 0 | Status: SHIPPED | OUTPATIENT
Start: 2021-12-28 | End: 2022-01-20

## 2022-01-07 ENCOUNTER — TELEPHONE (OUTPATIENT)
Dept: FAMILY MEDICINE CLINIC | Age: 47
End: 2022-01-07

## 2022-01-07 NOTE — TELEPHONE ENCOUNTER
Not sure why the whole message didn't come through. The patient stated that she had gone to the chiropractor and was told that she needed to get a referral to go to physical therapy for stiff neck.

## 2022-01-10 ENCOUNTER — NURSE TRIAGE (OUTPATIENT)
Dept: OTHER | Facility: CLINIC | Age: 47
End: 2022-01-10

## 2022-01-10 NOTE — TELEPHONE ENCOUNTER
Received call from [facility] with Red Flag Complaint. Prior to transfer from [facility], patient disconnected the call. This writer returned call to the patient x 3 and left VM for patient to return call to the office for appointment scheduling. Per [facility], patient is calling due to numbness in her bilateral fingertips and toes and pt wanting a referral.    Attention Provider: Thank you for allowing me to participate in the care of your patient. The patient was connected to triage in response to information provided to the ECC/PSC. Please do not respond through this encounter as the response is not directed to a shared pool.         Reason for Disposition   Message left on identified voice mail    Protocols used: NO CONTACT OR DUPLICATE CONTACT CALL-ADULT-

## 2022-01-12 ENCOUNTER — HOSPITAL ENCOUNTER (OUTPATIENT)
Dept: NEUROLOGY | Age: 47
Discharge: HOME | End: 2022-01-12
Payer: COMMERCIAL

## 2022-01-12 DIAGNOSIS — R20.2 PARESTHESIA OF BOTH HANDS: ICD-10-CM

## 2022-01-12 PROCEDURE — 95886 MUSC TEST DONE W/N TEST COMP: CPT

## 2022-01-12 PROCEDURE — 95910 NRV CNDJ TEST 7-8 STUDIES: CPT

## 2022-01-12 NOTE — PROCEDURES
[address]                      [address], [address] [facility]                             ELECTROMYOGRAM REPORT    PATIENT NAME: Yoly Alcaraz                 :        1975  MED REC NO:   [id number]                            ROOM:  ACCOUNT NO:   [de-identified]                           ADMIT DATE: 2022  PROVIDER:     Araceli Chery MD    DATE OF EM2022    REFERRING PROVIDER:  Kim Elias PA-C.    REASON FOR STUDY:  The patient was having numbness in the hands. She  has a positive family history of diabetes. The patient was borderline  diabetic also that she improved after weight loss. FINDINGS:  Motor nerve conduction velocities are normal in all the  nerves tested. F-wave latencies are delayed in the right median nerve, borderline in  the left median nerve, and normal in the ulnar nerves bilaterally. Distal motor and sensory latencies are normal in the ulnar nerves, but  significantly delayed in the median nerves. On concentric needle electrode examination, mild denervation changes are  present in the abductor pollicis brevis muscles bilaterally. CLINICAL INTERPRETATION:  EMG studies are showing severe bilateral  median nerve compression neuropathy at the wrists consistent with  diagnosis of severe bilateral carpal tunnel syndrome, being worse on the  right side. Delay of the F-wave latency in the right median nerve is due to proximal  demyelinating changes due to significant distal compression neuropathy. The patient is being tried on conservative management. Due to continued  symptoms, the patient shall need decompression of the median nerves to  start with on the right side. Thank you Ms. Tu Aguilar for allowing me to see this patient. Please  feel free to call me if I can be of any further assistance regarding  this patient's evaluation.         Cecilia Bella MD    D: 2022 16:22:44       T: 01/12/2022 16:26:07     [email or web address]  Job#: [id number]     Doc#: [id number]    CC:

## 2022-01-14 DIAGNOSIS — G56.03 BILATERAL CARPAL TUNNEL SYNDROME: Primary | ICD-10-CM

## 2022-01-20 RX ORDER — PEDI MULTIVIT NO.128/VITAMIN K 500 MCG/ML
LIQUID (ML) ORAL
Qty: 90 TABLET | Refills: 0 | Status: SHIPPED | OUTPATIENT
Start: 2022-01-20 | End: 2022-03-02

## 2022-01-20 RX ORDER — BUSPIRONE HYDROCHLORIDE 5 MG/1
TABLET ORAL
Qty: 270 TABLET | Refills: 0 | Status: SHIPPED | OUTPATIENT
Start: 2022-01-20 | End: 2022-04-20

## 2022-01-20 RX ORDER — LORATADINE 10 MG/1
TABLET ORAL
Qty: 90 TABLET | Refills: 0 | Status: SHIPPED | OUTPATIENT
Start: 2022-01-20 | End: 2022-04-20

## 2022-01-20 RX ORDER — FLUOXETINE HYDROCHLORIDE 20 MG/1
CAPSULE ORAL
Qty: 90 CAPSULE | Refills: 0 | Status: SHIPPED | OUTPATIENT
Start: 2022-01-20 | End: 2022-04-20

## 2022-01-20 RX ORDER — ROSUVASTATIN CALCIUM 10 MG/1
TABLET, COATED ORAL
Qty: 90 TABLET | Refills: 0 | Status: SHIPPED | OUTPATIENT
Start: 2022-01-20 | End: 2022-04-20

## 2022-01-20 RX ORDER — FERROUS GLUCONATE 240(27)MG
TABLET ORAL
Qty: 180 TABLET | Refills: 0 | Status: SHIPPED | OUTPATIENT
Start: 2022-01-20 | End: 2022-06-27

## 2022-01-20 RX ORDER — LOSARTAN POTASSIUM 50 MG/1
TABLET ORAL
Qty: 90 TABLET | Refills: 0 | Status: SHIPPED | OUTPATIENT
Start: 2022-01-20 | End: 2022-04-20

## 2022-01-28 ENCOUNTER — OFFICE VISIT (OUTPATIENT)
Dept: FAMILY MEDICINE CLINIC | Age: 47
End: 2022-01-28
Payer: COMMERCIAL

## 2022-01-28 ENCOUNTER — TELEPHONE (OUTPATIENT)
Dept: FAMILY MEDICINE CLINIC | Age: 47
End: 2022-01-28

## 2022-01-28 VITALS
BODY MASS INDEX: 37.65 KG/M2 | TEMPERATURE: 97.2 F | HEART RATE: 82 BPM | HEIGHT: 65 IN | DIASTOLIC BLOOD PRESSURE: 80 MMHG | OXYGEN SATURATION: 98 % | SYSTOLIC BLOOD PRESSURE: 130 MMHG | RESPIRATION RATE: 16 BRPM | WEIGHT: 226 LBS

## 2022-01-28 DIAGNOSIS — G56.03 BILATERAL CARPAL TUNNEL SYNDROME: ICD-10-CM

## 2022-01-28 DIAGNOSIS — M54.2 NECK PAIN ON LEFT SIDE: Primary | ICD-10-CM

## 2022-01-28 PROCEDURE — 99214 OFFICE O/P EST MOD 30 MIN: CPT | Performed by: FAMILY MEDICINE

## 2022-01-28 PROCEDURE — 1036F TOBACCO NON-USER: CPT | Performed by: FAMILY MEDICINE

## 2022-01-28 PROCEDURE — G8417 CALC BMI ABV UP PARAM F/U: HCPCS | Performed by: FAMILY MEDICINE

## 2022-01-28 PROCEDURE — G8427 DOCREV CUR MEDS BY ELIG CLIN: HCPCS | Performed by: FAMILY MEDICINE

## 2022-01-28 PROCEDURE — G8484 FLU IMMUNIZE NO ADMIN: HCPCS | Performed by: FAMILY MEDICINE

## 2022-01-28 RX ORDER — TIZANIDINE HYDROCHLORIDE 2 MG/1
2 CAPSULE, GELATIN COATED ORAL NIGHTLY PRN
Qty: 14 CAPSULE | Refills: 0 | Status: SHIPPED | OUTPATIENT
Start: 2022-01-28 | End: 2022-03-15 | Stop reason: SDUPTHER

## 2022-01-28 ASSESSMENT — ENCOUNTER SYMPTOMS
VOMITING: 0
DIARRHEA: 0

## 2022-01-28 NOTE — PROGRESS NOTES
Subjective:      Patient ID: Gali Menchaca is a 55 y.o. female    Neck Pain   This is a new problem. The current episode started 1 to 4 weeks ago. The problem has been unchanged. Pertinent negatives include no fever or weakness. Other  Associated symptoms include arthralgias and neck pain. Pertinent negatives include no chills, fever, rash, vomiting or weakness. Here in follow up from recent emg study for bilateral hand numbness. Seen by PA recently for this evaluation. No injury. Has had 3 weeks of left sided neck pain. No pain or numbness down left arm   Ambidextrous. Review of Systems   Constitutional: Negative for chills and fever. Gastrointestinal: Negative for diarrhea and vomiting. Musculoskeletal: Positive for arthralgias and neck pain. Skin: Negative for rash. Neurological: Negative for weakness. xray showing muscular spasm and mild arthritic changes   Reviewed allergy, medical, social, surgical, family and med list changes and updated   Files-reviewed emg with bilateral severe cps     Social History     Socioeconomic History    Marital status: Single     Spouse name: None    Number of children: 2    Years of education: None    Highest education level: None   Occupational History    Occupation: [employer] business, self employed   Tobacco Use    Smoking status: Former Smoker     Packs/day: 0.50     Types: Cigarettes     Quit date: 7/11/2018     Years since quitting: 3.5    Smokeless tobacco: Never Used    Tobacco comment: uses electronic cigarettes & trying to cut back   Vaping Use    Vaping Use: Never used   Substance and Sexual Activity    Alcohol use:  Yes     Alcohol/week: 1.0 standard drink     Types: 1 Standard drinks or equivalent per week     Comment: socially    Drug use: Not Currently     Frequency: 1.0 times per week     Comment: marijuana, quit July 2013    Sexual activity: Yes   Other Topics Concern    None   Social History Narrative    Born in [place], has 1 brother one sister    Never , was in a bad relationship    Lives with daughter age 16 (2017) in a house in [facility]    2 daughters, one daughter with problems, one in college    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)    Hobbies reading, puzzles, games, outdoor activities     [facility] Strain: [address] Difficulty of Paying Living Expenses: Not hard at all   Food Insecurity: No Food Insecurity    Worried About [facility] in the Last Year: Never true   [address] in the Last Year: Never true   Transportation Needs:     Lack of Transportation (Medical): Not on file    Lack of Transportation (Non-Medical):  Not on file   Physical Activity:     Days of Exercise per Week: Not on file    Minutes of Exercise per Session: Not on file   Stress:     Feeling of Stress : Not on file   Social Connections:     Frequency of Communication with Friends and Family: Not on file    Frequency of Social Gatherings with Friends and Family: Not on file    Attends Druze Services: Not on file    Active Member of [address] [place] [address] or Organizations: Not on file    Attends Club or Organization Meetings: Not on file    Marital Status: Not on file   Intimate Partner Violence:     Fear of Current or Ex-Partner: Not on file    Emotionally Abused: Not on file    Physically Abused: Not on file    Sexually Abused: Not on file   Housing Stability:     Unable to Pay for Housing in the Last Year: Not on file    Number of [place] in the Last Year: Not on file    Unstable Housing in the Last Year: Not on file     Current Outpatient Medications   Medication Sig Dispense Refill    rosuvastatin (CRESTOR) 10 MG tablet TAKE 1 TABLET BY MOUTH NIGHTLY 90 tablet 0    busPIRone (BUSPAR) 5 MG tablet TAKE 1 TABLET BY MOUTH THREE TIMES DAILY AS NEEDED FOR ANXIETY 270 tablet 0    loratadine (CLARITIN) 10 MG tablet TAKE 1 TABLET BY MOUTH DAILY AS NEEDED for allergies 90 tablet 0    FERATE 240 (27 Fe) MG tablet Take 1 tablet by mouth 2 times daily (before meals) 180 tablet 0    Multiple Vitamins-Minerals (DEKAS BARIATRIC) CHEW CHEW AND SWALLOW 1 (ONE) TABLET BY MOUTH DAILY 90 tablet 0    FLUoxetine (PROZAC) 20 MG capsule TAKE 1 CAPSULE BY MOUTH DAILY 90 capsule 0    losartan (COZAAR) 50 MG tablet TAKE 1 TABLET BY MOUTH EVERY DAY 90 tablet 0    albuterol sulfate  (90 Base) MCG/ACT inhaler Inhale 2 (TWO) puffs into the lungs EVERY 6 HOURS AS NEEDED FOR WHEEZING or SHORTNESS OF BREATH 18 g 5    azelastine (ASTELIN) 0.1 % nasal spray 1 spray by Nasal route 2 times daily Use in each nostril as directed 30 mL 0    vitamin D (ERGOCALCIFEROL) 1.25 MG (38216 UT) CAPS capsule Take 1 capsule by mouth once a week 4 capsule 3    pantoprazole (PROTONIX) 40 MG tablet TAKE 1 TABLET BY MOUTH EVERY OTHER DAY before a meal 45 tablet 2    albuterol sulfate  (90 Base) MCG/ACT inhaler Inhale 2 puffs into the lungs every 6 hours as needed for Wheezing 1 each 0    D-5000 125 MCG (5000 UT) TABS tablet Take 1 tablet by mouth daily 30 tablet 2    SYMBICORT 80-4.5 MCG/ACT AERO Inhale 2 puffs into the lungs 2 times daily 1 Inhaler 3    Fluticasone furoate-vilanterol (BREO ELLIPTA) 200-25 MCG/INH AEPB inhaler Inhale 1 puff into the lungs daily 1 each 5    albuterol (PROVENTIL) (5 MG/ML) 0.5% nebulizer solution Take 0.5 mLs by nebulization every 6 hours as needed for Wheezing 120 vial 5     No current facility-administered medications for this visit.      Family History   Problem Relation Age of Onset    Osteoarthritis Mother     Other Mother         hyperlipidemia    Hypertension Mother     High Cholesterol Mother     Arthritis Mother     Heart Disease Mother     Diabetes Mother     No Known Problems Father      Past Medical History:   Diagnosis Date    Allergic rhinitis     several allergens    Asthma     since shildhood    [facility]     Brachial neuralgia 1/23/2014    Chronic left shoulder pain     COPD (chronic obstructive pulmonary disease) ([facility] 2019    Dr Nakita Whiteside H/O colonoscopy 2014    Dr. Hema Pryor Hyperlipidemia     Hypertension 2010    Migraine headache     Obesity, morbid (more than 100 lbs over ideal weight or BMI > 40) ([facility]     SEJAL on CPAP 10/2018    Sleep apnea    xray showing reversal of normal curve with multi level spurring   Objective:   /80   Pulse 82   Temp 97.2 °F (36.2 °C)   Resp 16   Ht 5' 5\" (1.651 m)   Wt 226 lb (102.5 kg)   SpO2 98%   BMI 37.61 kg/m²     Physical Exam  Neck:        No decreased rom of neck. .   No masses. No thyroid asymmetry                       Mild Tenderness posteriorly along left base of neck   Neuro:  Biceps  1+      Triceps     R  1+               Biceps   1+      Triceps      L  1+                 and bicep and tricep strength 5/5 and equal  Radial pules:  2+ equal  Lungs: Clear equal breath sound bilat  Heart:  Rate reg      No   Murmur  Neuro   Tinel  + bilateral   Phalen + on left   Neg on right   Assessment:       Diagnosis Orders   1. Neck pain on left side  XR CERVICAL SPINE (4-5 VIEWS)    [facility]  [place]   2.  Bilateral carpal tunnel syndrome  [address]         Plan:      Orders Placed This Encounter   Procedures    XR CERVICAL SPINE (4-5 VIEWS)     Standing Status:   Future     Number of Occurrences:   1     Standing Expiration Date:   1/28/2023   [address] [place]     Referral Priority:   Routine     Referral Type:   Eval and Treat     Referral Reason:   Specialty Services Required     Requested Specialty:   Orthopedic Surgery     Number of Visits Requested:   1    [facility] [place]     Referral Priority:   Routine     Referral Type:   Eval and Treat     Referral Reason:   Specialty Services Required     Requested Specialty:   Physical Therapy     Number of Visits Requested:   1   f/u after trial of pt

## 2022-01-28 NOTE — TELEPHONE ENCOUNTER
[facility] calling into the office stating that the Tizanidine 2 mg Capsules are not covered by the patient's insurance and would like to change it to tables. Please advise and call the pharmacy.

## 2022-02-01 PROBLEM — M62.838 MUSCLE SPASM: Status: ACTIVE | Noted: 2022-02-01

## 2022-02-02 RX ORDER — TIZANIDINE 2 MG/1
2 TABLET ORAL NIGHTLY PRN
Qty: 14 TABLET | Refills: 0 | Status: SHIPPED | OUTPATIENT
Start: 2022-02-02 | End: 2022-02-16

## 2022-02-02 NOTE — TELEPHONE ENCOUNTER
----- Message from Coralie Kanner sent at 2/2/2022  9:47 AM EST -----  Subject: Referral Request    QUESTIONS   Reason for referral request? Pt is calling due to muscle relaxer and the   insurance will only cover the tablet not the capsule. She was there   1/28/2022 the pharmacy has faxed the information about the insurance   Please advise Pt if new script can be place. Has the physician seen you for this condition before? No   Preferred Specialist (if applicable)? Do you already have an appointment scheduled? No  Additional Information for Provider? She had a appt. on 1/28/2022  ---------------------------------------------------------------------------  --------------  CALL BACK INFO  What is the best way for the office to contact you? OK to leave message on   voicemail  Preferred Call Back Phone Number?  [phone]

## 2022-02-23 ENCOUNTER — TELEPHONE (OUTPATIENT)
Dept: FAMILY MEDICINE CLINIC | Age: 47
End: 2022-02-23

## 2022-03-02 RX ORDER — PEDI MULTIVIT NO.128/VITAMIN K 500 MCG/ML
LIQUID (ML) ORAL
Qty: 90 TABLET | Refills: 0 | Status: SHIPPED | OUTPATIENT
Start: 2022-03-02

## 2022-03-09 NOTE — PROGRESS NOTES
Patient Name: James Zhu : 1975        Date: 3/10/2022      Type of Appt: Follow up    Reason for appt: follow up per pt for increased back pain, also c/o carpal tunnel syndrome - EMG done    Last seen by Dr. Ariana Birch 3-31-21    Studies done: 22 EMG by Dr. Naren Irving, 21 L/S MRI @ [facility]    Smoking: No    REVIEW OF SYSTEMS:    Headaches, Neck Pain, Back Pain review of systems is otherwise negative                        [facility]) Physicians  Neurosurgery and [address] [place] [address] 82: [phone]  F: [phone]          Patient:  James Zhu  YOB: 1975  Date: 3/10/2022    The patient is a 55 y.o. female who presents today for evaluation of the following problems:     Chief Complaint   Patient presents with    Back Pain    Hand Pain        Referred by No ref. provider found               Estimated body mass index is 38.27 kg/m² as calculated from the following:    Height as of this encounter: 5' 5\" (1.651 m). Weight as of this encounter: 230 lb (104.3 kg). PAST MEDICAL, FAMILY AND SOCIAL HISTORY:  Past Medical History:   Diagnosis Date    Allergic rhinitis     several allergens    Asthma     since shildhood    Bariatric surgery status 2020    [facility], Dr Jack Hameed     Brachial neuralgia 2014    Chronic left shoulder pain     COPD (chronic obstructive pulmonary disease) ([facility] [address]     Dr Tone Sherman    Depression     H/O colonoscopy     Dr. Jagdish Gallagher Hyperlipidemia     Hypertension 2010    Migraine headache     Obesity, morbid (more than 100 lbs over ideal weight or BMI > 40) ([facility] [address]     SEJAL on CPAP 10/2018    Sleep apnea      Past Surgical History:   Procedure Laterality Date    BREAST SURGERY  2007    reduction, Dr Graham Georges  10/20/15    DR. Genoveva Espana Right     Dr Judge Messer    SLEEVE GASTRECTOMY  2020    [facility], Dr Arely Esquivel Family History   Problem Relation Age of Onset    Osteoarthritis Mother     Other Mother         hyperlipidemia    Hypertension Mother     High Cholesterol Mother     Arthritis Mother     Heart Disease Mother     Diabetes Mother     No Known Problems Father      Current Outpatient Medications on File Prior to Visit   Medication Sig Dispense Refill    Multiple Vitamins-Minerals (DEKAS BARIATRIC) CHEW CHEW AND SWALLOW 1 (ONE) TABLET BY MOUTH DAILY 90 tablet 0    rosuvastatin (CRESTOR) 10 MG tablet TAKE 1 TABLET BY MOUTH NIGHTLY 90 tablet 0    busPIRone (BUSPAR) 5 MG tablet TAKE 1 TABLET BY MOUTH THREE TIMES DAILY AS NEEDED FOR ANXIETY 270 tablet 0    loratadine (CLARITIN) 10 MG tablet TAKE 1 TABLET BY MOUTH DAILY AS NEEDED for allergies 90 tablet 0    FLUoxetine (PROZAC) 20 MG capsule TAKE 1 CAPSULE BY MOUTH DAILY 90 capsule 0    losartan (COZAAR) 50 MG tablet TAKE 1 TABLET BY MOUTH EVERY DAY 90 tablet 0    pantoprazole (PROTONIX) 40 MG tablet TAKE 1 TABLET BY MOUTH EVERY OTHER DAY before a meal 45 tablet 2    D-5000 125 MCG (5000 UT) TABS tablet Take 1 tablet by mouth daily 30 tablet 2    Fluticasone furoate-vilanterol (BREO ELLIPTA) 200-25 MCG/INH AEPB inhaler Inhale 1 puff into the lungs daily 1 each 5    tiZANidine (ZANAFLEX) 2 MG capsule Take 1 capsule by mouth nightly as needed for Muscle spasms (Patient not taking: Reported on 3/10/2022) 14 capsule 0    FERATE 240 (27 Fe) MG tablet Take 1 tablet by mouth 2 times daily (before meals) (Patient not taking: Reported on 3/10/2022) 180 tablet 0    albuterol sulfate  (90 Base) MCG/ACT inhaler Inhale 2 (TWO) puffs into the lungs EVERY 6 HOURS AS NEEDED FOR WHEEZING or SHORTNESS OF BREATH (Patient not taking: Reported on 3/10/2022) 18 g 5    azelastine (ASTELIN) 0.1 % nasal spray 1 spray by Nasal route 2 times daily Use in each nostril as directed (Patient not taking: Reported on 3/10/2022) 30 mL 0    vitamin D (ERGOCALCIFEROL) 1.25 MG (85164 UT) CAPS capsule Take 1 capsule by mouth once a week (Patient not taking: Reported on 3/10/2022) 4 capsule 3    albuterol sulfate  (90 Base) MCG/ACT inhaler Inhale 2 puffs into the lungs every 6 hours as needed for Wheezing (Patient not taking: Reported on 3/10/2022) 1 each 0    SYMBICORT 80-4.5 MCG/ACT AERO Inhale 2 puffs into the lungs 2 times daily (Patient not taking: Reported on 3/10/2022) 1 Inhaler 3    albuterol (PROVENTIL) (5 MG/ML) 0.5% nebulizer solution Take 0.5 mLs by nebulization every 6 hours as needed for Wheezing 120 vial 5     No current facility-administered medications on file prior to visit. Social History     Tobacco Use    Smoking status: Former Smoker     Packs/day: 0.50     Types: Cigarettes     Quit date: 7/11/2018     Years since quitting: 3.6    Smokeless tobacco: Never Used    Tobacco comment: uses electronic cigarettes & trying to cut back   Vaping Use    Vaping Use: Never used   Substance Use Topics    Alcohol use: Yes     Alcohol/week: 1.0 standard drink     Types: 1 Standard drinks or equivalent per week     Comment: socially    Drug use: Not Currently     Frequency: 1.0 times per week     Comment: marijuana, quit July 2013       ALLERGIES  No Known Allergies      I have personally reviewed the PMH, PSH, family history, home medications, social history, allergies, ROS. Physical Exam:      Vitals:    03/10/22 1534   BP: 112/62   Site: Left Upper Arm   Weight: 230 lb (104.3 kg)   Height: 5' 5\" (1.651 m)       Physical Exam:  Vitals and notes reviewed. Constitutional:  See above height, weight, pulse rate, and blood pressure. BMI      Appearance: Normal appearance. Head:      Normocephalic and atraumatic. Ears, Nose, Mouth, and Throat:      Normal shape, no discharge, deglutition intact  Eyes:      Extraocular Movements: Extraocular movements intact. Pupils: Pupils are equal, round, and reactive to light.    Cardiovascular:      Pulses: Normal radialis pulses. Heart sounds: Normal heart sounds, regular rate, no rubs or murmurs. Respiratory:      lungs clear to auscultation  Abdomen:        Soft to palpation. Bowel sounds present. Genitourinary:      Normal for sex  Musculoskeletal:      Gait: Regular walk and toe walk and heel walk intact. General: Normal range of motion. Extremities well developed and symmetric. Muscle tone normal with no spasticity or fasciculations. Skin:     General: Skin is warm and dry. Capillary Refill: Capillary refill less than 2 seconds. Neurological:      Mental Status: Alert and oriented to person, place, and time. Cranial nerves individually tested 2 through 12 normal  Hematologic/Lymphatic/Immunologic:      Negative for lymphadenopathy, hematomas. Psychiatric:         Mood and Affect: Mood normal. Appropriate affect    I have reviewed all laboratory studies, reports, data, and pertinent images. PROVIDER:     Oriana Arana MD  DATE OF EM2022  CLINICAL INTERPRETATION:  EMG studies are showing severe bilateral  median nerve compression neuropathy at the wrists consistent with  diagnosis of severe bilateral carpal tunnel syndrome, being worse on the  right side. HISTORY OF PRESENT ILLNESS:    Is in the emergency room complaining of increasing pain in her lower extremities charley horses on the right side can be on the left side toes are numb. She is also receiving physical therapy for her neck because she cannot turn it too far. She works very hard she works 7 days a week at a [email or web address] [address] [place] [address] and also at [employer]. She absolutely does not want to consider surgery. Previously discussed on 3/30/2021 I showed the patient her MRI study and also printed out an image on paper. Mild L4-5 canal stenosis with no significant neural compression and an annular tear posteriorly.    After some discussion with she'll continue her heat and range of motion Tylenol as she cannot take anti-inflammatories post-bariatric surgery. I answered her questions that she is not a candidate for consideration of spine surgery.     Should she get one of these episodes of pain that just does not improve over week or 2 she would be a candidate for a interlaminar L5-S1 epidural steroid directed to the right side. She gives a good history for carpal tunnel syndrome with waking her up at night swelling of the hands in the morning tingling numbness first second third minimal on the fourth digits. Dropping things cannot open jars cannot hold her hands up. Right may be slightly worse than the left. Discussed with patient my plan is to send her to pain management for evaluation treatment of her lumbar pain is she is not a candidate for surgery. Also I will order carpal tunnel injections first on the right and on the left by pain management. If those are unsuccessful she may be candidate for carpal tunnel release which I discussed with her and answered questions.       Plan pain management evaluation and treat and pain management for carpal tunnel injection      Darren Higuera MD

## 2022-03-10 ENCOUNTER — OFFICE VISIT (OUTPATIENT)
Dept: NEUROSURGERY | Age: 47
End: 2022-03-10
Payer: COMMERCIAL

## 2022-03-10 VITALS
WEIGHT: 230 LBS | SYSTOLIC BLOOD PRESSURE: 112 MMHG | BODY MASS INDEX: 38.32 KG/M2 | HEIGHT: 65 IN | DIASTOLIC BLOOD PRESSURE: 62 MMHG

## 2022-03-10 DIAGNOSIS — M51.26 LUMBAR DISC HERNIATION: ICD-10-CM

## 2022-03-10 DIAGNOSIS — M54.16 LUMBAR RADICULOPATHY: ICD-10-CM

## 2022-03-10 DIAGNOSIS — G56.01 CARPAL TUNNEL SYNDROME ON RIGHT: Primary | ICD-10-CM

## 2022-03-10 DIAGNOSIS — G56.02 CARPAL TUNNEL SYNDROME ON LEFT: ICD-10-CM

## 2022-03-10 DIAGNOSIS — M47.816 LUMBAR SPONDYLOSIS: ICD-10-CM

## 2022-03-10 DIAGNOSIS — M47.812 CERVICAL SPONDYLOSIS WITHOUT MYELOPATHY: ICD-10-CM

## 2022-03-10 PROCEDURE — 1036F TOBACCO NON-USER: CPT | Performed by: NEUROLOGICAL SURGERY

## 2022-03-10 PROCEDURE — G8417 CALC BMI ABV UP PARAM F/U: HCPCS | Performed by: NEUROLOGICAL SURGERY

## 2022-03-10 PROCEDURE — G8427 DOCREV CUR MEDS BY ELIG CLIN: HCPCS | Performed by: NEUROLOGICAL SURGERY

## 2022-03-10 PROCEDURE — 99214 OFFICE O/P EST MOD 30 MIN: CPT | Performed by: NEUROLOGICAL SURGERY

## 2022-03-10 PROCEDURE — G8484 FLU IMMUNIZE NO ADMIN: HCPCS | Performed by: NEUROLOGICAL SURGERY

## 2022-03-15 RX ORDER — TIZANIDINE HYDROCHLORIDE 2 MG/1
2 CAPSULE, GELATIN COATED ORAL NIGHTLY PRN
Qty: 14 CAPSULE | Refills: 0 | Status: SHIPPED | OUTPATIENT
Start: 2022-03-15 | End: 2022-08-22

## 2022-03-15 NOTE — TELEPHONE ENCOUNTER
Patient requesting medication refill.  Please approve or deny this request.    Rx requested:  Requested Prescriptions     Pending Prescriptions Disp Refills    tiZANidine (ZANAFLEX) 2 MG capsule 14 capsule 0     Sig: Take 1 capsule by mouth nightly as needed for Muscle spasms         Last Office Visit:   1/28/2022      Next Visit Date:  Future Appointments   Date Time Provider Donald Jones   4/13/2022  8:00 AM MD BELIA Bruce [facility] EMERGENCY [facility] [place]

## 2022-04-13 ENCOUNTER — TELEPHONE (OUTPATIENT)
Dept: PAIN MANAGEMENT | Age: 47
End: 2022-04-13

## 2022-04-13 ENCOUNTER — INITIAL CONSULT (OUTPATIENT)
Dept: PAIN MANAGEMENT | Age: 47
End: 2022-04-13
Payer: COMMERCIAL

## 2022-04-13 VITALS
SYSTOLIC BLOOD PRESSURE: 118 MMHG | WEIGHT: 230 LBS | DIASTOLIC BLOOD PRESSURE: 68 MMHG | BODY MASS INDEX: 38.32 KG/M2 | HEIGHT: 65 IN | RESPIRATION RATE: 18 BRPM

## 2022-04-13 DIAGNOSIS — M47.812 CERVICAL SPONDYLOSIS WITHOUT MYELOPATHY: ICD-10-CM

## 2022-04-13 DIAGNOSIS — M79.604 BILATERAL LEG PAIN: ICD-10-CM

## 2022-04-13 DIAGNOSIS — G56.03 BILATERAL CARPAL TUNNEL SYNDROME: ICD-10-CM

## 2022-04-13 DIAGNOSIS — M79.605 BILATERAL LEG PAIN: ICD-10-CM

## 2022-04-13 DIAGNOSIS — M54.16 LUMBAR RADICULOPATHY: Primary | ICD-10-CM

## 2022-04-13 DIAGNOSIS — M43.10 RETROLISTHESIS OF VERTEBRAE: ICD-10-CM

## 2022-04-13 DIAGNOSIS — G56.03 BILATERAL CARPAL TUNNEL SYNDROME: Primary | ICD-10-CM

## 2022-04-13 PROCEDURE — 1036F TOBACCO NON-USER: CPT | Performed by: PHYSICAL MEDICINE & REHABILITATION

## 2022-04-13 PROCEDURE — 99204 OFFICE O/P NEW MOD 45 MIN: CPT | Performed by: PHYSICAL MEDICINE & REHABILITATION

## 2022-04-13 PROCEDURE — G8427 DOCREV CUR MEDS BY ELIG CLIN: HCPCS | Performed by: PHYSICAL MEDICINE & REHABILITATION

## 2022-04-13 PROCEDURE — G8417 CALC BMI ABV UP PARAM F/U: HCPCS | Performed by: PHYSICAL MEDICINE & REHABILITATION

## 2022-04-13 RX ORDER — TIZANIDINE 2 MG/1
2 TABLET ORAL EVERY EVENING
Qty: 30 TABLET | Refills: 0 | Status: SHIPPED | OUTPATIENT
Start: 2022-04-13 | End: 2022-05-13

## 2022-04-13 RX ORDER — LIDOCAINE 40 MG/G
CREAM TOPICAL
Qty: 45 G | Refills: 1 | Status: SHIPPED | OUTPATIENT
Start: 2022-04-13

## 2022-04-13 RX ORDER — GABAPENTIN 300 MG/1
300 CAPSULE ORAL NIGHTLY
Qty: 30 CAPSULE | Refills: 0 | Status: SHIPPED | OUTPATIENT
Start: 2022-04-13 | End: 2022-06-09 | Stop reason: SDUPTHER

## 2022-04-13 ASSESSMENT — ENCOUNTER SYMPTOMS
BACK PAIN: 1
NAUSEA: 0
CONSTIPATION: 0
SHORTNESS OF BREATH: 0
DIARRHEA: 0

## 2022-04-13 NOTE — TELEPHONE ENCOUNTER
AUTHORIZATION: OSCAR L4-5 DELILAH    INSURANCE: Terrance ELLER VIA: NATE Mcnair #: [id number]    DATE RANGE: 4/19/2022 TO 7/15/2022    TELEPHONE CALL ROUTED TO MA TO SCHEDULE.

## 2022-04-13 NOTE — TELEPHONE ENCOUNTER
ORDER PLACED:    Date: 4/13/22  Description: BILAT L4-5 TRANSFORAMINAL  Order Number: [id number]  Ordering Provider: [facility]  Performing Provider: [facility]  CPT Codes: 98811  ICD10 Codes: M54.16    ORDER SENT TO [facility]

## 2022-04-13 NOTE — TELEPHONE ENCOUNTER
SM- TRANSFORAMINAL AUTH- BILAT L4-5 X1- 4/19/2022 TO 7/15/2022     OK to schedule procedure approved as above. Please note sides/levels approved and date range.    (If applicable, sides/levels approved may differ from those ordered)    TO BE SCHEDULED WITH DR Subhash Chacon

## 2022-04-13 NOTE — PROGRESS NOTES
Sharon Rust  (1975)    4/13/2022    Subjective:     Sharon Rust is 55 y.o. female who complains today of:    Chief Complaint   Patient presents with    Back Pain    Carpal Tunnel     BILATERAL     Neck Pain       Sharon Rust is a 55 y.o. female who presents for evaluation by request of Dr. Leta Multani for lumbar spine pain management. She has struggled with pain for over 1 year. She denies any immediately-preceding traumatic or inciting events. She has been previously evaluated by Dr Luz Jaimes whose records are reviewed below. She describes pain located in both sides of her low back with pain down both legs. Pain is a constant ache and is currently a 4/10 and gets up to a 9/10 at its worst and goes down to a 3/10 at its best. Pain is worse with work and bending. Pain is better with walking. Pain is located 50% on the right and 50% on the left. Pain is located 50% in the back and 50% in the legs. Neck pain located in both sides of her neck. Gets to a 7/10. bilateral hand numbness from carpal tunnel syndrome, EMG done and reviewed below. Constant ache for over 1 year. Worse with work. Better with rest.       She denies any numbness, tingling, weakness, bowel or bladder dysfunction, saddle anesthesia, falls, history of cancer, unexplained weight loss, persistent night pain and sweats, fever, IV drug abuse, immunocompromise, chronic prednisone or antibiotic use, or any other red flag symptoms. Mood is good, denies any suicidal or homicidal ideation. Sleep is poor, awakes fatigued.     She has tried:  Home exercise program with minimal relief  PT completed [place] April 2022    Diagnostic testing previously performed includes XRs, EMG and MRI    Medications tried include:  Acetaminophen with minimal relief for over 3 months  Ibuprofen unable to take due to history of gastric bypass  Prednisone caused palpitations and facial flushing, less reaction with Medrol  Tizanidine didn't use  Cyclobenzaprine helped  Percocet \"I don't like the way it makes me feel\" feels stressed, mood changes  Norco was fine  Morphine doesn't recall  Tramadol doesn't recall    Allergies, Medications, Past Medical History, Family History, Social History, Work History, and Review of Systems reviewed below     +stomach ulcers, hiatal hernia, gastric bypass unable to take NSAIDs   +history of diabetes mellitus prior to gastric bypass  +Depression and Anxiety on Prozac    No Seizures, Epilepsy or Brain Surgery     Spends her time: working 50 hrs/week in [address], [address]. She used to enjoy walking in the park, going to [place] and attending sports games. Allergies:  Patient has no known allergies. Past Medical History:   Diagnosis Date    Allergic rhinitis     several allergens    Asthma     since shildhood    Bariatric surgery status 01/02/2020    [facility], Dr Amena Mejia     Brachial neuralgia 1/23/2014    Chronic left shoulder pain     COPD (chronic obstructive pulmonary disease) ([facility] [address] 2019    Dr Jenna Chapman    Depression     H/O colonoscopy 2014    Dr. Kike Cain Hyperlipidemia     Hypertension 2010    Migraine headache     Obesity, morbid (more than 100 lbs over ideal weight or BMI > 40) ([facility] [address]     SEJAL on CPAP 10/2018    Sleep apnea      Past Surgical History:   Procedure Laterality Date    BREAST SURGERY  2007    reduction, Dr Amaya Courser  10/20/15    DR. Astrid Mane Right     Dr Russell Face    SLEEVE GASTRECTOMY  01/20/2020    [facility], Dr Bam Bowen History   Problem Relation Age of Onset    Osteoarthritis Mother     Other Mother         hyperlipidemia    Hypertension Mother     High Cholesterol Mother     Arthritis Mother     Heart Disease Mother     Diabetes Mother     No Known Problems Father      Social History     Socioeconomic History    Marital status: Single     Spouse name: Not on file    Number of children: 2    Years of education: Not on file    Highest education level: Not on file   Occupational History    Occupation: dog grooming business, self employed   Tobacco Use    Smoking status: Former Smoker     Packs/day: 0.50     Types: Cigarettes     Quit date: 7/11/2018     Years since quitting: 3.7    Smokeless tobacco: Never Used    Tobacco comment: uses electronic cigarettes & trying to cut back   Vaping Use    Vaping Use: Never used   Substance and Sexual Activity    Alcohol use: Yes     Alcohol/week: 1.0 standard drink     Types: 1 Standard drinks or equivalent per week     Comment: socially    Drug use: Not Currently     Frequency: 1.0 times per week     Comment: marijuana, quit July 2013    Sexual activity: Yes   Other Topics Concern    Not on file   Social History Narrative    Born in [place], has 1 brother one sister    Never , was in a bad relationship    Lives with daughter age 16 (2017) in a house in [facility]    2 daughters, one daughter with problems, one in college    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)    [employer] reading, puzzles, games, outdoor activities     [facility] Strain: Low Risk     Difficulty of Paying Living Expenses: Not hard at all   Food Insecurity: No Food Insecurity    Worried About [address] in the Last Year: Never true   [address] in the Last Year: Never true   Transportation Needs:     Lack of Transportation (Medical): Not on file    Lack of Transportation (Non-Medical):  Not on file   Physical Activity:     Days of Exercise per Week: Not on file    Minutes of Exercise per Session: Not on file   Stress:     Feeling of Stress : Not on file   Social Connections:     Frequency of Communication with Friends and Family: Not on file    Frequency of Social Gatherings with Friends and Family: Not on file    Attends Evangelical Services: Not on file   [address] [place] [address] Member of Clubs or Organizations: Not on file    Attends Club or Organization Meetings: Not on file    Marital Status: Not on file   Intimate Partner Violence:     Fear of Current or Ex-Partner: Not on file    Emotionally Abused: Not on file    Physically Abused: Not on file    Sexually Abused: Not on file   Housing Stability:     Unable to Pay for Housing in the Last Year: Not on file    Number of Places Lived in the Last Year: Not on file    Unstable Housing in the Last Year: Not on file       Current Outpatient Medications on File Prior to Visit   Medication Sig Dispense Refill    tiZANidine (ZANAFLEX) 2 MG capsule Take 1 capsule by mouth nightly as needed for Muscle spasms 14 capsule 0    Multiple Vitamins-Minerals (DEKAS BARIATRIC) CHEW CHEW AND SWALLOW 1 (ONE) TABLET BY MOUTH DAILY 90 tablet 0    rosuvastatin (CRESTOR) 10 MG tablet TAKE 1 TABLET BY MOUTH NIGHTLY 90 tablet 0    busPIRone (BUSPAR) 5 MG tablet TAKE 1 TABLET BY MOUTH THREE TIMES DAILY AS NEEDED FOR ANXIETY 270 tablet 0    loratadine (CLARITIN) 10 MG tablet TAKE 1 TABLET BY MOUTH DAILY AS NEEDED for allergies 90 tablet 0    FLUoxetine (PROZAC) 20 MG capsule TAKE 1 CAPSULE BY MOUTH DAILY 90 capsule 0    losartan (COZAAR) 50 MG tablet TAKE 1 TABLET BY MOUTH EVERY DAY 90 tablet 0    pantoprazole (PROTONIX) 40 MG tablet TAKE 1 TABLET BY MOUTH EVERY OTHER DAY before a meal 45 tablet 2    D-5000 125 MCG (5000 UT) TABS tablet Take 1 tablet by mouth daily 30 tablet 2    Fluticasone furoate-vilanterol (BREO ELLIPTA) 200-25 MCG/INH AEPB inhaler Inhale 1 puff into the lungs daily 1 each 5    FERATE 240 (27 Fe) MG tablet Take 1 tablet by mouth 2 times daily (before meals) (Patient not taking: Reported on 3/10/2022) 180 tablet 0    albuterol sulfate  (90 Base) MCG/ACT inhaler Inhale 2 (TWO) puffs into the lungs EVERY 6 HOURS AS NEEDED FOR WHEEZING or SHORTNESS OF BREATH (Patient not taking: Reported on 3/10/2022) 18 g 5    azelastine (ASTELIN) 0.1 % nasal spray 1 spray by Nasal route 2 times daily Use in each nostril as directed (Patient not taking: Reported on 3/10/2022) 30 mL 0    vitamin D (ERGOCALCIFEROL) 1.25 MG (86032 UT) CAPS capsule Take 1 capsule by mouth once a week (Patient not taking: Reported on 3/10/2022) 4 capsule 3    albuterol sulfate  (90 Base) MCG/ACT inhaler Inhale 2 puffs into the lungs every 6 hours as needed for Wheezing (Patient not taking: Reported on 3/10/2022) 1 each 0    SYMBICORT 80-4.5 MCG/ACT AERO Inhale 2 puffs into the lungs 2 times daily (Patient not taking: Reported on 3/10/2022) 1 Inhaler 3    albuterol (PROVENTIL) (5 MG/ML) 0.5% nebulizer solution Take 0.5 mLs by nebulization every 6 hours as needed for Wheezing 120 vial 5     No current facility-administered medications on file prior to visit. Review of Systems   Constitutional: Negative for fever. HENT: Negative for hearing loss. Respiratory: Negative for shortness of breath. Gastrointestinal: Negative for constipation, diarrhea and nausea. Genitourinary: Negative for difficulty urinating. Musculoskeletal: Positive for back pain. Negative for neck pain. Skin: Negative for rash. Neurological: Negative for headaches. Hematological: Does not bruise/bleed easily. Psychiatric/Behavioral: Negative for sleep disturbance. Objective:     Vitals:  /68 (Site: Left Upper Arm, Position: Sitting, Cuff Size: Large Adult)   Resp 18   Ht 5' 4.5\" (1.638 m)   Wt 230 lb (104.3 kg)   BMI 38.87 kg/m² Pain Score:   7 (WRIST )      Exam performed under Coronavirus precautions  Gen: No acute distress  Neck: Grossly symmetric without any significant thyromegaly or masses appreciated. Eyes: No scleral icterus or lid lag appreciated bilaterally. Irises without gross defects bilaterally. HEENT: Hearing grossly intact bilaterally. Normocephalic, external ears and visible portions of nose and mouth atraumatic.   Lymph: No gross neck or axillary lymphadenopathy  Cardio: No significant lower extremity edema, pulses intact without significant digit ischemia. Abd: No gross masses or large hernias appreciated. Skin: Visualized skin without any dermatomal rashes or sores. Palpation free of any tightening or subcutaneous nodules. MSK: Gait is antalgic. No significant upper limb digit ischemia appreciated. Psych: Pleasant and cooperative with the history and exam. Mood and Affect normal. Appropriately dressed with good eye contact. Judgement and insight normal. Recent and remote memory intact. Alert and Oriented x3. Neuro: Cranial nerves II-XII grossly intact. No significant pathologic reflexes appreciated. Rises from a seated to standing position with mild difficulty. Gait is antalgic. No assistive devices used. Heel and toe walk intact. Lumbar flexion to 70 degrees, extension to 25 degrees. Limited lumbar spine range of motion. Rotation and extension reproduces axial low back pain. Other facet provocative maneuvers are positive. No gross step offs noted. Tenderness to palpation over the mid to low lumbar spinous processes and bilateral lumbar paraspinals from L2 down to the sacrum. No tenderness over bilateral PSIS. No tenderness over bilateral greater trochanters. No tenderness over bilateral deep gluteal regions.     Sensation grossly intact in both legs except for bilateral L4 paresthesias  Reflexes and strength functional for ambulation, no abnormal reflexes appreciated on exam today  Strength greater than 3/5 bilateral legs  Straight leg raise positive on exam today    Sensation intact in both arms except for bilateral median nerve paresthesias  Reflexes and strength functional for arm use, no abnormal reflexes appreciated on exam today  Strength greater than 3/5 in both arms  Spurling's negative on exam today    +Tinel's bilaterally at wrists     There is tenderness to palpation over cervical spinous processes from C2 down to C7 with bilateral cervical paraspinal muscle tenderness. Rotation and extension reproduces axial neck pain. Other facet provocative maneuvers are positive. Outside record review:  Review of the original consultation request reveals no specific diagnostic requests or clinical concerns aside from lumbar pain management that require particular attention. There are no suggested, requested, or specified tests to be ordered or any prior diagnostics performed that require follow-up or further investigation. Dr Mel López 3/10/22: not a candidate for spine surgery. Candidate interlaminar L5/S1 epidural steroid on the right. carpal tunnel syndrome right worse than left. carpal tunnel syndrome injections, pain management for lumbar pain. EMG B UE 1/12/22 Dr Fragoso Holding: severe bilateral carpal tunnel syndrome right worse than left. XR cervical spine 1/28/2022: No fracture. The spaces within normal limits. Anterior osteophyte C4-C7. No acute osseous changes. Personal review of cervical spine images on 4/13/2022 show age-appropriate facet arthropathy, mild C3-4 right osseous foraminal narrowing, mild left 4 5 osseous foraminal narrowing, loss of normal cervical lordosis. XR R knee 8/22/2021: Joint spaces preserved. No acute osseous injuries  MRI lumbar spine 6/29/2021: Retrolisthesis L5-S1, no fracture. Degenerative disease and facet arthropathy. T12-L3 normal canal foramen. L3-L4 normal canal, normal left foramen, minimal right foraminal narrowing. L4-L5 mild canal stenosis, mild left foraminal narrowing, normal right foramen. L5-S1 disc bulge, retrolisthesis, normal canal foramen. XR L-spine 11/30/2020: Mild anterior wedging lower thoracic spine and at L1. Facet arthrosis. No fractures. Vascular calcifications abdominal aorta. XR R hip 11/30/2020: Joint spaces both hips are maintained. Sacroiliac joints are preserved. No fracture.     Component      Latest Ref Rng & Units 11/3/2021 8:59 AM   Platelet Count      911 - 400 K/uL 302     Component      Latest Ref Rng & Units 11/3/2021           8:59 AM   Hemoglobin A1C      4.8 - 5.9 % 5.6     Component      Latest Ref Rng & Units 11/3/2021           8:59 AM   Creatinine      0.50 - 0.90 mg/dL 0.70         Family history of alcohol abuse 0  Family history of illegal drug abuse 0  Family history of prescription drug abuse 0    Personal history of alcohol abuse/DUI 0  Personal history of illegal drug abuse 0  Personal history of prescription drug abuse 0    Age between 17-45 0    History of preadolescent sexual abuse 0    Personal history of obsessive compulsive disorder 0  Personal history of attention deficit disorder 0  Personal history of bipolar disorder 0  Personal history of schizophrenia 0  Personal history of depression +1    Score = 1, low risk  Assessment:      Diagnosis Orders   1. Lumbar radiculopathy  XR LUMBAR SPINE FLEXION AND EXTENSION ONLY    lidocaine (LMX) 4 % cream    tiZANidine (ZANAFLEX) 2 MG tablet    gabapentin (NEURONTIN) 300 MG capsule    KS NJX AA&/STRD TFRML EPI LUMBAR/SACRAL 1 LEVEL   2. Retrolisthesis of vertebrae  XR LUMBAR SPINE FLEXION AND EXTENSION ONLY   3. Cervical spondylosis without myelopathy  MRI CERVICAL SPINE WO CONTRAST    lidocaine (LMX) 4 % cream    tiZANidine (ZANAFLEX) 2 MG tablet    gabapentin (NEURONTIN) 300 MG capsule    KS INJ DX/THER AGNT PARAVERT FACET JOINT, CERV/THORAC, 1ST LEVEL   4. Bilateral leg pain  XR LUMBAR SPINE FLEXION AND EXTENSION ONLY    EMG   5. Bilateral carpal tunnel syndrome  [facility] [place]    KS WHO COCK-UP NONMOLDE PRE OTS    KS INJECT CARPAL TUNNEL    lidocaine (LMX) 4 % cream    tiZANidine (ZANAFLEX) 2 MG tablet    gabapentin (NEURONTIN) 300 MG capsule       Plan:     Periodic Controlled Substance Monitoring: Assessed functional status.  Clem Woodward MD)    Orders Placed This Encounter   Medications    lidocaine (LMX) 4 % cream     Sig: Apply a half dollar sized amount to intact skin topically up to twice daily as needed for pain     Dispense:  45 g     Refill:  1    tiZANidine (ZANAFLEX) 2 MG tablet     Sig: Take 1 tablet by mouth every evening As needed for pain and spasms     Dispense:  30 tablet     Refill:  0    gabapentin (NEURONTIN) 300 MG capsule     Sig: Take 1 capsule by mouth nightly for 30 days. Dispense:  30 capsule     Refill:  0       Orders Placed This Encounter   Procedures    MRI CERVICAL SPINE WO CONTRAST     Standing Status:   Future     Standing Expiration Date:   7/12/2022     Order Specific Question:   Reason for exam:     Answer:   Evaluate canal stenosis    XR LUMBAR SPINE FLEXION AND EXTENSION ONLY     Standing Status:   Future     Standing Expiration Date:   7/12/2022     Order Specific Question:   Reason for exam:     Answer:   please evaluate for instability    [facility]     Referral Priority:   Routine     Referral Type:   Eval and Treat     Referral Reason:   Specialty Services Required     Requested Specialty:   Occupational Therapy     Number of Visits Requested:   1    EMG     Standing Status:   Future     Standing Expiration Date:   4/13/2023     Order Specific Question:   Which body part? Answer:   Bilateral lower extremities    DC WHO COCK-UP NONMOLDE PRE OTS     BILATERAL     Standing Status:   Future     Standing Expiration Date:   7/12/2022    DC INJECT CARPAL TUNNEL     Bilateral carpal tunnel injection under ultrasound with Dr. Jahaira Boyd. 15-minute procedure. Standing Status:   Future     Standing Expiration Date:   7/12/2022    DC NJX AA&/STRD TFRML EPI LUMBAR/SACRAL 1 LEVEL     Bilateral Lumbar L4/5 transforaminal epidural steroid injection under XR with Dr Jahaira Boyd. No anticoagulation, no antibiotics, no diabetes mellitus, no osteoporosis, no bleeding or platelet dysfunction, IV Dye oktrena NKDA. Not pregnant. 30 minute procedure.  Prone position Standing Status:   Future     Standing Expiration Date:   7/12/2022    IN INJ DX/THER AGNT PARAVERT FACET JOINT, CERV/THORAC, 1ST LEVEL     Left Cervical TON/C3/4/5 medial branch blocks under XR with Dr Vaishnavi Conti. 15 minute procedure. Standing Status:   Future     Standing Expiration Date:   7/12/2022       -Continue home exercise program for lumbar spondylosis and neck pain, completed physical therapy April 2022  -Please follow-up with primary care team regarding vascular calcification seen on x-ray lumbar spine  -XR LS Spine flexion-extension evaluate for instability given retrolisthesis of lumbar vertebrae   -Reviewed XR cervical spine, MRI lumbar spine, EMG BUE above, all questions answered   -Given persistent neck pain despite physical therapy, inability to take NSAIDs due to history of gastric bypass, recommend:  -MRI cervical spine without contrast evaluate canal stenosis  -Lidocaine 4% ointment topical BID prn #1 tube one refill start 4/13/2022   -No NSAIDs recommended given history of gastric bypass surgery   -Start Gabapentin 300 mg by mouth every evening #30 no refills start 4/13/2022. [place] reviewed 4/13/2022  -Tizanidine 2 mg by mouth every evening #30 no refills start 4/13/2022. Avoid all other muscle relaxers and/or sedating medicines. -Consideration for opioids may be given. -Bilateral Lumbar L4/5 transforaminal epidural steroid injection under XR with Dr Vaishnavi Conti. No anticoagulation, no antibiotics, no diabetes mellitus, no osteoporosis, no bleeding or platelet dysfunction, IV Dye okay, NKDA. Not pregnant. 30 minute procedure. Prone position Consider 10 mg dexamethasone, subpedicular approach    -Bilateral carpal tunnel injection under ultrasound with Dr. Vaishnavi Conti. 15 minute procedure. Consider 6 mg total betamethasone  -Left Cervical TON/C3/4/5 medial branch blocks under XR with Dr Vaishnavi Conti. 15 minute procedure. Consider 5 mg dexamethasone.    --Recommend occupational therapy for bilateral carpal tunnel syndrome  -Recommend bilateral wrist splint(s) for the patient's carpal tunnel syndrome. The patient has weakness and instability of bilateral wrists causing intermittent median nerve compression. She requires stabilization from this rigid orthosis to improve her function and reduce pain.  -Consideration for bilateral carpal tunnel injections under ultrasound guidance may be given if her symptoms do not improve with conservative measures as outlined above or to help facilitate a formal physical therapy program. Discussed the risks including but not limited to bleeding, infection, worsened pain, damage to surrounding structures, side effects, toxicity, allergic reactions to medications used, need for surgery, premature damage or degeneration of the joint, nerve, tendon, or vascular injury, as well as catastrophic injury such as vision loss, paralysis, stroke, bowel or bladder incontinence, ventilator dependence, loss of use of the wrists joint and/or extremities, and death. Discussed the risks, benefits, alternative procedures, and alternatives to the procedure including no procedure at all. Discussed that we cannot undo any permanent neurologic or orthopaedic damage or change the course of any underlying disease. After thorough discussion, patient expressed understanding and willingness to proceed. Controlled Substance Monitoring:    Acute and Chronic Pain Monitoring:   RX Monitoring 4/13/2022   Periodic Controlled Substance Monitoring Assessed functional status. Discussed the risks, side effects, and symptoms that would warrant urgent or emergent physician evaluation of all medications prescribed today.      Discussed the risks of the above recommended procedures including but not limited to bleeding, infection, worsened pain, damage to surrounding structures, side effects, toxicity, allergic reactions to medications used, immune and stress-response dysfunction, fat necrosis, decreased bone mineralization, cartilage loss, increased fracture risk, avascular necrosis, skin pigmentation changes, blood sugar elevation, need for surgery, premature damage or degeneration of the joint, as well as catastrophic injury such as vision loss, paralysis, stroke, bowel or bladder incontinence, ventilator dependence, loss of use of the joint and/or extremity, and death. Discussed the risks, benefits, alternative procedures, and alternatives to the procedure including no procedure at all. Discussed that we cannot undo any permanent neurologic or orthopaedic damage or change the course of any underlying disease. The patient appears to be a good candidate for the above recommended procedures, but no guarantees expressed or implied are given regarding the outcome of any procedure. After thorough discussion, patient expressed complete understanding and willingness to proceed. Discussed the risks of the above recommended procedures including but not limited to bleeding, infection, worsened pain, damage to surrounding structures, side effects, toxicity, allergic reactions to medications used, immune and stress-response dysfunction, fat necrosis, skin pigmentation changes, blood sugar elevation, headache, vision changes, need for surgery, as well as catastrophic injury such as vision loss, paralysis, stroke, spinal cord and/or plexus infarction or injury, intrathecal injection, spinal cord puncture, arachnoiditis, discitis, bowel or bladder incontinence, ventilator dependence, loss of use of the arms and/or legs, and death. Discussed off-label use of corticosteroids and how the Food and Drug Administration (FDA) has not approved corticosteroids for epidural use. Discussed the risks, benefits, alternative procedures, and alternatives to the procedure including no procedure at all. Discussed that we cannot undo any permanent neurologic damage or change the course of any underlying disease.  The patient appears to be a good candidate for the above recommended procedures, but no guarantees expressed or implied are given regarding the outcome of any procedure. After thorough discussion, patient expressed understanding and willingness to proceed. Provided education and counseling regarding the diagnosis, prognosis, and treatment options. All questions were answered. Encouraged her to follow-up with her primary care physician and/or specialists as required for her overall health and management of her comorbidities as well as any new positive symptoms mentioned in review of systems above. Care was provided within the definitions and limitations of our specialty practice. Encouraged lifestyle interventions including healthy habits, lifestyle changes, regular aerobic exercise and appropriate weight maintenance as advised by their primary care physician or cardiovascular health provider. Discussed well care and disease prevention/maintenance. All recommendations for therapy are provided to improve function with activities of daily living, decrease pain, and help develop an exercise program. All recommendations for medications are meant to help decrease pain, improve function with activities of daily living, maintain compliance with home exercise program, and improve quality of life. All recommendations for therapeutic injections are meant to help decrease pain, improve function with activities of daily living, maintain compliance with home exercise program, improve quality of life, and decrease reliance upon oral medications. All recommendations for diagnostic injections are meant to help assess the hypothesis that the targeted structure is a significant pain generator that limits the patient's function, causes pain, and reduces her quality of life. Encouraged compliance with her home exercise program. Recommended compliance with physical therapy program as outlined above.  Informed her to wear bracing as appropriate, and that bracing is not a replacement for core strengthening or muscle stabilization. Discussed the elevated risks of excessive sedation while on pain medications. Advised her against driving or operating heavy machinery or performing any activities where she may harm herself or others while on pain medications. Particular caution was emphasized especially during dose adjustments and medication changes. Discussed the elevated risks of respiratory depression and death while on opioid medications, especially when combined with other sedative substances. Discussed the risks of temporary disability, permanent disability, morbidity, and mortality with poorly-managed or undiagnosed medical conditions and comorbidities. Emphasized the importance of timely medical evaluation and treatment as previously recommended by us or other medical professionals. Risks of not pursing these recommendations were emphasized. The patient was offered a treatment at our facility. The physician and patient have discussed in detail the risk of exposure to and/or potential harm posed by the COVID-19 virus with having office visits and procedures at this time versus the risk of delaying the visits and procedures. It is not possible to know either the risk of delaying the visits or procedure or chance of getting an infection with perfect accuracy, but a joint decision was made between the patient and the physician to proceed at this time with the scheduled visits and procedures. Advised her that any lab testing, imaging, or other diagnostic test results are best discussed in person in the office so that we can provide a clear explanation of their significance and best treatment based upon these results. It is her responsibility to make and keep a follow up appointment to discuss these test results in person to discuss the significance of the findings and appropriate follow-up steps.  She expressed complete understanding and agreement with the entire plan as outlined above. Portions of this note may have been typed, auto-populated, dictated or transcribed by voice recognition resulting in errors, omissions, or close substitutions which may be missed despite careful proofreading. Please contact the author for any questions or concerns. Thank you Dr. Araceli Dee for the opportunity to participate in this patient's care. If you have any questions or concerns, please do not hesitate to contact us. Follow up:  Return in about 1 month (around 5/13/2022) for reassessment of pain and symptoms, EMG Internal, P.T. Internal Ref.     Fabrice Villeda MD

## 2022-04-13 NOTE — TELEPHONE ENCOUNTER
ORDER PLACED:    Date: 4/13/22  Description: BILATERAL CARPAL TUNNEL BRACES  Order Number: [id number]  Ordering Provider: LEI  Performing Provider: [facility]  CPT Codes:   ICD10 Codes: X87.94

## 2022-04-13 NOTE — TELEPHONE ENCOUNTER
CELY PENA AUTH- LEFT CERVICAL TON C3,4,5 X1- 4/19/2022 TO 7/15/2022     OK to schedule procedure approved as above. Please note sides/levels approved and date range.    (If applicable, sides/levels approved may differ from those ordered)    TO BE SCHEDULED WITH DR Seven Crum

## 2022-04-13 NOTE — TELEPHONE ENCOUNTER
AUTHORIZATION: LEFT TON/C3,4,5 MBB     INSURANCE: Sayda Carranza [facility] VIA: PORTAL     AUTH #: [id number]    DATE RANGE: 4/19/2022 TO 7/15/2022     TELEPHONE CALL ROUTED TO MA TO SCHEDULE.

## 2022-04-20 RX ORDER — ROSUVASTATIN CALCIUM 10 MG/1
TABLET, COATED ORAL
Qty: 90 TABLET | Refills: 0 | Status: SHIPPED | OUTPATIENT
Start: 2022-04-20 | End: 2022-08-31 | Stop reason: SDUPTHER

## 2022-04-20 RX ORDER — LORATADINE 10 MG/1
TABLET ORAL
Qty: 90 TABLET | Refills: 0 | Status: SHIPPED | OUTPATIENT
Start: 2022-04-20 | End: 2022-08-31 | Stop reason: SDUPTHER

## 2022-04-20 RX ORDER — RESVER/WINE/BFL/GRPSD/PC/C/POM 200MG-60MG
5000 CAPSULE ORAL DAILY
Qty: 30 TABLET | Refills: 1 | Status: SHIPPED | OUTPATIENT
Start: 2022-04-20 | End: 2022-05-18

## 2022-04-20 RX ORDER — LOSARTAN POTASSIUM 50 MG/1
TABLET ORAL
Qty: 90 TABLET | Refills: 0 | Status: SHIPPED | OUTPATIENT
Start: 2022-04-20 | End: 2022-08-31 | Stop reason: SDUPTHER

## 2022-04-20 RX ORDER — BUSPIRONE HYDROCHLORIDE 5 MG/1
TABLET ORAL
Qty: 270 TABLET | Refills: 0 | Status: SHIPPED | OUTPATIENT
Start: 2022-04-20 | End: 2022-06-27

## 2022-04-20 RX ORDER — FLUOXETINE HYDROCHLORIDE 20 MG/1
CAPSULE ORAL
Qty: 90 CAPSULE | Refills: 0 | Status: SHIPPED | OUTPATIENT
Start: 2022-04-20 | End: 2022-08-31 | Stop reason: SDUPTHER

## 2022-04-20 NOTE — TELEPHONE ENCOUNTER
Past Visits    Date Provider Specialty Visit Type Primary Dx   04/13/2022 Ene Alcaraz MD Pain Management Initial consult Lumbar radiculopathy   03/10/2022 Riley Gallardo MD Neurosurgery Office Visit Carpal tunnel syndrome on right   01/28/2022 Chandler Easley MD Family Medicine Office Visit Neck pain on left side

## 2022-04-20 NOTE — TELEPHONE ENCOUNTER
LVM for patient to call back. Monday & Friday: 9:00 - 4:00  Wednesday: 10:00 - 5:00    Patient also has appt on 5/4/22 with Dr. Eulalia Ferro - can get braces then.

## 2022-05-04 ENCOUNTER — PROCEDURE VISIT (OUTPATIENT)
Dept: PAIN MANAGEMENT | Age: 47
End: 2022-05-04
Payer: COMMERCIAL

## 2022-05-04 DIAGNOSIS — M54.16 LUMBAR RADICULOPATHY: Primary | ICD-10-CM

## 2022-05-04 PROCEDURE — 64483 NJX AA&/STRD TFRM EPI L/S 1: CPT | Performed by: PHYSICAL MEDICINE & REHABILITATION

## 2022-05-04 RX ORDER — DEXAMETHASONE SODIUM PHOSPHATE 10 MG/ML
10 INJECTION, SOLUTION INTRAMUSCULAR; INTRAVENOUS ONCE
Status: COMPLETED | OUTPATIENT
Start: 2022-05-04 | End: 2022-05-04

## 2022-05-04 RX ORDER — LIDOCAINE HYDROCHLORIDE 10 MG/ML
5 INJECTION, SOLUTION INFILTRATION; PERINEURAL ONCE
Status: COMPLETED | OUTPATIENT
Start: 2022-05-04 | End: 2022-05-04

## 2022-05-04 RX ORDER — SODIUM CHLORIDE 9 MG/ML
3 INJECTION INTRAVENOUS ONCE
Status: COMPLETED | OUTPATIENT
Start: 2022-05-04 | End: 2022-05-04

## 2022-05-04 RX ADMIN — LIDOCAINE HYDROCHLORIDE 5 ML: 10 INJECTION, SOLUTION INFILTRATION; PERINEURAL at 17:28

## 2022-05-04 RX ADMIN — DEXAMETHASONE SODIUM PHOSPHATE 10 MG: 10 INJECTION, SOLUTION INTRAMUSCULAR; INTRAVENOUS at 17:27

## 2022-05-04 RX ADMIN — SODIUM CHLORIDE 3 ML: 9 INJECTION INTRAVENOUS at 17:27

## 2022-05-04 RX ADMIN — Medication 0.5 MEQ: at 17:27

## 2022-05-04 NOTE — PROGRESS NOTES
This procedure was 30% more difficult and required 30% more work secondary to the patient's habitus. The patient has a BMI of 38.9 and has comorbidities of COPD, obstructive sleep apnea, hypertension, and osteoarthritis. This required increased work for safe and proper positioning upon the fluoroscopy table, increased needle passes for safe and appropriate needle placement, and increased fluoroscopy time and radiation exposure for proper visualization.

## 2022-05-04 NOTE — PROGRESS NOTES
Lumbar Transforaminal Epidural Steroid Injection (TFESI)    Patient Name: Iris Simmons   : 1975  Date: 2022     Physician: Baldev De León MD     INDICATIONS: Iris Simmons is a 55 y.o. female who presents with symptoms and physical exam findings consistent with lumbar radiculopathy. She has had persistent pain that limits her activities of daily living. The pain is persistent despite conservative measures. She has significant functional and psychological impairment due to this condition. Given her symptoms, physical exam findings, impairment in activities of daily living, and lack of response to conservative measures, consideration for lumbar transforaminal epidural corticosteroid injection was given. Discussed the risks including but not limited to bleeding, infection, worsened pain, damage to surrounding structures, side effects, toxicity, allergic reactions to medications used, need for surgery, headache, vision changes, difficulty with chewing and/or swallowing, immune and stress-response dysfunction, fat necrosis, skin pigmentation changes, blood sugar elevation, as well as catastrophic injury such as vision loss, paralysis, spinal cord or plexus injury, cerebral brainstem or spinal cord infarction, intrathecal injection, spinal cord puncture, arachnoiditis, discitis, stroke, bowel or bladder incontinence, ventilator dependence, loss of use of the arms and/or legs, and death. Discussed off-label use of corticosteroids and how the Food and Drug Administration (FDA) has not approved corticosteroids for epidural use. Discussed the risks, benefits, alternative procedures, and alternatives to the procedure including no procedure at all. Discussed that we cannot undo any permanent neurologic or orthopaedic damage or change the course of any underlying disease. After thorough discussion, patient expressed understanding and willingness to proceed.  Written informed consent was obtained and is in the chart. Verbal consent to proceed was obtained. PROCEDURE: Standard ASIPP guidelines were followed and sterile technique used. Area was cleaned with Betadine three times. Fluoroscopic guidance was used for this procedure. The L5 vertebral body was taken as the first lumbar-appearing vertebral body directly above the sacrum on a lateral view. The skin and subcutaneous tissue was anesthetized with 2 mL of 1% preservative-free lidocaine and 0.5 mEq sodium bicarbonate with a 27 gauge 1.5 inch needle. Then a 25 gauge 5 inch spinal needle was used for the remainder of the procedure. There was fair spread of contrast in the anterior epidural space and fair spread of contrast around the exiting nerve root on the right. There was limited spread of contrast in the anterior epidural space and limited spread of contrast around the exiting nerve root on the left. The 6 oclock position of the pedicle was identified. Multiple views of fluoroscopy including lateral were used to confirm accurate needle placement of depth. Injection of 2 mL of contrast dye was free of any subdural or vascular spread or any other aberrant uptake. Live fluoroscopy was used for contrast injection. Aspiration was negative throughout the procedure. An injectate containing 1 mL of 10 mg of Dexamethasone and 3 ml of 0.9% preservative-free normal saline was injected slowly and without difficulty and divided equally between the two sites. Patient tolerated the procedure well, no obvious complications occurred during the procedure. Patient was appropriately monitored and discharged home in stable condition with her usual motor strength. Post-procedure instructions were given to patient.          [x] Bilateral [] T12-L1    [] L1-2   [] Right [] L2-3    [] L3-4   [] Left [x] L4-5    [] L5-S1                    [address] [facility]  Phone [phone]

## 2022-05-18 ENCOUNTER — TELEMEDICINE (OUTPATIENT)
Dept: FAMILY MEDICINE CLINIC | Age: 47
End: 2022-05-18
Payer: COMMERCIAL

## 2022-05-18 DIAGNOSIS — B96.89 ACUTE BACTERIAL SINUSITIS: Primary | ICD-10-CM

## 2022-05-18 DIAGNOSIS — J01.90 ACUTE BACTERIAL SINUSITIS: Primary | ICD-10-CM

## 2022-05-18 PROCEDURE — G8428 CUR MEDS NOT DOCUMENT: HCPCS | Performed by: NURSE PRACTITIONER

## 2022-05-18 PROCEDURE — 99213 OFFICE O/P EST LOW 20 MIN: CPT | Performed by: NURSE PRACTITIONER

## 2022-05-18 RX ORDER — AMOXICILLIN AND CLAVULANATE POTASSIUM 875; 125 MG/1; MG/1
1 TABLET, FILM COATED ORAL 2 TIMES DAILY
Qty: 20 TABLET | Refills: 0 | Status: SHIPPED | OUTPATIENT
Start: 2022-05-18 | End: 2022-05-28

## 2022-05-18 RX ORDER — ALBUTEROL SULFATE 90 UG/1
AEROSOL, METERED RESPIRATORY (INHALATION)
Qty: 18 G | Refills: 0 | Status: SHIPPED | OUTPATIENT
Start: 2022-05-18 | End: 2022-06-14

## 2022-05-18 RX ORDER — RESVER/WINE/BFL/GRPSD/PC/C/POM 200MG-60MG
5000 CAPSULE ORAL DAILY
Qty: 30 TABLET | Refills: 1 | Status: SHIPPED | OUTPATIENT
Start: 2022-05-18 | End: 2022-07-20

## 2022-05-18 ASSESSMENT — ENCOUNTER SYMPTOMS
SINUS PRESSURE: 1
SHORTNESS OF BREATH: 0
GASTROINTESTINAL NEGATIVE: 1
WHEEZING: 0
COUGH: 0
SINUS COMPLAINT: 1
SWOLLEN GLANDS: 1
TROUBLE SWALLOWING: 0
HOARSE VOICE: 0
SORE THROAT: 1

## 2022-05-18 NOTE — TELEPHONE ENCOUNTER
Pharmacy requesting medication refill.  Please approve or deny this request.    Rx requested:  Requested Prescriptions     Pending Prescriptions Disp Refills    albuterol sulfate HFA (VENTOLIN HFA) 108 (90 Base) MCG/ACT inhaler [Pharmacy Med Name: Ventolin HFA 90 mcg/actuation aerosol inhaler] 18 g 5     Sig: INHALE 2 PUFFS into the lungs EVERY 6 HOURS AS NEEDED FOR WHEEZING or SHORTNESS OF BREATH         Last Office Visit:   11/22/2021      Next Visit Date:  Future Appointments   Date Time Provider Donald Jones   6/6/2022  3:45 PM MD BELIA Ramires PM [facility] EMERGENCY [facility] AT [place]

## 2022-05-18 NOTE — PROGRESS NOTES
Subjective:     Patient ID: Eliz Cooper is a 55 y.o. female who presentstoday for:  Chief Complaint   Patient presents with    Sinus Problem         TELEHEALTH EVALUATION -- Audio/Visual (During CQYPR-63 public health emergency)    -   Eliz Cooper is a 55 y.o. female being evaluated by a Virtual Visit (video visit) encounter to address concerns as mentioned above. A caregiver was present when appropriate. Due to this being a TeleHealth encounter (During AZRDR-92 public health emergency), evaluation of the following organ systems was limited: Vitals/Constitutional/EENT/Resp/CV/GI//MS/Neuro/Skin/Heme-Lymph-Imm. Pursuant to the emergency declaration under the [address] [place] [address], [address] [place] [address] authority and the [employer] Act, this Virtual Visit was conducted with patient's (and/or legal guardian's) consent, to reduce the patient's risk of exposure to COVID-19 and provide necessary medical care. The patient (and/or legal guardian) has also been advised to contact this office for worsening conditions or problems, and seek emergency medical treatment and/or call 911 if deemed necessary. Services were provided through a video synchronous discussion virtually to substitute for in-person clinic visit. Type of encounter was _x Doxy __ MyChart ___Facetime    Patient was located at their home. Provider was located at their ___ home or        _x___ office. --DIRK Feng - CNP on 5/18/2022 at 10:18 AM    An electronic signature was used to authenticate this note. Sinus Problem  This is a new problem. The current episode started in the past 7 days. The problem has been gradually worsening since onset. There has been no fever. Associated symptoms include congestion, ear pain, headaches, sinus pressure, a sore throat and swollen glands.  Pertinent negatives include no chills, coughing, diaphoresis, hoarse voice, neck pain, shortness of breath or sneezing. Past treatments include oral decongestants. The treatment provided mild relief. Past Medical History:   Diagnosis Date    Allergic rhinitis     several allergens    Asthma     since shildhood    Bariatric surgery status 01/02/2020    Dr Addis Garcia     Brachial neuralgia 1/23/2014    Chronic left shoulder pain     COPD (chronic obstructive pulmonary disease) ([facility]) 2019    Dr Robbie Menendez    Depression     H/O colonoscopy 2014    Dr. Vira Varghese Hyperlipidemia     Hypertension 2010    Migraine headache     Obesity, morbid (more than 100 lbs over ideal weight or BMI > 40) ([facility])     SEJAL on CPAP 10/2018    Sleep apnea      Current Outpatient Medications on File Prior to Visit   Medication Sig Dispense Refill    FLUoxetine (PROZAC) 20 MG capsule TAKE 1 CAPSULE BY MOUTH DAILY 90 capsule 0    loratadine (CLARITIN) 10 MG tablet TAKE 1 TABLET BY MOUTH DAILY AS NEEDED for allergies 90 tablet 0    rosuvastatin (CRESTOR) 10 MG tablet TAKE 1 TABLET BY MOUTH NIGHTLY 90 tablet 0    losartan (COZAAR) 50 MG tablet TAKE 1 TABLET BY MOUTH EVERY DAY 90 tablet 0    busPIRone (BUSPAR) 5 MG tablet TAKE 1 TABLET BY MOUTH THREE TIMES DAILY AS NEEDED FOR ANXIETY 270 tablet 0    vitamin D3 (D-5000) 125 MCG (5000 UT) TABS tablet Take 1 tablet by mouth daily 30 tablet 1    lidocaine (LMX) 4 % cream Apply a half dollar sized amount to intact skin topically up to twice daily as needed for pain 45 g 1    gabapentin (NEURONTIN) 300 MG capsule Take 1 capsule by mouth nightly for 30 days.  30 capsule 0    tiZANidine (ZANAFLEX) 2 MG capsule Take 1 capsule by mouth nightly as needed for Muscle spasms 14 capsule 0    Multiple Vitamins-Minerals (DEKAS BARIATRIC) CHEW CHEW AND SWALLOW 1 (ONE) TABLET BY MOUTH DAILY 90 tablet 0    FERATE 240 (27 Fe) MG tablet Take 1 tablet by mouth 2 times daily (before meals) (Patient not taking: Reported on 3/10/2022) 180 tablet 0    albuterol sulfate  (90 Base) MCG/ACT inhaler Inhale 2 (TWO) puffs into the lungs EVERY 6 HOURS AS NEEDED FOR WHEEZING or SHORTNESS OF BREATH (Patient not taking: Reported on 3/10/2022) 18 g 5    azelastine (ASTELIN) 0.1 % nasal spray 1 spray by Nasal route 2 times daily Use in each nostril as directed (Patient not taking: Reported on 3/10/2022) 30 mL 0    vitamin D (ERGOCALCIFEROL) 1.25 MG (40155 UT) CAPS capsule Take 1 capsule by mouth once a week (Patient not taking: Reported on 3/10/2022) 4 capsule 3    pantoprazole (PROTONIX) 40 MG tablet TAKE 1 TABLET BY MOUTH EVERY OTHER DAY before a meal 45 tablet 2    albuterol sulfate  (90 Base) MCG/ACT inhaler Inhale 2 puffs into the lungs every 6 hours as needed for Wheezing (Patient not taking: Reported on 3/10/2022) 1 each 0    SYMBICORT 80-4.5 MCG/ACT AERO Inhale 2 puffs into the lungs 2 times daily (Patient not taking: Reported on 3/10/2022) 1 Inhaler 3    Fluticasone furoate-vilanterol (BREO ELLIPTA) 200-25 MCG/INH AEPB inhaler Inhale 1 puff into the lungs daily 1 each 5    albuterol (PROVENTIL) (5 MG/ML) 0.5% nebulizer solution Take 0.5 mLs by nebulization every 6 hours as needed for Wheezing 120 vial 5     Current Facility-Administered Medications on File Prior to Visit   Medication Dose Route Frequency Provider Last Rate Last Admin    iopamidol (ISOVUE-300) 61 % injection 2 mL  2 mL Other ONCE PRN Bonnie Harmon MD         Past Surgical History:   Procedure Laterality Date    BREAST SURGERY  2007    reduction, Dr Denise Nichols  10/20/15    DR. Reyes Holden Right     Dr Phoenix Simons    SLEEVE GASTRECTOMY  01/20/2020    Dr Beatriz Saleh History   Problem Relation Age of Onset    Osteoarthritis Mother     Other Mother         hyperlipidemia    Hypertension Mother     High Cholesterol Mother     Arthritis Mother     Heart Disease Mother     Diabetes Mother     No Known Problems Father      Social History     Socioeconomic History    Marital status: Single     Spouse name: Not on file    Number of children: 2    Years of education: Not on file    Highest education level: Not on file   Occupational History    Occupation: dog grooming business, self employed   Tobacco Use    Smoking status: Former Smoker     Packs/day: 0.50     Types: Cigarettes     Quit date: 7/11/2018     Years since quitting: 3.8    Smokeless tobacco: Never Used    Tobacco comment: uses electronic cigarettes & trying to cut back   Vaping Use    Vaping Use: Never used   Substance and Sexual Activity    Alcohol use: Yes     Alcohol/week: 1.0 standard drink     Types: 1 Standard drinks or equivalent per week     Comment: socially    Drug use: Not Currently     Frequency: 1.0 times per week     Comment: marijuana, quit July 2013    Sexual activity: Yes   Other Topics Concern    Not on file   Social History Narrative    Born in [place], has 1 brother one sister    Never , was in a bad relationship    Lives with daughter age 16 (2017) in a house in [facility]    2 daughters, one daughter with problems, one in college    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)    Hobbies reading, puzzles, games, outdoor activities     [facility] Strain: Low Risk     Difficulty of Paying Living Expenses: Not hard at all   Food Insecurity: No Food Insecurity    Worried About [facility] in the Last Year: Never true   [address] in the Last Year: Never true   Transportation Needs:     Lack of Transportation (Medical): Not on file    Lack of Transportation (Non-Medical):  Not on file   Physical Activity:     Days of Exercise per Week: Not on file    Minutes of Exercise per Session: Not on file   Stress:     Feeling of Stress : Not on file   Social Connections:     Frequency of Communication with Friends and Family: Not on file    Frequency of Social Gatherings with Friends and Family: Not on file    Attends Hinduism Services: Not on file    Active Member of Clubs or Organizations: Not on file    Attends Club or Organization Meetings: Not on file    Marital Status: Not on file   Intimate Partner Violence:     Fear of Current or Ex-Partner: Not on file    Emotionally Abused: Not on file    Physically Abused: Not on file    Sexually Abused: Not on file   Housing Stability:     Unable to Pay for Housing in the Last Year: Not on file    Number of [place] in the Last Year: Not on file    Unstable Housing in the Last Year: Not on file     Allergies:  Patient has no known allergies. Review of Systems   Constitutional: Negative for chills, diaphoresis and fever. HENT: Positive for congestion, ear pain, postnasal drip, sinus pressure and sore throat. Negative for hoarse voice, sneezing and trouble swallowing. Respiratory: Negative for cough, shortness of breath and wheezing. Cardiovascular: Negative for chest pain and palpitations. Gastrointestinal: Negative. Musculoskeletal: Negative for neck pain. Allergic/Immunologic: Positive for environmental allergies. Neurological: Positive for headaches. Negative for dizziness, syncope, weakness and light-headedness. Objective: There were no vitals taken for this visit. Physical Exam  Constitutional:       General: She is not in acute distress. Pulmonary:      Effort: No respiratory distress. Neurological:      Mental Status: She is alert and oriented to person, place, and time. Psychiatric:         Mood and Affect: Mood normal.         Behavior: Behavior normal.         Assessment & Plan:       Diagnosis Orders   1. Acute bacterial sinusitis  amoxicillin-clavulanate (AUGMENTIN) 875-125 MG per tablet     No orders of the defined types were placed in this encounter.     Orders Placed This Encounter   Medications    amoxicillin-clavulanate (AUGMENTIN) 875-125 MG per tablet     Sig: Take 1 tablet by mouth 2 times daily for 10 days     Dispense:  20 tablet     Refill:  0     There are no discontinued medications. Return for needs routine appointment with PCP. Reviewed with the patient: currentclinical status, medications, activities and diet. Side effects, adverse effects of the medicationprescribed today, as well as treatment plan/ rationale and result expectations havebeen discussed with the patient who expresses understanding and desires to proceed. Pt instructions reviewed and given to patient.     Close follow up to evaluate treatment resultsand for coordination of care. I have reviewed the patient's medical historyin detail and updated the computerized patient record.     Javier Lange, APRN - CNP

## 2022-06-03 ENCOUNTER — TELEPHONE (OUTPATIENT)
Dept: PAIN MANAGEMENT | Age: 47
End: 2022-06-03

## 2022-06-09 ENCOUNTER — TELEPHONE (OUTPATIENT)
Dept: PAIN MANAGEMENT | Age: 47
End: 2022-06-09

## 2022-06-09 ENCOUNTER — OFFICE VISIT (OUTPATIENT)
Dept: PAIN MANAGEMENT | Age: 47
End: 2022-06-09
Payer: COMMERCIAL

## 2022-06-09 VITALS — TEMPERATURE: 96.9 F | HEIGHT: 64 IN | BODY MASS INDEX: 39.27 KG/M2 | WEIGHT: 230 LBS

## 2022-06-09 DIAGNOSIS — M54.16 LUMBAR RADICULOPATHY: ICD-10-CM

## 2022-06-09 DIAGNOSIS — M47.817 LUMBOSACRAL SPONDYLOSIS WITHOUT MYELOPATHY: Primary | ICD-10-CM

## 2022-06-09 DIAGNOSIS — G56.03 BILATERAL CARPAL TUNNEL SYNDROME: Primary | ICD-10-CM

## 2022-06-09 DIAGNOSIS — M47.812 CERVICAL SPONDYLOSIS WITHOUT MYELOPATHY: ICD-10-CM

## 2022-06-09 DIAGNOSIS — G56.03 BILATERAL CARPAL TUNNEL SYNDROME: ICD-10-CM

## 2022-06-09 PROCEDURE — 99214 OFFICE O/P EST MOD 30 MIN: CPT | Performed by: NURSE PRACTITIONER

## 2022-06-09 PROCEDURE — G8417 CALC BMI ABV UP PARAM F/U: HCPCS | Performed by: NURSE PRACTITIONER

## 2022-06-09 PROCEDURE — 1036F TOBACCO NON-USER: CPT | Performed by: NURSE PRACTITIONER

## 2022-06-09 PROCEDURE — G8427 DOCREV CUR MEDS BY ELIG CLIN: HCPCS | Performed by: NURSE PRACTITIONER

## 2022-06-09 RX ORDER — GABAPENTIN 300 MG/1
300 CAPSULE ORAL 3 TIMES DAILY
Qty: 90 CAPSULE | Refills: 0 | Status: SHIPPED | OUTPATIENT
Start: 2022-06-09 | End: 2022-10-25 | Stop reason: DRUGHIGH

## 2022-06-09 ASSESSMENT — ENCOUNTER SYMPTOMS
SORE THROAT: 0
BACK PAIN: 1
SHORTNESS OF BREATH: 0
ABDOMINAL PAIN: 0

## 2022-06-09 NOTE — PROGRESS NOTES
Aloma Fairly  (1975)    6/9/2022    Subjective:     Aloma Fairly is 55 y.o. female who complains today of:    Chief Complaint   Patient presents with    Back Pain    Leg Pain         Allergies:  Patient has no known allergies. Past Medical History:   Diagnosis Date    Allergic rhinitis     several allergens    Asthma     since shildhood    Bariatric surgery status 01/02/2020    [facility], Dr Kalyani Smith     Brachial neuralgia 1/23/2014    Chronic left shoulder pain     COPD (chronic obstructive pulmonary disease) ([facility] [address] 2019    Dr Esvin Jimenez    Depression     H/O colonoscopy 2014    Dr. Abdiaziz Kenny Hyperlipidemia     Hypertension 2010    Migraine headache     Obesity, morbid (more than 100 lbs over ideal weight or BMI > 40) ([facility] [address]     SEJAL on CPAP 10/2018    Sleep apnea      Past Surgical History:   Procedure Laterality Date    BREAST SURGERY  2007    reduction, Dr Hay Linker  10/20/15    DR. Mayela Cain Right     Dr Felix Espinosa    SLEEVE GASTRECTOMY  01/20/2020    [facility], Dr Burnham Speaks History   Problem Relation Age of Onset    Osteoarthritis Mother     Other Mother         hyperlipidemia    Hypertension Mother     High Cholesterol Mother     Arthritis Mother     Heart Disease Mother     Diabetes Mother     No Known Problems Father      Social History     Socioeconomic History    Marital status: Single     Spouse name: Not on file    Number of children: 2    Years of education: Not on file    Highest education level: Not on file   Occupational History    Occupation: dog grooming business, self employed   Tobacco Use    Smoking status: Former Smoker     Packs/day: 0.50     Types: Cigarettes     Quit date: 7/11/2018     Years since quitting: 3.9    Smokeless tobacco: Never Used    Tobacco comment: uses electronic cigarettes & trying to cut back   Vaping Use    Vaping Use: Never used   Substance and Sexual Activity    Alcohol use: Yes     Alcohol/week: 1.0 standard drink     Types: 1 Standard drinks or equivalent per week     Comment: socially    Drug use: Not Currently     Frequency: 1.0 times per week     Comment: marijuana, quit July 2013    Sexual activity: Yes   Other Topics Concern    Not on file   Social History Narrative    Born in [place], has 1 brother one sister    Never , was in a bad relationship    Lives with daughter age 16 (2017) in a house in [place] [facility]    2 daughters, one daughter with problems, one in college    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)    [employer] reading, puzzles, games, outdoor activities     [facility] Strain: Low Risk     Difficulty of Paying Living Expenses: Not hard at all   Food Insecurity: No Food Insecurity    Worried About [facility] in the Last Year: Never true   [address] [employer] in the Last Year: Never true   Transportation Needs:     Lack of Transportation (Medical): Not on file    Lack of Transportation (Non-Medical):  Not on file   Physical Activity:     Days of Exercise per Week: Not on file    Minutes of Exercise per Session: Not on file   Stress:     Feeling of Stress : Not on file   Social Connections:     Frequency of Communication with Friends and Family: Not on file    Frequency of Social Gatherings with Friends and Family: Not on file    Attends Baptist Services: Not on file    Active Member of [address] [place] [address] or Organizations: Not on file    Attends Club or Organization Meetings: Not on file    Marital Status: Not on file   Intimate Partner Violence:     Fear of Current or Ex-Partner: Not on file    Emotionally Abused: Not on file    Physically Abused: Not on file    Sexually Abused: Not on file   Housing Stability:     Unable to Pay for Housing in the Last Year: Not on file    Number of [place] in the Last Year: Not on file    Unstable Housing in the Last Year: Not on file Current Outpatient Medications on File Prior to Visit   Medication Sig Dispense Refill    D-5000 125 MCG (5000 UT) TABS tablet Take 1 tablet by mouth daily 30 tablet 1    albuterol sulfate HFA (VENTOLIN HFA) 108 (90 Base) MCG/ACT inhaler INHALE 2 PUFFS into the lungs EVERY 6 HOURS AS NEEDED FOR WHEEZING or SHORTNESS OF BREATH 18 g 0    FLUoxetine (PROZAC) 20 MG capsule TAKE 1 CAPSULE BY MOUTH DAILY 90 capsule 0    loratadine (CLARITIN) 10 MG tablet TAKE 1 TABLET BY MOUTH DAILY AS NEEDED for allergies 90 tablet 0    rosuvastatin (CRESTOR) 10 MG tablet TAKE 1 TABLET BY MOUTH NIGHTLY 90 tablet 0    losartan (COZAAR) 50 MG tablet TAKE 1 TABLET BY MOUTH EVERY DAY 90 tablet 0    busPIRone (BUSPAR) 5 MG tablet TAKE 1 TABLET BY MOUTH THREE TIMES DAILY AS NEEDED FOR ANXIETY 270 tablet 0    lidocaine (LMX) 4 % cream Apply a half dollar sized amount to intact skin topically up to twice daily as needed for pain 45 g 1    tiZANidine (ZANAFLEX) 2 MG capsule Take 1 capsule by mouth nightly as needed for Muscle spasms 14 capsule 0    Multiple Vitamins-Minerals (DEKAS BARIATRIC) CHEW CHEW AND SWALLOW 1 (ONE) TABLET BY MOUTH DAILY 90 tablet 0    FERATE 240 (27 Fe) MG tablet Take 1 tablet by mouth 2 times daily (before meals) (Patient not taking: Reported on 3/10/2022) 180 tablet 0    azelastine (ASTELIN) 0.1 % nasal spray 1 spray by Nasal route 2 times daily Use in each nostril as directed (Patient not taking: Reported on 3/10/2022) 30 mL 0    vitamin D (ERGOCALCIFEROL) 1.25 MG (46670 UT) CAPS capsule Take 1 capsule by mouth once a week (Patient not taking: Reported on 3/10/2022) 4 capsule 3    pantoprazole (PROTONIX) 40 MG tablet TAKE 1 TABLET BY MOUTH EVERY OTHER DAY before a meal 45 tablet 2    albuterol sulfate  (90 Base) MCG/ACT inhaler Inhale 2 puffs into the lungs every 6 hours as needed for Wheezing (Patient not taking: Reported on 3/10/2022) 1 each 0    SYMBICORT 80-4.5 MCG/ACT AERO Inhale 2 puffs into the lungs 2 times daily (Patient not taking: Reported on 3/10/2022) 1 Inhaler 3    Fluticasone furoate-vilanterol (BREO ELLIPTA) 200-25 MCG/INH AEPB inhaler Inhale 1 puff into the lungs daily 1 each 5    albuterol (PROVENTIL) (5 MG/ML) 0.5% nebulizer solution Take 0.5 mLs by nebulization every 6 hours as needed for Wheezing 120 vial 5     Current Facility-Administered Medications on File Prior to Visit   Medication Dose Route Frequency Provider Last Rate Last Admin    iopamidol (ISOVUE-300) 61 % injection 2 mL  2 mL Other ONCE PRN Nikki Willett MD               Pt presents today for a f/u of chronic low back, neck and CTS pain. Patient previously saw Dr. Lynnette Rush in March for neck pain and carpal tunnel and low back pain and did not want to consider surgery nor was she a surgical candidate at that time. patient saw Dr. Marcia Cranker for initial consult in April. Since the injection she feels the leg pain has become worse, more achy. She had previously completed PT at [facility]. History of stomach ulcers, hiatal hernia, gastric bypass, DM, depression and anxiety on Prozac. Previous EMG 1/12/2022 with Dr. Marian Billingsley showed severe right greater than left carpal tunnel. She doesn't feel the medications are helping. Pt feels that any activity aggravates the pain. Pt denies radiating numbness and tingling. Right leg is a little worse than the left. Worst pain is BL low back. Was using the Tizanidine and Gabapentin together and having some drowsiness in the evening the following day. Not sure which medication was causing that issue. 5/4/2022 bilateral L4-5 DELILAH            Review of Systems   Constitutional: Negative for fever. HENT: Negative for congestion and sore throat. Respiratory: Negative for shortness of breath. Gastrointestinal: Negative for abdominal pain. Genitourinary: Negative for difficulty urinating. Musculoskeletal: Positive for back pain.    Neurological: Negative for speech difficulty and headaches. Hematological: Negative for adenopathy. Psychiatric/Behavioral: Negative for agitation. All other systems reviewed and are negative. Objective:     Vitals:  Temp 96.9 °F (36.1 °C)   Ht 5' 4\" (1.626 m)   Wt 230 lb (104.3 kg)   BMI 39.48 kg/m² Pain Score:   8      Physical Exam  Vitals and nursing note reviewed. Pt is alert and oriented x 3. Recent and remote memory is intact. Mood, judgement and affect are normal.  No signs of distress or SOB noted. Visualized skin intact. Sensation intact to light touch. Decreased ROM with flexion and extension of low back. Tender with palpation to bilateral lumbar spine with positive provacative maneuvers noted. Negative SLR. Pt is able to briefly heel walk and toe walk. Strength, balance, and coordination are functional for ambulation. Assessment:      Diagnosis Orders   1. Lumbosacral spondylosis without myelopathy  ID RADIOFREQUENCY NEUROTOMY LUMBAR OR SACRAL, W IMAGE GUIDANCE, SINGLE    ID RADIOFREQ NEUROTOMY LUMBAR OR SACRAL, W IMAGE GUIDE,EA ADDL LEVEL   2. Cervical spondylosis without myelopathy  gabapentin (NEURONTIN) 300 MG capsule   3. Lumbar radiculopathy  gabapentin (NEURONTIN) 300 MG capsule   4. Bilateral carpal tunnel syndrome  gabapentin (NEURONTIN) 300 MG capsule       Plan:     Periodic Controlled Substance Monitoring: No signs of potential drug abuse or diversion identified. Emir Capone, APRN - CNP)    Orders Placed This Encounter   Medications    gabapentin (NEURONTIN) 300 MG capsule     Sig: Take 1 capsule by mouth 3 times daily for 30 days.      Dispense:  90 capsule     Refill:  0       Orders Placed This Encounter   Procedures    ID RADIOFREQUENCY NEUROTOMY LUMBAR OR SACRAL, W IMAGE GUIDANCE, SINGLE     Standing Status:   Future     Standing Expiration Date:   9/7/2022    ID RADIOFREQ NEUROTOMY LUMBAR OR SACRAL, W IMAGE GUIDE,EA ADDL LEVEL     BL L345 MBB with SM     Standing Status:   Future     Standing Expiration Date:   9/7/2022             Discussed options with the patient today. MRI not done. Patient no longer wants treatment for the neck at this time. Will reprint order for OT for CTS. She declines cervical MBBs at this time. Will proceed with lumbar MBBs for chronic arthritic pain. Will check on wrist spints. No NSAIDs recommended given history of gastric bypass surgery. She will continue to see chiropractor. Gabapentin increased to 3 times daily, patient instructed on titrating dose up. All questions were answered. Pt verbalized understanding and agrees with above plan. We will go ahead and order diagnostic bilateral L3, L4, L5 medial branch blocks to see if the patient is a candidate for RF ablation. she has failed conservative treatment in the past. Anatomic model of pathology was shown. Risks and benefits of the procedure were discussed. All questions were answered and patient understands and agrees with the plan. Will continue medications for chronic pain as they help pt function with ADL and improve quality of life. Discussed possible risks of opiate medication with pt, including but not limited to, constipation, nausea or vomiting, sedation, urinary retention, dependence and possible addiction. Pt agrees to use medication as directed. Pt advised to not use opiates while driving or operating heavy equipment, or in situations where pt may harm him/herself or others. Pt is aware that while on narcotics, pt needs to be seen monthly to reassess pain and need for continued medication. NDP reviewed. OARRS was reviewed. This NP saw pt under direct supervision of Dr. Daisy Guillen. Follow up:  Return in about 4 weeks (around 7/7/2022) for review meds and reassess pain.     Jose Ramon Quiroz, DIRK - CNP

## 2022-06-09 NOTE — TELEPHONE ENCOUNTER
Received call from Shiela Canales at [facility] stating that they received an order for occupational therapy for carpal tunnel. She says that they only do physical therapy there. They are asking if Dr. Rey hSin is wanting to order physical therapy for the patient? If so they will need a new order. Call back number is [phone].

## 2022-06-13 ENCOUNTER — TELEPHONE (OUTPATIENT)
Dept: PAIN MANAGEMENT | Age: 47
End: 2022-06-13

## 2022-06-13 DIAGNOSIS — M54.16 LUMBAR RADICULOPATHY: ICD-10-CM

## 2022-06-13 DIAGNOSIS — G56.03 BILATERAL CARPAL TUNNEL SYNDROME: ICD-10-CM

## 2022-06-13 DIAGNOSIS — M47.812 CERVICAL SPONDYLOSIS WITHOUT MYELOPATHY: ICD-10-CM

## 2022-06-13 NOTE — TELEPHONE ENCOUNTER
SUBMITTED AUTH REQUEST ON [facility] PORTAL  REF # [id number]  CLINICAL UPLOADED  PENDED FOR REVIEW

## 2022-06-13 NOTE — TELEPHONE ENCOUNTER
ORDER PLACED:    Date: 6/9/22  Description: Liz PENA  Order Number: [id number]  Ordering Provider: TONIIT  Performing Provider: Gwendolyn Ramirez  CPT Codes: 55681, 99827  ICD10 Codes: E41.557

## 2022-06-14 ENCOUNTER — TELEPHONE (OUTPATIENT)
Dept: PAIN MANAGEMENT | Age: 47
End: 2022-06-14

## 2022-06-14 RX ORDER — ALBUTEROL SULFATE 90 UG/1
AEROSOL, METERED RESPIRATORY (INHALATION)
Qty: 18 G | Refills: 0 | Status: SHIPPED | OUTPATIENT
Start: 2022-06-14 | End: 2022-08-31 | Stop reason: SDUPTHER

## 2022-06-14 RX ORDER — LIDOCAINE 40 MG/G
CREAM TOPICAL
Qty: 45 G | Refills: 1 | OUTPATIENT
Start: 2022-06-14

## 2022-06-14 NOTE — TELEPHONE ENCOUNTER
Patient is requesting a new physical therapy order as the one from 22 has .  She would like to go to [address] [place] [address] [phone]

## 2022-06-14 NOTE — TELEPHONE ENCOUNTER
Order was just placed 5 days ago. Do we really need to put in another order? How can the order  so quickly?

## 2022-06-17 ENCOUNTER — OFFICE VISIT (OUTPATIENT)
Dept: FAMILY MEDICINE CLINIC | Age: 47
End: 2022-06-17
Payer: COMMERCIAL

## 2022-06-17 ENCOUNTER — HOSPITAL ENCOUNTER (OUTPATIENT)
Dept: ULTRASOUND IMAGING | Age: 47
Discharge: HOME | End: 2022-06-19
Payer: COMMERCIAL

## 2022-06-17 VITALS
OXYGEN SATURATION: 96 % | RESPIRATION RATE: 16 BRPM | TEMPERATURE: 97 F | HEIGHT: 64 IN | BODY MASS INDEX: 40.97 KG/M2 | WEIGHT: 240 LBS | DIASTOLIC BLOOD PRESSURE: 82 MMHG | SYSTOLIC BLOOD PRESSURE: 120 MMHG | HEART RATE: 77 BPM

## 2022-06-17 DIAGNOSIS — E78.00 PURE HYPERCHOLESTEROLEMIA: ICD-10-CM

## 2022-06-17 DIAGNOSIS — G89.29 CHRONIC PAIN OF RIGHT KNEE: Primary | ICD-10-CM

## 2022-06-17 DIAGNOSIS — M79.89 RIGHT LEG SWELLING: ICD-10-CM

## 2022-06-17 DIAGNOSIS — M25.561 CHRONIC PAIN OF RIGHT KNEE: Primary | ICD-10-CM

## 2022-06-17 DIAGNOSIS — R73.03 PRE-DIABETES: ICD-10-CM

## 2022-06-17 DIAGNOSIS — I10 ESSENTIAL HYPERTENSION: ICD-10-CM

## 2022-06-17 DIAGNOSIS — E55.9 VITAMIN D DEFICIENCY: ICD-10-CM

## 2022-06-17 PROCEDURE — G8417 CALC BMI ABV UP PARAM F/U: HCPCS | Performed by: FAMILY MEDICINE

## 2022-06-17 PROCEDURE — G8427 DOCREV CUR MEDS BY ELIG CLIN: HCPCS | Performed by: FAMILY MEDICINE

## 2022-06-17 PROCEDURE — 1036F TOBACCO NON-USER: CPT | Performed by: FAMILY MEDICINE

## 2022-06-17 PROCEDURE — 93971 EXTREMITY STUDY: CPT

## 2022-06-17 PROCEDURE — 99214 OFFICE O/P EST MOD 30 MIN: CPT | Performed by: FAMILY MEDICINE

## 2022-06-17 ASSESSMENT — PATIENT HEALTH QUESTIONNAIRE - PHQ9
6. FEELING BAD ABOUT YOURSELF - OR THAT YOU ARE A FAILURE OR HAVE LET YOURSELF OR YOUR FAMILY DOWN: 0
SUM OF ALL RESPONSES TO PHQ QUESTIONS 1-9: 9
7. TROUBLE CONCENTRATING ON THINGS, SUCH AS READING THE NEWSPAPER OR WATCHING TELEVISION: 3
SUM OF ALL RESPONSES TO PHQ QUESTIONS 1-9: 9
1. LITTLE INTEREST OR PLEASURE IN DOING THINGS: 0
SUM OF ALL RESPONSES TO PHQ9 QUESTIONS 1 & 2: 0
10. IF YOU CHECKED OFF ANY PROBLEMS, HOW DIFFICULT HAVE THESE PROBLEMS MADE IT FOR YOU TO DO YOUR WORK, TAKE CARE OF THINGS AT HOME, OR GET ALONG WITH OTHER PEOPLE: 1
4. FEELING TIRED OR HAVING LITTLE ENERGY: 0
8. MOVING OR SPEAKING SO SLOWLY THAT OTHER PEOPLE COULD HAVE NOTICED. OR THE OPPOSITE, BEING SO FIGETY OR RESTLESS THAT YOU HAVE BEEN MOVING AROUND A LOT MORE THAN USUAL: 0
5. POOR APPETITE OR OVEREATING: 3
9. THOUGHTS THAT YOU WOULD BE BETTER OFF DEAD, OR OF HURTING YOURSELF: 0
3. TROUBLE FALLING OR STAYING ASLEEP: 3
2. FEELING DOWN, DEPRESSED OR HOPELESS: 0

## 2022-06-17 ASSESSMENT — ENCOUNTER SYMPTOMS
SORE THROAT: 0
COUGH: 0

## 2022-06-17 NOTE — PROGRESS NOTES
Subjective:      Patient ID: Jason Hankins is a 55 y.o. female    HPI  Here with recurrent right knee pain for last 3 weeks. .  Felt more cracking of knee over this time. Feels like going to give out. Swelling intermittently. Feeling like swelling behind knee     Review of Systems   Constitutional: Negative for chills and fever. HENT: Negative for sore throat. Respiratory: Negative for cough. Skin: Negative for rash. Neurological: Negative for weakness. Reviewed allergy, medical, social, surgical, family and med list changes and updated   Files     Social History     Socioeconomic History    Marital status: Single     Spouse name: Not on file    Number of children: 2    Years of education: Not on file    Highest education level: Not on file   Occupational History    Occupation: [employer] business, self employed   Tobacco Use    Smoking status: Former Smoker     Packs/day: 0.50     Types: Cigarettes     Quit date: 7/11/2018     Years since quitting: 3.9    Smokeless tobacco: Never Used    Tobacco comment: uses electronic cigarettes & trying to cut back   Vaping Use    Vaping Use: Never used   Substance and Sexual Activity    Alcohol use:  Yes     Alcohol/week: 1.0 standard drink     Types: 1 Standard drinks or equivalent per week     Comment: socially    Drug use: Not Currently     Frequency: 1.0 times per week     Comment: marijuana, quit July 2013    Sexual activity: Yes   Other Topics Concern    Not on file   Social History Narrative    Born in [place], has 1 brother one sister    Never , was in a bad relationship    Lives with daughter age 16 (2017) in a house in [facility]    2 daughters, one daughter with problems, one in college    Works at [employer])    Hobbies reading, puzzles, games, outdoor activities     [facility] Strain: [address] Difficulty of Paying Living Expenses: Not hard at all Food Insecurity: No Food Insecurity    Worried About Running Out of Food in the Last Year: Never true    Kimberly of Food in the Last Year: Never true   Transportation Needs:     Lack of Transportation (Medical): Not on file    Lack of Transportation (Non-Medical): Not on file   Physical Activity:     Days of Exercise per Week: Not on file    Minutes of Exercise per Session: Not on file   Stress:     Feeling of Stress : Not on file   Social Connections:     Frequency of Communication with Friends and Family: Not on file    Frequency of Social Gatherings with Friends and Family: Not on file    Attends Oriental orthodox Services: Not on file    Active Member of [address] [place] [address] [employer] or Organizations: Not on file    Attends Club or Organization Meetings: Not on file    Marital Status: Not on file   Intimate Partner Violence:     Fear of Current or Ex-Partner: Not on file    Emotionally Abused: Not on file    Physically Abused: Not on file    Sexually Abused: Not on file   Housing Stability:     Unable to Pay for Housing in the Last Year: Not on file    Number of [place] in the Last Year: Not on file    Unstable Housing in the Last Year: Not on file     Current Outpatient Medications   Medication Sig Dispense Refill    albuterol sulfate HFA (VENTOLIN HFA) 108 (90 Base) MCG/ACT inhaler INHALE 2 PUFFS into the lungs EVERY 6 HOURS AS NEEDED FOR WHEEZING or SHORTNESS OF BREATH 18 g 0    gabapentin (NEURONTIN) 300 MG capsule Take 1 capsule by mouth 3 times daily for 30 days.  90 capsule 0    D-5000 125 MCG (5000 UT) TABS tablet Take 1 tablet by mouth daily 30 tablet 1    FLUoxetine (PROZAC) 20 MG capsule TAKE 1 CAPSULE BY MOUTH DAILY 90 capsule 0    loratadine (CLARITIN) 10 MG tablet TAKE 1 TABLET BY MOUTH DAILY AS NEEDED for allergies 90 tablet 0    rosuvastatin (CRESTOR) 10 MG tablet TAKE 1 TABLET BY MOUTH NIGHTLY 90 tablet 0    losartan (COZAAR) 50 MG tablet TAKE 1 TABLET BY MOUTH EVERY DAY 90 tablet 0    busPIRone (BUSPAR) 5 MG tablet TAKE 1 TABLET BY MOUTH THREE TIMES DAILY AS NEEDED FOR ANXIETY 270 tablet 0    lidocaine (LMX) 4 % cream Apply a half dollar sized amount to intact skin topically up to twice daily as needed for pain 45 g 1    tiZANidine (ZANAFLEX) 2 MG capsule Take 1 capsule by mouth nightly as needed for Muscle spasms 14 capsule 0    Multiple Vitamins-Minerals (DEKAS BARIATRIC) CHEW CHEW AND SWALLOW 1 (ONE) TABLET BY MOUTH DAILY 90 tablet 0    FERATE 240 (27 Fe) MG tablet Take 1 tablet by mouth 2 times daily (before meals) 180 tablet 0    azelastine (ASTELIN) 0.1 % nasal spray 1 spray by Nasal route 2 times daily Use in each nostril as directed 30 mL 0    pantoprazole (PROTONIX) 40 MG tablet TAKE 1 TABLET BY MOUTH EVERY OTHER DAY before a meal 45 tablet 2    albuterol sulfate  (90 Base) MCG/ACT inhaler Inhale 2 puffs into the lungs every 6 hours as needed for Wheezing 1 each 0    SYMBICORT 80-4.5 MCG/ACT AERO Inhale 2 puffs into the lungs 2 times daily 1 Inhaler 3    Fluticasone furoate-vilanterol (BREO ELLIPTA) 200-25 MCG/INH AEPB inhaler Inhale 1 puff into the lungs daily 1 each 5    albuterol (PROVENTIL) (5 MG/ML) 0.5% nebulizer solution Take 0.5 mLs by nebulization every 6 hours as needed for Wheezing 120 vial 5     Current Facility-Administered Medications   Medication Dose Route Frequency Provider Last Rate Last Admin    iopamidol (ISOVUE-300) 61 % injection 2 mL  2 mL Other ONCE PRN Annette Lopez MD         Family History   Problem Relation Age of Onset    Osteoarthritis Mother     Other Mother         hyperlipidemia    Hypertension Mother     High Cholesterol Mother     Arthritis Mother     Heart Disease Mother     Diabetes Mother     No Known Problems Father      Past Medical History:   Diagnosis Date    Allergic rhinitis     several allergens    Asthma     since shildhood    Bariatric surgery status 01/02/2020    [address], Dr Amena Mejia     Brachial neuralgia 1/23/2014    Chronic left shoulder pain     COPD (chronic obstructive pulmonary disease) ([facility] [address] 2019    Dr Trevin Gamez H/O colonoscopy 2014    Dr. Susan Gamez Hyperlipidemia     Hypertension 2010    Migraine headache     Obesity, morbid (more than 100 lbs over ideal weight or BMI > 40) (HCC)     SEJAL on CPAP 10/2018    Sleep apnea      Objective:   /82   Pulse 77   Temp 97 °F (36.1 °C)   Resp 16   Ht 5' 4\" (1.626 m)   Wt 240 lb (108.9 kg)   SpO2 96%   BMI 41.20 kg/m²     Physical Exam  right Knee    Swelling: Mild   Effusion: Positive   Tenderness: Medial joint line       Range of Motion:      Extension: Normal     Flexion: Normal       McMurrays: Negative   Anterior Lachmann: Negative   Posterior Lachmann: Negative   Anterior Drawer: Negative   Posterior Drawer: Negative   Varus Stress Test: Negative   Valgus Stress Test: Negative   Pivot Shift: Not done   Patellar Apprehension: No    D.P 1+ and equal   Extremities; Minimal swelling of calf on right  susanne neg. No erythema or warmth                        Mild tenderness proximal calf. Lungs;           Clear and equal breath sounds  Heart;             Rate reg. No murmur                    Assessment:       Diagnosis Orders   1. Chronic pain of right knee  MILVIA - Hannah Enriquez MD, Orthopaedic Surgery   2. Right leg swelling  US DUP LOWER EXTREMITY RIGHT NUBIA   3. Vitamin D deficiency     4. Pure hypercholesterolemia     5.  Essential hypertension           Plan:      Orders Placed This Encounter   Procedures    US DUP LOWER EXTREMITY RIGHT NUBIA     Standing Status:   Future     Standing Expiration Date:   6/17/2023   Mima Bravo MD, Orthopaedic Surgery     Referral Priority:   Routine     Referral Type:   Eval and Treat     Referral Reason:   Specialty Services Required     Referred to Provider:   Haleigh Cueto MD     Requested Specialty:   Orthopedic Surgery     Number of Visits Requested:   1     Orders Placed This Encounter   Medications    diclofenac sodium (VOLTAREN) 1 % GEL     Sig: Apply 4 g topically 4 times daily     Dispense:  100 g     Refill:  0     Fasting blood work this month   F/u after done

## 2022-06-20 NOTE — TELEPHONE ENCOUNTER
RISA L3,4,5 MBB    AUTH APPROVED FROM 6/13/22-9/13/22    OK to schedule procedure approved as above. Please note sides/levels approved and date range. (If applicable, sides/levels approved may differ from those ordered)    TO BE SCHEDULED WITH DR. Gwendolyn Ramirez

## 2022-06-21 NOTE — TELEPHONE ENCOUNTER
Surgery Scheduling (MELISSA L3,4,5 JEAN-FELIPE APPROVED 6/13/22-9/13/22)        LEFT MESSAGE FOR THE PATIENT TO PLEASE CONTACT THE OFFICE TO SCHEDULE PROCEDURE.

## 2022-06-23 NOTE — TELEPHONE ENCOUNTER
I called and spoke with patient, she stated that she is getting ready to go on vacation and would like to hold off on injections at this time. Patient is aware to call our office when she would like to schedule these back injections.

## 2022-06-27 ENCOUNTER — CLINICAL DOCUMENTATION (OUTPATIENT)
Dept: PAIN MANAGEMENT | Age: 47
End: 2022-06-27

## 2022-06-27 ENCOUNTER — OFFICE VISIT (OUTPATIENT)
Dept: OBGYN CLINIC | Age: 47
End: 2022-06-27
Payer: COMMERCIAL

## 2022-06-27 VITALS
SYSTOLIC BLOOD PRESSURE: 112 MMHG | HEIGHT: 65 IN | DIASTOLIC BLOOD PRESSURE: 70 MMHG | BODY MASS INDEX: 40.98 KG/M2 | WEIGHT: 246 LBS

## 2022-06-27 DIAGNOSIS — Z01.419 WOMEN'S ANNUAL ROUTINE GYNECOLOGICAL EXAMINATION: Primary | ICD-10-CM

## 2022-06-27 DIAGNOSIS — Z12.31 SCREENING MAMMOGRAM FOR BREAST CANCER: ICD-10-CM

## 2022-06-27 DIAGNOSIS — Z11.51 SCREENING FOR HUMAN PAPILLOMAVIRUS: ICD-10-CM

## 2022-06-27 PROCEDURE — 99386 PREV VISIT NEW AGE 40-64: CPT | Performed by: OBSTETRICS & GYNECOLOGY

## 2022-06-27 ASSESSMENT — ENCOUNTER SYMPTOMS
ABDOMINAL DISTENTION: 0
EYES NEGATIVE: 1
RECTAL PAIN: 0
DIARRHEA: 0
RESPIRATORY NEGATIVE: 1
BLOOD IN STOOL: 0
CONSTIPATION: 0
ALLERGIC/IMMUNOLOGIC NEGATIVE: 1
ANAL BLEEDING: 0
ABDOMINAL PAIN: 0
NAUSEA: 0
VOMITING: 0

## 2022-06-27 ASSESSMENT — VISUAL ACUITY: OU: 1

## 2022-06-27 NOTE — PROGRESS NOTES
Subjective:      Patient ID: Annie Loomis is a 52 y.o. female    Annual exam. New patient; reviewed medical, surgical, social and family history. Also reviewed current medications and allergies. No GYN complaints. Normal cycles. Pap performed and screening mammogram ordered. STD screening offered. Monthly SBE encouraged. F/U annual or prn. Vitals:  /70   Ht 5' 5\" (1.651 m)   Wt 246 lb (111.6 kg)   LMP 2022   BMI 40.94 kg/m²   Past Medical History:   Diagnosis Date    Abnormal Pap smear of cervix     Allergic rhinitis     several allergens    Asthma     since shildhood    Bariatric surgery status 2020    [facility], Dr Priyanka Partida     Brachial neuralgia 2014    Chronic left shoulder pain     COPD (chronic obstructive pulmonary disease) ([facility] [address]     Dr Mykel Mattson    Depression     H/O colonoscopy     Dr. Mauricio Ridindu Hyperlipidemia     Hypertension 2010    Migraine headache     Obesity, morbid (more than 100 lbs over ideal weight or BMI > 40) ([facility] [address]     SEJAL on CPAP 10/2018    Sleep apnea      Past Surgical History:   Procedure Laterality Date    BREAST SURGERY  2007    reduction, Dr Rehana Roberts  10/20/15    DR. Wolfgang Medina Right     Dr Webber, [address] GASTRECTOMY  2020    [facility], Dr Derek Sam       Allergies:  Patient has no known allergies. Current Outpatient Medications   Medication Sig Dispense Refill    diclofenac sodium (VOLTAREN) 1 % GEL Apply 4 g topically 4 times daily 100 g 0    albuterol sulfate HFA (VENTOLIN HFA) 108 (90 Base) MCG/ACT inhaler INHALE 2 PUFFS into the lungs EVERY 6 HOURS AS NEEDED FOR WHEEZING or SHORTNESS OF BREATH 18 g 0    gabapentin (NEURONTIN) 300 MG capsule Take 1 capsule by mouth 3 times daily for 30 days.  90 capsule 0    D-5000 125 MCG (5000 UT) TABS tablet Take 1 tablet by mouth daily 30 tablet 1    FLUoxetine (PROZAC) 20 MG capsule TAKE 1 CAPSULE BY MOUTH DAILY 90 capsule 0    loratadine (CLARITIN) 10 MG tablet TAKE 1 TABLET BY MOUTH DAILY AS NEEDED for allergies 90 tablet 0    rosuvastatin (CRESTOR) 10 MG tablet TAKE 1 TABLET BY MOUTH NIGHTLY 90 tablet 0    losartan (COZAAR) 50 MG tablet TAKE 1 TABLET BY MOUTH EVERY DAY 90 tablet 0    lidocaine (LMX) 4 % cream Apply a half dollar sized amount to intact skin topically up to twice daily as needed for pain 45 g 1    tiZANidine (ZANAFLEX) 2 MG capsule Take 1 capsule by mouth nightly as needed for Muscle spasms 14 capsule 0    Multiple Vitamins-Minerals (DEKAS BARIATRIC) CHEW CHEW AND SWALLOW 1 (ONE) TABLET BY MOUTH DAILY 90 tablet 0    azelastine (ASTELIN) 0.1 % nasal spray 1 spray by Nasal route 2 times daily Use in each nostril as directed 30 mL 0    pantoprazole (PROTONIX) 40 MG tablet TAKE 1 TABLET BY MOUTH EVERY OTHER DAY before a meal 45 tablet 2    albuterol sulfate  (90 Base) MCG/ACT inhaler Inhale 2 puffs into the lungs every 6 hours as needed for Wheezing 1 each 0    SYMBICORT 80-4.5 MCG/ACT AERO Inhale 2 puffs into the lungs 2 times daily 1 Inhaler 3    Fluticasone furoate-vilanterol (BREO ELLIPTA) 200-25 MCG/INH AEPB inhaler Inhale 1 puff into the lungs daily 1 each 5    albuterol (PROVENTIL) (5 MG/ML) 0.5% nebulizer solution Take 0.5 mLs by nebulization every 6 hours as needed for Wheezing 120 vial 5     Current Facility-Administered Medications   Medication Dose Route Frequency Provider Last Rate Last Admin    iopamidol (ISOVUE-300) 61 % injection 2 mL  2 mL Other ONCE PRN Bennett Salgado MD         Social History     Socioeconomic History    Marital status: Single     Spouse name: Not on file    Number of children: 2    Years of education: Not on file    Highest education level: Not on file   Occupational History    Occupation: dog grooming business, self employed   Tobacco Use    Smoking status: Former Smoker     Packs/day: 0.50     Types: Cigarettes Quit date: 7/11/2018     Years since quitting: 3.9    Smokeless tobacco: Never Used    Tobacco comment: uses electronic cigarettes & trying to cut back   Vaping Use    Vaping Use: Never used   Substance and Sexual Activity    Alcohol use: Yes     Alcohol/week: 1.0 standard drink     Types: 1 Standard drinks or equivalent per week     Comment: socially    Drug use: Not Currently     Frequency: 1.0 times per week     Comment: marijuana, quit July 2013    Sexual activity: Yes     Partners: Male     Birth control/protection: Condom   Other Topics Concern    Not on file   Social History Narrative    Born in [place], has 1 brother one sister    Never , was in a bad relationship    Lives with daughter age 16 (2017) in a house in [place]    2 daughters, one daughter with problems, one in college    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)    Hobbies reading, puzzles, games, outdoor activities     [address] Strain:     Difficulty of Paying Living Expenses: Not on file   Food Insecurity:     Worried About [address] in the Last Year: Not on file    [address] in the Last Year: Not on file   Transportation Needs:     Lack of Transportation (Medical): Not on file    Lack of Transportation (Non-Medical):  Not on file   Physical Activity:     Days of Exercise per Week: Not on file    Minutes of Exercise per Session: Not on file   Stress:     Feeling of Stress : Not on file   Social Connections:     Frequency of Communication with Friends and Family: Not on file    Frequency of Social Gatherings with Friends and Family: Not on file    Attends Pentecostal Services: Not on file    Active Member of Clubs or Organizations: Not on file    Attends Club or Organization Meetings: Not on file    Marital Status: Not on file   Intimate Partner Violence:     Fear of Current or Ex-Partner: Not on file    Emotionally Abused: Not on file    Physically Abused: Not on file    Sexually Abused: Not on file   Housing Stability:     Unable to Pay for Housing in the Last Year: Not on file    Number of Places Lived in the Last Year: Not on file    Unstable Housing in the Last Year: Not on file     Family History   Problem Relation Age of Onset    Osteoarthritis Mother     Other Mother         hyperlipidemia    Hypertension Mother     High Cholesterol Mother     Arthritis Mother     Heart Disease Mother     Diabetes Mother     No Known Problems Father        Review of Systems   Constitutional: Negative. Negative for activity change, appetite change, chills, diaphoresis, fatigue, fever and unexpected weight change. HENT: Negative. Eyes: Negative. Respiratory: Negative. Cardiovascular: Negative. Gastrointestinal: Negative for abdominal distention, abdominal pain, anal bleeding, blood in stool, constipation, diarrhea, nausea, rectal pain and vomiting. Endocrine: Negative. Genitourinary: Negative for decreased urine volume, difficulty urinating, dyspareunia, dysuria, enuresis, flank pain, frequency, genital sores, hematuria, menstrual problem, pelvic pain, urgency, vaginal bleeding, vaginal discharge and vaginal pain. Musculoskeletal: Negative. Skin: Negative. Allergic/Immunologic: Negative. Neurological: Negative. Hematological: Negative. Psychiatric/Behavioral: Negative. Objective:     Physical Exam  Constitutional:       Appearance: She is well-developed. HENT:      Head: Normocephalic. Eyes:      General: Lids are normal. Vision grossly intact. Neck:      Thyroid: No thyromegaly. Cardiovascular:      Rate and Rhythm: Normal rate and regular rhythm. Heart sounds: Normal heart sounds. Pulmonary:      Effort: Pulmonary effort is normal. No respiratory distress. Breath sounds: Normal breath sounds. No wheezing or rales. Chest:      Chest wall: No tenderness.    Breasts:      Right: Normal. No swelling, bleeding, inverted nipple, mass, nipple discharge, skin change, tenderness, axillary adenopathy or supraclavicular adenopathy. Left: Normal. No swelling, bleeding, inverted nipple, mass, nipple discharge, skin change, tenderness, axillary adenopathy or supraclavicular adenopathy. Abdominal:      General: There is no distension. Palpations: Abdomen is soft. There is no mass. Tenderness: There is no abdominal tenderness. There is no guarding or rebound. Hernia: No hernia is present. There is no hernia in the left inguinal area or right inguinal area. Genitourinary:     General: Normal vulva. Pubic Area: No rash. Labia:         Right: No rash, tenderness, lesion or injury. Left: No rash, tenderness, lesion or injury. Urethra: No prolapse, urethral swelling or urethral lesion. Vagina: Normal. No signs of injury and foreign body. No vaginal discharge, erythema, tenderness or bleeding. Cervix: No cervical motion tenderness, discharge or friability. Uterus: Not deviated, not enlarged, not fixed and not tender. Adnexa:         Right: No mass, tenderness or fullness. Left: No mass, tenderness or fullness. Rectum: Normal.   Musculoskeletal:         General: No tenderness. Normal range of motion. Cervical back: Normal range of motion and neck supple. Lymphadenopathy:      Cervical: No cervical adenopathy. Upper Body:      Right upper body: No supraclavicular or axillary adenopathy. Left upper body: No supraclavicular or axillary adenopathy. Lower Body: No right inguinal adenopathy. No left inguinal adenopathy. Skin:     General: Skin is warm and dry. Coloration: Skin is not pale. Findings: No erythema or rash. Neurological:      Mental Status: She is alert and oriented to person, place, and time. Psychiatric:         Behavior: Behavior normal.         Thought Content:  Thought content normal. Judgment: Judgment normal.         Assessment:      Diagnosis Orders   1. Women's annual routine gynecological examination  PAP SMEAR   2. Screening for human papillomavirus  PAP SMEAR   3. Screening mammogram for breast cancer  XIN DIGITAL SCREEN W OR WO CAD BILATERAL         Plan:      Medications placedthis encounter:  No orders of the defined types were placed in this encounter. Orders placedthis encounter:  Orders Placed This Encounter   Procedures    XIN DIGITAL SCREEN W OR WO CAD BILATERAL     Standing Status:   Future     Standing Expiration Date:   6/27/2023    PAP SMEAR     Patient History:    Patient's last menstrual period was 06/22/2022. OBGYN Status: Having periods  Past Surgical History:  2007: BREAST SURGERY      Comment:  reduction, Dr Alexei Heard  10/20/15: COLONOSCOPY      Comment:  DR. MONTE  No date: GYNECOLOGIC CRYOSURGERY  No date: HERNIA REPAIR; Right      Comment:  Dr Drew Muniz  01/20/2020: SLEEVE GASTRECTOMY      Comment:  DIANE ZAMBRANO [facility], Dr Amena Mejia  No date: TUBAL LIGATION      Social History    Tobacco Use      Smoking status: Former Smoker        Packs/day: 0.50        Types: Cigarettes        Quit date: 7/11/2018        Years since quitting: 3.9      Smokeless tobacco: Never Used      Tobacco comment: uses electronic cigarettes & trying to cut back       Standing Status:   Future     Standing Expiration Date:   6/27/2023     Order Specific Question:   Collection Type     Answer: Thin Prep     Order Specific Question:   Prior Abnormal Pap Test     Answer:   No     Order Specific Question:   Screening or Diagnostic     Answer:   Screening     Order Specific Question:   HPV Requested? Answer:   Yes     Order Specific Question:   High Risk Patient     Answer:   N/A         Follow up:  Return in about 1 year (around 6/27/2023) for Annual.   Repeat Annual GYN exam every 1 year. Cervical Cytology exam starts age 24.    If Negative Cytology;  follow-up screening per current guidelines. Mammograms yearly starting at age 36. Calcium and Vitamin D dosing reviewed ( age appropriate ). Colonoscopy and bone density screening discussed ( age appropriate ). Birth control and STD prevention discussed ( age appropriate ). Gardisil counseling completed for all patients ( age appropriate ). Routine health maintenance ( per PCP and guidelines ).

## 2022-06-27 NOTE — PROGRESS NOTES
The patient was asked if she would like a chaperone present for her intimate exam. She  Declined the chaperone.  Rebecca Duncan CMA ([address] [place] [address])

## 2022-06-28 DIAGNOSIS — Z11.51 SCREENING FOR HUMAN PAPILLOMAVIRUS: ICD-10-CM

## 2022-06-28 DIAGNOSIS — Z01.419 WOMEN'S ANNUAL ROUTINE GYNECOLOGICAL EXAMINATION: ICD-10-CM

## 2022-06-29 NOTE — PROGRESS NOTES
[facility]  Spine Surgery  Advanced Pain Management           DME Coordinator: Jordan Sanchez        Patient Name: Erik Brady : 1975        Date: 2022       DME Fitting: Bilateral Wrist Splint -     The above brace has been ordered by Dr. India Jean for bilateral carpal tunnel syndrome. she will benefit from this brace as it helps to reduce weakness and instability of the wrist. This rigid orthosis is required to stabilize the wrist to improve function and decrease pain. I have shown the patient the brace and demonstrated how to wear it and adjust the straps. I advised the patient wear the splint at night or as directed by their provider. I advised the patient to adjust the brace for comfort and loosen or tighten as needed while maintaining restriction of motion during the night. I had the patient try on the brace and we assessed the fit. The patient was fit the standard size splint. Per my evaluation and discussion with the patient, this brace will work well for the patient. The patient expressed satisfaction with the fit of this brace and understanding of it's use. [address]  Phone [phone]

## 2022-06-30 ENCOUNTER — OFFICE VISIT (OUTPATIENT)
Dept: ORTHOPEDIC SURGERY | Age: 47
End: 2022-06-30
Payer: COMMERCIAL

## 2022-06-30 DIAGNOSIS — M23.91 DERANGEMENT OF RIGHT KNEE: Primary | ICD-10-CM

## 2022-06-30 PROCEDURE — 99204 OFFICE O/P NEW MOD 45 MIN: CPT | Performed by: ORTHOPAEDIC SURGERY

## 2022-06-30 PROCEDURE — G8427 DOCREV CUR MEDS BY ELIG CLIN: HCPCS | Performed by: ORTHOPAEDIC SURGERY

## 2022-06-30 PROCEDURE — 1036F TOBACCO NON-USER: CPT | Performed by: ORTHOPAEDIC SURGERY

## 2022-06-30 PROCEDURE — 20610 DRAIN/INJ JOINT/BURSA W/O US: CPT | Performed by: ORTHOPAEDIC SURGERY

## 2022-06-30 PROCEDURE — G8417 CALC BMI ABV UP PARAM F/U: HCPCS | Performed by: ORTHOPAEDIC SURGERY

## 2022-06-30 RX ORDER — LIDOCAINE HYDROCHLORIDE 10 MG/ML
5 INJECTION, SOLUTION INFILTRATION; PERINEURAL ONCE
Status: COMPLETED | OUTPATIENT
Start: 2022-06-30 | End: 2022-06-30

## 2022-06-30 RX ORDER — METHYLPREDNISOLONE ACETATE 80 MG/ML
80 INJECTION, SUSPENSION INTRA-ARTICULAR; INTRALESIONAL; INTRAMUSCULAR; SOFT TISSUE ONCE
Status: SHIPPED | OUTPATIENT
Start: 2022-06-30

## 2022-06-30 RX ADMIN — TRIAMCINOLONE ACETONIDE 40 MG: 40 INJECTION, SUSPENSION INTRA-ARTICULAR; INTRAMUSCULAR at 14:18

## 2022-06-30 RX ADMIN — LIDOCAINE HYDROCHLORIDE 5 ML: 10 INJECTION, SOLUTION INFILTRATION; PERINEURAL at 16:50

## 2022-06-30 NOTE — PROGRESS NOTES
Subjective:      Patient ID: Natali Chavez is a 52 y.o. female who presents today for:  Chief Complaint   Patient presents with    Knee Pain     right knee pain, she states it is popping. She had an xray done Last xray 08/22/21 but she di have a recent US       HPI  Pain medial and posterior knee with cracking sensation that was treated with a cortisone shot 1 year ago. According to the patient this lasted about 10 months however she started case getting some cramping pain and cracking sensation within the knee once again. Ultrasound demonstrates a Baker's cyst.    Past Medical History:   Diagnosis Date    Abnormal Pap smear of cervix     Allergic rhinitis     several allergens    Asthma     since shildhood    Bariatric surgery status 01/02/2020    [facility], Dr Skip Rodriguez     Brachial neuralgia 1/23/2014    Chronic left shoulder pain     COPD (chronic obstructive pulmonary disease) ([facility] [address] 2019    Dr Dorothy Camilo    Depression     H/O colonoscopy 2014    Dr. Jessica Hammer Hyperlipidemia     Hypertension 2010    Migraine headache     Obesity, morbid (more than 100 lbs over ideal weight or BMI > 40) ([facility] [address]     SEJAL on CPAP 10/2018    Sleep apnea      Past Surgical History:   Procedure Laterality Date    BREAST SURGERY  2007    reduction, Dr Elvira Stauffer  10/20/15    DR. Lacy Mcmahon Right     Dr Francisco Javier Gandhi    SLEEVE GASTRECTOMY  01/20/2020    [facility], Dr Gillespie Favorite History     Socioeconomic History    Marital status: Single     Spouse name: Not on file    Number of children: 2    Years of education: Not on file    Highest education level: Not on file   Occupational History    Occupation: dog grooming business, self employed   Tobacco Use    Smoking status: Former Smoker     Packs/day: 0.50     Types: Cigarettes     Quit date: 7/11/2018     Years since quitting: 3.9    Smokeless tobacco: Never Used    Tobacco comment: uses electronic cigarettes & trying to cut back   Vaping Use    Vaping Use: Never used   Substance and Sexual Activity    Alcohol use: Yes     Alcohol/week: 1.0 standard drink     Types: 1 Standard drinks or equivalent per week     Comment: socially    Drug use: Not Currently     Frequency: 1.0 times per week     Comment: marijuana, quit July 2013    Sexual activity: Yes     Partners: Male     Birth control/protection: Condom   Other Topics Concern    Not on file   Social History Narrative    Born in [place], has 1 brother one sister    Never , was in a bad relationship    Lives with daughter age 16 (2017) in a house in [place]    2 daughters, one daughter with problems, one in college    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)    [employer] reading, puzzles, games, outdoor activities     [address] Strain:     Difficulty of Paying Living Expenses: Not on file   Food Insecurity:     Worried About [address] in the Last Year: Not on file    [address] in the Last Year: Not on file   Transportation Needs:     Lack of Transportation (Medical): Not on file    Lack of Transportation (Non-Medical):  Not on file   Physical Activity:     Days of Exercise per Week: Not on file    Minutes of Exercise per Session: Not on file   Stress:     Feeling of Stress : Not on file   Social Connections:     Frequency of Communication with Friends and Family: Not on file    Frequency of Social Gatherings with Friends and Family: Not on file    Attends Mosque Services: Not on file    Active Member of Clubs or Organizations: Not on file    Attends Club or Organization Meetings: Not on file    Marital Status: Not on file   Intimate Partner Violence:     Fear of Current or Ex-Partner: Not on file    Emotionally Abused: Not on file    Physically Abused: Not on file    Sexually Abused: Not on file   Housing Stability:     Unable to Pay for Housing in the Last Year: Not on file    Number of Places Lived in the Last Year: Not on file    Unstable Housing in the Last Year: Not on file     No Known Allergies  Current Outpatient Medications on File Prior to Visit   Medication Sig Dispense Refill    diclofenac sodium (VOLTAREN) 1 % GEL Apply 4 g topically 4 times daily 100 g 0    albuterol sulfate HFA (VENTOLIN HFA) 108 (90 Base) MCG/ACT inhaler INHALE 2 PUFFS into the lungs EVERY 6 HOURS AS NEEDED FOR WHEEZING or SHORTNESS OF BREATH 18 g 0    gabapentin (NEURONTIN) 300 MG capsule Take 1 capsule by mouth 3 times daily for 30 days.  90 capsule 0    D-5000 125 MCG (5000 UT) TABS tablet Take 1 tablet by mouth daily 30 tablet 1    FLUoxetine (PROZAC) 20 MG capsule TAKE 1 CAPSULE BY MOUTH DAILY 90 capsule 0    loratadine (CLARITIN) 10 MG tablet TAKE 1 TABLET BY MOUTH DAILY AS NEEDED for allergies 90 tablet 0    rosuvastatin (CRESTOR) 10 MG tablet TAKE 1 TABLET BY MOUTH NIGHTLY 90 tablet 0    losartan (COZAAR) 50 MG tablet TAKE 1 TABLET BY MOUTH EVERY DAY 90 tablet 0    lidocaine (LMX) 4 % cream Apply a half dollar sized amount to intact skin topically up to twice daily as needed for pain 45 g 1    tiZANidine (ZANAFLEX) 2 MG capsule Take 1 capsule by mouth nightly as needed for Muscle spasms 14 capsule 0    Multiple Vitamins-Minerals (DEKAS BARIATRIC) CHEW CHEW AND SWALLOW 1 (ONE) TABLET BY MOUTH DAILY 90 tablet 0    azelastine (ASTELIN) 0.1 % nasal spray 1 spray by Nasal route 2 times daily Use in each nostril as directed 30 mL 0    pantoprazole (PROTONIX) 40 MG tablet TAKE 1 TABLET BY MOUTH EVERY OTHER DAY before a meal 45 tablet 2    albuterol sulfate  (90 Base) MCG/ACT inhaler Inhale 2 puffs into the lungs every 6 hours as needed for Wheezing 1 each 0    SYMBICORT 80-4.5 MCG/ACT AERO Inhale 2 puffs into the lungs 2 times daily 1 Inhaler 3    Fluticasone furoate-vilanterol (BREO ELLIPTA) 200-25 MCG/INH AEPB inhaler Inhale 1 puff into the lungs daily 1 each 5    albuterol (PROVENTIL) (5 MG/ML) 0.5% nebulizer solution Take 0.5 mLs by nebulization every 6 hours as needed for Wheezing 120 vial 5     Current Facility-Administered Medications on File Prior to Visit   Medication Dose Route Frequency Provider Last Rate Last Admin    iopamidol (ISOVUE-300) 61 % injection 2 mL  2 mL Other ONCE PRN Victoriano Roberts MD            Review of Systems    Objective:   [facility] 06/22/2022   Ortho Exam   Right knee demonstrates hyperextension to 140 flexion, collateral cruciates intact Lockman's negative Hong's moderately positive medial joint line tenderness is present there is no significant fullness posterior to the knee but there is patellofemoral crepitus  Radiographs and Laboratory Studies:     Diagnostic Imaging Studies:    Multiple x-rays taken of the right knee that were done about 1 year ago demonstrates no sign of fracture dislocation but does demonstrates the presence of a small osteophyte off the medial femoral condyle and some subtle narrowing medially    Assessment:       Diagnosis Orders   1. Derangement of right knee           Plan:     The right knee was prepped with Betadine and alcohol and injected using 5 cc of lidocaine and 1 cc of 40mg Kenalog. No orders of the defined types were placed in this encounter. No orders of the defined types were placed in this encounter. No follow-ups on file.       Shawn Torrez MD

## 2022-07-06 LAB
HPV COMMENT: ABNORMAL
HPV TYPE 16: NOT DETECTED
HPV TYPE 18: NOT DETECTED
HPVOH (OTHER TYPES): DETECTED

## 2022-07-10 DIAGNOSIS — M47.812 CERVICAL SPONDYLOSIS WITHOUT MYELOPATHY: ICD-10-CM

## 2022-07-10 DIAGNOSIS — M54.16 LUMBAR RADICULOPATHY: ICD-10-CM

## 2022-07-10 DIAGNOSIS — G56.03 BILATERAL CARPAL TUNNEL SYNDROME: ICD-10-CM

## 2022-07-11 RX ORDER — LIDOCAINE 40 MG/G
CREAM TOPICAL
Qty: 45 G | Refills: 1 | OUTPATIENT
Start: 2022-07-11

## 2022-07-12 ENCOUNTER — OFFICE VISIT (OUTPATIENT)
Dept: OBGYN CLINIC | Age: 47
End: 2022-07-12
Payer: COMMERCIAL

## 2022-07-12 VITALS
HEIGHT: 65 IN | SYSTOLIC BLOOD PRESSURE: 114 MMHG | WEIGHT: 240 LBS | DIASTOLIC BLOOD PRESSURE: 70 MMHG | BODY MASS INDEX: 39.99 KG/M2

## 2022-07-12 DIAGNOSIS — N92.6 IRREGULAR PERIODS/MENSTRUAL CYCLES: ICD-10-CM

## 2022-07-12 DIAGNOSIS — I10 ESSENTIAL HYPERTENSION: ICD-10-CM

## 2022-07-12 DIAGNOSIS — R10.2 PELVIC PAIN IN FEMALE: ICD-10-CM

## 2022-07-12 DIAGNOSIS — Z11.3 SCREEN FOR SEXUALLY TRANSMITTED DISEASES: ICD-10-CM

## 2022-07-12 DIAGNOSIS — E78.00 PURE HYPERCHOLESTEROLEMIA: ICD-10-CM

## 2022-07-12 DIAGNOSIS — R73.03 PRE-DIABETES: ICD-10-CM

## 2022-07-12 DIAGNOSIS — E55.9 VITAMIN D DEFICIENCY: ICD-10-CM

## 2022-07-12 DIAGNOSIS — R10.2 PELVIC PAIN IN FEMALE: Primary | ICD-10-CM

## 2022-07-12 LAB
ALBUMIN SERPL-MCNC: 4 G/DL (ref 3.5–4.6)
ALP BLD-CCNC: 55 U/L (ref 40–130)
ALT SERPL-CCNC: 12 U/L (ref 0–33)
ANION GAP SERPL CALCULATED.3IONS-SCNC: 13 MEQ/L (ref 9–15)
AST SERPL-CCNC: 10 U/L (ref 0–35)
BASOPHILS ABSOLUTE: 0.1 K/UL (ref 0–0.2)
BASOPHILS RELATIVE PERCENT: 1 %
BILIRUB SERPL-MCNC: 0.3 MG/DL (ref 0.2–0.7)
BILIRUBIN, POC: NORMAL
BLOOD URINE, POC: NORMAL
BUN BLDV-MCNC: 12 MG/DL (ref 6–20)
CALCIUM SERPL-MCNC: 9.4 MG/DL (ref 8.5–9.9)
CHLORIDE BLD-SCNC: 104 MEQ/L (ref 95–107)
CHOLESTEROL, TOTAL: 185 MG/DL (ref 0–199)
CLARITY, POC: NORMAL
CO2: 24 MEQ/L (ref 20–31)
COLOR, POC: NORMAL
CREAT SERPL-MCNC: 0.58 MG/DL (ref 0.5–0.9)
EOSINOPHILS ABSOLUTE: 0.2 K/UL (ref 0–0.7)
EOSINOPHILS RELATIVE PERCENT: 4.1 %
GFR AFRICAN AMERICAN: >60
GFR NON-AFRICAN AMERICAN: >60
GLOBULIN: 2.5 G/DL (ref 2.3–3.5)
GLUCOSE BLD-MCNC: 105 MG/DL (ref 70–99)
GLUCOSE URINE, POC: NORMAL
HBA1C MFR BLD: 5.5 % (ref 4.8–5.9)
HCG, URINE, POC: NEGATIVE
HCT VFR BLD CALC: 37 % (ref 37–47)
HDLC SERPL-MCNC: 51 MG/DL (ref 40–59)
HEMOGLOBIN: 12.5 G/DL (ref 12–16)
KETONES, POC: NORMAL
LDL CHOLESTEROL CALCULATED: 98 MG/DL (ref 0–129)
LEUKOCYTE EST, POC: NORMAL
LYMPHOCYTES ABSOLUTE: 1.3 K/UL (ref 1–4.8)
LYMPHOCYTES RELATIVE PERCENT: 25.7 %
Lab: NORMAL
MCH RBC QN AUTO: 30.2 PG (ref 27–31.3)
MCHC RBC AUTO-ENTMCNC: 33.6 % (ref 33–37)
MCV RBC AUTO: 89.8 FL (ref 82–100)
MONOCYTES ABSOLUTE: 0.4 K/UL (ref 0.2–0.8)
MONOCYTES RELATIVE PERCENT: 7.6 %
NEGATIVE QC PASS/FAIL: NORMAL
NEUTROPHILS ABSOLUTE: 3.1 K/UL (ref 1.4–6.5)
NEUTROPHILS RELATIVE PERCENT: 61.6 %
NITRITE, POC: NORMAL
PDW BLD-RTO: 13.4 % (ref 11.5–14.5)
PH, POC: 6
PLATELET # BLD: 265 K/UL (ref 130–400)
POSITIVE QC PASS/FAIL: NORMAL
POTASSIUM SERPL-SCNC: 4.4 MEQ/L (ref 3.4–4.9)
PROTEIN, POC: NORMAL
RBC # BLD: 4.12 M/UL (ref 4.2–5.4)
SODIUM BLD-SCNC: 141 MEQ/L (ref 135–144)
SPECIFIC GRAVITY, POC: 1.03
TOTAL PROTEIN: 6.5 G/DL (ref 6.3–8)
TRIGL SERPL-MCNC: 180 MG/DL (ref 0–150)
UROBILINOGEN, POC: NORMAL
WBC # BLD: 5 K/UL (ref 4.8–10.8)

## 2022-07-12 PROCEDURE — 81003 URINALYSIS AUTO W/O SCOPE: CPT | Performed by: OBSTETRICS & GYNECOLOGY

## 2022-07-12 PROCEDURE — 1036F TOBACCO NON-USER: CPT | Performed by: OBSTETRICS & GYNECOLOGY

## 2022-07-12 PROCEDURE — G8417 CALC BMI ABV UP PARAM F/U: HCPCS | Performed by: OBSTETRICS & GYNECOLOGY

## 2022-07-12 PROCEDURE — 99213 OFFICE O/P EST LOW 20 MIN: CPT | Performed by: OBSTETRICS & GYNECOLOGY

## 2022-07-12 PROCEDURE — G8427 DOCREV CUR MEDS BY ELIG CLIN: HCPCS | Performed by: OBSTETRICS & GYNECOLOGY

## 2022-07-12 PROCEDURE — 81025 URINE PREGNANCY TEST: CPT | Performed by: OBSTETRICS & GYNECOLOGY

## 2022-07-12 ASSESSMENT — ENCOUNTER SYMPTOMS
DIARRHEA: 0
ALLERGIC/IMMUNOLOGIC NEGATIVE: 1
NAUSEA: 0
ABDOMINAL PAIN: 0
ANAL BLEEDING: 0
EYES NEGATIVE: 1
RECTAL PAIN: 0
VOMITING: 0
BLOOD IN STOOL: 0
CONSTIPATION: 0
ABDOMINAL DISTENTION: 0
RESPIRATORY NEGATIVE: 1

## 2022-07-12 NOTE — PROGRESS NOTES
Patient here c/o LLQ pain radiating to leg and mid-cycle spotting. Reviewed medical, surgical, social and family history. Also reviewed current medications and allergies. States she started having LLQ groin pain a few days ago and then noted some vaginal spotting. Denies trauma or heavy lifting, but does have 2 active jobs. Not SA x 4 months. Urine HCG and dip negative ( blood only from vagina ). No h/o stones. Patient is mid-cycle and describes increase mucus discharge at same time of sx. Could be ovulatory. Discussed and ordered vaginal cx and US. Motrin prn pain. F/U results. All questions answered. Vitals:  /70   Ht 5' 5\" (1.651 m)   Wt 240 lb (108.9 kg)   LMP 06/22/2022   BMI 39.94 kg/m²   Past Medical History:   Diagnosis Date    Abnormal Pap smear of cervix     Allergic rhinitis     several allergens    Asthma     since shildhood    Bariatric surgery status 01/02/2020    Dr Aryan Berg     Brachial neuralgia 1/23/2014    Chronic left shoulder pain     COPD (chronic obstructive pulmonary disease) ([address] 2019    Dr Mio Dee    Depression     H/O colonoscopy 2014    Dr. Niurka lAlison Hyperlipidemia     Hypertension 2010    Migraine headache     Obesity, morbid (more than 100 lbs over ideal weight or BMI > 40) ([address]     SEJAL on CPAP 10/2018    Sleep apnea      Past Surgical History:   Procedure Laterality Date    BREAST SURGERY  2007    reduction, Dr Gillette Bending  10/20/15    DR. Rose Perales Right     Dr Webber, [address] [place] [address] GASTRECTOMY  01/20/2020    Dr Francisca Berg [place]       Allergies:  Patient has no known allergies.   Current Outpatient Medications   Medication Sig Dispense Refill    diclofenac sodium (VOLTAREN) 1 % GEL Apply 4 g topically 4 times daily 100 g 0    albuterol sulfate HFA (VENTOLIN HFA) 108 (90 Base) MCG/ACT inhaler INHALE 2 PUFFS into the lungs EVERY 6 HOURS AS NEEDED FOR WHEEZING or SHORTNESS OF BREATH 18 g 0    gabapentin (NEURONTIN) 300 MG capsule Take 1 capsule by mouth 3 times daily for 30 days.  90 capsule 0    D-5000 125 MCG (5000 UT) TABS tablet Take 1 tablet by mouth daily 30 tablet 1    FLUoxetine (PROZAC) 20 MG capsule TAKE 1 CAPSULE BY MOUTH DAILY 90 capsule 0    loratadine (CLARITIN) 10 MG tablet TAKE 1 TABLET BY MOUTH DAILY AS NEEDED for allergies 90 tablet 0    rosuvastatin (CRESTOR) 10 MG tablet TAKE 1 TABLET BY MOUTH NIGHTLY 90 tablet 0    losartan (COZAAR) 50 MG tablet TAKE 1 TABLET BY MOUTH EVERY DAY 90 tablet 0    lidocaine (LMX) 4 % cream Apply a half dollar sized amount to intact skin topically up to twice daily as needed for pain 45 g 1    tiZANidine (ZANAFLEX) 2 MG capsule Take 1 capsule by mouth nightly as needed for Muscle spasms 14 capsule 0    Multiple Vitamins-Minerals (DEKAS BARIATRIC) CHEW CHEW AND SWALLOW 1 (ONE) TABLET BY MOUTH DAILY 90 tablet 0    azelastine (ASTELIN) 0.1 % nasal spray 1 spray by Nasal route 2 times daily Use in each nostril as directed 30 mL 0    pantoprazole (PROTONIX) 40 MG tablet TAKE 1 TABLET BY MOUTH EVERY OTHER DAY before a meal 45 tablet 2    albuterol sulfate  (90 Base) MCG/ACT inhaler Inhale 2 puffs into the lungs every 6 hours as needed for Wheezing 1 each 0    SYMBICORT 80-4.5 MCG/ACT AERO Inhale 2 puffs into the lungs 2 times daily 1 Inhaler 3    Fluticasone furoate-vilanterol (BREO ELLIPTA) 200-25 MCG/INH AEPB inhaler Inhale 1 puff into the lungs daily 1 each 5    albuterol (PROVENTIL) (5 MG/ML) 0.5% nebulizer solution Take 0.5 mLs by nebulization every 6 hours as needed for Wheezing 120 vial 5     Current Facility-Administered Medications   Medication Dose Route Frequency Provider Last Rate Last Admin    iopamidol (ISOVUE-300) 61 % injection 2 mL  2 mL Other ONCE PRN Richard Feliz MD         Social History     Socioeconomic History    Marital status: Single     Spouse name: Not on file    Number of children: 2    Years of education: Not on file    Highest education level: Not on file   Occupational History    Occupation: dog grooming business, self employed   Tobacco Use    Smoking status: Former Smoker     Packs/day: 0.50     Types: Cigarettes     Quit date: 2018     Years since quittin.0    Smokeless tobacco: Never Used    Tobacco comment: uses electronic cigarettes & trying to cut back   Vaping Use    Vaping Use: Never used   Substance and Sexual Activity    Alcohol use: Yes     Alcohol/week: 1.0 standard drink     Types: 1 Standard drinks or equivalent per week     Comment: socially    Drug use: Not Currently     Frequency: 1.0 times per week     Comment: marijuana, quit 2013    Sexual activity: Yes     Partners: Male     Birth control/protection: Condom   Other Topics Concern    Not on file   Social History Narrative    Born in [place], has 1 brother one sister    Never , was in a bad relationship    Lives with daughter age 16 (2017) in a house in [facility]    2 daughters, one daughter with problems, one in college    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)    Hobbies reading, puzzles, games, outdoor activities     [facility] Strain:     Difficulty of Paying Living Expenses: Not on file   Food Insecurity:     Worried About [address] in the Last Year: Not on file    [address] in the Last Year: Not on file   Transportation Needs:     Lack of Transportation (Medical): Not on file    Lack of Transportation (Non-Medical):  Not on file   Physical Activity:     Days of Exercise per Week: Not on file    Minutes of Exercise per Session: Not on file   Stress:     Feeling of Stress : Not on file   Social Connections:     Frequency of Communication with Friends and Family: Not on file    Frequency of Social Gatherings with Friends and Family: Not on file    Attends Zoroastrian Services: Not on file   Victor Manuel Jones Active Member of Clubs or Organizations: Not on file    Attends Club or Organization Meetings: Not on file    Marital Status: Not on file   Intimate Partner Violence:     Fear of Current or Ex-Partner: Not on file    Emotionally Abused: Not on file    Physically Abused: Not on file    Sexually Abused: Not on file   Housing Stability:     Unable to Pay for Housing in the Last Year: Not on file    Number of [place] in the Last Year: Not on file    Unstable Housing in the Last Year: Not on file        Family History   Problem Relation Age of Onset    Osteoarthritis Mother     Other Mother         hyperlipidemia    Hypertension Mother     High Cholesterol Mother     Arthritis Mother     Heart Disease Mother     Diabetes Mother     No Known Problems Father        Review of Systems   Constitutional: Negative. Negative for activity change, appetite change, chills, diaphoresis, fatigue, fever and unexpected weight change. HENT: Negative. Eyes: Negative. Respiratory: Negative. Cardiovascular: Negative. Gastrointestinal: Negative for abdominal distention, abdominal pain, anal bleeding, blood in stool, constipation, diarrhea, nausea, rectal pain and vomiting. Endocrine: Negative. Genitourinary: Positive for pelvic pain and vaginal bleeding. Negative for decreased urine volume, difficulty urinating, dyspareunia, dysuria, enuresis, flank pain, frequency, genital sores, hematuria (LLQ pain), menstrual problem, urgency, vaginal discharge and vaginal pain. Musculoskeletal: Negative. Skin: Negative. Allergic/Immunologic: Negative. Neurological: Negative. Hematological: Negative. Psychiatric/Behavioral: Negative. Objective:     Physical Exam  Constitutional:       General: She is not in acute distress. Appearance: Normal appearance. She is well-developed. She is not diaphoretic. HENT:      Head: Normocephalic and atraumatic.    Eyes:      Conjunctiva/sclera: normal.         Judgment: Judgment normal.         Assessment:          Diagnosis Orders   1. Pelvic pain in female  US PELVIS COMPLETE    POC Pregnancy Urine Qual    POCT Urinalysis No Micro (Auto)    Culture, Urine   2. Irregular periods/menstrual cycles  US PELVIS COMPLETE    POC Pregnancy Urine Qual    POCT Urinalysis No Micro (Auto)    Culture, Urine   3. Screen for sexually transmitted diseases  Miscellaneous lab test #3        Plan:      Medications placedthis encounter:  No orders of the defined types were placed in this encounter. Orders placedthis encounter:  Orders Placed This Encounter   Procedures    Culture, Urine     Standing Status:   Future     Number of Occurrences:   1     Standing Expiration Date:   7/12/2023     Order Specific Question:   Specify (ex-cath, midstream, cysto, etc)? Answer:   midstream    US PELVIS COMPLETE     This procedure can be scheduled via [facility]. Access your [facility] account by visiting [email or web address]. Standing Status:   Future     Standing Expiration Date:   7/12/2023     Scheduling Instructions: With transvagnal    Miscellaneous lab test #3     Genital cultures     Standing Status:   Future     Standing Expiration Date:   7/12/2023    POC Pregnancy Urine Qual    POCT Urinalysis No Micro (Auto)         Follow up:  Return for Annual, Results Review, Pelvic US. The patient was asked if she would like a chaperone present for her intimate exam. She  Declined the chaperone.  Luther Woods MD

## 2022-07-13 ENCOUNTER — HOSPITAL ENCOUNTER (OUTPATIENT)
Dept: WOMENS IMAGING | Age: 47
Discharge: HOME | End: 2022-07-15
Payer: COMMERCIAL

## 2022-07-13 DIAGNOSIS — Z12.31 SCREENING MAMMOGRAM FOR BREAST CANCER: ICD-10-CM

## 2022-07-13 LAB
FOLATE: >20 NG/ML
URINE CULTURE, ROUTINE: NORMAL
VITAMIN B-12: 787 PG/ML (ref 232–1245)
VITAMIN D 25-HYDROXY: 32.5 NG/ML

## 2022-07-13 PROCEDURE — 77063 BREAST TOMOSYNTHESIS BI: CPT

## 2022-07-14 DIAGNOSIS — R92.8 ABNORMAL MAMMOGRAM OF LEFT BREAST: Primary | ICD-10-CM

## 2022-07-18 ENCOUNTER — HOSPITAL ENCOUNTER (OUTPATIENT)
Dept: ULTRASOUND IMAGING | Age: 47
Discharge: HOME | End: 2022-07-20
Payer: COMMERCIAL

## 2022-07-18 DIAGNOSIS — N92.6 IRREGULAR PERIODS/MENSTRUAL CYCLES: ICD-10-CM

## 2022-07-18 DIAGNOSIS — R10.2 PELVIC PAIN IN FEMALE: ICD-10-CM

## 2022-07-18 PROCEDURE — 76856 US EXAM PELVIC COMPLETE: CPT

## 2022-07-18 PROCEDURE — 76830 TRANSVAGINAL US NON-OB: CPT

## 2022-07-20 DIAGNOSIS — Z11.3 SCREEN FOR SEXUALLY TRANSMITTED DISEASES: ICD-10-CM

## 2022-07-20 RX ORDER — RESVER/WINE/BFL/GRPSD/PC/C/POM 200MG-60MG
5000 CAPSULE ORAL DAILY
Qty: 30 TABLET | Refills: 1 | Status: SHIPPED | OUTPATIENT
Start: 2022-07-20 | End: 2022-09-14 | Stop reason: SDUPTHER

## 2022-07-20 RX ORDER — PANTOPRAZOLE SODIUM 40 MG/1
TABLET, DELAYED RELEASE ORAL
Qty: 45 TABLET | Refills: 2 | Status: SHIPPED | OUTPATIENT
Start: 2022-07-20

## 2022-07-21 ENCOUNTER — OFFICE VISIT (OUTPATIENT)
Dept: OBGYN CLINIC | Age: 47
End: 2022-07-21
Payer: COMMERCIAL

## 2022-07-21 VITALS
HEIGHT: 65 IN | WEIGHT: 241 LBS | BODY MASS INDEX: 40.15 KG/M2 | DIASTOLIC BLOOD PRESSURE: 72 MMHG | SYSTOLIC BLOOD PRESSURE: 126 MMHG

## 2022-07-21 DIAGNOSIS — R10.2 PELVIC PAIN IN FEMALE: Primary | ICD-10-CM

## 2022-07-21 DIAGNOSIS — N92.6 IRREGULAR PERIODS/MENSTRUAL CYCLES: ICD-10-CM

## 2022-07-21 PROCEDURE — 99212 OFFICE O/P EST SF 10 MIN: CPT | Performed by: OBSTETRICS & GYNECOLOGY

## 2022-07-21 ASSESSMENT — ENCOUNTER SYMPTOMS
BLOOD IN STOOL: 0
RESPIRATORY NEGATIVE: 1
CONSTIPATION: 0
RECTAL PAIN: 0
NAUSEA: 0
ANAL BLEEDING: 0
ABDOMINAL PAIN: 0
ABDOMINAL DISTENTION: 0
ALLERGIC/IMMUNOLOGIC NEGATIVE: 1
VOMITING: 0
EYES NEGATIVE: 1
DIARRHEA: 0

## 2022-07-21 NOTE — PATIENT INSTRUCTIONS
Patient Education        Human Papillomavirus (HPV): Care Instructions  Overview  The human papillomavirus (HPV) is a very common virus. There are many types of HPV. Some types cause the common skin wart. Other types cause genital warts, which can be spread by sexual contact. Some types can increase the risk of certain cancers, such as cervical or anal cancer. Having one type of HPVdoesn't lead to having another type. Many people who have HPV don't know that they're infected. It's often foundwith a cervical cancer screening test, such as an HPV test.  If an HPV screening test finds that you have a type of HPV that might lead to cancer, your doctor may suggest more tests. This doesn't mean you'll get cancer. But it means that you may have an increased risk. Abnormal cell changescaused by HPV often go away on their own. If they don't, they can be treated. Follow-up care is a key part of your treatment and safety. Be sure to make and go to all appointments, and call your doctor if you are having problems. It's also a good idea to know your test results and keep alist of the medicines you take. How can you care for yourself at home? Use a condom every time you have sex. Use it from the start to the end of sexual contact. Be sure to tell your sexual partner or partners that you have HPV. Even if you don't have symptoms, you can still pass HPV to others. Limit how many sex partners you have. The safest practice is to have only one sex partner who doesn't have STIs and doesn't have sex with anyone else. This lowers your risk of getting STIs. Don't smoke. Smoking increases the risk for cervical problems and cervical cancer. If you need help quitting, talk to your doctor about stop-smoking programs and medicines. These can increase your chances of quitting for good. When should you call for help?   Watch closely for changes in your health, and be sure to contact your doctor if:    You have vaginal pain during or after sex.     You have vaginal bleeding when you are not in your menstrual period. Where can you learn more? Go to [email or web address] and sign in to your 3Touch account. Enter F690 in the Gift Card Combo box to learn more about \"Human Papillomavirus (HPV): Care Instructions. \"     If you do not have an account, please click on the \"Sign Up Now\" link. Current as of: November 17, 2021               Content Version: 13.3  © 4161-2440 Healthwise, Incorporated. Care instructions adapted under license by [facility] If you have questions about a medical condition or this instruction, always ask your healthcare professional. [address] any warranty or liability for your use of this information.

## 2022-07-21 NOTE — PROGRESS NOTES
Patient here for US results for LLQ pain ( see previous note ). Urine and vaginal cx negative. US as follows:    EXAMINATION: US PELVIS COMPLETE, US NON OB TRANSVAGINAL       CLINICAL HISTORY: 52year-old with pelvic pain and irregular cyst menstrual cycles. Patient has an elevated body mass index       COMPARISONS: None available. FINDINGS: Transabdominal and transvaginal ultrasounds of the pelvis and Doppler ovarian assessments were performed. On the transabdominal study, the uterus is normal in size with normal contours. It measures 10.6 x 4.8 x 4.2 cm. Ovaries are also normal    in size. Endometrium, cervix and ovaries are better visualized on the transvaginal exam.       On transvaginal views there is a trilaminar endometrial echo complex which is normal in thickness measuring 9 mm. Myometrial echotexture is heterogeneous especially in the posterior fundal aspect the uterus. Can't exclude fibroid change or adenomyosis. However no defined focal myometrial masses. There is a 1 cm cyst in the lower cervix and a few smaller cysts. Visualized cervix is otherwise within normal limits. Right ovary measures 3.3 x 2.2 x 1.5 cm with a volume of 5.7 mL and contains a few small follicles. Left ovary measures 3.1 x 2.4 x 1.7 cm with a volume of 6.6 mL. It contains a 1.5 cm dominant follicle and a few smaller follicles. Color flow, low    resistive arterial venous waveforms are documented within both ovaries. There are no adnexal masses or significant free fluid in the pelvis           Impression   NONSPECIFIC MYOMETRIAL HETEROGENEITY ESPECIALLY IN THE POSTERIOR FUNDAL ASPECT. OTHERWISE UNREMARKABLE PELVIC ULTRASOUND              Discussed essentially negative US. Also discussed HPV  on recent pap. PNV with folic acid recommended. Co-testing again 1 year. All questions answered. F/U annual exam or prn.               Vitals:  /72   Ht 5' 5\" (1.651 m)   Wt 241 lb (109.3 kg)   LMP 07/10/2022   BMI 40.10 kg/m²   Past Medical History:   Diagnosis Date    Abnormal Pap smear of cervix     Allergic rhinitis     several allergens    Asthma     since shildhood    Bariatric surgery status 01/02/2020    [facility], Dr Werner Workman     Brachial neuralgia 1/23/2014    Chronic left shoulder pain     COPD (chronic obstructive [facility] disease) [facility] 2019    Dr Mary Jane Ibrahim    Depression     H/O colonoscopy 2014    Dr. Michaela Gurrola    Hyperlipidemia     Hypertension 2010    Migraine headache     Obesity, morbid (more than 100 lbs over ideal weight or BMI > 40) ([facility])     SEJAL on CPAP 10/2018    Sleep apnea      Past Surgical History:   Procedure Laterality Date    BREAST REDUCTION SURGERY Bilateral 2007    BREAST SURGERY  2007    reduction, Dr Shiv Bowser  10/20/15    DR. Nely Halwey Right     Dr Billy Rhoades, [address] GASTRECTOMY  01/20/2020    [facility], Dr Chaudhyr Night:  Patient has no known allergies. Current Outpatient Medications   Medication Sig Dispense Refill    D-5000 125 MCG (5000 UT) TABS tablet Take 1 tablet by mouth daily 30 tablet 1    pantoprazole (PROTONIX) 40 MG tablet TAKE 1 TABLET BY MOUTH EVERY OTHER DAY BEFORE a meal 45 tablet 2    diclofenac sodium (VOLTAREN) 1 % GEL Apply 4 g topically 4 times daily 100 g 0    albuterol sulfate HFA (VENTOLIN HFA) 108 (90 Base) MCG/ACT inhaler INHALE 2 PUFFS into the lungs EVERY 6 HOURS AS NEEDED FOR WHEEZING or SHORTNESS OF BREATH 18 g 0    gabapentin (NEURONTIN) 300 MG capsule Take 1 capsule by mouth 3 times daily for 30 days.  90 capsule 0    FLUoxetine (PROZAC) 20 MG capsule TAKE 1 CAPSULE BY MOUTH DAILY 90 capsule 0    loratadine (CLARITIN) 10 MG tablet TAKE 1 TABLET BY MOUTH DAILY AS NEEDED for allergies 90 tablet 0    rosuvastatin (CRESTOR) 10 MG tablet TAKE 1 TABLET BY MOUTH NIGHTLY 90 tablet 0    losartan (COZAAR) 50 MG tablet TAKE 1 TABLET BY MOUTH EVERY DAY 90 tablet 0    lidocaine (LMX) 4 % cream Apply a half dollar sized amount to intact skin topically up to twice daily as needed for pain 45 g 1    tiZANidine (ZANAFLEX) 2 MG capsule Take 1 capsule by mouth nightly as needed for Muscle spasms 14 capsule 0    Multiple Vitamins-Minerals (DEKAS BARIATRIC) CHEW CHEW AND SWALLOW 1 (ONE) TABLET BY MOUTH DAILY 90 tablet 0    azelastine (ASTELIN) 0.1 % nasal spray 1 spray by Nasal route 2 times daily Use in each nostril as directed 30 mL 0    albuterol sulfate  (90 Base) MCG/ACT inhaler Inhale 2 puffs into the lungs every 6 hours as needed for Wheezing 1 each 0    SYMBICORT 80-4.5 MCG/ACT AERO Inhale 2 puffs into the lungs 2 times daily 1 Inhaler 3    Fluticasone furoate-vilanterol (BREO ELLIPTA) 200-25 MCG/INH AEPB inhaler Inhale 1 puff into the lungs daily 1 each 5    albuterol (PROVENTIL) (5 MG/ML) 0.5% nebulizer solution Take 0.5 mLs by nebulization every 6 hours as needed for Wheezing 120 vial 5     Current Facility-Administered Medications   Medication Dose Route Frequency Provider Last Rate Last Admin    iopamidol (ISOVUE-300) 61 % injection 2 mL  2 mL Other ONCE PRN Sabi Jackson MD         Social History     Socioeconomic History    Marital status: Single     Spouse name: Not on file    Number of children: 2    Years of education: Not on file    Highest education level: Not on file   Occupational History    Occupation: dog grooming business, self employed   Tobacco Use    Smoking status: Former     Packs/day: 0.50     Types: Cigarettes     Quit date: 2018     Years since quittin.0    Smokeless tobacco: Never    Tobacco comments:     uses electronic cigarettes & trying to cut back   Vaping Use    Vaping Use: Never used   Substance and Sexual Activity    Alcohol use:  Yes     Alcohol/week: 1.0 standard drink     Types: 1 Standard drinks or equivalent per week     Comment: socially    Drug use: Not Currently     Frequency: 1.0 times per week     Comment: marijuana, quit 2013 Sexual activity: Yes     Partners: Male     Birth control/protection: Condom   Other Topics Concern    Not on file   Social History Narrative    Born in [place], has 1 brother one sister    Never , was in a bad relationship    Lives with daughter age 16 (2017) in a house in [place]    2 daughters, one daughter with problems, one in college    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)    Hobbies reading, puzzles, games, outdoor activities     Michael Whiteside      Social Determinants of Health     Financial Resource Strain: Not on [employer] Insecurity: Not on file   Transportation Needs: Not on file   Physical Activity: Not on file   Stress: Not on file   Social Connections: Not on file   Intimate Partner Violence: Not on file   Housing Stability: Not on file        Family History   Problem Relation Age of Onset    Osteoarthritis Mother     Other Mother         hyperlipidemia    Hypertension Mother     High Cholesterol Mother     Arthritis Mother     Heart Disease Mother     Diabetes Mother     No Known Problems Father        Review of Systems   Constitutional: Negative. Negative for activity change, appetite change, chills, diaphoresis, fatigue, fever and unexpected weight change. HENT: Negative. Eyes: Negative. Respiratory: Negative. Cardiovascular: Negative. Gastrointestinal:  Negative for abdominal distention, abdominal pain, anal bleeding, blood in stool, constipation, diarrhea, nausea, rectal pain and vomiting. Endocrine: Negative. Genitourinary:  Positive for pelvic pain. Negative for decreased urine volume, difficulty urinating, dyspareunia, dysuria, enuresis, flank pain, frequency, genital sores, hematuria, menstrual problem, urgency, vaginal bleeding, vaginal discharge and vaginal pain. Musculoskeletal: Negative. Skin: Negative. Allergic/Immunologic: Negative. Neurological: Negative. Hematological: Negative. Psychiatric/Behavioral: Negative.        Objective:     Physical Exam  Constitutional:       General: She is not in acute distress. Appearance: She is well-developed. She is not diaphoretic. HENT:      Head: Normocephalic and atraumatic. Eyes:      Conjunctiva/sclera: Conjunctivae normal.   Cardiovascular:      Rate and Rhythm: Normal rate and regular rhythm. Pulmonary:      Effort: Pulmonary effort is normal. No respiratory distress. Musculoskeletal:         General: No tenderness or deformity. Normal range of motion. Cervical back: Normal range of motion and neck supple. Skin:     General: Skin is warm and dry. Coloration: Skin is not pale. Neurological:      Mental Status: She is alert and oriented to person, place, and time. Motor: No abnormal muscle tone. Coordination: Coordination normal.   Psychiatric:         Behavior: Behavior normal.         Thought Content: Thought content normal.         Judgment: Judgment normal.       Assessment:          Diagnosis Orders   1. Pelvic pain in female        2. Irregular periods/menstrual cycles             Plan:      Medications placedthis encounter:  No orders of the defined types were placed in this encounter. Orders placedthis encounter:  No orders of the defined types were placed in this encounter.         Follow up:  Return for Annual.

## 2022-07-25 RX ORDER — TRIAMCINOLONE ACETONIDE 40 MG/ML
40 INJECTION, SUSPENSION INTRA-ARTICULAR; INTRAMUSCULAR ONCE
Status: COMPLETED | OUTPATIENT
Start: 2022-07-25 | End: 2022-06-30

## 2022-07-26 ENCOUNTER — TELEPHONE (OUTPATIENT)
Dept: FAMILY MEDICINE CLINIC | Age: 47
End: 2022-07-26

## 2022-07-26 NOTE — TELEPHONE ENCOUNTER
----- Message from Trixie Patches sent at 7/26/2022  2:54 PM EDT -----  Subject: Referral Request    Reason for referral request? patient is calling to see if she can get a   referral for what she stated is a rectal absences she said they keep   coming back she needs to see one   Provider patient wants to be referred to(if known): Deja Mallory    Provider Phone Number(if known):     Additional Information for Provider?   ---------------------------------------------------------------------------  --------------  [email or web address]    [phone]; OK to leave message on voicemail, OK to respond with   electronic message via [employer] portal (only for patients who have   registered [employer] account)  ---------------------------------------------------------------------------  --------------

## 2022-07-27 DIAGNOSIS — K61.1 RECTAL ABSCESS: Primary | ICD-10-CM

## 2022-08-03 ENCOUNTER — TELEPHONE (OUTPATIENT)
Dept: PAIN MANAGEMENT | Age: 47
End: 2022-08-03

## 2022-08-03 ENCOUNTER — APPOINTMENT (OUTPATIENT)
Dept: ULTRASOUND IMAGING | Age: 47
End: 2022-08-03
Payer: COMMERCIAL

## 2022-08-03 ENCOUNTER — HOSPITAL ENCOUNTER (OUTPATIENT)
Dept: WOMENS IMAGING | Age: 47
Discharge: HOME | End: 2022-08-05
Payer: COMMERCIAL

## 2022-08-03 DIAGNOSIS — R92.8 ABNORMAL MAMMOGRAM OF LEFT BREAST: ICD-10-CM

## 2022-08-03 DIAGNOSIS — G56.03 BILATERAL CARPAL TUNNEL SYNDROME: Primary | ICD-10-CM

## 2022-08-03 PROCEDURE — G0279 TOMOSYNTHESIS, MAMMO: HCPCS

## 2022-08-03 NOTE — TELEPHONE ENCOUNTER
Patient states that she did physical therapy at [facility] on [address]. She says it did not work and is asking if she can get the carpal tunnel injections?

## 2022-08-04 ENCOUNTER — TELEPHONE (OUTPATIENT)
Dept: PAIN MANAGEMENT | Age: 47
End: 2022-08-04

## 2022-08-04 NOTE — TELEPHONE ENCOUNTER
ORDER PLACED:    Date: 8/3/22  Description: BILAT CARPAL TUNNEL INJ-  Order Number: [id number]  Ordering Provider: TONIIT  Performing Provider: Nate Davis  CPT Codes: 54223  ICD10 Codes: X49.07

## 2022-08-04 NOTE — TELEPHONE ENCOUNTER
RISA CARPAL TUNNEL INJ    NO AUTH REQUIRED    OK to schedule procedure approved as above. Please note sides/levels approved and date range. (If applicable, sides/levels approved may differ from those ordered)    TO BE SCHEDULED WITH DR. Rakesh Eubanks

## 2022-08-15 ENCOUNTER — NURSE TRIAGE (OUTPATIENT)
Dept: OTHER | Facility: CLINIC | Age: 47
End: 2022-08-15

## 2022-08-15 NOTE — TELEPHONE ENCOUNTER
Received call from [facility] with Red Flag Complaint. Subjective: Caller states \"I think I might be getting pneumonia. Last week we took a wall down, and I've been feeling fatigued, ear popping and now getting some pain in lungs. And feels similar to pneumonia I've had in the past.\"     Current Symptoms: ear pain, fatigue, body aches, back pain, stabbing radiates toward front intermittent, denies chest pain/SOB,     Onset: 1 week ago; gradual, worsening    Associated Symptoms: reduced activity    Pain Severity: 3/10; sharp; intermittent    Temperature: Denies       What has been tried: inhaler, rest/fluids    LMP: NA Pregnant: NA    Recommended disposition: See PCP within 3 Days    Care advice provided, patient verbalizes understanding; denies any other questions or concerns; instructed to call back for any new or worsening symptoms. Patient/caller agrees with disposition. Writer left message with [facility] for return call to patient for scheduling. Attention Provider: Thank you for allowing me to participate in the care of your patient. The patient was connected to triage in response to information provided to the ECC/PSC. Please do not respond through this encounter as the response is not directed to a shared pool.       Reason for Disposition   [1] MODERATE pain (e.g., interferes with normal activities) AND [2] present > 3 days    Protocols used: Muscle Aches and Body Pain-ADULT-AH

## 2022-08-17 ENCOUNTER — TELEMEDICINE (OUTPATIENT)
Dept: FAMILY MEDICINE CLINIC | Age: 47
End: 2022-08-17
Payer: COMMERCIAL

## 2022-08-17 ENCOUNTER — HOSPITAL ENCOUNTER (OUTPATIENT)
Dept: WOMENS IMAGING | Age: 47
Discharge: HOME | End: 2022-08-19
Payer: COMMERCIAL

## 2022-08-17 VITALS — HEART RATE: 65 BPM | RESPIRATION RATE: 20 BRPM | DIASTOLIC BLOOD PRESSURE: 69 MMHG | SYSTOLIC BLOOD PRESSURE: 151 MMHG

## 2022-08-17 DIAGNOSIS — R53.83 FATIGUE, UNSPECIFIED TYPE: ICD-10-CM

## 2022-08-17 DIAGNOSIS — R92.8 ABNORMAL MAMMOGRAM OF LEFT BREAST: ICD-10-CM

## 2022-08-17 DIAGNOSIS — M79.10 MYALGIA: ICD-10-CM

## 2022-08-17 DIAGNOSIS — R92.8 ABNORMAL MAMMOGRAM OF LEFT BREAST: Primary | ICD-10-CM

## 2022-08-17 DIAGNOSIS — R53.1 WEAKNESS: Primary | ICD-10-CM

## 2022-08-17 PROCEDURE — 99442 PR PHYS/QHP TELEPHONE EVALUATION 11-20 MIN: CPT | Performed by: FAMILY MEDICINE

## 2022-08-17 PROCEDURE — 2709999900 MAM STEREO PLACE BREAST LOC DEVICE 1ST LESION LEFT

## 2022-08-17 PROCEDURE — 77065 DX MAMMO INCL CAD UNI: CPT

## 2022-08-17 PROCEDURE — 88305 TISSUE EXAM BY PATHOLOGIST: CPT

## 2022-08-17 PROCEDURE — 76098 X-RAY EXAM SURGICAL SPECIMEN: CPT

## 2022-08-17 PROCEDURE — 2500000003 HC RX 250 WO HCPCS: Performed by: RADIOLOGY

## 2022-08-17 PROCEDURE — A4217 STERILE WATER/SALINE, 500 ML: HCPCS | Performed by: RADIOLOGY

## 2022-08-17 PROCEDURE — 2580000003 HC RX 258: Performed by: RADIOLOGY

## 2022-08-17 RX ORDER — MAGNESIUM HYDROXIDE 1200 MG/15ML
250 LIQUID ORAL CONTINUOUS
Status: DISCONTINUED | OUTPATIENT
Start: 2022-08-17 | End: 2022-08-20 | Stop reason: HOSPADM

## 2022-08-17 RX ORDER — LIDOCAINE HYDROCHLORIDE 20 MG/ML
20 INJECTION, SOLUTION INFILTRATION; PERINEURAL ONCE
Status: COMPLETED | OUTPATIENT
Start: 2022-08-17 | End: 2022-08-17

## 2022-08-17 RX ORDER — LIDOCAINE HYDROCHLORIDE AND EPINEPHRINE 10; 10 MG/ML; UG/ML
20 INJECTION, SOLUTION INFILTRATION; PERINEURAL ONCE
Status: COMPLETED | OUTPATIENT
Start: 2022-08-17 | End: 2022-08-17

## 2022-08-17 RX ORDER — MAGNESIUM HYDROXIDE 1200 MG/15ML
LIQUID ORAL CONTINUOUS PRN
Status: COMPLETED | OUTPATIENT
Start: 2022-08-17 | End: 2022-08-17

## 2022-08-17 RX ADMIN — LIDOCAINE HYDROCHLORIDE 11 ML: 20 INJECTION, SOLUTION INFILTRATION; PERINEURAL at 13:47

## 2022-08-17 RX ADMIN — LIDOCAINE HYDROCHLORIDE,EPINEPHRINE BITARTRATE 3 ML: 10; .01 INJECTION, SOLUTION INFILTRATION; PERINEURAL at 13:42

## 2022-08-17 RX ADMIN — SODIUM CHLORIDE 250 ML: 900 IRRIGANT IRRIGATION at 13:40

## 2022-08-17 SDOH — ECONOMIC STABILITY: FOOD INSECURITY: WITHIN THE PAST 12 MONTHS, THE FOOD YOU BOUGHT JUST DIDN'T LAST AND YOU DIDN'T HAVE MONEY TO GET MORE.: NEVER TRUE

## 2022-08-17 SDOH — ECONOMIC STABILITY: FOOD INSECURITY: WITHIN THE PAST 12 MONTHS, YOU WORRIED THAT YOUR FOOD WOULD RUN OUT BEFORE YOU GOT MONEY TO BUY MORE.: NEVER TRUE

## 2022-08-17 ASSESSMENT — PAIN SCALES - GENERAL: PAINLEVEL_OUTOF10: 0

## 2022-08-17 ASSESSMENT — ENCOUNTER SYMPTOMS
ABDOMINAL PAIN: 0
VOMITING: 0
VOICE CHANGE: 0

## 2022-08-17 ASSESSMENT — SOCIAL DETERMINANTS OF HEALTH (SDOH): HOW HARD IS IT FOR YOU TO PAY FOR THE VERY BASICS LIKE FOOD, HOUSING, MEDICAL CARE, AND HEATING?: NOT HARD AT ALL

## 2022-08-17 NOTE — PROGRESS NOTES
Subjective:      Patient ID: Vicenta Stauffer is a 52 y.o. female    Fatigue  This is a new problem. The current episode started 1 to 4 weeks ago. Associated symptoms include fatigue. Pertinent negatives include no abdominal pain, chills, rash or vomiting. Here with 2 weeks of muscle aches. No coughing. No fever. Feels weak.-more tired than usual.    No cough. No sore throat. No emesis. Having some diarrhea for 3 days-    No blood in stools. No dysuria. Review of Systems   Constitutional:  Positive for fatigue. Negative for chills. HENT:  Negative for ear pain and voice change. Gastrointestinal:  Negative for abdominal pain and vomiting. Skin:  Negative for rash. Reviewed allergy, medical, social, surgical, family and med list changes and updated   Files   Vicenta Stauffer is a 52 y.o. female evaluated via telephone on 8/17/2022. Consent:  She and/or health care decision maker is aware that that she may receive a bill for this telephone service, depending on her insurance coverage, and has provided verbal consent to proceed: Yes      Documentation:  I communicated with the patient and/or health care decision maker about . Details of this discussion including any medical advice provided: This visit was a telephone encounter. Patient was located at their home. Provider was located at their ___ home or        __x__ office. I affirm this is a Patient Initiated Episode with an Established Patient who has not had a related appointment within my department in the past 7 days or scheduled within the next 24 hours.     Total Time: minutes: 11-20 minutes    Note: not billable if this call serves to triage the patient into an appointment for the relevant concern      Jaqueline Shah MD    Social History     Socioeconomic History    Marital status: Single    Number of children: 2   Occupational History    Occupation: dog grooming business, self employed   Tobacco Use    Smoking status: Former     Packs/day: 0.50     Types: Cigarettes     Quit date: 2018     Years since quittin.1    Smokeless tobacco: Never    Tobacco comments:     uses electronic cigarettes & trying to cut back   Vaping Use    Vaping Use: Never used   Substance and Sexual Activity    Alcohol use: Yes     Alcohol/week: 1.0 standard drink     Types: 1 Standard drinks or equivalent per week     Comment: socially    Drug use: Not Currently     Frequency: 1.0 times per week     Comment: marijuana, quit 2013    Sexual activity: Yes     Partners: Male     Birth control/protection: Condom   Social History Narrative    Born in [place], has 1 brother one sister    Never , was in a bad relationship    Lives with daughter age 16 (2017) in a house in [facility]    2 daughters, one daughter with problems, one in college    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)    Hobbies reading, puzzles, games, outdoor activities     [address] [facility] Strain: Low Risk     Difficulty of Paying Living Expenses: Not hard at all   Food Insecurity: No Food Insecurity    Worried About [facility] in the Last Year: Never true    [address] [place] [address] in the Last Year: Never true     Current Outpatient Medications   Medication Sig Dispense Refill    D-5000 125 MCG (5000 UT) TABS tablet Take 1 tablet by mouth daily 30 tablet 1    pantoprazole (PROTONIX) 40 MG tablet TAKE 1 TABLET BY MOUTH EVERY OTHER DAY BEFORE a meal 45 tablet 2    diclofenac sodium (VOLTAREN) 1 % GEL Apply 4 g topically 4 times daily 100 g 0    albuterol sulfate HFA (VENTOLIN HFA) 108 (90 Base) MCG/ACT inhaler INHALE 2 PUFFS into the lungs EVERY 6 HOURS AS NEEDED FOR WHEEZING or SHORTNESS OF BREATH 18 g 0    gabapentin (NEURONTIN) 300 MG capsule Take 1 capsule by mouth 3 times daily for 30 days.  90 capsule 0    FLUoxetine (PROZAC) 20 MG capsule TAKE 1 CAPSULE BY MOUTH DAILY 90 capsule 0    loratadine (CLARITIN) 10 MG tablet TAKE 1 TABLET BY MOUTH DAILY AS NEEDED for allergies 90 tablet 0    rosuvastatin (CRESTOR) 10 MG tablet TAKE 1 TABLET BY MOUTH NIGHTLY 90 tablet 0    losartan (COZAAR) 50 MG tablet TAKE 1 TABLET BY MOUTH EVERY DAY 90 tablet 0    lidocaine (LMX) 4 % cream Apply a half dollar sized amount to intact skin topically up to twice daily as needed for pain 45 g 1    tiZANidine (ZANAFLEX) 2 MG capsule Take 1 capsule by mouth nightly as needed for Muscle spasms 14 capsule 0    Multiple Vitamins-Minerals (DEKAS BARIATRIC) CHEW CHEW AND SWALLOW 1 (ONE) TABLET BY MOUTH DAILY 90 tablet 0    azelastine (ASTELIN) 0.1 % nasal spray 1 spray by Nasal route 2 times daily Use in each nostril as directed 30 mL 0    albuterol sulfate  (90 Base) MCG/ACT inhaler Inhale 2 puffs into the lungs every 6 hours as needed for Wheezing 1 each 0    SYMBICORT 80-4.5 MCG/ACT AERO Inhale 2 puffs into the lungs 2 times daily 1 Inhaler 3    Fluticasone furoate-vilanterol (BREO ELLIPTA) 200-25 MCG/INH AEPB inhaler Inhale 1 puff into the lungs daily 1 each 5    albuterol (PROVENTIL) (5 MG/ML) 0.5% nebulizer solution Take 0.5 mLs by nebulization every 6 hours as needed for Wheezing 120 vial 5     Current Facility-Administered Medications   Medication Dose Route Frequency Provider Last Rate Last Admin    methylPREDNISolone acetate (DEPO-MEDROL) injection 80 mg  80 mg Intra-artICUlar Once Gabriel Echavarria MD        iopamidol (ISOVUE-300) 61 % injection 2 mL  2 mL Other ONCE PRN Karthik Mcdonald MD         Family History   Problem Relation Age of Onset    Osteoarthritis Mother     Other Mother         hyperlipidemia    Hypertension Mother     High Cholesterol Mother     Arthritis Mother     Heart Disease Mother     Diabetes Mother     No Known Problems Father      Past Medical History:   Diagnosis Date    Abnormal Pap smear of cervix     Allergic rhinitis     several allergens    Asthma     since shildhood    Bariatric surgery status 01/02/2020    An[facility] [address] [address] Pristas     Brachial neuralgia 1/23/2014    Chronic left shoulder pain     [facility]    Dr Caesar Maya    Depression     H/O colonoscopy 2014    Dr. Eduardo Abernathy    Hyperlipidemia     Hypertension 2010    Migraine headache     Obesity, morbid (more than 100 lbs over ideal weight or BMI > 40) (HCC)     SEJAL on CPAP 10/2018    Sleep apnea    Ian Mcgill is a 52 y.o. female evaluated via telephone on 8/17/2022 for Pneumonia (Lung weakness, sharp pain in lungs and weakness, fatigue)  . Documentation:  I communicated with the patient and/or health care decision maker about . Details of this discussion including any medical advice provided: Total Time: minutes: 11-20 minutes    Ian Mcgill was evaluated through a synchronous (real-time) audio encounter. Patient identification was verified at the start of the visit. She (or guardian if applicable) is aware that this is a billable service, which includes applicable co-pays. This visit was conducted with the patient's (and/or legal guardian's) verbal consent. She has not had a related appointment within my department in the past 7 days or scheduled within the next 24 hours. The patient was located at Home: [facility]. [address]  [id number] [address]. The provider was located at [facility] (Appt Dept): [address] [place] [address]. Note: not billable if this call serves to triage the patient into an appointment for the relevant concern    Claire Roberts MD     Objective: There were no vitals taken for this visit. Physical Exam  No exam done   Assessment:       Diagnosis Orders   1. Weakness        2. Fatigue, unspecified type        3. Myalgia               Plan:     Will take home covid test this pm  Orders Placed This Encounter   Procedures    XR CHEST STANDARD (2 VW)     Standing Status:   Future     Standing Expiration Date:   8/17/2023      If testing neg -needs in office appt for further evaluation

## 2022-08-17 NOTE — DISCHARGE INSTRUCTIONS
Post Procedure Instructions for Breast Biopsies    You have had a breast biopsy of the left  Breast at [facility] [place], which was ordered by Dr.M. Kraus    Activity  You may return to work or other activities the day of your biopsy, providing these activites do not require any heavy lifting or strenuous activity. We do recommend that you take the rest of the day following your biopsy just to rest.    Diet  You may resume your normal diet    Medications  1. Continue taking your normal medications. 2. You may take a mild pain reliever, as needed, such as Tylenol (Acetaminophen), Advil (Ibuprofen) or Motrin    Dressing  May remove ace wrap int the morning and take a shower. Pat the dressing dry. Put on snug fitting bra  2. Keep the ice pack on for 15 minutes at least 2 times today  3. You may shower and pat the dressing dry tomorrow. On Friday you may remove the dressing. The biopsy site should have started to heal at this point. At your discretion you can put a band-aid on it. Other Instructions  1. You may have some bruising following the biopsy, that is normal because of the compression and it will heal and go away  2. For severyal days or even a couple of weeks, you may feel mild tenderness, \"twinges\", or even a small bump at the biopsy site. This is normal. Applying moist heat to the area may bring relief. This will disappear with time    If you develop any of the following symptoms, please contact your referring physician. 1. Active bleeding-hold compression to the site. If it does not stop call the radiology department  2. If you develop redness or heat at the biopsy site or a fever 5-7 days following your biopsy this could be a sign of an early infection    Physician performing exam: Dr. Kelsey Sarabia    Please keep follow up appointment with Dr. Clarene Kehr. Efra on August 22, Monday at 9:15 at the [facility] to go over pathology report and follow up. Linda Meyers [address] [place] [address]   Phone: [phone]              Cell  [phone]           Radiology Phone Number: [phone]  [facility]: [phone]

## 2022-08-22 ENCOUNTER — OFFICE VISIT (OUTPATIENT)
Dept: SURGERY | Age: 47
End: 2022-08-22
Payer: COMMERCIAL

## 2022-08-22 VITALS
WEIGHT: 231 LBS | RESPIRATION RATE: 14 BRPM | OXYGEN SATURATION: 99 % | DIASTOLIC BLOOD PRESSURE: 68 MMHG | BODY MASS INDEX: 38.49 KG/M2 | HEART RATE: 89 BPM | HEIGHT: 65 IN | SYSTOLIC BLOOD PRESSURE: 105 MMHG | TEMPERATURE: 97.6 F

## 2022-08-22 DIAGNOSIS — N64.9 BREAST DISORDER: ICD-10-CM

## 2022-08-22 DIAGNOSIS — E66.9 OBESITY, CLASS II, BMI 35-39.9: ICD-10-CM

## 2022-08-22 DIAGNOSIS — N60.11 FIBROCYSTIC BREAST CHANGES OF BOTH BREASTS: Primary | ICD-10-CM

## 2022-08-22 DIAGNOSIS — Z12.31 BREAST CANCER SCREENING BY MAMMOGRAM: ICD-10-CM

## 2022-08-22 DIAGNOSIS — N60.12 FIBROCYSTIC BREAST CHANGES OF BOTH BREASTS: Primary | ICD-10-CM

## 2022-08-22 PROBLEM — E66.812 OBESITY, CLASS II, BMI 35-39.9: Status: ACTIVE | Noted: 2022-08-22

## 2022-08-22 PROBLEM — K61.0 PERIANAL ABSCESS: Status: ACTIVE | Noted: 2020-08-11

## 2022-08-22 PROBLEM — R10.12 LEFT UPPER QUADRANT ABDOMINAL PAIN: Status: ACTIVE | Noted: 2019-03-01

## 2022-08-22 PROBLEM — J45.909 ASTHMA: Status: ACTIVE | Noted: 2022-08-22

## 2022-08-22 PROCEDURE — G8427 DOCREV CUR MEDS BY ELIG CLIN: HCPCS | Performed by: SURGERY

## 2022-08-22 PROCEDURE — 99243 OFF/OP CNSLTJ NEW/EST LOW 30: CPT | Performed by: SURGERY

## 2022-08-22 PROCEDURE — G8417 CALC BMI ABV UP PARAM F/U: HCPCS | Performed by: SURGERY

## 2022-08-22 RX ORDER — BUSPIRONE HYDROCHLORIDE 5 MG/1
1 TABLET ORAL 3 TIMES DAILY PRN
COMMUNITY
Start: 2022-07-10 | End: 2022-08-31 | Stop reason: SDUPTHER

## 2022-08-22 ASSESSMENT — ENCOUNTER SYMPTOMS
SHORTNESS OF BREATH: 0
COLOR CHANGE: 0
CHEST TIGHTNESS: 0
ABDOMINAL PAIN: 0
SORE THROAT: 0
COUGH: 0
NAUSEA: 0
VOMITING: 0

## 2022-08-22 NOTE — PROGRESS NOTES
NEW BREAST PATIENT         SERVICE DATE: 8/22/22  SERVICE TIME:  9:19 AM EDT    REFERRED BY:  Maryam Delarosa MD   REASON FOR TODAY'S VISIT:    Chief Complaint   Patient presents with    New Patient    Abnormal Mammogram     Left Breast BIRADS 4, stereotactic left breast biopsy completed on 8/17/2022, incision healing with no complaints, does not do self breast exams, last month during menses,she had clear left nipple drainage without expression, left nipple was itchy, no further drainage noted, denies breast pain but has bilateral breast numbness since bilateral breast reduction 2007      CHAPERONE WAS OFFERED, PATIENT RESPONDED: no    HISTORY AND CHIEF COMPLAINT:  Denice Carreon is a 52 y.o.  female who is here for a New Patient and Abnormal Mammogram (Left Breast BIRADS 4, stereotactic left breast biopsy completed on 8/17/2022, incision healing with no complaints, does not do self breast exams, last month during menses,she had clear left nipple drainage without expression, left nipple was itchy, no further drainage noted, denies breast pain but has bilateral breast numbness since bilateral breast reduction 2007)    Pathology showed usual intraductal hyperplasia and microcalcifications and fibrocystic changes    She is tearful as she lost her best friend to alcoholism. BREAST HISTORY  Her past breast history (prior to this encounter) is as follows:   Abnormal mammogram:   Yes, Left Breast BIRADS 4  Abnormal Breast US:  No  Breast biopsy:    Yes, Left Breast 8/17/2022  Breast cysts:    No  Breast surgery:    No  Breast cancer              No  History of Breastfeeding: Yes tried  Currently Breastfeeding: No      RISK FACTORS FOR BREAST CANCER:  Family History of Breast Cancer: No.  History of ovarian cancer: no  Ashkenazi Ancestry: no  Age at the birth of first child: 21  Age at the onset of menses: 15  Age at menopause: N/A   Hormonal therapy: no  Postmenopausal obesity: BMI 38.44    BRA SIZE: 42DD    Past Medical History:   Diagnosis Date    Abnormal Pap smear of cervix     Allergic rhinitis     several allergens    Asthma     since shildhood    Bariatric surgery status 2020    [address] [facility], Dr Fran Graham     Brachial neuralgia 2014    Chronic left shoulder pain     COPD (chronic obstructive [facility]) [facility]     Dr Luz Moreno    Depression     H/O colonoscopy     Dr. Luna Esquivel    Hyperlipidemia     Hypertension 2010    Migraine headache     Obesity, morbid (more than 100 lbs over ideal weight or BMI > 40) ([facility])     SEJAL on CPAP 10/2018    Sleep apnea      Past Surgical History:   Procedure Laterality Date    BREAST REDUCTION SURGERY Bilateral 2007    BREAST SURGERY  2007    reduction, Dr Joy Bostonidus  10/20/15    DR. 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, self employed   Tobacco Use    Smoking status: Former     Packs/day: 0.50     Types: Cigarettes     Quit date: 2018     Years since quittin.1    Smokeless tobacco: Never   Vaping Use    Vaping Use: Never used   Substance and Sexual Activity    Alcohol use:  Yes     Alcohol/week: 1.0 standard drink     Types: 1 Standard drinks or equivalent per week     Comment: socially    Drug use: Not Currently     Frequency: 1.0 times per week     Comment: marijuana, quit 2013    Sexual activity: Yes     Partners: Male     Birth control/protection: Condom   Other Topics Concern    Not on file   Social History Narrative    Born in [place], has 1 brother one sister    Never , was in a bad relationship    Lives with daughter age 16 (2017) in a house in [facility] [facility]    2 daughters, one daughter with problems, one in college    Works at [employer]    Hobbies reading, puzzles, games, outdoor activities     [address] Strain: Low Risk     Difficulty of Paying Living Expenses: Not hard at all   Food Insecurity: No Food Insecurity    Worried About [address] [employer] in the Last Year: Never true    [address]  [facility] [employer] in the Last Year: Never true   Transportation Needs: Not on file   Physical Activity: Not on file   Stress: Not on file   Social Connections: Not on file   Intimate Partner Violence: Not on file   Housing Stability: Not on file       Review of Systems   Constitutional:  Negative for activity change, appetite change, chills, diaphoresis, fatigue, fever and unexpected weight change. HENT:  Negative for congestion, ear pain, hearing loss, mouth sores, nosebleeds and sore throat. Respiratory:  Negative for cough, chest tightness and shortness of breath. Cardiovascular:  Negative for chest pain, palpitations and leg swelling. Gastrointestinal:  Negative for abdominal pain, nausea and vomiting. Endocrine: Negative for cold intolerance, heat intolerance, polydipsia, polyphagia and polyuria. Genitourinary:  Negative for difficulty urinating, menstrual problem and vaginal bleeding. Musculoskeletal:  Negative for neck pain and neck stiffness. Skin:  Negative for color change, pallor, rash and wound. Allergic/Immunologic: Negative for environmental allergies and immunocompromised state. Neurological:  Negative for weakness. Hematological:  Does not bruise/bleed easily.    Psychiatric/Behavioral:  Positive for dysphoric mood (she lost her best friend recently). Negative for agitation, confusion, sleep disturbance and suicidal ideas. The patient is not nervous/anxious. Have you ever tested positive for AIDS? no  Have you ever tested positive for Hepatitis? no    ANTICOAGULANT MEDICATIONS:  none    SOCIAL HISTORY   Marital status: Single  Occupation:  [employer]  Tobacco use: Shireen Tanner  reports that she quit smoking about 4 years ago. Her smoking use included cigarettes. She smoked an average of .5 packs per day. She has never used smokeless tobacco.  Alcohol use: Shireen Tanner  reports current alcohol use of about 1.0 standard drink per week. Drug use: Shireen Tanner  reports that she does not currently use drugs. Frequency: 1.00 time per week. Caffeine intake: 3 cups of caffeinated coffee per day(s), 2-3 tea per day  Exercise:  moderately active    Vitals:    08/22/22 0911   BP: 105/68   Pulse: 89   Resp: 14   Temp: 97.6 °F (36.4 °C)   SpO2: 99%   Weight: 231 lb (104.8 kg)   Height: 5' 5\" (1.651 m)        Physical Exam  Vitals reviewed. Constitutional:       Appearance: Normal appearance. She is well-developed. HENT:      Head: Normocephalic and atraumatic. Nose: Nose normal.   Eyes:      Conjunctiva/sclera: Conjunctivae normal.      Right eye: No hemorrhage. Left eye: No hemorrhage. Cardiovascular:      Rate and Rhythm: Normal rate. Pulmonary:      Effort: Pulmonary effort is normal. No respiratory distress. Chest:   Breasts:     Breasts are symmetrical.      Right: No inverted nipple, mass, nipple discharge, skin change, tenderness, axillary adenopathy or supraclavicular adenopathy. Left: No inverted nipple, mass, nipple discharge, skin change, tenderness, axillary adenopathy or supraclavicular adenopathy. Musculoskeletal:         General: Normal range of motion. Cervical back: Normal range of motion and neck supple. Comments: Normal Range of motion in upper and lower extremities.    Lymphadenopathy: Cervical: No cervical adenopathy. Right cervical: No superficial, deep or posterior cervical adenopathy. Left cervical: No superficial, deep or posterior cervical adenopathy. Upper Body:      Right upper body: No supraclavicular, axillary or pectoral adenopathy. Left upper body: No supraclavicular, axillary or pectoral adenopathy. Skin:     General: Skin is warm and dry. Findings: No abrasion, bruising, erythema or lesion. Neurological:      Mental Status: She is alert and oriented to person, place, and time. She is not disoriented. Psychiatric:         Mood and Affect: Affect is tearful (due to recent loss of friend; she sees pcp for it). Speech: Speech normal.         Behavior: Behavior normal. Behavior is cooperative. Thought Content: Thought content normal.         Judgment: Judgment normal.     RADIOGRAPHIC FINDINGS:    XR CHEST STANDARD (2 VW)    Result Date: 8/18/2022  EXAMINATION:  CHEST. CLINICAL HISTORY:  GENERALIZED WEAKNESS. COMPARISONS:  10/23/2020. TECHNIQUE:  PA and lateral. FINDINGS:  Heart size and contour are within normal limits. Pulmonary vascularity is normal. No infiltrate or effusion is seen. No evidence of a mass or adenopathy. No significant bony abnormality. NORMAL CHEST.     [facility] BREAST SPECIMEN    Result Date: 8/17/2022  EXAMINATION:  [facility] STEREO PLACE BREAST LOC DEVICE 1ST LESION LEFT, [facility] DIGITAL DIAGNOSTIC W OR WO CAD LEFT, [facility] BREAST SPECIMEN HISTORY:  R92.8 Abnormal mammogram of left breast ICD10. The patients recent studies were reviewed. After reviewing  films the suspicious calcifications in the left breast were felt to be accessible to stereotactic biopsy. At this point, the biopsy procedure was discussed with the patient including possible complications such as infection, hematoma, and inconclusive results necessitating further biopsy or surgery.   Alternative procedures of needle localization and surgical extraction accessible to stereotactic biopsy. At this point, the biopsy procedure was discussed with the patient including possible complications such as infection, hematoma, and inconclusive results necessitating further biopsy or surgery. Alternative procedures of needle localization and surgical extraction were also discussed with patient. The patient understands this is a sampling procedure and agrees to proceed. Consent was signed. TECHNIQUE:  Under sterile technique, local anesthetic was applied to the skin, initially, and then to the deeper soft tissues down to the depth of the calcifications. Through a small nick made in the skin, the 9-gauge EVIVA probe was inserted and then advanced to the appropriate depth. Pre-fire and post-fire images insured that the calcifications were properly targeted. Multiple vacuum assisted core samples were taken. Prior to removing the probe, specimen images were reviewed (see below) and a localization clip was left in place at the site of biopsy for future localization, if needed. The probe was then removed and hemostasis was obtained with direct compression. No serious complications were encountered. SPECIMEN MAMMOGRAM:  A specimen mammogram obtained during the procedure confirmed adequate sampling tissue was obtained. POST BIOPSY MAMMOGRAM:  After the biopsy procedure imaging was obtained confirming appropriate biopsy site and successful deployment of localization clip. FOLLOWUP:  After the procedure, the biopsy wound was properly dressed and the patient was given appropriate discharge instructions covering the next 48 hours. The patient was also instructed to followup with their provider to receive the results. If they do not receive results within one to two weeks following the biopsy, she was instructed to contact the office. LEFT STEREOTACTIC BREAST BIOPSY PERFORMED.       [facility] PLACE BREAST LOC DEVICE 1ST LESION LEFT    Result Date: 8/17/2022  EXAMINATION: [facility] STEREO PLACE BREAST LOC DEVICE 1ST LESION LEFT, [facility] DIGITAL DIAGNOSTIC W OR WO CAD LEFT, [facility] BREAST SPECIMEN HISTORY:  R92.8 Abnormal mammogram of left breast ICD10. The patients recent studies were reviewed. After reviewing  films the suspicious calcifications in the left breast were felt to be accessible to stereotactic biopsy. At this point, the biopsy procedure was discussed with the patient including possible complications such as infection, hematoma, and inconclusive results necessitating further biopsy or surgery. Alternative procedures of needle localization and surgical extraction were also discussed with patient. The patient understands this is a sampling procedure and agrees to proceed. Consent was signed. TECHNIQUE:  Under sterile technique, local anesthetic was applied to the skin, initially, and then to the deeper soft tissues down to the depth of the calcifications. Through a small nick made in the skin, the 9-gauge EVIVA probe was inserted and then advanced to the appropriate depth. Pre-fire and post-fire images insured that the calcifications were properly targeted. Multiple vacuum assisted core samples were taken. Prior to removing the probe, specimen images were reviewed (see below) and a localization clip was left in place at the site of biopsy for future localization, if needed. The probe was then removed and hemostasis was obtained with direct compression. No serious complications were encountered. SPECIMEN MAMMOGRAM:  A specimen mammogram obtained during the procedure confirmed adequate sampling tissue was obtained. POST BIOPSY MAMMOGRAM:  After the biopsy procedure imaging was obtained confirming appropriate biopsy site and successful deployment of localization clip. FOLLOWUP:  After the procedure, the biopsy wound was properly dressed and the patient was given appropriate discharge instructions covering the next 48 hours.   The patient was also instructed to followup with their provider to receive the results. If they do not receive results within one to two weeks following the biopsy, she was instructed to contact the office. LEFT STEREOTACTIC BREAST BIOPSY PERFORMED. [facility] DALIA DIGITAL DIAGNOSTIC UNILATERAL LEFT    Result Date: 8/3/2022  LEFT BREAST DIGITAL DIAGNOSTIC MAMMOGRAMS AND 3-D BREAST TOMOSYNTHESIS: CLINICAL HISTORY: 52YEAR-OLD WITH BASELINE CATEGORY 0 LEFT SCREENING MAMMOGRAPHY. FINDINGS: 3D breast tomosynthesis diagnostic mammography and spot magnification views in the craniocaudal and MLO projection were performed and compared to the  previous baseline study from 7/13/2022. There is heterogeneously dense left breast parenchyma which can obscure small breast lesions. There is a grouping of calcifications seen best on the spot magnification views in the mid outer left breast at a posterior third depth. Calcifications are faint and amorphous. Biopsy should be considered to assess for malignancy. On the diagnostic images no dominant masses or additional worrisome groupings of calcifications. Left breast BI-RADS 4: Suspicious Findings were discussed with the patient and she was referred to the  breast imaging nurse navigator to coordinate surgical consultation. CAD analysis was performed and used in the interpretation. BI-RADS 4: SUSPICIOUS FINDING--BIOPSY SHOULD BE CONSIDERED. BREAST DENSITY:  HETEROGENEOUS Board Certified Radiologists. Accredited by the ACR and FDA. MAMMOGRAPHY IS VERY IMPORTANT TO YOUR HEALTH. THE AMERICAN CANCER SOCIETY GUIDELINES RECOMMEND THAT WOMEN 36YEARS OF AGE AND OLDER SHOULD HAVE A MAMMOGRAM EVERY YEAR. A REMINDER LETTER WILL BE SENT AT THE APPROPRIATE TIME. ASSESSMENT       IMPRESSION :      ICD-10-CM    1. Fibrocystic breast changes of both breasts  N60.11     N60.12       2. Breast disorder  N64.9 CANCELED: [facility] DALIA DIGITAL DIAGNOSTIC UNILATERAL LEFT      3.  Breast cancer screening by mammogram  Z12.31 [facility] extensive medical and family history, and review of systems were asked and reviewed with the patient by me. I also reviewed the note in detail. This note was partially generated using Dragon voice recognition system, and there may be some incorrect words, spellings, punctuation that were not noticed in checking the note before saving.

## 2022-08-31 RX ORDER — ALBUTEROL SULFATE 90 UG/1
AEROSOL, METERED RESPIRATORY (INHALATION)
Qty: 18 G | Refills: 0 | Status: SHIPPED | OUTPATIENT
Start: 2022-08-31 | End: 2022-09-26

## 2022-08-31 RX ORDER — ROSUVASTATIN CALCIUM 10 MG/1
TABLET, COATED ORAL
Qty: 90 TABLET | Refills: 0 | Status: SHIPPED | OUTPATIENT
Start: 2022-08-31 | End: 2022-10-26

## 2022-08-31 RX ORDER — BUSPIRONE HYDROCHLORIDE 5 MG/1
5 TABLET ORAL 3 TIMES DAILY
Qty: 90 TABLET | Refills: 0 | Status: SHIPPED | OUTPATIENT
Start: 2022-08-31 | End: 2022-09-26

## 2022-08-31 RX ORDER — FLUOXETINE HYDROCHLORIDE 20 MG/1
CAPSULE ORAL
Qty: 90 CAPSULE | Refills: 0 | Status: SHIPPED | OUTPATIENT
Start: 2022-08-31 | End: 2022-10-26

## 2022-08-31 RX ORDER — LORATADINE 10 MG/1
TABLET ORAL
Qty: 90 TABLET | Refills: 0 | Status: SHIPPED | OUTPATIENT
Start: 2022-08-31 | End: 2022-10-26

## 2022-08-31 RX ORDER — LOSARTAN POTASSIUM 50 MG/1
TABLET ORAL
Qty: 90 TABLET | Refills: 0 | Status: SHIPPED | OUTPATIENT
Start: 2022-08-31 | End: 2022-10-26

## 2022-09-14 RX ORDER — RESVER/WINE/BFL/GRPSD/PC/C/POM 200MG-60MG
5000 CAPSULE ORAL DAILY
Qty: 30 TABLET | Refills: 1 | Status: SHIPPED | OUTPATIENT
Start: 2022-09-14

## 2022-09-19 ENCOUNTER — PROCEDURE VISIT (OUTPATIENT)
Dept: PAIN MANAGEMENT | Age: 47
End: 2022-09-19
Payer: COMMERCIAL

## 2022-09-19 DIAGNOSIS — G56.03 BILATERAL CARPAL TUNNEL SYNDROME: Primary | ICD-10-CM

## 2022-09-19 PROCEDURE — 76942 ECHO GUIDE FOR BIOPSY: CPT | Performed by: PHYSICAL MEDICINE & REHABILITATION

## 2022-09-19 PROCEDURE — 20526 THER INJECTION CARP TUNNEL: CPT | Performed by: PHYSICAL MEDICINE & REHABILITATION

## 2022-09-19 RX ORDER — LIDOCAINE HYDROCHLORIDE 10 MG/ML
5 INJECTION, SOLUTION INFILTRATION; PERINEURAL ONCE
Status: COMPLETED | OUTPATIENT
Start: 2022-09-19 | End: 2022-09-19

## 2022-09-19 RX ORDER — BETAMETHASONE SODIUM PHOSPHATE AND BETAMETHASONE ACETATE 3; 3 MG/ML; MG/ML
6 INJECTION, SUSPENSION INTRA-ARTICULAR; INTRALESIONAL; INTRAMUSCULAR; SOFT TISSUE ONCE
Status: COMPLETED | OUTPATIENT
Start: 2022-09-19 | End: 2022-09-19

## 2022-09-19 RX ADMIN — LIDOCAINE HYDROCHLORIDE 5 ML: 10 INJECTION, SOLUTION INFILTRATION; PERINEURAL at 11:15

## 2022-09-19 RX ADMIN — BETAMETHASONE SODIUM PHOSPHATE AND BETAMETHASONE ACETATE 6 MG: 3; 3 INJECTION, SUSPENSION INTRA-ARTICULAR; INTRALESIONAL; INTRAMUSCULAR; SOFT TISSUE at 11:14

## 2022-09-19 NOTE — PROGRESS NOTES
Carpal Tunnel Corticosteroid Injection under Ultrasound Guidance    Patient Name: Denis Mancuso   : 1975  Date: 2022     Provider: Pauly Hi MD        PROCEDURE: Bilateral carpal tunnel corticosteroid injection under ultrasound guidance    INDICATIONS: Discussed the risks of the steroid injection procedure includes but is not limited to bleeding, infection, local skin irritation, skin atrophy, calcification, skin color and pigmentation changes, worsened pain and irritation, burning, damage to surrounding structures, side effects, toxicity, allergic reactions to medications used, immune and stress-response dysfunction, fat necrosis, decreased bone mineralization, increased fracture risk, blood sugar elevation, need for surgery, premature damage or degeneration of the nearby joints, as well as catastrophic injury such as vision loss, paralysis, stroke, loss of use of the wrist and use of the hand, and death. Discussed the risks, benefits, alternative procedures, and alternatives to the procedure including no procedure at all. Discussed that we cannot undo any permanent neurologic or orthopaedic damage or change the course of any underlying disease. After thorough discussion, patient expressed complete understanding and willingness to proceed. Description of Procedure: The sites were marked. A time-out was performed. Ultrasound was used to visualize the pertinent anatomy and identify any critical neurovascular structures. The sites were then prepped in a sterile manner with Betadine three times and alcohol. Attention was turned to the right wrist.  Then a 27-gauge 1.5 inch needle was advanced under direct ultrasound guidance up to the median nerve. Safe interval between the ulnar artery and median nerve was reduced, approach was maintained in the safe interval.  Needle is advanced in an out of plane transverse approach.   The needle was advanced further in a in plane longitudinal approach towards the transverse carpal ligament. Aspiration for blood was negative. Then a 3 mL injectate containing 0.5 mL of 3 mg of betamethasone and 2.5 mL of 1% preservative-free lidocaine was injected without resistance or difficulty. Fair spread of the injectate was directly visualized under ultrasound. The needle was flushed and removed. The site was hemostatic. The site was cleaned and appropriately dressed. Attention was turned to the left wrist.  Then a 27-gauge 1.5 inch needle was advanced under direct ultrasound guidance up to the median nerve. Nerve had a bifid appearance. Safe interval between the ulnar artery and median nerve was reduced, approach was performed lateral to the median nerve. Needle is advanced in an out of plane transverse approach. The needle was advanced further in a in plane longitudinal approach towards the transverse carpal ligament. Aspiration for blood was negative. Then a 3 mL injectate containing 0.5 mL of 3 mg of betamethasone and 2.5 mL of 1% preservative-free lidocaine was injected without resistance or difficulty. A total of 6 mg betamethasone was used for today's procedure. Limited spread of the injectate was directly visualized under ultrasound. The needle was flushed and removed. The site was hemostatic. The site was cleaned and appropriately dressed. The patient tolerated the procedure well. There were no immediate post procedure complications. Post procedure instructions were given. The patient will follow-up as previously instructed.        [address] [facility]  Phone [phone]

## 2022-09-19 NOTE — PROGRESS NOTES
This procedure was 30% more difficult and required 30% more work secondary to the patient's habitus. The patient has a BMI of 38.4 and has comorbidities of COPD, obstructive sleep apnea, hypertension, and osteoarthritis. This required increased work for safe and proper positioning upon the procedure table, increased needle passes for safe and appropriate needle placement, and increased ultrasound time for proper visualization.

## 2022-09-26 RX ORDER — ALBUTEROL SULFATE 90 UG/1
AEROSOL, METERED RESPIRATORY (INHALATION)
Qty: 18 G | Refills: 0 | Status: SHIPPED | OUTPATIENT
Start: 2022-09-26 | End: 2022-11-01 | Stop reason: SDUPTHER

## 2022-09-26 RX ORDER — BUSPIRONE HYDROCHLORIDE 5 MG/1
TABLET ORAL
Qty: 90 TABLET | Refills: 0 | Status: SHIPPED | OUTPATIENT
Start: 2022-09-26

## 2022-09-26 NOTE — TELEPHONE ENCOUNTER
Comments: This request is coming from the pharmacy. Last Office Visit (last PCP visit):   8/17/2022    Next Visit Date:  Future Appointments   Date Time Provider Donald Jones   6/27/2023  8:30 AM David Person MD [facility]   7/19/2023  8:20 AM Sumi Parker MAMMO ROOM 2 [facility]       If hasn't been seen in over a year OR hasn't followed up according to last diabetes/ADHD visit, make appointment for patient before sending refill to provider.     Rx requested:  Requested Prescriptions     Pending Prescriptions Disp Refills    busPIRone (BUSPAR) 5 MG tablet [Pharmacy Med Name: buspirone 5 mg tablet] 90 tablet 0     Sig: TAKE 1 TABLET BY MOUTH THREE TIMES DAILY AS NEEDED    albuterol sulfate HFA (VENTOLIN HFA) 108 (90 Base) MCG/ACT inhaler [Pharmacy Med Name: Ventolin HFA 90 mcg/actuation aerosol inhaler] 18 g 0     Sig: INHALE 2 PUFFS into the lungs EVERY 6 HOURS AS NEEDED FOR WHEEZING or SHORTNESS OF BREATH

## 2022-10-25 ENCOUNTER — OFFICE VISIT (OUTPATIENT)
Dept: PAIN MANAGEMENT | Age: 47
End: 2022-10-25
Payer: COMMERCIAL

## 2022-10-25 VITALS
SYSTOLIC BLOOD PRESSURE: 130 MMHG | TEMPERATURE: 96.8 F | BODY MASS INDEX: 40.12 KG/M2 | WEIGHT: 235 LBS | HEIGHT: 64 IN | DIASTOLIC BLOOD PRESSURE: 82 MMHG

## 2022-10-25 DIAGNOSIS — M54.12 CERVICAL RADICULITIS: ICD-10-CM

## 2022-10-25 DIAGNOSIS — M47.812 CERVICAL SPONDYLOSIS WITHOUT MYELOPATHY: ICD-10-CM

## 2022-10-25 DIAGNOSIS — G56.03 BILATERAL CARPAL TUNNEL SYNDROME: Primary | ICD-10-CM

## 2022-10-25 PROCEDURE — G8427 DOCREV CUR MEDS BY ELIG CLIN: HCPCS | Performed by: NURSE PRACTITIONER

## 2022-10-25 PROCEDURE — 99214 OFFICE O/P EST MOD 30 MIN: CPT | Performed by: NURSE PRACTITIONER

## 2022-10-25 PROCEDURE — 1036F TOBACCO NON-USER: CPT | Performed by: NURSE PRACTITIONER

## 2022-10-25 PROCEDURE — G8417 CALC BMI ABV UP PARAM F/U: HCPCS | Performed by: NURSE PRACTITIONER

## 2022-10-25 PROCEDURE — G8484 FLU IMMUNIZE NO ADMIN: HCPCS | Performed by: NURSE PRACTITIONER

## 2022-10-25 RX ORDER — GABAPENTIN 300 MG/1
300 CAPSULE ORAL 2 TIMES DAILY
Qty: 60 CAPSULE | Refills: 0 | Status: SHIPPED | OUTPATIENT
Start: 2022-10-25 | End: 2022-11-24

## 2022-10-25 ASSESSMENT — ENCOUNTER SYMPTOMS
DIARRHEA: 0
SHORTNESS OF BREATH: 0
BACK PAIN: 1
EYES NEGATIVE: 1
GASTROINTESTINAL NEGATIVE: 1
CONSTIPATION: 0
TROUBLE SWALLOWING: 0
COUGH: 0

## 2022-10-25 NOTE — PROGRESS NOTES
Heidi Estevez  (1975)    10/25/2022    Subjective:     Heidi Estevez is 52 y.o. female who complains today of:    Chief Complaint   Patient presents with    Back Pain    Arm Pain     Bilateral         Allergies:  Patient has no known allergies. Past Medical History:   Diagnosis Date    Abnormal Pap smear of cervix     Allergic rhinitis     several allergens    Asthma     since shildhood    Bariatric surgery status 2020    [facility], Dr Eric Anne     Brachial neuralgia 2014    Chronic left shoulder pain     COPD (chronic obstructive pulmonary disease) ([facility] [address]     Dr Alvino Paredes    Depression     H/O colonoscopy     Dr. Ligia Ch    Hyperlipidemia     Hypertension     Migraine headache     Obesity, morbid (more than 100 lbs over ideal weight or BMI > 40) ([facility])     SEJAL on CPAP 10/2018    Sleep apnea      Past Surgical History:   Procedure Laterality Date    BREAST REDUCTION SURGERY Bilateral 2007    BREAST SURGERY  2007    reduction, Dr Patel Perez  10/20/15    DR. Rafa Romo Right     Dr Alinda Mcburney    SLEEVE GASTRECTOMY  2020    [facility], Dr Santana Gerard History   Problem Relation Age of Onset    Osteoarthritis Mother     Other Mother         hyperlipidemia    Hypertension Mother     High Cholesterol Mother     Arthritis Mother     Heart Disease Mother     Diabetes Mother     No Known Problems Father      Social History     Socioeconomic History    Marital status: Single     Spouse name: Not on file    Number of children: 2    Years of education: Not on file    Highest education level: Not on file   Occupational History    Occupation: dog grooming business, self employed   Tobacco Use    Smoking status: Former     Packs/day: 0.50     Types: Cigarettes     Quit date: 2018     Years since quittin.2    Smokeless tobacco: Never   Vaping Use    Vaping Use: Never used   Substance and Sexual Activity    Alcohol use:  Yes     Alcohol/week: 1.0 standard drink     Types: 1 Standard drinks or equivalent per week     Comment: socially    Drug use: Not Currently     Frequency: 1.0 times per week     Comment: marijuana, quit July 2013    Sexual activity: Yes     Partners: Male     Birth control/protection: Condom   Other Topics Concern    Not on file   Social History Narrative    Born in [place], has 1 brother one sister    Never , was in a bad relationship    Lives with daughter age 16 (2017) in a house in [place]    2 daughters, one daughter with problems, one in college    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)    Hobbies reading, puzzles, games, outdoor activities     [id number] Resource Strain: Low Risk     Difficulty of Paying Living Expenses: Not hard at all   Food Insecurity: No Food Insecurity    Worried About [email or web address] in the Last Year: Never true    [employer] in the Last Year: Never true   Transportation Needs: Not on file   Physical Activity: Not on file   Stress: Not on file   Social Connections: Not on file   Intimate Partner Violence: Not on file   Housing Stability: Not on file       Current Outpatient Medications on File Prior to Visit   Medication Sig Dispense Refill    busPIRone (BUSPAR) 5 MG tablet TAKE 1 TABLET BY MOUTH THREE TIMES DAILY AS NEEDED 90 tablet 0    albuterol sulfate HFA (VENTOLIN HFA) 108 (90 Base) MCG/ACT inhaler INHALE 2 PUFFS into the lungs EVERY 6 HOURS AS NEEDED FOR WHEEZING or SHORTNESS OF BREATH 18 g 0    vitamin D3 (D-5000) 125 MCG (5000 UT) TABS tablet Take 1 tablet by mouth daily 30 tablet 1    rosuvastatin (CRESTOR) 10 MG tablet TAKE 1 TABLET BY MOUTH NIGHTLY 90 tablet 0    losartan (COZAAR) 50 MG tablet TAKE 1 TABLET BY MOUTH EVERY DAY 90 tablet 0    FLUoxetine (PROZAC) 20 MG capsule TAKE 1 CAPSULE BY MOUTH DAILY 90 capsule 0    loratadine (CLARITIN) 10 MG tablet TAKE 1 TABLET BY MOUTH DAILY AS NEEDED for allergies 90 tablet 0    pantoprazole (PROTONIX) 40 MG tablet TAKE 1 TABLET BY MOUTH EVERY OTHER DAY BEFORE a meal 45 tablet 2    diclofenac sodium (VOLTAREN) 1 % GEL Apply 4 g topically 4 times daily 100 g 0    lidocaine (LMX) 4 % cream Apply a half dollar sized amount to intact skin topically up to twice daily as needed for pain 45 g 1    Multiple Vitamins-Minerals (DEKAS BARIATRIC) CHEW CHEW AND SWALLOW 1 (ONE) TABLET BY MOUTH DAILY 90 tablet 0    Fluticasone furoate-vilanterol (BREO ELLIPTA) 200-25 MCG/INH AEPB inhaler Inhale 1 puff into the lungs daily 1 each 5    albuterol (PROVENTIL) (5 MG/ML) 0.5% nebulizer solution Take 0.5 mLs by nebulization every 6 hours as needed for Wheezing 120 vial 5     Current Facility-Administered Medications on File Prior to Visit   Medication Dose Route Frequency Provider Last Rate Last Admin    methylPREDNISolone acetate (DEPO-MEDROL) injection 80 mg  80 mg Intra-artICUlar Once Army Esteban MD        iopamidol (ISOVUE-300) 61 % injection 2 mL  2 mL Other ONCE PRN Maame Cueva MD               Pt presents today for a f/u of her pain. PCP is Dr. Ambar Cadena MD.  Patient was last seen by Dr. Chinmay Salazar on 9/19/2022 for bilateral  CTS injection. She says she doesn't feel these offered any relief. She says her arms are \"aching\" and still has tingling. She continues to have numbness in fingers. She has done PT for neck. She says she is getting pain in neck and low back. She is now having pain in low back that shoots into buttock and up into back. She has done PT at [facility] she says this year for both neck and low back. She last saw a Beth Connors CNP for follow-up on 6/9/2022 with complaints of low back pain, neck pain, CTS pain. He has seen Dr. Ry Mike in the past and she is not a surgical candidate, nor does patient want to consider surgery evidently.   On 5/4/2022 had a bilateral L4-5 transforaminal epidural steroid injection which patient felt made the pain worse. PMH: Stomach ulcers, hiatal hernia, gastric bypass, diabetes, depression and anxiety on Prozac. Previous EMG 2022 with Dr. Sam Yeh shows severe right greater than left carpal tunnel syndrome. At that time diagnostic bilateral L3-4-5 MBB's were ordered and Ned Faye declined cervical MBB's. She says she didn't schedule this d/t best friend . Gabapentin was increased 300 mg to 3 times daily. Last filled in  according to [facility]. She says she did use this and it made a difference, but says didn't continue. Using Tylenol OTC. Pt feels pain level 7/10. Pt feels that not moving, too much activity makes the pain worse, and past gabapentin, change of position makes the pain better. Pt admits to UE radiating numbness and tingling. Denies recent falls, injuries or trauma. Pt denies new weakness. Pt reports PT has been done. Review of Systems   Constitutional: Negative. Negative for fatigue. HENT: Negative. Negative for trouble swallowing. Eyes: Negative. Respiratory:  Negative for cough and shortness of breath. Cardiovascular:  Negative for chest pain. Gastrointestinal: Negative. Negative for constipation and diarrhea. Endocrine: Negative. Genitourinary: Negative. Musculoskeletal:  Positive for back pain and neck pain. Skin: Negative. Neurological:  Positive for numbness. Negative for dizziness, weakness and headaches. Hematological: Negative. Psychiatric/Behavioral: Negative. Objective:     Vitals:  /82   Temp 96.8 °F (36 °C)   Ht 5' 4\" (1.626 m)   Wt 235 lb (106.6 kg)   BMI 40.34 kg/m² Pain Score:   7      Physical Exam  Vitals and nursing note reviewed. This is a pleasant female who answers questions appropriately and follows commands. Pt is alert and oriented x 3. Recent and remote memory is intact. Mood and affect, judgement and insight are normal.  No signs of distress, no dyspnea or SOB noted. HEENT: PERRL.   Neck is supple, trachea midline. No lymphadenopathy noted. Decreased ROM with flexion, extension and rotation of cervical spine. Tightness in trapezius with palpation noted. Tender with palpation over cervical spine. Mildly positive Spurling's maneuver. Negative Gretchen's sign. Strong grasp B/L. Positive Brandi's sign bilaterally. Strength is functional in UE bilaterally. Pulses are intact. Decreased ROM with flexion and extension of low back. Mildly tender with palpation to lumbar spine with palpation on bilateral with positive provacative maneuvers noted. Negative SLR. Tightness in both hamstrings noted. Pt is able to briefly heel walk and toe walk. Balance and coordination normal.  Strength is functional for ambulation. Cranial nerves II-XII are intact. Assessment:      Diagnosis Orders   1. Bilateral carpal tunnel syndrome  gabapentin (NEURONTIN) 300 MG capsule      2. Cervical spondylosis without myelopathy  MRI CERVICAL SPINE WO CONTRAST      3. Cervical radiculitis  MRI CERVICAL SPINE WO CONTRAST    gabapentin (NEURONTIN) 300 MG capsule          Plan:     Periodic Controlled Substance Monitoring: No signs of potential drug abuse or diversion identified. (Maye Rojas, APRN - CNP)    Orders Placed This Encounter   Medications    gabapentin (NEURONTIN) 300 MG capsule     Sig: Take 1 capsule by mouth in the morning and at bedtime for 30 days. Start QHS x 7 days then increase to BID as tolerated     Dispense:  60 capsule     Refill:  0       Orders Placed This Encounter   Procedures    MRI CERVICAL SPINE WO CONTRAST     Standing Status:   Future     Standing Expiration Date:   1/23/2023     Order Specific Question:   Reason for exam:     Answer:   neck pain and radicular Sx     Discussed options with the patient today. Anatomic model pathology was shown and reviewed with pt. We will try restarting gabapentin 300 mg reduced to twice a day. Start at night x7 days and increase as tolerated.   Advised patient due to CTS injections not helping she can return to Dr. Kwabena Ramirez for consideration of surgery-patient is trying to avoid this if possible. Order MRI of her cervical spine due to the neck pain and cervical radiculitis on exam to evaluate further. She says she is done physical therapy at [facility]. EMG report reviewed. He works 2 jobs. Hold injections at this time. All questions were answered. Discussed home exercise program.  Relevant imaging and pain generators reviewed. Pt verbalized understanding and agrees with above plan. Pt has chronic pain. OARRS was reviewed. This NP saw pt under direct supervision of Dr. Deng Lincoln. Follow up:  Return in about 4 weeks (around 11/22/2022) for review meds and reassess pain.     Vicente Villalba, DIRK - CNP

## 2022-10-26 RX ORDER — FLUOXETINE HYDROCHLORIDE 20 MG/1
CAPSULE ORAL
Qty: 90 CAPSULE | Refills: 0 | Status: SHIPPED | OUTPATIENT
Start: 2022-10-26

## 2022-10-26 RX ORDER — LORATADINE 10 MG/1
TABLET ORAL
Qty: 90 TABLET | Refills: 0 | Status: SHIPPED | OUTPATIENT
Start: 2022-10-26

## 2022-10-26 RX ORDER — ROSUVASTATIN CALCIUM 10 MG/1
TABLET, COATED ORAL
Qty: 90 TABLET | Refills: 0 | Status: SHIPPED | OUTPATIENT
Start: 2022-10-26

## 2022-10-26 RX ORDER — LOSARTAN POTASSIUM 50 MG/1
TABLET ORAL
Qty: 90 TABLET | Refills: 0 | Status: SHIPPED | OUTPATIENT
Start: 2022-10-26

## 2022-11-01 RX ORDER — ALBUTEROL SULFATE 90 UG/1
AEROSOL, METERED RESPIRATORY (INHALATION)
Qty: 18 G | Refills: 0 | Status: SHIPPED | OUTPATIENT
Start: 2022-11-01

## 2022-11-07 RX ORDER — PEDI MULTIVIT NO.128/VITAMIN K 500 MCG/ML
LIQUID (ML) ORAL
Qty: 90 TABLET | Refills: 1 | Status: SHIPPED | OUTPATIENT
Start: 2022-11-07

## 2022-11-07 NOTE — TELEPHONE ENCOUNTER
----- Message from Eliseo Dotson sent at 11/7/2022  9:38 AM EST -----  Subject: Message to Provider    QUESTIONS  Information for Provider? Yaneli Jimbomichael would like a callback asa due   to her [facility] was supposed to be calling to see if they can   switch medication due to not having what she ordered for Bariatric.   ---------------------------------------------------------------------------  --------------  [facility]  [phone]; OK to leave message on voicemail  ---------------------------------------------------------------------------  --------------  SCRIPT ANSWERS  Relationship to Patient?  Self

## 2022-11-14 ENCOUNTER — HOSPITAL ENCOUNTER (OUTPATIENT)
Dept: MRI IMAGING | Age: 47
Discharge: HOME | End: 2022-11-16

## 2022-11-14 ENCOUNTER — TELEPHONE (OUTPATIENT)
Dept: PAIN MANAGEMENT | Age: 47
End: 2022-11-14

## 2022-11-14 DIAGNOSIS — M54.12 CERVICAL RADICULITIS: ICD-10-CM

## 2022-11-14 DIAGNOSIS — F40.240 CLAUSTROPHOBIA: Primary | ICD-10-CM

## 2022-11-14 DIAGNOSIS — M47.812 CERVICAL SPONDYLOSIS WITHOUT MYELOPATHY: ICD-10-CM

## 2022-11-14 RX ORDER — DIAZEPAM 5 MG/1
5 TABLET ORAL SEE ADMIN INSTRUCTIONS
Qty: 2 TABLET | Refills: 0 | Status: SHIPPED | OUTPATIENT
Start: 2022-11-14 | End: 2022-11-15

## 2022-11-14 NOTE — TELEPHONE ENCOUNTER
Patient called stating that she was unable to complete the MRI due to a panic attack. She is asking if she can get an open MRI or be given something to calm her during the procedure?

## 2022-11-21 DIAGNOSIS — E55.9 VITAMIN D DEFICIENCY: Primary | ICD-10-CM

## 2022-11-21 RX ORDER — RESVER/WINE/BFL/GRPSD/PC/C/POM 200MG-60MG
5000 CAPSULE ORAL DAILY
Qty: 30 TABLET | Refills: 1 | Status: SHIPPED | OUTPATIENT
Start: 2022-11-21

## 2022-11-22 ENCOUNTER — TELEPHONE (OUTPATIENT)
Dept: PAIN MANAGEMENT | Age: 47
End: 2022-11-22

## 2022-11-22 NOTE — TELEPHONE ENCOUNTER
Patient last office visit note clearly says she is done physical therapy at [facility] this year for both her neck and her low back. Her exam shows a positive Spurling's maneuver and patient is describing cervical radiculitis. Gabapentin was restarted. She has had an upper extremity EMG with on 1/12/2022. Are we able to resubmit for cervical MRI?

## 2022-11-22 NOTE — TELEPHONE ENCOUNTER
[facility] has denied MRI Cervical for the following reason(s):     Based on what was given, you have neck pain, your doctors request cannot be approved. A person might need a(n) Cervical Spine MRI if these notes have/has been given: notes that say that you rested and did what your doctor told you to do for six weeks. The notes could also say that you took medications that your doctor gave, or you did neck stretches (physical therapy or medically directed home exercise program). We need to know that your doctor is planning to do injections or surgery for your pain. The information we got did not include these notes. Nancy Perera may discuss this case with the Clinical Peer Reviewer by calling [phone] using tracking #[id number] no later than 11-. How would Maye like to proceed?   Please respond to Pain Clinical Pool

## 2022-11-23 NOTE — TELEPHONE ENCOUNTER
Tried to call patient to find out if she has had physical therapy for her neck within the last 6 months. Mailbox was full, not able to leave a message. All documentation we have for physical therapy is for Bilateral Carpal Tunnel. If patient has had Cervical Therapy, she will have to have all therapy notes for Cervical therapy faxed to our office so we may submit another request for authorization.   Without any Cervical therapy notes, we cannot submit another request.

## 2022-11-28 NOTE — TELEPHONE ENCOUNTER
Tried to call patient a second time to find out if she has had physical therapy for her neck within the last 6 months. Mailbox was full, not able to leave a message. All documentation we have for physical therapy is for Bilateral Carpal Tunnel. If patient has had Cervical Therapy, she will have to have all therapy notes for Cervical therapy faxed to our office so we may submit another request for authorization.   Without any Cervical therapy notes, we cannot submit another request.

## 2022-11-29 NOTE — TELEPHONE ENCOUNTER
Patient called office and was asked if she has completed physical therapy for her neck/cervical within the last 6 months. She informed Subhash Person in [address] she last completed neck/cervical therapy at the beginning of 2021.   Since this was not done in the last 6 months, how would Maye like to proceed with MRI Cervical denial?    Please respond to Pain Clinical Pool

## 2022-12-01 NOTE — TELEPHONE ENCOUNTER
PT WAS CALLED & MADE AWARE. WILL N[address] [facility]ACE PHYSICAL THERAPY ORDER? PT GOES TO [facility] [address].

## 2022-12-05 DIAGNOSIS — M47.812 CERVICAL SPONDYLOSIS WITHOUT MYELOPATHY: Primary | ICD-10-CM

## 2022-12-05 DIAGNOSIS — M54.12 CERVICAL RADICULITIS: ICD-10-CM

## 2022-12-08 ENCOUNTER — NURSE TRIAGE (OUTPATIENT)
Dept: OTHER | Facility: CLINIC | Age: 47
End: 2022-12-08

## 2022-12-08 NOTE — TELEPHONE ENCOUNTER
Location of patient: [facility] [employer] with [employer]. Has an appointment for 12/19/2022. Subjective: Caller states \"swelling all over body, hand is numb and tingling\"     Current Symptoms: see above, her feet are swollen, hands are swollen, body aches, right hand feels numb and tingling from holding the phone. Does see a chiropractor 2 times a week. Has had some chest pain and back pain. Onset: 3 days ago; worsening    Associated Symptoms: reduced activity    Pain Severity: 7/10; aching, throbbing; constant of whole body    Temperature: denies fever     What has been tried: gabapentin    LMP:  stopped yesterday  Pregnant: No    Recommended disposition: Go to Office Now    Care advice provided, patient verbalizes understanding; denies any other questions or concerns; instructed to call back for any new or worsening symptoms. Patient/Caller agrees with recommended disposition; writer provided warm transfer to [facility] [employer] for appointment scheduling    Attention Provider: Thank you for allowing me to participate in the care of your patient. The patient was connected to triage in response to information provided to the ECC/PSC. Please do not respond through this encounter as the response is not directed to a shared pool.         Reason for Disposition   SEVERE pain and taking a statin medicine (a lipid or cholesterol lowering drug)    Protocols used: Muscle Aches and Body Pain-ADULT-[place]

## 2022-12-14 ENCOUNTER — OFFICE VISIT (OUTPATIENT)
Dept: PAIN MANAGEMENT | Age: 47
End: 2022-12-14
Payer: COMMERCIAL

## 2022-12-14 VITALS
BODY MASS INDEX: 39.27 KG/M2 | DIASTOLIC BLOOD PRESSURE: 78 MMHG | TEMPERATURE: 97 F | SYSTOLIC BLOOD PRESSURE: 122 MMHG | HEIGHT: 64 IN | WEIGHT: 230 LBS

## 2022-12-14 DIAGNOSIS — M47.812 CERVICAL SPONDYLOSIS WITHOUT MYELOPATHY: ICD-10-CM

## 2022-12-14 DIAGNOSIS — M47.817 LUMBOSACRAL SPONDYLOSIS WITHOUT MYELOPATHY: ICD-10-CM

## 2022-12-14 DIAGNOSIS — M54.12 CERVICAL RADICULITIS: ICD-10-CM

## 2022-12-14 DIAGNOSIS — M46.1 BILATERAL SACROILIITIS (HCC): Primary | ICD-10-CM

## 2022-12-14 PROCEDURE — G8427 DOCREV CUR MEDS BY ELIG CLIN: HCPCS | Performed by: NURSE PRACTITIONER

## 2022-12-14 PROCEDURE — 99214 OFFICE O/P EST MOD 30 MIN: CPT | Performed by: NURSE PRACTITIONER

## 2022-12-14 PROCEDURE — G8484 FLU IMMUNIZE NO ADMIN: HCPCS | Performed by: NURSE PRACTITIONER

## 2022-12-14 PROCEDURE — 3078F DIAST BP <80 MM HG: CPT | Performed by: NURSE PRACTITIONER

## 2022-12-14 PROCEDURE — 3074F SYST BP LT 130 MM HG: CPT | Performed by: NURSE PRACTITIONER

## 2022-12-14 PROCEDURE — 1036F TOBACCO NON-USER: CPT | Performed by: NURSE PRACTITIONER

## 2022-12-14 PROCEDURE — G8417 CALC BMI ABV UP PARAM F/U: HCPCS | Performed by: NURSE PRACTITIONER

## 2022-12-14 RX ORDER — GABAPENTIN 300 MG/1
300 CAPSULE ORAL 3 TIMES DAILY
Qty: 90 CAPSULE | Refills: 0 | Status: SHIPPED | OUTPATIENT
Start: 2022-12-14 | End: 2023-01-13

## 2022-12-14 RX ORDER — TIZANIDINE 4 MG/1
4 TABLET ORAL NIGHTLY PRN
Qty: 30 TABLET | Refills: 0 | Status: SHIPPED | OUTPATIENT
Start: 2022-12-14 | End: 2023-01-13

## 2022-12-14 ASSESSMENT — ENCOUNTER SYMPTOMS
DIARRHEA: 0
RESPIRATORY NEGATIVE: 1
CONSTIPATION: 0
BACK PAIN: 1

## 2022-12-14 NOTE — PROGRESS NOTES
Patient: Leyla Stewart  YOB: 1975  Date: 22        Subjective:     Leyla Stewart is a 52 y.o. female who complains today of:    Chief Complaint   Patient presents with    Back Pain    Arm Pain     OSCAR         Allergies:  Patient has no known allergies. Past Medical History:   Diagnosis Date    Abnormal Pap smear of cervix     Allergic rhinitis     several allergens    Asthma     since shildhood    Bariatric surgery status 2020    [facility], Dr Homar Abarca     Brachial neuralgia 2014    Chronic left shoulder pain     COPD (chronic obstructive pulmonary disease) ([facility] [address]     Dr Robbie Scott    Depression     H/O colonoscopy     Dr. Xander Meehan    Hyperlipidemia     Hypertension     Migraine headache     Obesity, morbid (more than 100 lbs over ideal weight or BMI > 40) ([facility])     SEJAL on CPAP 10/2018    Sleep apnea      Past Surgical History:   Procedure Laterality Date    BREAST REDUCTION SURGERY Bilateral 2007    BREAST SURGERY  2007    reduction, Dr Jj Alvarado  10/20/15    DR. Kvng Levin [facility]     Dr Jaylene Chilel    SLEEVE GASTRECTOMY  2020    [facility], Dr Darshana Simon History   Problem Relation Age of Onset    Osteoarthritis Mother     Other Mother         hyperlipidemia    Hypertension Mother     High Cholesterol Mother     Arthritis Mother     Heart Disease Mother     Diabetes Mother     No Known Problems Father      Social History     Socioeconomic History    Marital status: Single     Spouse name: Not on file    Number of children: 2    Years of education: Not on file    Highest education level: Not on file   Occupational History    Occupation: dog grooming business, self employed   Tobacco Use    Smoking status: Former     Packs/day: 0.50     Types: Cigarettes     Quit date: 2018     Years since quittin.4    Smokeless tobacco: Never   Vaping Use    Vaping Use: Never used Substance and Sexual Activity    Alcohol use:  Yes     Alcohol/week: 1.0 standard drink     Types: 1 Standard drinks or equivalent per week     Comment: socially    Drug use: Not Currently     Frequency: 1.0 times per week     Comment: marijuana, quit July 2013    Sexual activity: Yes     Partners: Male     Birth control/protection: Condom   Other Topics Concern    Not on file   Social History Narrative    Born in [place], has 1 brother one sister    Never , was in a bad relationship    Lives with daughter age 16 (2017) in a house in [place]    2 daughters, one daughter with problems, one in college    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)    Hobbies reading, puzzles, games, outdoor activities     [id number] Strain: Low Risk     Difficulty of Paying Living Expenses: Not hard at all   Food Insecurity: No Food Insecurity    Worried About [employer] [email or web address] in the Last Year: Never true    [facility] in the Last Year: Never true   Transportation Needs: Not on file   Physical Activity: Not on file   Stress: Not on file   Social Connections: Not on file   Intimate Partner Violence: Not on file   Housing Stability: Not on file       Current Outpatient Medications on File Prior to Visit   Medication Sig Dispense Refill    vitamin D3 (D-5000) 125 MCG (5000 UT) TABS tablet Take 1 tablet by mouth daily 30 tablet 1    Multiple Vitamins-Minerals (DEKAS BARIATRIC) CHEW 1 po  q  Day 90 tablet 1    albuterol sulfate HFA (VENTOLIN HFA) 108 (90 Base) MCG/ACT inhaler INHALE 2 PUFFS into the lungs EVERY 6 HOURS AS NEEDED FOR WHEEZING or SHORTNESS OF BREATH 18 g 0    rosuvastatin (CRESTOR) 10 MG tablet TAKE 1 TABLET BY MOUTH NIGHTLY 90 tablet 0    loratadine (CLARITIN) 10 MG tablet TAKE 1 TABLET BY MOUTH DAILY AS NEEDED for allergies 90 tablet 0    FLUoxetine (PROZAC) 20 MG capsule TAKE 1 CAPSULE BY MOUTH DAILY 90 capsule 0    losartan (COZAAR) 50 MG tablet TAKE 1 TABLET BY MOUTH EVERY DAY 90 tablet 0    busPIRone (BUSPAR) 5 MG tablet TAKE 1 TABLET BY MOUTH THREE TIMES DAILY AS NEEDED 90 tablet 0    pantoprazole (PROTONIX) 40 MG tablet TAKE 1 TABLET BY MOUTH EVERY OTHER DAY BEFORE a meal 45 tablet 2    diclofenac sodium (VOLTAREN) 1 % GEL Apply 4 g topically 4 times daily 100 g 0    lidocaine (LMX) 4 % cream Apply a half dollar sized amount to intact skin topically up to twice daily as needed for pain 45 g 1    Multiple Vitamins-Minerals (DEKAS BARIATRIC) CHEW CHEW AND SWALLOW 1 (ONE) TABLET BY MOUTH DAILY 90 tablet 0    Fluticasone furoate-vilanterol (BREO ELLIPTA) 200-25 MCG/INH AEPB inhaler Inhale 1 puff into the lungs daily 1 each 5    albuterol (PROVENTIL) (5 MG/ML) 0.5% nebulizer solution Take 0.5 mLs by nebulization every 6 hours as needed for Wheezing 120 vial 5     Current Facility-Administered Medications on File Prior to Visit   Medication Dose Route Frequency Provider Last Rate Last Admin    methylPREDNISolone acetate (DEPO-MEDROL) injection 80 mg  80 mg Intra-artICUlar Once Harley Dotson MD        iopamidol (ISOVUE-300) 61 % injection 2 mL  2 mL Other ONCE PRN Jam Washington MD             51-year-old female following up for chronic low back and neck pain. She has neck pain and wakes with swollen and achey arms and hands. When she turns her head to left pain shoots down left arm. Was last seen in October by Terrell Miles NP. MRI cervical spine was ordered but denied due to lack of recent physical therapy. She hasn't started yet. EMG upper extremities done by Dr. Vivienne Grier was normal.  Back pain is on both sides. Goes around to her hips. Gabapentin 300 mg twice daily and tizanidine 4 mg as needed. She feels she is not getting significant relief. Not sure the gabapentin is working. PMH: Stomach ulcers, hiatal hernia, gastric bypass, diabetes, depression and anxiety on Prozac.   Previous EMG 1/12/2022 with Dr. Randy Bueno shows severe right greater than left carpal tunnel syndrome. Back Pain  Pertinent negatives include no headaches, numbness or weakness. Arm Pain   Pertinent negatives include no numbness. Review of Systems   Constitutional: Negative. Negative for activity change and unexpected weight change. HENT: Negative. Negative for hearing loss. Respiratory: Negative. Cardiovascular:  Negative for leg swelling. Gastrointestinal:  Negative for constipation and diarrhea. Genitourinary: Negative. Musculoskeletal:  Positive for back pain. Negative for gait problem, joint swelling and neck pain. Skin:  Negative for rash. Neurological:  Negative for dizziness, weakness, numbness and headaches. Psychiatric/Behavioral: Negative. Negative for sleep disturbance. Objective:     Vitals:  /78 (Site: Left Upper Arm, Position: Sitting)   Temp 97 °F (36.1 °C) (Temporal)   Ht 5' 4\" (1.626 m)   Wt 230 lb (104.3 kg)   BMI 39.48 kg/m² Pain Score:   7    Physical Exam  Vitals reviewed. Constitutional:       Appearance: She is well-developed. HENT:      Head: Normocephalic. Nose: Nose normal.   Pulmonary:      Effort: Pulmonary effort is normal.   Musculoskeletal:      Cervical back: Neck supple. Tenderness present. Pain with movement present. Decreased range of motion. Lumbar back: Tenderness present. Decreased range of motion. Negative right straight leg raise test and negative left straight leg raise test.      Comments: Pain Over both S I joint with (+) provacative manuvers. SLR Negative. Negative spurlings   Lymphadenopathy:      Cervical: No cervical adenopathy. Skin:     General: Skin is warm and dry. Findings: No erythema or rash. Neurological:      Mental Status: She is alert and oriented to person, place, and time. Cranial Nerves: No cranial nerve deficit. Deep Tendon Reflexes: Reflexes are normal and symmetric.  Reflexes normal.   Psychiatric:         Mood and Affect: Mood normal.         Behavior: Behavior normal.         Thought Content: Thought content normal.         Judgment: Judgment normal.          Assessment:        Diagnosis Orders   1. Bilateral sacroiliitis (HCC)  LA INJECT SI JOINT ARTHRGRPHY&/ANES/STEROID W/IMAGE      2. Cervical radiculitis        3. Cervical spondylosis without myelopathy        4. Lumbosacral spondylosis without myelopathy            Plan:          Orders Placed This Encounter   Medications    gabapentin (NEURONTIN) 300 MG capsule     Sig: Take 1 capsule by mouth 3 times daily for 30 days. Dispense:  90 capsule     Refill:  0    tiZANidine (ZANAFLEX) 4 MG tablet     Sig: Take 1 tablet by mouth nightly as needed (spasm)     Dispense:  30 tablet     Refill:  0       Orders Placed This Encounter   Procedures    LA INJECT SI JOINT ARTHRGRPHY&/ANES/STEROID W/IMAGE     Standing Status:   Future     Standing Expiration Date:   3/14/2023     Increase gabapentin from 300 mg twice daily to 3 times daily #90  -Refill tizanidine 4 mg nightly as needed pain and spasm #30  she has flared in the sacroiliac joint. she has failed conservative treatment in the past. We will set the patient up for bilateral S.I. Joint injection. Anatomic model of pathology was shown. Risks and benefits of the procedure were discussed. All questions were answered and patient understands and agrees with the plan. Follow up:  Return in about 4 weeks (around 1/11/2023) for review meds and reassess pain.     Roxane Vega, APRN - CNP

## 2022-12-19 ENCOUNTER — OFFICE VISIT (OUTPATIENT)
Dept: FAMILY MEDICINE CLINIC | Age: 47
End: 2022-12-19
Payer: COMMERCIAL

## 2022-12-19 VITALS
SYSTOLIC BLOOD PRESSURE: 130 MMHG | HEART RATE: 83 BPM | WEIGHT: 240 LBS | DIASTOLIC BLOOD PRESSURE: 84 MMHG | OXYGEN SATURATION: 96 % | RESPIRATION RATE: 16 BRPM | TEMPERATURE: 97.8 F | HEIGHT: 64 IN | BODY MASS INDEX: 40.97 KG/M2

## 2022-12-19 DIAGNOSIS — M21.619 BUNION: ICD-10-CM

## 2022-12-19 DIAGNOSIS — Z98.84 HISTORY OF BARIATRIC SURGERY: ICD-10-CM

## 2022-12-19 DIAGNOSIS — E78.00 PURE HYPERCHOLESTEROLEMIA: ICD-10-CM

## 2022-12-19 DIAGNOSIS — F31.70 BIPOLAR DISORDER IN FULL REMISSION, MOST RECENT EPISODE UNSPECIFIED TYPE (HCC): ICD-10-CM

## 2022-12-19 DIAGNOSIS — E55.9 VITAMIN D DEFICIENCY: ICD-10-CM

## 2022-12-19 DIAGNOSIS — L84 CALLUS OF FOOT: ICD-10-CM

## 2022-12-19 DIAGNOSIS — J44.9 CHRONIC OBSTRUCTIVE PULMONARY DISEASE, UNSPECIFIED COPD TYPE (HCC): ICD-10-CM

## 2022-12-19 DIAGNOSIS — I10 ESSENTIAL HYPERTENSION: Primary | ICD-10-CM

## 2022-12-19 PROCEDURE — 3023F SPIROM DOC REV: CPT | Performed by: FAMILY MEDICINE

## 2022-12-19 PROCEDURE — 99214 OFFICE O/P EST MOD 30 MIN: CPT | Performed by: FAMILY MEDICINE

## 2022-12-19 PROCEDURE — 1036F TOBACCO NON-USER: CPT | Performed by: FAMILY MEDICINE

## 2022-12-19 PROCEDURE — G8417 CALC BMI ABV UP PARAM F/U: HCPCS | Performed by: FAMILY MEDICINE

## 2022-12-19 PROCEDURE — 3074F SYST BP LT 130 MM HG: CPT | Performed by: FAMILY MEDICINE

## 2022-12-19 PROCEDURE — 3078F DIAST BP <80 MM HG: CPT | Performed by: FAMILY MEDICINE

## 2022-12-19 PROCEDURE — G8427 DOCREV CUR MEDS BY ELIG CLIN: HCPCS | Performed by: FAMILY MEDICINE

## 2022-12-19 PROCEDURE — G8484 FLU IMMUNIZE NO ADMIN: HCPCS | Performed by: FAMILY MEDICINE

## 2022-12-19 RX ORDER — ALBUTEROL SULFATE 90 UG/1
AEROSOL, METERED RESPIRATORY (INHALATION)
Qty: 18 G | Refills: 0 | Status: SHIPPED | OUTPATIENT
Start: 2022-12-19

## 2022-12-19 RX ORDER — RESVER/WINE/BFL/GRPSD/PC/C/POM 200MG-60MG
5000 CAPSULE ORAL DAILY
Qty: 30 TABLET | Refills: 1 | Status: SHIPPED | OUTPATIENT
Start: 2022-12-19

## 2022-12-19 RX ORDER — PEDI MULTIVIT NO.128/VITAMIN K 500 MCG/ML
LIQUID (ML) ORAL
Qty: 90 TABLET | Refills: 0 | Status: SHIPPED | OUTPATIENT
Start: 2022-12-19

## 2022-12-19 ASSESSMENT — ENCOUNTER SYMPTOMS
DIARRHEA: 0
COUGH: 0
VOMITING: 0

## 2022-12-19 NOTE — PROGRESS NOTES
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  90 capsule 0    tiZANidine (ZANAFLEX) 4 MG tablet Take 1 tablet by mouth nightly as needed (spasm) 30 tablet 0    vitamin D3 (D-5000) 125 MCG (5000 UT) TABS tablet Take 1 tablet by mouth daily 30 tablet 1    Multiple Vitamins-Minerals (DEKAS BARIATRIC) CHEW 1 po  q  Day 90 tablet 1    albuterol sulfate HFA (VENTOLIN HFA) 108 (90 Base) MCG/ACT inhaler INHALE 2 PUFFS into the lungs EVERY 6 HOURS AS NEEDED FOR WHEEZING or SHORTNESS OF BREATH 18 g 0    rosuvastatin (CRESTOR) 10 MG tablet TAKE 1 TABLET BY MOUTH NIGHTLY 90 tablet 0    loratadine (CLARITIN) 10 MG tablet TAKE 1 TABLET BY MOUTH DAILY AS NEEDED for allergies 90 tablet 0    FLUoxetine (PROZAC) 20 MG capsule TAKE 1 CAPSULE BY MOUTH DAILY 90 capsule 0    losartan (COZAAR) 50 MG tablet TAKE 1 TABLET BY MOUTH EVERY DAY 90 tablet 0    busPIRone (BUSPAR) 5 MG tablet TAKE 1 TABLET BY MOUTH THREE TIMES DAILY AS NEEDED 90 tablet 0    pantoprazole (PROTONIX) 40 MG tablet TAKE 1 TABLET BY MOUTH EVERY OTHER DAY BEFORE a meal 45 tablet 2    diclofenac sodium (VOLTAREN) 1 % GEL Apply 4 g topically 4 times daily 100 g 0 lidocaine (LMX) 4 % cream Apply a half dollar sized amount to intact skin topically up to twice daily as needed for pain 45 g 1    Multiple Vitamins-Minerals (DEKAS BARIATRIC) CHEW CHEW AND SWALLOW 1 (ONE) TABLET BY MOUTH DAILY 90 tablet 0    albuterol (PROVENTIL) (5 MG/ML) 0.5% nebulizer solution Take 0.5 mLs by nebulization every 6 hours as needed for Wheezing 120 vial 5     Current Facility-Administered Medications   Medication Dose Route Frequency Provider Last Rate Last Admin    methylPREDNISolone acetate (DEPO-MEDROL) injection 80 mg  80 mg Intra-artICUlar Once Daniel Hernandez MD        iopamidol (ISOVUE-300) 61 % injection 2 mL  2 mL Other ONCE PRN Khushboo Lafleur MD         Family History   Problem Relation Age of Onset    Osteoarthritis Mother     Other Mother         hyperlipidemia    Hypertension Mother     High Cholesterol Mother     Arthritis Mother     Heart Disease Mother     Diabetes Mother     No Known Problems Father      Past Medical History:   Diagnosis Date    Abnormal Pap smear of cervix     Allergic rhinitis     several allergens    Asthma     since shildhood    Bariatric surgery status 01/02/2020    [facility], Dr Chet Seth     Brachial neuralgia 1/23/2014    Chronic left shoulder pain     COPD (chronic obstructive pulmonary disease) ([facility] [address] 2019    Dr Roe Askew    Depression     H/O colonoscopy 2014    Dr. Gabi Aly    Hyperlipidemia     Hypertension 2010    Migraine headache     Obesity, morbid (more than 100 lbs over ideal weight or BMI > 40) ([facility])     SEJAL on CPAP 10/2018    Sleep apnea      Objective:   /84   Pulse 83   Temp 97.8 °F (36.6 °C)   Resp 16   Ht 5' 4\" (1.626 m)   Wt 240 lb (108.9 kg)   SpO2 96%   BMI 41.20 kg/m²     Physical Exam  Neck:no carotid bruits. No masses. No adenopathy. No thyroid asymmetry. Lungs:clear and equal breath sounds. No wheezes or rales. Heart:rate reg. 1/ syst murmur across precordium   No gallops   Pulses:Radials 2+ equal               Poster tib 1+ equal  Extremities:no edema in either leg--bilateral mild-moderate bunion deformities with bilateral metatarsal callous  Gen: In no acute distress  Abdomen; B.S present. Soft  Non tender. No hepatosplenomegaly. No masses    Patient with appropriate affect. Alert   Thought content appropriate  Good eye contact    Assessment:       Diagnosis Orders   1. Essential hypertension        2. Pure hypercholesterolemia        3. Bipolar disorder in full remission, most recent episode unspecified type ([address]        4. Bunion        5. Callus of foot        6. Chronic obstructive pulmonary disease, unspecified COPD type ([address]        7.  History of bariatric surgery               Plan:    Copd stable with only occasional use of alburterol -continue current tx  Bipolar disorder -fair control-psychiatry referral   Orders Placed This Encounter   Procedures    Nnamdi Kat MD, Gabriel Costa     Referral Priority:   Routine     Referral Type:   Eval and Treat     Referral Reason:   Specialty Services Required     Referred to Provider:   Kim Griffiths MD     Requested Specialty:   Psychiatry     Number of Visits Requested:   Bryce Kelly MD, Bariatric Surgery, [place]     Referral Priority:   Routine     Referral Type:   Eval and Treat     Referral Reason:   Specialty Services Required     Referred to Provider:   Suad Martinez MD     Requested Specialty:   Bariatric Surgery     Number of Visits Requested:   [facility] Juan R Bose DPM, Podiatry, [place]     Referral Priority:   Routine     Referral Type:   Eval and Treat     Referral Reason:   Specialty Services Required     Referred to Provider:   Tana Santos DPM     Requested Specialty:   Podiatry     Number of Visits Requested:   1    Continue current meds for now   Fasting blood work in April and f/u after done

## 2023-01-04 ENCOUNTER — TELEPHONE (OUTPATIENT)
Dept: PAIN MANAGEMENT | Age: 48
End: 2023-01-04

## 2023-01-04 NOTE — TELEPHONE ENCOUNTER
Patient is asking for a new physical therapy order for her neck, she says her current one expires on 1/6.  She is asking for it to be faxed to [facility] at [phone]

## 2023-01-05 DIAGNOSIS — M54.12 CERVICAL RADICULITIS: Primary | ICD-10-CM

## 2023-01-05 DIAGNOSIS — M47.812 CERVICAL SPONDYLOSIS WITHOUT MYELOPATHY: ICD-10-CM

## 2023-01-17 RX ORDER — ALBUTEROL SULFATE 90 UG/1
AEROSOL, METERED RESPIRATORY (INHALATION)
Qty: 18 G | Refills: 0 | Status: SHIPPED | OUTPATIENT
Start: 2023-01-17

## 2023-01-18 RX ORDER — LOSARTAN POTASSIUM 50 MG/1
TABLET ORAL
Qty: 90 TABLET | Refills: 0 | Status: SHIPPED | OUTPATIENT
Start: 2023-01-18

## 2023-01-18 RX ORDER — LORATADINE 10 MG/1
TABLET ORAL
Qty: 90 TABLET | Refills: 0 | Status: SHIPPED | OUTPATIENT
Start: 2023-01-18

## 2023-01-18 RX ORDER — FLUOXETINE HYDROCHLORIDE 20 MG/1
CAPSULE ORAL
Qty: 90 CAPSULE | Refills: 0 | Status: SHIPPED | OUTPATIENT
Start: 2023-01-18

## 2023-01-18 RX ORDER — ROSUVASTATIN CALCIUM 10 MG/1
TABLET, COATED ORAL
Qty: 90 TABLET | Refills: 0 | Status: SHIPPED | OUTPATIENT
Start: 2023-01-18

## 2023-02-08 ENCOUNTER — HOSPITAL ENCOUNTER (EMERGENCY)
Age: 48
Discharge: LEFT BEFORE DISCHARGE | End: 2023-02-08
Payer: COMMERCIAL

## 2023-02-08 ENCOUNTER — APPOINTMENT (OUTPATIENT)
Dept: GENERAL RADIOLOGY | Age: 48
End: 2023-02-08
Payer: COMMERCIAL

## 2023-02-08 VITALS
OXYGEN SATURATION: 96 % | TEMPERATURE: 97.8 F | RESPIRATION RATE: 18 BRPM | DIASTOLIC BLOOD PRESSURE: 86 MMHG | WEIGHT: 230 LBS | SYSTOLIC BLOOD PRESSURE: 167 MMHG | BODY MASS INDEX: 39.48 KG/M2 | HEART RATE: 111 BPM

## 2023-02-08 LAB
ALBUMIN SERPL-MCNC: 4.2 G/DL (ref 3.5–4.6)
ALP BLD-CCNC: 70 U/L (ref 40–130)
ALT SERPL-CCNC: 16 U/L (ref 0–33)
ANION GAP SERPL CALCULATED.3IONS-SCNC: 8 MEQ/L (ref 9–15)
APTT: 25.5 SEC (ref 24.4–36.8)
AST SERPL-CCNC: 14 U/L (ref 0–35)
BASOPHILS ABSOLUTE: 0.1 K/UL (ref 0–0.2)
BASOPHILS RELATIVE PERCENT: 1.2 %
BILIRUB SERPL-MCNC: <0.2 MG/DL (ref 0.2–0.7)
BUN BLDV-MCNC: 13 MG/DL (ref 6–20)
CALCIUM SERPL-MCNC: 9.2 MG/DL (ref 8.5–9.9)
CHLORIDE BLD-SCNC: 105 MEQ/L (ref 95–107)
CO2: 25 MEQ/L (ref 20–31)
CREAT SERPL-MCNC: 0.77 MG/DL (ref 0.5–0.9)
D DIMER: 0.35 MG/L FEU (ref 0–0.5)
EKG ATRIAL RATE: 93 BPM
EKG P AXIS: 52 DEGREES
EKG P-R INTERVAL: 158 MS
EKG Q-T INTERVAL: 338 MS
EKG QRS DURATION: 82 MS
EKG QTC CALCULATION (BAZETT): 420 MS
EKG R AXIS: 38 DEGREES
EKG T AXIS: 40 DEGREES
EKG VENTRICULAR RATE: 93 BPM
EOSINOPHILS ABSOLUTE: 0.3 K/UL (ref 0–0.7)
EOSINOPHILS RELATIVE PERCENT: 4.1 %
GFR SERPL CREATININE-BSD FRML MDRD: >60 ML/MIN/{1.73_M2}
GLOBULIN: 2.8 G/DL (ref 2.3–3.5)
GLUCOSE BLD-MCNC: 135 MG/DL (ref 70–99)
HCT VFR BLD CALC: 39.5 % (ref 37–47)
HEMOGLOBIN: 13 G/DL (ref 12–16)
INR BLD: 0.9
LYMPHOCYTES ABSOLUTE: 1.9 K/UL (ref 1–4.8)
LYMPHOCYTES RELATIVE PERCENT: 23.1 %
MCH RBC QN AUTO: 29.3 PG (ref 27–31.3)
MCHC RBC AUTO-ENTMCNC: 33 % (ref 33–37)
MCV RBC AUTO: 88.8 FL (ref 79.4–94.8)
MONOCYTES ABSOLUTE: 0.6 K/UL (ref 0.2–0.8)
MONOCYTES RELATIVE PERCENT: 7.3 %
NEUTROPHILS ABSOLUTE: 5.2 K/UL (ref 1.4–6.5)
NEUTROPHILS RELATIVE PERCENT: 64.3 %
PDW BLD-RTO: 13 % (ref 11.5–14.5)
PLATELET # BLD: 261 K/UL (ref 130–400)
POTASSIUM SERPL-SCNC: 4.8 MEQ/L (ref 3.4–4.9)
PROTHROMBIN TIME: 12.2 SEC (ref 12.3–14.9)
RBC # BLD: 4.45 M/UL (ref 4.2–5.4)
SODIUM BLD-SCNC: 138 MEQ/L (ref 135–144)
TOTAL PROTEIN: 7 G/DL (ref 6.3–8)
TROPONIN: <0.01 NG/ML (ref 0–0.01)
WBC # BLD: 8.1 K/UL (ref 4.8–10.8)

## 2023-02-08 PROCEDURE — 71046 X-RAY EXAM CHEST 2 VIEWS: CPT

## 2023-02-08 PROCEDURE — 36415 COLL VENOUS BLD VENIPUNCTURE: CPT

## 2023-02-08 PROCEDURE — 84484 ASSAY OF TROPONIN QUANT: CPT

## 2023-02-08 PROCEDURE — 85610 PROTHROMBIN TIME: CPT

## 2023-02-08 PROCEDURE — 85730 THROMBOPLASTIN TIME PARTIAL: CPT

## 2023-02-08 PROCEDURE — 99284 EMERGENCY DEPT VISIT MOD MDM: CPT

## 2023-02-08 PROCEDURE — 85379 FIBRIN DEGRADATION QUANT: CPT

## 2023-02-08 PROCEDURE — 85025 COMPLETE CBC W/AUTO DIFF WBC: CPT

## 2023-02-08 PROCEDURE — 93005 ELECTROCARDIOGRAM TRACING: CPT | Performed by: STUDENT IN AN ORGANIZED HEALTH CARE EDUCATION/TRAINING PROGRAM

## 2023-02-08 PROCEDURE — 80053 COMPREHEN METABOLIC PANEL: CPT

## 2023-02-08 ASSESSMENT — PAIN DESCRIPTION - PAIN TYPE: TYPE: ACUTE PAIN

## 2023-02-08 ASSESSMENT — PAIN SCALES - GENERAL: PAINLEVEL_OUTOF10: 7

## 2023-02-08 ASSESSMENT — PAIN - FUNCTIONAL ASSESSMENT: PAIN_FUNCTIONAL_ASSESSMENT: 0-10

## 2023-02-08 ASSESSMENT — PAIN DESCRIPTION - ORIENTATION: ORIENTATION: LEFT

## 2023-02-08 ASSESSMENT — PAIN DESCRIPTION - LOCATION: LOCATION: CHEST;SHOULDER;NECK

## 2023-02-08 ASSESSMENT — PAIN DESCRIPTION - DESCRIPTORS: DESCRIPTORS: ACHING

## 2023-02-08 NOTE — ED NOTES
Called to room, no answer.  Pt had told nurse she might have to leave before treatment completed     Dashawn Pope RN  02/08/23 0829

## 2023-02-08 NOTE — ED TRIAGE NOTES
Pt c/o left sided chest pain with radiation to neck & shoulder that started hours ago, states SBP was 190s, pt states hx of HTN but is noncompliant with meds, states intermittent shortness of breath, intermittent nausea, no respiratory distress noted at this time, EKG completed upon triage

## 2023-02-15 DIAGNOSIS — E55.9 VITAMIN D DEFICIENCY: ICD-10-CM

## 2023-02-21 ENCOUNTER — OFFICE VISIT (OUTPATIENT)
Dept: PULMONOLOGY | Age: 48
End: 2023-02-21
Payer: COMMERCIAL

## 2023-02-21 VITALS
HEART RATE: 75 BPM | SYSTOLIC BLOOD PRESSURE: 168 MMHG | OXYGEN SATURATION: 97 % | WEIGHT: 240 LBS | DIASTOLIC BLOOD PRESSURE: 82 MMHG | BODY MASS INDEX: 41.2 KG/M2

## 2023-02-21 DIAGNOSIS — G47.33 OSA (OBSTRUCTIVE SLEEP APNEA): ICD-10-CM

## 2023-02-21 DIAGNOSIS — E66.9 OBESITY (BMI 30-39.9): ICD-10-CM

## 2023-02-21 DIAGNOSIS — J44.9 CHRONIC OBSTRUCTIVE PULMONARY DISEASE, UNSPECIFIED COPD TYPE (HCC): ICD-10-CM

## 2023-02-21 DIAGNOSIS — J45.40 MODERATE PERSISTENT ASTHMA, UNSPECIFIED WHETHER COMPLICATED: Primary | ICD-10-CM

## 2023-02-21 PROCEDURE — 3023F SPIROM DOC REV: CPT | Performed by: INTERNAL MEDICINE

## 2023-02-21 PROCEDURE — G8427 DOCREV CUR MEDS BY ELIG CLIN: HCPCS | Performed by: INTERNAL MEDICINE

## 2023-02-21 PROCEDURE — G8484 FLU IMMUNIZE NO ADMIN: HCPCS | Performed by: INTERNAL MEDICINE

## 2023-02-21 PROCEDURE — 1036F TOBACCO NON-USER: CPT | Performed by: INTERNAL MEDICINE

## 2023-02-21 PROCEDURE — 3078F DIAST BP <80 MM HG: CPT | Performed by: INTERNAL MEDICINE

## 2023-02-21 PROCEDURE — G8417 CALC BMI ABV UP PARAM F/U: HCPCS | Performed by: INTERNAL MEDICINE

## 2023-02-21 PROCEDURE — 3074F SYST BP LT 130 MM HG: CPT | Performed by: INTERNAL MEDICINE

## 2023-02-21 PROCEDURE — 99215 OFFICE O/P EST HI 40 MIN: CPT | Performed by: INTERNAL MEDICINE

## 2023-02-21 RX ORDER — ALBUTEROL SULFATE 90 UG/1
AEROSOL, METERED RESPIRATORY (INHALATION)
Qty: 18 G | Refills: 2 | Status: SHIPPED | OUTPATIENT
Start: 2023-02-21

## 2023-02-21 ASSESSMENT — ENCOUNTER SYMPTOMS
EYE DISCHARGE: 0
RHINORRHEA: 0
ABDOMINAL PAIN: 0
VOMITING: 0
VOICE CHANGE: 0
SINUS PRESSURE: 0
TROUBLE SWALLOWING: 0
COUGH: 0
DIARRHEA: 0
NAUSEA: 0
CHEST TIGHTNESS: 0
SORE THROAT: 0
WHEEZING: 1
SHORTNESS OF BREATH: 1
EYE ITCHING: 0

## 2023-02-21 NOTE — PROGRESS NOTES
Subjective:     Nidhi Cisneros is a 52 y.o. female who complains today of:     Chief Complaint   Patient presents with    Follow-up     2 year f/u on SEJAL, COPD, asthma. CXR results        HPI  Last time seen in office 3/ 2021  She is using albuterol HFA and neb with albuterol. She was on symbicort  HFA but not using it at this time. PFT 2020 show moderately severe COPD  FVC 2.70 , 71%. FEV1 1.67  55%, significant response to bronchodilator   C/o shortness of breath  with exertion. C/o Wheezing at night   No Cough or  Sputum  No Hemoptysis  No Chest tightness   No Chest pain with radiation  or pleuritic pain  No Fever or chills. No Rhinorrhea and postnasal drip. She was smoking 1 and 1/2 ppd , quit 4 yrs ago  She shows mild sleep apnea with AHI 7    Allergies:  Patient has no known allergies. Past Medical History:   Diagnosis Date    Abnormal Pap smear of cervix     Allergic rhinitis     several allergens    Asthma     since shildhood    Bariatric surgery status 01/02/2020    [facility], Dr Markie Harris     Brachial neuralgia 1/23/2014    Chronic left shoulder pain     COPD (chronic obstructive pulmonary disease) ([facility] [address] 2019    Dr Bharathi Avalos    Depression     H/O colonoscopy 2014    Dr. Mercedez Cotton    Hyperlipidemia     Hypertension 2010    Migraine headache     Obesity, morbid (more than 100 lbs over ideal weight or BMI > 40) (HCC)     SEJAL on CPAP 10/2018    Sleep apnea      Past Surgical History:   Procedure Laterality Date    BREAST REDUCTION SURGERY Bilateral 2007    BREAST SURGERY  2007    reduction, Dr Dominic Naidu  10/20/15    DR. Jose Durbin Right     Dr Reema Fernandes    SLEEVE GASTRECTOMY  01/20/2020    [facility], Dr Shelley Lawson       Family History   Problem Relation Age of Onset    Osteoarthritis Mother     Other Mother         hyperlipidemia    Hypertension Mother     High Cholesterol Mother     Arthritis Mother     Heart Disease Mother Diabetes Mother     No Known Problems Father      Social History     Socioeconomic History    Marital status: Single     Spouse name: Not on file    Number of children: 2    Years of education: Not on file    Highest education level: Not on file   Occupational History    Occupation: dog grooming business, self employed   Tobacco Use    Smoking status: Former     Packs/day: 0.50     Types: Cigarettes     Quit date: 2018     Years since quittin.6    Smokeless tobacco: Never   Vaping Use    Vaping Use: Never used   Substance and Sexual Activity    Alcohol use: Yes     Alcohol/week: 1.0 standard drink     Types: 1 Standard drinks or equivalent per week     Comment: socially    Drug use: Not Currently     Frequency: 1.0 times per week     Comment: marijuana, quit 2013    Sexual activity: Yes     Partners: Male     Birth control/protection: Condom   Other Topics Concern    Not on file   Social History Narrative    Born in [place], has 1 brother one sister    Never , was in a bad relationship    Lives with daughter age 16 (2017) in a house in [place]    2 daughters, one daughter with problems, one in college    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)    [place] reading, puzzles, games, outdoor activities     [facility] Strain: Low Risk     Difficulty of Paying Living Expenses: Not hard at all   Food Insecurity: No Food Insecurity    Worried About [facility] in the Last Year: Never true    [address] in the Last Year: Never true   Transportation Needs: Not on file   Physical Activity: Not on file   Stress: Not on file   Social Connections: Not on file   Intimate Partner Violence: Not on file   Housing Stability: Not on file         Review of Systems   Constitutional:  Negative for chills, diaphoresis, fatigue and fever.    HENT:  Negative for congestion, mouth sores, nosebleeds, postnasal drip, rhinorrhea, sinus pressure, sneezing, sore throat, trouble swallowing and voice change. Eyes:  Negative for discharge, itching and visual disturbance. Respiratory:  Positive for shortness of breath and wheezing. Negative for cough and chest tightness. Cardiovascular:  Negative for chest pain, palpitations and leg swelling. Gastrointestinal:  Negative for abdominal pain, diarrhea, nausea and vomiting. Genitourinary:  Negative for difficulty urinating and hematuria. Musculoskeletal:  Negative for arthralgias, joint swelling and myalgias. Skin:  Negative for rash. Allergic/Immunologic: Negative for environmental allergies and food allergies. Neurological:  Negative for dizziness, tremors, weakness and headaches. Psychiatric/Behavioral:  Positive for sleep disturbance. Negative for behavioral problems. :     Vitals:    02/21/23 0900 02/21/23 0907   BP: (!) 148/84 (!) 168/82   Site: Right Upper Arm Left Upper Arm   Position: Sitting Sitting   Cuff Size: Large Adult Large Adult   Pulse: 75    SpO2: 97%    Weight: 240 lb (108.9 kg)      Wt Readings from Last 3 Encounters:   02/21/23 240 lb (108.9 kg)   12/19/22 240 lb (108.9 kg)   12/14/22 230 lb (104.3 kg)         Physical Exam  Constitutional:       General: She is not in acute distress. Appearance: She is well-developed. She is not diaphoretic. HENT:      Head: Normocephalic and atraumatic. Nose: Nose normal.   Eyes:      Pupils: Pupils are equal, round, and reactive to light. Neck:      Thyroid: No thyromegaly. Vascular: No JVD. Trachea: No tracheal deviation. Cardiovascular:      Rate and Rhythm: Normal rate and regular rhythm. Heart sounds: No murmur heard. No friction rub. No gallop. Pulmonary:      Effort: No respiratory distress. Breath sounds: No wheezing or rales. Chest:      Chest wall: No tenderness. Abdominal:      General: There is no distension. Tenderness: There is no abdominal tenderness. There is no rebound. Musculoskeletal:         General: Normal range of motion. Lymphadenopathy:      Cervical: No cervical adenopathy. Skin:     General: Skin is warm and dry. Neurological:      Mental Status: She is alert and oriented to person, place, and time. Coordination: Coordination normal.   Psychiatric:         Mood and Affect: Mood normal.         Behavior: Behavior normal.       Current Outpatient Medications   Medication Sig Dispense Refill    Budeson-Glycopyrrol-Formoterol (BREZTRI AEROSPHERE) 160-9-4.8 MCG/ACT AERO Inhale 2 puffs into the lungs 2 times daily 4 each 0    albuterol sulfate HFA (VENTOLIN HFA) 108 (90 Base) MCG/ACT inhaler INHALE 2 PUFFS into the lungs EVERY 6 HOURS AS NEEDED FOR WHEEZING or SHORTNESS OF BREATH 18 g 2    Cholecalciferol (VITAMIN D3) 125 MCG (5000 UT) TABS Take 1 tablet by mouth daily 30 tablet 1    losartan (COZAAR) 50 MG tablet TAKE 1 TABLET BY MOUTH EVERY DAY 90 tablet 0    rosuvastatin (CRESTOR) 10 MG tablet TAKE 1 TABLET BY MOUTH NIGHTLY 90 tablet 0    loratadine (CLARITIN) 10 MG tablet TAKE 1 TABLET BY MOUTH DAILY AS NEEDED for allergies 90 tablet 0    FLUoxetine (PROZAC) 20 MG capsule TAKE 1 CAPSULE BY MOUTH DAILY 90 capsule 0    Multiple Vitamins-Minerals (DEKAS BARIATRIC) CHEW 1 po  q  Day 90 tablet 0    gabapentin (NEURONTIN) 300 MG capsule Take 1 capsule by mouth 3 times daily for 30 days.  90 capsule 0    busPIRone (BUSPAR) 5 MG tablet TAKE 1 TABLET BY MOUTH THREE TIMES DAILY AS NEEDED 90 tablet 0    pantoprazole (PROTONIX) 40 MG tablet TAKE 1 TABLET BY MOUTH EVERY OTHER DAY BEFORE a meal 45 tablet 2    diclofenac sodium (VOLTAREN) 1 % GEL Apply 4 g topically 4 times daily 100 g 0    lidocaine (LMX) 4 % cream Apply a half dollar sized amount to intact skin topically up to twice daily as needed for pain 45 g 1    albuterol (PROVENTIL) (5 MG/ML) 0.5% nebulizer solution Take 0.5 mLs by nebulization every 6 hours as needed for Wheezing 120 vial 5     Current Facility-Administered Medications   Medication Dose Route Frequency Provider Last Rate Last Admin    methylPREDNISolone acetate (DEPO-MEDROL) injection 80 mg  80 mg Intra-artICUlar Once Viral Dominique MD        iopamidol (ISOVUE-300) 61 % injection 2 mL  2 mL Other ONCE PRN Kendrick Johnson MD           Results for orders placed in visit on 08/18/22    XR CHEST STANDARD (2 VW)    Narrative  EXAMINATION:  CHEST. CLINICAL HISTORY:  GENERALIZED WEAKNESS. COMPARISONS:  10/23/2020. TECHNIQUE:  PA and lateral.    FINDINGS:  Heart size and contour are within normal limits. Pulmonary vascularity is normal. No infiltrate or effusion is seen. No evidence of a mass or adenopathy. No significant bony abnormality. Impression  NORMAL CHEST. Results for orders placed during the hospital encounter of 09/11/20    XR CHEST (2 VW)    Narrative  XR CHEST (2 VW)    Clinical History:   cough and sob    Other . Comparison:  3/16/2010    RESULT:        Lungs and pleura:  No consolidation. No pleural effusion. No pneumothorax. Normal pulmonary vascular pattern. Cardiomediastinal silhouette:  Normal.    Other:  No acute osseous findings. Degenerative changes bilateral acromioclavicular joints. Impression  No acute radiographic abnormality.  ]  Results for orders placed during the hospital encounter of 10/23/20    XR CHEST PORTABLE    Narrative  XR CHEST PORTABLE: 10/23/2020    CLINICAL HISTORY:  cough and wheezing . COMPARISON: 9/11/2020. TECHNIQUE: A portable upright AP radiograph of the chest was obtained. FINDINGS:    There is no significant pulmonary infiltrate, pleural effusion, cardiomegaly, vascular congestion, pneumothorax, or displaced fractures identified. Impression  NO EVIDENCE OF ACTIVE CARDIOPULMONARY DISEASE. Assessment/Plan:     1. Moderate persistent asthma, unspecified whether complicated    She is using albuterol HFA and neb with albuterol.  She was on symbicort  HFA but not using it at this time. C/o shortness of breath  with exertion. C/o Wheezing at night No Cough or  Sputum  She was smoking 1 and 1/2 ppd , quit 4 yrs ago    - albuterol sulfate HFA (VENTOLIN HFA) 108 (90 Base) MCG/ACT inhaler; INHALE 2 PUFFS into the lungs EVERY 6 HOURS AS NEEDED FOR WHEEZING or SHORTNESS OF BREATH  Dispense: 18 g; Refill: 2    - Full PFT Study With Bronchodilator; Future    2. Chronic obstructive pulmonary disease, unspecified COPD type (HCC)  - albuterol sulfate HFA (VENTOLIN HFA) 108 (90 Base) MCG/ACT inhaler; INHALE 2 PUFFS into the lungs EVERY 6 HOURS AS NEEDED FOR WHEEZING or SHORTNESS OF BREATH  Dispense: 18 g; Refill: 2  - Budeson-Glycopyrrol-Formoterol (BREZTRI AEROSPHERE) 160-9-4.8 MCG/ACT AERO; Inhale 2 puffs into the lungs 2 times daily  Dispense: 4 each; Refill: 0    3. SEJAL (obstructive sleep apnea)  She shows mild sleep apnea with AHI 7.      4. Obesity (BMI 30-39. 9)  She is advised try to lose weight. obesity related risk explained to the patient ,  Current weight:  240 lb (108.9 kg) Lbs. BMI:  Body mass index is 41.2 kg/m². Suggested weight control approaches, including dietary changes , exercise, behavioral modification. CXR no active disease. Breztri HFA 2 puff BID    Return in about 3 months (around 5/21/2023) for COPD, asthma.       Crystal Cortes MD

## 2023-02-28 RX ORDER — BUDESONIDE, GLYCOPYRROLATE, AND FORMOTEROL FUMARATE 160; 9; 4.8 UG/1; UG/1; UG/1
2 AEROSOL, METERED RESPIRATORY (INHALATION) 2 TIMES DAILY
Qty: 4 EACH | Refills: 0 | COMMUNITY
Start: 2023-02-28

## 2023-03-14 RX ORDER — PANTOPRAZOLE SODIUM 40 MG/1
TABLET, DELAYED RELEASE ORAL
Qty: 45 TABLET | Refills: 0 | Status: SHIPPED | OUTPATIENT
Start: 2023-03-14

## 2023-03-20 ENCOUNTER — INITIAL CONSULT (OUTPATIENT)
Dept: PODIATRY | Age: 48
End: 2023-03-20
Payer: COMMERCIAL

## 2023-03-20 ENCOUNTER — OFFICE VISIT (OUTPATIENT)
Dept: FAMILY MEDICINE CLINIC | Age: 48
End: 2023-03-20
Payer: COMMERCIAL

## 2023-03-20 ENCOUNTER — TELEPHONE (OUTPATIENT)
Dept: PODIATRY | Age: 48
End: 2023-03-20

## 2023-03-20 VITALS
DIASTOLIC BLOOD PRESSURE: 70 MMHG | SYSTOLIC BLOOD PRESSURE: 136 MMHG | BODY MASS INDEX: 42.1 KG/M2 | HEART RATE: 72 BPM | TEMPERATURE: 97 F | HEIGHT: 64 IN | OXYGEN SATURATION: 99 % | WEIGHT: 246.6 LBS

## 2023-03-20 VITALS — BODY MASS INDEX: 40.97 KG/M2 | WEIGHT: 240 LBS | TEMPERATURE: 97.1 F | HEIGHT: 64 IN

## 2023-03-20 DIAGNOSIS — M79.671 PAIN IN BOTH FEET: Primary | ICD-10-CM

## 2023-03-20 DIAGNOSIS — M21.622 TAILOR'S BUNIONETTE, BILATERAL: ICD-10-CM

## 2023-03-20 DIAGNOSIS — M79.672 PAIN IN BOTH FEET: Primary | ICD-10-CM

## 2023-03-20 DIAGNOSIS — M20.11 HALLUX ABDUCTO VALGUS, BILATERAL: ICD-10-CM

## 2023-03-20 DIAGNOSIS — M20.12 HALLUX ABDUCTO VALGUS, BILATERAL: ICD-10-CM

## 2023-03-20 DIAGNOSIS — Q66.51 CONGENITAL PES PLANUS OF BOTH FEET: ICD-10-CM

## 2023-03-20 DIAGNOSIS — M54.16 LUMBAR RADICULOPATHY: ICD-10-CM

## 2023-03-20 DIAGNOSIS — M21.621 TAILOR'S BUNIONETTE, BILATERAL: ICD-10-CM

## 2023-03-20 DIAGNOSIS — F31.75 BIPOLAR DISORDER, IN PARTIAL REMISSION, MOST RECENT EPISODE DEPRESSED (HCC): Primary | ICD-10-CM

## 2023-03-20 DIAGNOSIS — M20.42 HAMMERTOE, BILATERAL: ICD-10-CM

## 2023-03-20 DIAGNOSIS — M20.41 HAMMERTOE, BILATERAL: ICD-10-CM

## 2023-03-20 DIAGNOSIS — G63 POLYNEUROPATHY IN OTHER DISEASES CLASSIFIED ELSEWHERE (HCC): ICD-10-CM

## 2023-03-20 DIAGNOSIS — M19.072 PRIMARY OSTEOARTHRITIS OF BOTH FEET: ICD-10-CM

## 2023-03-20 DIAGNOSIS — Q66.52 CONGENITAL PES PLANUS OF BOTH FEET: ICD-10-CM

## 2023-03-20 DIAGNOSIS — M19.071 PRIMARY OSTEOARTHRITIS OF BOTH FEET: ICD-10-CM

## 2023-03-20 PROCEDURE — G8417 CALC BMI ABV UP PARAM F/U: HCPCS | Performed by: PODIATRIST

## 2023-03-20 PROCEDURE — 3078F DIAST BP <80 MM HG: CPT | Performed by: FAMILY MEDICINE

## 2023-03-20 PROCEDURE — G8484 FLU IMMUNIZE NO ADMIN: HCPCS | Performed by: FAMILY MEDICINE

## 2023-03-20 PROCEDURE — G8417 CALC BMI ABV UP PARAM F/U: HCPCS | Performed by: FAMILY MEDICINE

## 2023-03-20 PROCEDURE — G8427 DOCREV CUR MEDS BY ELIG CLIN: HCPCS | Performed by: FAMILY MEDICINE

## 2023-03-20 PROCEDURE — G8484 FLU IMMUNIZE NO ADMIN: HCPCS | Performed by: PODIATRIST

## 2023-03-20 PROCEDURE — 1036F TOBACCO NON-USER: CPT | Performed by: PODIATRIST

## 2023-03-20 PROCEDURE — G8427 DOCREV CUR MEDS BY ELIG CLIN: HCPCS | Performed by: PODIATRIST

## 2023-03-20 PROCEDURE — 3075F SYST BP GE 130 - 139MM HG: CPT | Performed by: FAMILY MEDICINE

## 2023-03-20 PROCEDURE — 1036F TOBACCO NON-USER: CPT | Performed by: FAMILY MEDICINE

## 2023-03-20 PROCEDURE — 99204 OFFICE O/P NEW MOD 45 MIN: CPT | Performed by: PODIATRIST

## 2023-03-20 PROCEDURE — 99213 OFFICE O/P EST LOW 20 MIN: CPT | Performed by: FAMILY MEDICINE

## 2023-03-20 RX ORDER — AMMONIUM LACTATE 12 G/100G
CREAM TOPICAL
Qty: 385 G | Refills: 5 | Status: SHIPPED | OUTPATIENT
Start: 2023-03-20

## 2023-03-20 RX ORDER — FLUOXETINE HYDROCHLORIDE 20 MG/1
20 CAPSULE ORAL 2 TIMES DAILY
Qty: 180 CAPSULE | Refills: 0 | Status: SHIPPED | OUTPATIENT
Start: 2023-03-20

## 2023-03-20 RX ORDER — MULTIVITAMIN
TABLET ORAL
Qty: 30 TABLET | Refills: 1 | Status: SHIPPED | OUTPATIENT
Start: 2023-03-20

## 2023-03-20 SDOH — ECONOMIC STABILITY: HOUSING INSECURITY
IN THE LAST 12 MONTHS, WAS THERE A TIME WHEN YOU DID NOT HAVE A STEADY PLACE TO SLEEP OR SLEPT IN A SHELTER (INCLUDING NOW)?: NO

## 2023-03-20 SDOH — ECONOMIC STABILITY: INCOME INSECURITY: HOW HARD IS IT FOR YOU TO PAY FOR THE VERY BASICS LIKE FOOD, HOUSING, MEDICAL CARE, AND HEATING?: NOT HARD AT ALL

## 2023-03-20 SDOH — ECONOMIC STABILITY: FOOD INSECURITY: WITHIN THE PAST 12 MONTHS, YOU WORRIED THAT YOUR FOOD WOULD RUN OUT BEFORE YOU GOT MONEY TO BUY MORE.: NEVER TRUE

## 2023-03-20 SDOH — ECONOMIC STABILITY: FOOD INSECURITY: WITHIN THE PAST 12 MONTHS, THE FOOD YOU BOUGHT JUST DIDN'T LAST AND YOU DIDN'T HAVE MONEY TO GET MORE.: NEVER TRUE

## 2023-03-20 ASSESSMENT — ENCOUNTER SYMPTOMS
COUGH: 0
VOMITING: 0
VOMITING: 0
DIARRHEA: 0
NAUSEA: 0
SHORTNESS OF BREATH: 0
BACK PAIN: 1

## 2023-03-20 ASSESSMENT — PATIENT HEALTH QUESTIONNAIRE - PHQ9
7. TROUBLE CONCENTRATING ON THINGS, SUCH AS READING THE NEWSPAPER OR WATCHING TELEVISION: 1
6. FEELING BAD ABOUT YOURSELF - OR THAT YOU ARE A FAILURE OR HAVE LET YOURSELF OR YOUR FAMILY DOWN: 0
2. FEELING DOWN, DEPRESSED OR HOPELESS: 3
SUM OF ALL RESPONSES TO PHQ QUESTIONS 1-9: 12
SUM OF ALL RESPONSES TO PHQ QUESTIONS 1-9: 12
3. TROUBLE FALLING OR STAYING ASLEEP: 0
5. POOR APPETITE OR OVEREATING: 2
4. FEELING TIRED OR HAVING LITTLE ENERGY: 3
9. THOUGHTS THAT YOU WOULD BE BETTER OFF DEAD, OR OF HURTING YOURSELF: 0
1. LITTLE INTEREST OR PLEASURE IN DOING THINGS: 0
SUM OF ALL RESPONSES TO PHQ QUESTIONS 1-9: 12
SUM OF ALL RESPONSES TO PHQ9 QUESTIONS 1 & 2: 3
8. MOVING OR SPEAKING SO SLOWLY THAT OTHER PEOPLE COULD HAVE NOTICED. OR THE OPPOSITE, BEING SO FIGETY OR RESTLESS THAT YOU HAVE BEEN MOVING AROUND A LOT MORE THAN USUAL: 3
SUM OF ALL RESPONSES TO PHQ QUESTIONS 1-9: 12
10. IF YOU CHECKED OFF ANY PROBLEMS, HOW DIFFICULT HAVE THESE PROBLEMS MADE IT FOR YOU TO DO YOUR WORK, TAKE CARE OF THINGS AT HOME, OR GET ALONG WITH OTHER PEOPLE: 1

## 2023-03-20 NOTE — PROGRESS NOTES
recommended that she have a metatarsal pad applied to offload the ball of the foot bilaterally. Plantar fasciitis:  I discussed the nature and etiology of the condition with the patient in detail. Posterior calf stretching exercises were demonstrated to patient and these are to be performed three times per day, a detailed home exercise plan was dispensed to the patient. An icepack is to be applied to the heel for 10 minutes after the stretching is performed. The patient is to refrain from barefoot walking and wear a supportive shoe. The custom orthotic should also help manage the plantar fasciitis by increasing the medial longitudinal arch height thereby decreasing the lead/stress on the plantar fascia. Neuropathic pain: We discussed possible etiologies including her lower back problems. We will await the results of her upcoming hemoglobin A1c test.  In the meantime, I have prescribed a topical compounded pain cream, apply as directed. Xerosis/calluses: I have prescribed a topical keratolytic cream, the patient is to apply this to areas of dry skin twice daily. The patient has been instructed to avoid applying the cream between the toes. I recommend that she continue gentle exfoliation of the hyperkeratosis after she bathes. Orders Placed This Encounter   Medications    ammonium lactate (AMLACTIN) 12 % cream     Sig: Apply topically daily, avoid applying between the toes. Dispense:  385 g     Refill:  5       Orders Placed This Encounter   Procedures    [place] FT INSERT [employer] SHELL     Foot Orthotic - 1 pair  Length of need - lifetime of the device  NPI - [id number]    Please apply full-length accommodative top-cover due to patient's peripheral neuropathy, please apply metatarsal pad bilaterally. Standing Status:   Future     Standing Expiration Date:   9/20/2023       All questions were answered to the patient's satisfaction.     Thank you for allowing me to participate in the care of

## 2023-03-20 NOTE — PROGRESS NOTES
Treatment Adherence:   Medication compliance:  {Desc; compliance:5303::\"compliant most of the time\"}  Diet compliance:  {Desc; compliance:5303::\"compliant most of the time\"}  Weight trend: {INCREASING/DECREASING/STABLE:04504}  Current exercise: {EXERCISE IPQX:370286938}  Barriers: {Barriers to success:05488}    Hypertension:  Home blood pressure monitoring: {NO/YES:6208393300}. She {is/is not:9024} adherent to a low sodium diet. Patient {denies/complains:95902} {Symptoms of Hypertension, Denies:67955}. Antihypertensive medication side effects: {Hypertension med side effects:5728::\"no medication side effects noted\"}. Use of agents associated with hypertension: {bp agents assoc with hypertension:511::\"none\"}. Sodium (mEq/L)   Date Value   02/08/2023 138    BUN (mg/dL)   Date Value   02/08/2023 13    Glucose (mg/dL)   Date Value   02/08/2023 135 (H)   03/01/2019 159 (H)      Potassium (mEq/L)   Date Value   02/08/2023 4.8    Creatinine (mg/dL)   Date Value   02/08/2023 0.77         Hyperlipidemia:  No new myalgias or GI upset on {RP HYPERLIPIDEMIA MEDS:06859}.      Lab Results   Component Value Date    CHOL 185 07/12/2022    TRIG 180 (H) 07/12/2022    HDL 51 07/12/2022    LDLCALC 98 07/12/2022     Lab Results   Component Value Date    ALT 16 02/08/2023    AST 14 02/08/2023
Facility-Administered Medications   Medication Dose Route Frequency Provider Last Rate Last Admin    methylPREDNISolone acetate (DEPO-MEDROL) injection 80 mg  80 mg Intra-artICUlar Once Tali Flores MD        iopamidol (ISOVUE-300) 61 % injection 2 mL  2 mL Other ONCE PRN Federica Garnett MD         Family History   Problem Relation Age of Onset    Osteoarthritis Mother     Other Mother         hyperlipidemia    Hypertension Mother     High Cholesterol Mother     Arthritis Mother     Heart Disease Mother     Diabetes Mother     No Known Problems Father      Past Medical History:   Diagnosis Date    Abnormal Pap smear of cervix     Allergic rhinitis     several allergens    Asthma     since shildhood    Bariatric surgery status 01/02/2020    [facility], Dr Yeni Anthony     Brachial neuralgia 1/23/2014    Chronic left shoulder pain     COPD (chronic obstructive pulmonary disease) ([facility] [address] 2019    Dr Roslyn Barron    Depression     H/O colonoscopy 2014    Dr. Silas Madden    Hyperlipidemia     Hypertension 2010    Migraine headache     Obesity, morbid (more than 100 lbs over ideal weight or BMI > 40) (HCC)     SEJAL on CPAP 10/2018    Sleep apnea      Objective:   /70   Pulse 72   Temp 97 °F (36.1 °C)   Ht 5' 4\" (1.626 m)   Wt 246 lb 9.6 oz (111.9 kg)   SpO2 99%   BMI 42.33 kg/m²     Physical Exam  Patient with appropriate affect. Alert   Thought content appropriate  Good eye contact  Not suicidal or homicidal  Gen:   NAD  Lungs: clear and equal breath sounds  Heart:   Rate reg. No murmur   Assessment:       Diagnosis Orders   1.  Bipolar disorder, in partial remission, most recent episode depressed ([facility] [address]  MILVIA Harp, Psychiatry, MD, [facility] 7:    Increase prozac   Orders Placed This Encounter   Medications    FLUoxetine (PROZAC) 20 MG capsule     Sig: Take 1 capsule by mouth 2 times daily     Dispense:  180 capsule     Refill:  0    Multiple Vitamin (MULTIVITAMIN) TABS

## 2023-04-03 DIAGNOSIS — I10 ESSENTIAL HYPERTENSION: ICD-10-CM

## 2023-04-03 DIAGNOSIS — E78.00 PURE HYPERCHOLESTEROLEMIA: ICD-10-CM

## 2023-04-03 DIAGNOSIS — Z98.84 HISTORY OF BARIATRIC SURGERY: ICD-10-CM

## 2023-04-03 LAB
ALBUMIN SERPL-MCNC: 3.8 G/DL (ref 3.5–4.6)
ALP SERPL-CCNC: 54 U/L (ref 40–130)
ALT SERPL-CCNC: 12 U/L (ref 0–33)
ANION GAP SERPL CALCULATED.3IONS-SCNC: 10 MEQ/L (ref 9–15)
AST SERPL-CCNC: 9 U/L (ref 0–35)
BASOPHILS # BLD: 0.1 K/UL (ref 0–0.2)
BASOPHILS NFR BLD: 1 %
BILIRUB SERPL-MCNC: 0.7 MG/DL (ref 0.2–0.7)
BUN SERPL-MCNC: 12 MG/DL (ref 6–20)
CALCIUM SERPL-MCNC: 9 MG/DL (ref 8.5–9.9)
CHLORIDE SERPL-SCNC: 106 MEQ/L (ref 95–107)
CHOLEST SERPL-MCNC: 184 MG/DL (ref 0–199)
CO2 SERPL-SCNC: 25 MEQ/L (ref 20–31)
CREAT SERPL-MCNC: 0.65 MG/DL (ref 0.5–0.9)
EOSINOPHIL # BLD: 0.2 K/UL (ref 0–0.7)
EOSINOPHIL NFR BLD: 3.7 %
ERYTHROCYTE [DISTWIDTH] IN BLOOD BY AUTOMATED COUNT: 13.5 % (ref 11.5–14.5)
GLOBULIN SER CALC-MCNC: 2.4 G/DL (ref 2.3–3.5)
GLUCOSE SERPL-MCNC: 125 MG/DL (ref 70–99)
HCT VFR BLD AUTO: 37.8 % (ref 37–47)
HDLC SERPL-MCNC: 43 MG/DL (ref 40–59)
HGB BLD-MCNC: 12.7 G/DL (ref 12–16)
LDLC SERPL CALC-MCNC: 80 MG/DL (ref 0–129)
LYMPHOCYTES # BLD: 1.3 K/UL (ref 1–4.8)
LYMPHOCYTES NFR BLD: 19.8 %
MCH RBC QN AUTO: 30.1 PG (ref 27–31.3)
MCHC RBC AUTO-ENTMCNC: 33.6 % (ref 33–37)
MCV RBC AUTO: 89.6 FL (ref 79.4–94.8)
MONOCYTES # BLD: 0.4 K/UL (ref 0.2–0.8)
MONOCYTES NFR BLD: 6.9 %
NEUTROPHILS # BLD: 4.4 K/UL (ref 1.4–6.5)
NEUTS SEG NFR BLD: 68.6 %
PLATELET # BLD AUTO: 242 K/UL (ref 130–400)
POTASSIUM SERPL-SCNC: 4.3 MEQ/L (ref 3.4–4.9)
PROT SERPL-MCNC: 6.2 G/DL (ref 6.3–8)
RBC # BLD AUTO: 4.21 M/UL (ref 4.2–5.4)
SODIUM SERPL-SCNC: 141 MEQ/L (ref 135–144)
TRIGL SERPL-MCNC: 306 MG/DL (ref 0–150)
WBC # BLD AUTO: 6.4 K/UL (ref 4.8–10.8)

## 2023-04-04 LAB
FOLATE: 13.9 NG/ML
VITAMIN B-12: 584 PG/ML (ref 232–1245)

## 2023-04-06 ENCOUNTER — APPOINTMENT (OUTPATIENT)
Dept: CT IMAGING | Age: 48
End: 2023-04-06
Payer: COMMERCIAL

## 2023-04-06 ENCOUNTER — HOSPITAL ENCOUNTER (EMERGENCY)
Age: 48
Discharge: HOME | End: 2023-04-06
Payer: COMMERCIAL

## 2023-04-06 VITALS
HEART RATE: 82 BPM | OXYGEN SATURATION: 98 % | SYSTOLIC BLOOD PRESSURE: 140 MMHG | WEIGHT: 235 LBS | TEMPERATURE: 97.2 F | RESPIRATION RATE: 14 BRPM | BODY MASS INDEX: 40.12 KG/M2 | DIASTOLIC BLOOD PRESSURE: 86 MMHG | HEIGHT: 64 IN

## 2023-04-06 DIAGNOSIS — R10.9 LEFT FLANK PAIN: Primary | ICD-10-CM

## 2023-04-06 LAB
ALBUMIN SERPL-MCNC: 3.7 G/DL (ref 3.5–4.6)
ALP SERPL-CCNC: 56 U/L (ref 40–130)
ALT SERPL-CCNC: 14 U/L (ref 0–33)
ANION GAP SERPL CALCULATED.3IONS-SCNC: 5 MEQ/L (ref 9–15)
AST SERPL-CCNC: 11 U/L (ref 0–35)
BACTERIA URNS QL MICRO: NEGATIVE /HPF
BASOPHILS # BLD: 0.1 K/UL (ref 0–0.2)
BASOPHILS NFR BLD: 1.1 %
BILIRUB SERPL-MCNC: 0.5 MG/DL (ref 0.2–0.7)
BILIRUB UR QL STRIP: NEGATIVE
BUN SERPL-MCNC: 11 MG/DL (ref 6–20)
CALCIUM SERPL-MCNC: 8.5 MG/DL (ref 8.5–9.9)
CHLORIDE SERPL-SCNC: 104 MEQ/L (ref 95–107)
CLARITY UR: CLEAR
CO2 SERPL-SCNC: 27 MEQ/L (ref 20–31)
COLOR UR: YELLOW
CREAT SERPL-MCNC: 0.7 MG/DL (ref 0.5–0.9)
EOSINOPHIL # BLD: 0.3 K/UL (ref 0–0.7)
EOSINOPHIL NFR BLD: 3 %
EPI CELLS #/AREA URNS AUTO: NORMAL /HPF (ref 0–5)
ERYTHROCYTE [DISTWIDTH] IN BLOOD BY AUTOMATED COUNT: 13.4 % (ref 11.5–14.5)
GLOBULIN SER CALC-MCNC: 2.5 G/DL (ref 2.3–3.5)
GLUCOSE SERPL-MCNC: 90 MG/DL (ref 70–99)
GLUCOSE UR STRIP-MCNC: NEGATIVE MG/DL
HCT VFR BLD AUTO: 43 % (ref 37–47)
HGB BLD-MCNC: 14.2 G/DL (ref 12–16)
HGB UR QL STRIP: ABNORMAL
HYALINE CASTS #/AREA URNS AUTO: NORMAL /HPF (ref 0–5)
KETONES UR STRIP-MCNC: NEGATIVE MG/DL
LEUKOCYTE ESTERASE UR QL STRIP: NEGATIVE
LYMPHOCYTES # BLD: 2.1 K/UL (ref 1–4.8)
LYMPHOCYTES NFR BLD: 21.4 %
MCH RBC QN AUTO: 29.5 PG (ref 27–31.3)
MCHC RBC AUTO-ENTMCNC: 33 % (ref 33–37)
MCV RBC AUTO: 89.3 FL (ref 79.4–94.8)
MONOCYTES # BLD: 0.6 K/UL (ref 0.2–0.8)
MONOCYTES NFR BLD: 6.3 %
NEUTROPHILS # BLD: 6.6 K/UL (ref 1.4–6.5)
NEUTS SEG NFR BLD: 68.2 %
NITRITE UR QL STRIP: NEGATIVE
PH UR STRIP: 5 [PH] (ref 5–9)
PLATELET # BLD AUTO: 276 K/UL (ref 130–400)
POTASSIUM SERPL-SCNC: 4.3 MEQ/L (ref 3.4–4.9)
PROT SERPL-MCNC: 6.2 G/DL (ref 6.3–8)
PROT UR STRIP-MCNC: NEGATIVE MG/DL
RBC # BLD AUTO: 4.82 M/UL (ref 4.2–5.4)
RBC #/AREA URNS AUTO: NORMAL /HPF (ref 0–5)
SODIUM SERPL-SCNC: 136 MEQ/L (ref 135–144)
SP GR UR STRIP: 1.02 (ref 1–1.03)
URINE REFLEX TO CULTURE: ABNORMAL
UROBILINOGEN UR STRIP-ACNC: 0.2 E.U./DL
WBC # BLD AUTO: 9.7 K/UL (ref 4.8–10.8)
WBC #/AREA URNS AUTO: NORMAL /HPF (ref 0–5)

## 2023-04-06 PROCEDURE — 2580000003 HC RX 258: Performed by: PHYSICIAN ASSISTANT

## 2023-04-06 PROCEDURE — 6360000002 HC RX W HCPCS: Performed by: PHYSICIAN ASSISTANT

## 2023-04-06 PROCEDURE — 80053 COMPREHEN METABOLIC PANEL: CPT

## 2023-04-06 PROCEDURE — 81001 URINALYSIS AUTO W/SCOPE: CPT

## 2023-04-06 PROCEDURE — 74150 CT ABDOMEN W/O CONTRAST: CPT

## 2023-04-06 PROCEDURE — 36415 COLL VENOUS BLD VENIPUNCTURE: CPT

## 2023-04-06 PROCEDURE — 85025 COMPLETE CBC W/AUTO DIFF WBC: CPT

## 2023-04-06 RX ORDER — TRAMADOL HYDROCHLORIDE 50 MG/1
50 TABLET ORAL EVERY 6 HOURS PRN
Qty: 12 TABLET | Refills: 0 | Status: SHIPPED | OUTPATIENT
Start: 2023-04-06 | End: 2023-04-09

## 2023-04-06 RX ORDER — KETOROLAC TROMETHAMINE 30 MG/ML
30 INJECTION, SOLUTION INTRAMUSCULAR; INTRAVENOUS ONCE
Status: COMPLETED | OUTPATIENT
Start: 2023-04-06 | End: 2023-04-06

## 2023-04-06 RX ORDER — 0.9 % SODIUM CHLORIDE 0.9 %
1000 INTRAVENOUS SOLUTION INTRAVENOUS ONCE
Status: COMPLETED | OUTPATIENT
Start: 2023-04-06 | End: 2023-04-06

## 2023-04-06 RX ORDER — ETODOLAC 400 MG/1
400 TABLET, FILM COATED ORAL 2 TIMES DAILY
Qty: 14 TABLET | Refills: 0 | Status: SHIPPED | OUTPATIENT
Start: 2023-04-06

## 2023-04-06 RX ORDER — TIZANIDINE 4 MG/1
4 TABLET ORAL EVERY 8 HOURS PRN
Qty: 12 TABLET | Refills: 0 | Status: SHIPPED | OUTPATIENT
Start: 2023-04-06

## 2023-04-06 RX ADMIN — KETOROLAC TROMETHAMINE 30 MG: 30 INJECTION, SOLUTION INTRAMUSCULAR; INTRAVENOUS at 17:42

## 2023-04-06 RX ADMIN — SODIUM CHLORIDE 1000 ML: 9 INJECTION, SOLUTION INTRAVENOUS at 17:41

## 2023-04-06 ASSESSMENT — ENCOUNTER SYMPTOMS
APNEA: 0
COLOR CHANGE: 0
TROUBLE SWALLOWING: 0
ALLERGIC/IMMUNOLOGIC NEGATIVE: 1
EYE PAIN: 0
ABDOMINAL PAIN: 0
SHORTNESS OF BREATH: 0

## 2023-04-06 ASSESSMENT — PAIN - FUNCTIONAL ASSESSMENT
PAIN_FUNCTIONAL_ASSESSMENT: 0-10
PAIN_FUNCTIONAL_ASSESSMENT: NONE - DENIES PAIN

## 2023-04-06 ASSESSMENT — PAIN DESCRIPTION - LOCATION: LOCATION: HIP

## 2023-04-06 ASSESSMENT — PAIN DESCRIPTION - ORIENTATION: ORIENTATION: LEFT

## 2023-04-06 ASSESSMENT — PAIN SCALES - GENERAL: PAINLEVEL_OUTOF10: 9

## 2023-04-06 ASSESSMENT — PAIN DESCRIPTION - DESCRIPTORS: DESCRIPTORS: STABBING

## 2023-04-06 NOTE — ED PROVIDER NOTES
Insecurity: No Food Insecurity    Worried About Running Out of Food in the Last Year: Never true    Ran Out of Food in the Last Year: Never true   Transportation Needs: Unknown    Lack of Transportation (Non-Medical): No   Housing Stability: Unknown    Unstable Housing in the Last Year: No       SCREENINGS           PHYSICAL EXAM    (up to 7 forlevel 4, 8 or more for level 5)     ED Triage Vitals [04/06/23 1703]   BP Temp Temp Source Heart Rate Resp SpO2 Height Weight   (!) 140/86 97.2 °F (36.2 °C) Temporal 82 14 98 % 5' 4\" (1.626 m) 235 lb (106.6 kg)       Physical Exam  Vitals and nursing note reviewed. Constitutional:       General: She is not in acute distress. Appearance: She is well-developed. She is not diaphoretic. HENT:      Head: Normocephalic and atraumatic. Mouth/Throat:      Mouth: Mucous membranes are moist.      Pharynx: No oropharyngeal exudate. Eyes:      General: No scleral icterus. Conjunctiva/sclera: Conjunctivae normal.      Pupils: Pupils are equal, round, and reactive to light. Neck:      Trachea: No tracheal deviation. Cardiovascular:      Rate and Rhythm: Normal rate. Heart sounds: Normal heart sounds. Pulmonary:      Effort: Pulmonary effort is normal. No respiratory distress. Breath sounds: Normal breath sounds. Abdominal:      General: Bowel sounds are normal. There is no distension. Palpations: Abdomen is soft. Tenderness: There is abdominal tenderness. Musculoskeletal:         General: Normal range of motion. Cervical back: Normal range of motion and neck supple. No rigidity or tenderness. Skin:     General: Skin is warm and dry. Findings: No erythema or rash. Neurological:      Mental Status: She is alert and oriented to person, place, and time. Cranial Nerves: No cranial nerve deficit. Motor: No abnormal muscle tone. Psychiatric:         Behavior: Behavior normal.         Thought Content:  Thought content

## 2023-04-06 NOTE — Clinical Note
Yusuf Hernandez was seen and treated in our emergency department on 4/6/2023. She may return to work on 04/09/2023. May return sooner if symptoms permit     If you have any questions or concerns, please don't hesitate to call.       Duy Rea PA-C

## 2023-04-06 NOTE — ED NOTES
Pt provided with discharge instructions and f/u care instructions. Pt verbalizes understanding; denies questions. Pt ambulatory off unit in stable condition.      Donal Sandhoff, RN  04/06/23 1931

## 2023-04-06 NOTE — ED NOTES
Pt presents to ER with left hip pain. Pt states it has been progressively getting worse for 4 days. Pt denies any trauma to the area.       Samia Lopez, EMT-P  04/06/23 2302

## 2023-04-07 ENCOUNTER — TELEMEDICINE (OUTPATIENT)
Dept: FAMILY MEDICINE CLINIC | Age: 48
End: 2023-04-07
Payer: COMMERCIAL

## 2023-04-07 DIAGNOSIS — F31.75 BIPOLAR DISORDER, IN PARTIAL REMISSION, MOST RECENT EPISODE DEPRESSED (HCC): ICD-10-CM

## 2023-04-07 DIAGNOSIS — R73.03 PRE-DIABETES: ICD-10-CM

## 2023-04-07 DIAGNOSIS — I10 ESSENTIAL HYPERTENSION: Primary | ICD-10-CM

## 2023-04-07 DIAGNOSIS — E78.00 PURE HYPERCHOLESTEROLEMIA: ICD-10-CM

## 2023-04-07 PROCEDURE — 99442 PR PHYS/QHP TELEPHONE EVALUATION 11-20 MIN: CPT | Performed by: FAMILY MEDICINE

## 2023-04-07 ASSESSMENT — ENCOUNTER SYMPTOMS
DIARRHEA: 0
VOMITING: 0
COUGH: 0

## 2023-04-07 NOTE — PROGRESS NOTES
Subjective:      Patient ID: Melanie Casey is a 52 y.o. female. HPI    Review of Systems  Treatment Adherence:   Medication compliance:  {Desc; compliance:5303::\"compliant most of the time\"}  Diet compliance:  {Desc; compliance:5303::\"compliant most of the time\"}  Weight trend: {INCREASING/DECREASING/STABLE:94880}  Current exercise: {EXERCISE RMNU:874738814}  Barriers: {Barriers to success:04724}    Hypertension:  Home blood pressure monitoring: {NO/YES:2632125729}. She {is/is not:9024} adherent to a low sodium diet. Patient {denies/complains:37215} {Symptoms of Hypertension, Denies:39775}. Antihypertensive medication side effects: {Hypertension med side effects:5728::\"no medication side effects noted\"}. Use of agents associated with hypertension: {bp agents assoc with hypertension:511::\"none\"}. Sodium (mEq/L)   Date Value   04/06/2023 136    BUN (mg/dL)   Date Value   04/06/2023 11    Glucose (mg/dL)   Date Value   04/06/2023 90   03/01/2019 159 (H)      Potassium (mEq/L)   Date Value   04/06/2023 4.3    Creatinine (mg/dL)   Date Value   04/06/2023 0.70         Hyperlipidemia:  No new myalgias or GI upset on {RP HYPERLIPIDEMIA MEDS:49554}.      Lab Results   Component Value Date    CHOL 184 04/03/2023    TRIG 306 (H) 04/03/2023    HDL 43 04/03/2023    LDLCALC 80 04/03/2023     Lab Results   Component Value Date    ALT 14 04/06/2023    AST 11 04/06/2023          Objective:   Physical Exam    Assessment / Plan:

## 2023-04-07 NOTE — PROGRESS NOTES
Subjective:      Patient ID: Mouna Flores is a 52 y.o. female    Hypertension  The current episode started more than 1 year ago. The problem is controlled. Past treatments include angiotensin blockers. The current treatment provides moderate improvement. Hyperlipidemia  The current episode started more than 1 year ago. Current antihyperlipidemic treatment includes statins. The current treatment provides mild improvement of lipids. Here in follow up for htn and lipids and pre diabetes and blood work. No missed doses of medication. Bp has been under 438 systolic   Weight is about the same. Has not yet heard from [facility]---seen in er earlier this week and does have ortho referral planned    Review of Systems   Constitutional:  Negative for chills and fever. Respiratory:  Negative for cough. Gastrointestinal:  Negative for diarrhea and vomiting. Neurological:  Negative for weakness. Reviewed allergy, medical, social, surgical, family and med list changes and updated   Files---------reviewed blood work with elevated glucose and triglycerides    Mouna Flores is a 52 y.o. female evaluated via telephone on 4/7/2023. Consent:  She and/or health care decision maker is aware that that she may receive a bill for this telephone service, depending on her insurance coverage, and has provided verbal consent to proceed: Yes      Documentation:  I communicated with the patient and/or health care decision maker about . Details of this discussion including any medical advice provided: This visit was a telephone encounter. Patient was located at their home. Provider was located at their ___ home or        ____x office. I affirm this is a Patient Initiated Episode with an Established Patient who has not had a related appointment within my department in the past 7 days or scheduled within the next 24 hours.     Total Time: minutes: 11-20 minutes    Note: not billable if

## 2023-04-18 ENCOUNTER — OFFICE VISIT (OUTPATIENT)
Dept: PAIN MANAGEMENT | Age: 48
End: 2023-04-18
Payer: COMMERCIAL

## 2023-04-18 VITALS
DIASTOLIC BLOOD PRESSURE: 82 MMHG | BODY MASS INDEX: 40.12 KG/M2 | SYSTOLIC BLOOD PRESSURE: 142 MMHG | HEIGHT: 64 IN | TEMPERATURE: 96.8 F | WEIGHT: 235 LBS

## 2023-04-18 DIAGNOSIS — M54.12 CERVICAL RADICULITIS: Primary | ICD-10-CM

## 2023-04-18 DIAGNOSIS — M47.812 CERVICAL SPONDYLOSIS WITHOUT MYELOPATHY: ICD-10-CM

## 2023-04-18 PROCEDURE — 99214 OFFICE O/P EST MOD 30 MIN: CPT | Performed by: NURSE PRACTITIONER

## 2023-04-18 PROCEDURE — 3077F SYST BP >= 140 MM HG: CPT | Performed by: NURSE PRACTITIONER

## 2023-04-18 PROCEDURE — 1036F TOBACCO NON-USER: CPT | Performed by: NURSE PRACTITIONER

## 2023-04-18 PROCEDURE — 3079F DIAST BP 80-89 MM HG: CPT | Performed by: NURSE PRACTITIONER

## 2023-04-18 PROCEDURE — G8427 DOCREV CUR MEDS BY ELIG CLIN: HCPCS | Performed by: NURSE PRACTITIONER

## 2023-04-18 PROCEDURE — G8417 CALC BMI ABV UP PARAM F/U: HCPCS | Performed by: NURSE PRACTITIONER

## 2023-04-18 ASSESSMENT — ENCOUNTER SYMPTOMS
ABDOMINAL PAIN: 0
SORE THROAT: 0
SHORTNESS OF BREATH: 0

## 2023-04-18 NOTE — PROGRESS NOTES
bilateral cervical facet joint provacative with palpation. Negative Spurling's maneuver. Negative Gretchen's sign. Strong grasp B/L. Strength is functional in UE bilaterally. Pulses are intact. Assessment:      Diagnosis Orders   1. Cervical radiculitis  MRI CERVICAL SPINE WO CONTRAST      2. Cervical spondylosis without myelopathy  MRI CERVICAL SPINE WO CONTRAST          Plan:          No orders of the defined types were placed in this encounter. Orders Placed This Encounter   Procedures    MRI CERVICAL SPINE WO CONTRAST     Standing Status:   Future     Standing Expiration Date:   7/17/2023     Discussed options with the patient today. She still has valium at home for an MRI. No longer using tizanidine or gabapentin. She tried and failed PT for her neck. Will get MRI cervical, concern for stenosis. Advised not to use valium and THC concomitantly. No NSAIDs recommended given history of gastric bypass surgery. All questions were answered. Pt verbalized understanding and agrees with above plan. OARRS was reviewed. This NP saw pt under direct supervision of Dr. Frank García. Follow up:  Return if symptoms worsen or fail to improve.     Kelley Suarez, DIRK - CNP

## 2023-04-19 ENCOUNTER — HOSPITAL ENCOUNTER (OUTPATIENT)
Dept: PULMONOLOGY | Age: 48
Discharge: HOME | End: 2023-04-19
Payer: COMMERCIAL

## 2023-04-19 DIAGNOSIS — J45.40 MODERATE PERSISTENT ASTHMA, UNSPECIFIED WHETHER COMPLICATED: ICD-10-CM

## 2023-04-19 PROCEDURE — 94060 EVALUATION OF WHEEZING: CPT

## 2023-04-19 PROCEDURE — 94726 PLETHYSMOGRAPHY LUNG VOLUMES: CPT

## 2023-04-19 PROCEDURE — 94729 DIFFUSING CAPACITY: CPT

## 2023-04-19 PROCEDURE — 6360000002 HC RX W HCPCS: Performed by: INTERNAL MEDICINE

## 2023-04-19 RX ORDER — ALBUTEROL SULFATE 2.5 MG/3ML
2.5 SOLUTION RESPIRATORY (INHALATION) ONCE
Status: COMPLETED | OUTPATIENT
Start: 2023-04-19 | End: 2023-04-19

## 2023-04-19 RX ADMIN — ALBUTEROL SULFATE 2.5 MG: 2.5 SOLUTION RESPIRATORY (INHALATION) at 08:26

## 2023-04-21 NOTE — PROCEDURES
[address]                      [facility], [address] [facility]                    PULMONARY FUNCTION  Markel Chew   52 y.o.   female     Test interpretation      Spirometry is normal with no significant response to bronchodilator  Lung volumes shows air trapping  Diffusion capacity is normal      Clinical correlation is recommended     Michael Do MD [facility], 4/21/2023 11:13 AM

## 2023-05-08 ENCOUNTER — HOSPITAL ENCOUNTER (OUTPATIENT)
Dept: MRI IMAGING | Age: 48
Discharge: HOME | End: 2023-05-10
Payer: COMMERCIAL

## 2023-05-08 DIAGNOSIS — M54.12 CERVICAL RADICULITIS: ICD-10-CM

## 2023-05-08 DIAGNOSIS — M47.812 CERVICAL SPONDYLOSIS WITHOUT MYELOPATHY: ICD-10-CM

## 2023-05-08 PROCEDURE — 72141 MRI NECK SPINE W/O DYE: CPT

## 2023-05-10 ENCOUNTER — TELEPHONE (OUTPATIENT)
Dept: PAIN MANAGEMENT | Age: 48
End: 2023-05-10

## 2023-05-15 DIAGNOSIS — E55.9 VITAMIN D DEFICIENCY: ICD-10-CM

## 2023-05-15 NOTE — TELEPHONE ENCOUNTER
Future Appointments    Encounter Information    Provider Department Appt Notes   5/16/2023 Mukund Simpson, APRN - [address] Pain Management 4 WEEKS FOLLOW UP   5/23/2023 Brenda Montero, [facility] Pulmonology 3M FU   6/19/2023 Rachel Jenkins 1153 Podiatry 3 MONTH FOLLOW UP   6/27/2023 [facility]лена Johnston [facility] [place] [facility]   7/19/2023 [facility] MAMMO ROOM 3 [facility], Z12.31, prep given, cordelia w/pt     Past Visits    Date Provider Specialty Visit Type Primary Dx   04/18/2023 DIRK Chiang - CNP Pain Management Office Visit Cervical radiculitis   04/07/2023 Kayleigh Sidhu MD Family Medicine Telemedicine Essential hypertension

## 2023-05-23 ENCOUNTER — OFFICE VISIT (OUTPATIENT)
Dept: PULMONOLOGY | Age: 48
End: 2023-05-23
Payer: COMMERCIAL

## 2023-05-23 VITALS
SYSTOLIC BLOOD PRESSURE: 114 MMHG | TEMPERATURE: 97.1 F | HEART RATE: 82 BPM | WEIGHT: 244 LBS | BODY MASS INDEX: 41.88 KG/M2 | DIASTOLIC BLOOD PRESSURE: 60 MMHG | OXYGEN SATURATION: 98 %

## 2023-05-23 DIAGNOSIS — J45.20 MILD INTERMITTENT ASTHMA WITHOUT COMPLICATION: Primary | ICD-10-CM

## 2023-05-23 DIAGNOSIS — E66.9 OBESITY (BMI 30-39.9): ICD-10-CM

## 2023-05-23 DIAGNOSIS — G47.33 OSA (OBSTRUCTIVE SLEEP APNEA): ICD-10-CM

## 2023-05-23 PROCEDURE — 3074F SYST BP LT 130 MM HG: CPT | Performed by: INTERNAL MEDICINE

## 2023-05-23 PROCEDURE — 3078F DIAST BP <80 MM HG: CPT | Performed by: INTERNAL MEDICINE

## 2023-05-23 PROCEDURE — 99214 OFFICE O/P EST MOD 30 MIN: CPT | Performed by: INTERNAL MEDICINE

## 2023-05-23 PROCEDURE — 1036F TOBACCO NON-USER: CPT | Performed by: INTERNAL MEDICINE

## 2023-05-23 PROCEDURE — G8417 CALC BMI ABV UP PARAM F/U: HCPCS | Performed by: INTERNAL MEDICINE

## 2023-05-23 PROCEDURE — G8427 DOCREV CUR MEDS BY ELIG CLIN: HCPCS | Performed by: INTERNAL MEDICINE

## 2023-05-23 ASSESSMENT — ENCOUNTER SYMPTOMS
VOICE CHANGE: 0
ABDOMINAL PAIN: 0
SORE THROAT: 0
COUGH: 0
SHORTNESS OF BREATH: 1
CHEST TIGHTNESS: 0
TROUBLE SWALLOWING: 0
DIARRHEA: 0
RHINORRHEA: 0
VOMITING: 0
EYE ITCHING: 0
SINUS PRESSURE: 0
NAUSEA: 0
WHEEZING: 0
EYE DISCHARGE: 0

## 2023-05-23 NOTE — PROGRESS NOTES
Subjective:     José Duenas is a 52 y.o. female who complains today of:     Chief Complaint   Patient presents with    Follow-up     3m f/u on asthma and PFT results        HPI  She is using albuterol HFA and neb with albuterol. She was on Breztri HFA 2 puff BID but not using it at this time. 4/21/23 Spirometry is normal with no significant response to bronchodilator  Lung volumes shows air trapping, hyperinflation   Diffusion capacity is normal    PFT 2020 show moderately severe COPD  FVC 2.70 , 71%. FEV1 1.67  55%, significant response to bronchodilator     C/o shortness of breath  with exertion. C/o Wheezing at night   No Cough or  Sputum. No Chest tightness   No Chest pain with radiation  or pleuritic pain  No Fever or chills. No Rhinorrhea and postnasal drip. She was smoking 1 and 1/2 ppd , quit  about 5  yrs ago    She shows mild sleep apnea with AHI 7, she said she could not use CPAP due dry throat , headache and nose was burning. Allergies:  Patient has no known allergies. Past Medical History:   Diagnosis Date    Abnormal Pap smear of cervix     Allergic rhinitis     several allergens    Asthma     since shildhood    Bariatric surgery status 01/02/2020    Supriya Chacon, Dr Valdez Handler     Brachial neuralgia 1/23/2014    Chronic left shoulder pain     COPD (chronic obstructive pulmonary disease) ([facility] [address] 2019    Dr Daniela Ochoa    Depression     H/O colonoscopy 2014    Dr. Laura Mabry    Hyperlipidemia     Hypertension 2010    Migraine headache     Obesity, morbid (more than 100 lbs over ideal weight or BMI > 40) ([facility])     SEJAL on CPAP 10/2018    Sleep apnea      Past Surgical History:   Procedure Laterality Date    BREAST REDUCTION SURGERY Bilateral 2007    BREAST SURGERY  2007    reduction, Dr Bear Smith  10/20/15    DR. Jerry Billingsley Right     Dr Anil Fuentes, [address]  01/20/2020    Supriya Chacon, Dr Jacinta Seth

## 2023-05-30 ENCOUNTER — OFFICE VISIT (OUTPATIENT)
Dept: PAIN MANAGEMENT | Age: 48
End: 2023-05-30
Payer: COMMERCIAL

## 2023-05-30 VITALS
WEIGHT: 240 LBS | DIASTOLIC BLOOD PRESSURE: 82 MMHG | TEMPERATURE: 98.1 F | BODY MASS INDEX: 39.99 KG/M2 | SYSTOLIC BLOOD PRESSURE: 140 MMHG | HEIGHT: 65 IN

## 2023-05-30 DIAGNOSIS — M47.812 CERVICAL SPONDYLOSIS WITHOUT MYELOPATHY: ICD-10-CM

## 2023-05-30 DIAGNOSIS — G56.03 BILATERAL CARPAL TUNNEL SYNDROME: Primary | ICD-10-CM

## 2023-05-30 PROCEDURE — G8427 DOCREV CUR MEDS BY ELIG CLIN: HCPCS | Performed by: NURSE PRACTITIONER

## 2023-05-30 PROCEDURE — 1036F TOBACCO NON-USER: CPT | Performed by: NURSE PRACTITIONER

## 2023-05-30 PROCEDURE — 3077F SYST BP >= 140 MM HG: CPT | Performed by: NURSE PRACTITIONER

## 2023-05-30 PROCEDURE — 3079F DIAST BP 80-89 MM HG: CPT | Performed by: NURSE PRACTITIONER

## 2023-05-30 PROCEDURE — 99213 OFFICE O/P EST LOW 20 MIN: CPT | Performed by: NURSE PRACTITIONER

## 2023-05-30 PROCEDURE — G8417 CALC BMI ABV UP PARAM F/U: HCPCS | Performed by: NURSE PRACTITIONER

## 2023-05-30 ASSESSMENT — ENCOUNTER SYMPTOMS
BACK PAIN: 1
ABDOMINAL PAIN: 0
SORE THROAT: 0
SHORTNESS OF BREATH: 0

## 2023-05-30 NOTE — PROGRESS NOTES
Gagandeep Keller  (1975)    2023    Subjective:     Gagandeep Keller is 52 y.o. female who complains today of:    Chief Complaint   Patient presents with    Neck Pain     Physical therapy has helped a lot          Allergies:  Patient has no known allergies. Past Medical History:   Diagnosis Date    Abnormal Pap smear of cervix     Allergic rhinitis     several allergens    Asthma     since shildhood    Bariatric surgery status 2020    Dr Maikel Gao     Brachial neuralgia 2014    Chronic left shoulder pain     COPD (chronic obstructive pulmonary disease) ([facility] [address]     Dr Michela Garcia    Depression     H/O colonoscopy     Dr. Kandace Muniz    Hyperlipidemia     Hypertension     Migraine headache     Obesity, morbid (more than 100 lbs over ideal weight or BMI > 40) ([facility])     SEJAL on CPAP 10/2018    Sleep apnea      Past Surgical History:   Procedure Laterality Date    BREAST REDUCTION SURGERY Bilateral 2007    BREAST SURGERY  2007    reduction, Dr Ilda Goldmann  10/20/15    DR. Azael Niño Right     Dr Eliseo Cordova    SLEEVE GASTRECTOMY  2020    Dr Nancy Gao History   Problem Relation Age of Onset    Osteoarthritis Mother     Other Mother         hyperlipidemia    Hypertension Mother     High Cholesterol Mother     Arthritis Mother     Heart Disease Mother     Diabetes Mother     No Known Problems Father      Social History     Socioeconomic History    Marital status: Single     Spouse name: Not on file    Number of children: 2    Years of education: Not on file    Highest education level: Not on file   Occupational History    Occupation: dog grooming business, self employed   Tobacco Use    Smoking status: Former     Packs/day: 0.50     Types: Cigarettes     Quit date: 2018     Years since quittin.8    Smokeless tobacco: Never   Vaping Use    Vaping Use: Never used

## 2023-05-31 ENCOUNTER — TELEPHONE (OUTPATIENT)
Dept: PAIN MANAGEMENT | Age: 48
End: 2023-05-31

## 2023-05-31 NOTE — TELEPHONE ENCOUNTER
RISA CTS INJ    NO AUTH REQUIRED    OK to schedule procedure approved as above. Please note sides/levels approved and date range.    (If applicable, sides/levels approved may differ from those ordered)    TO BE SCHEDULED WITH DR Javi Frederick

## 2023-06-19 ENCOUNTER — PROCEDURE VISIT (OUTPATIENT)
Dept: PAIN MANAGEMENT | Age: 48
End: 2023-06-19
Payer: COMMERCIAL

## 2023-06-19 ENCOUNTER — OFFICE VISIT (OUTPATIENT)
Dept: PODIATRY | Age: 48
End: 2023-06-19
Payer: COMMERCIAL

## 2023-06-19 VITALS — BODY MASS INDEX: 40.97 KG/M2 | WEIGHT: 240 LBS | TEMPERATURE: 97.1 F | HEIGHT: 64 IN

## 2023-06-19 DIAGNOSIS — M20.12 HALLUX ABDUCTO VALGUS, BILATERAL: ICD-10-CM

## 2023-06-19 DIAGNOSIS — M54.16 LUMBAR RADICULOPATHY: ICD-10-CM

## 2023-06-19 DIAGNOSIS — L84 CALLUS: ICD-10-CM

## 2023-06-19 DIAGNOSIS — M20.11 HALLUX ABDUCTO VALGUS, BILATERAL: ICD-10-CM

## 2023-06-19 DIAGNOSIS — G63 POLYNEUROPATHY IN OTHER DISEASES CLASSIFIED ELSEWHERE (HCC): ICD-10-CM

## 2023-06-19 DIAGNOSIS — Q66.51 CONGENITAL PES PLANUS OF BOTH FEET: ICD-10-CM

## 2023-06-19 DIAGNOSIS — M20.42 HAMMERTOE, BILATERAL: ICD-10-CM

## 2023-06-19 DIAGNOSIS — M19.071 PRIMARY OSTEOARTHRITIS OF BOTH FEET: ICD-10-CM

## 2023-06-19 DIAGNOSIS — M20.41 HAMMERTOE, BILATERAL: ICD-10-CM

## 2023-06-19 DIAGNOSIS — M79.672 PAIN IN BOTH FEET: Primary | ICD-10-CM

## 2023-06-19 DIAGNOSIS — Q66.52 CONGENITAL PES PLANUS OF BOTH FEET: ICD-10-CM

## 2023-06-19 DIAGNOSIS — M79.671 PAIN IN BOTH FEET: Primary | ICD-10-CM

## 2023-06-19 DIAGNOSIS — M19.072 PRIMARY OSTEOARTHRITIS OF BOTH FEET: ICD-10-CM

## 2023-06-19 DIAGNOSIS — M21.621 TAILOR'S BUNIONETTE, BILATERAL: ICD-10-CM

## 2023-06-19 DIAGNOSIS — G56.03 BILATERAL CARPAL TUNNEL SYNDROME: Primary | ICD-10-CM

## 2023-06-19 DIAGNOSIS — M21.622 TAILOR'S BUNIONETTE, BILATERAL: ICD-10-CM

## 2023-06-19 PROCEDURE — 1036F TOBACCO NON-USER: CPT | Performed by: PODIATRIST

## 2023-06-19 PROCEDURE — 20526 THER INJECTION CARP TUNNEL: CPT | Performed by: PHYSICAL MEDICINE & REHABILITATION

## 2023-06-19 PROCEDURE — G8417 CALC BMI ABV UP PARAM F/U: HCPCS | Performed by: PODIATRIST

## 2023-06-19 PROCEDURE — 76942 ECHO GUIDE FOR BIOPSY: CPT | Performed by: PHYSICAL MEDICINE & REHABILITATION

## 2023-06-19 PROCEDURE — G8427 DOCREV CUR MEDS BY ELIG CLIN: HCPCS | Performed by: PODIATRIST

## 2023-06-19 PROCEDURE — 99213 OFFICE O/P EST LOW 20 MIN: CPT | Performed by: PODIATRIST

## 2023-06-19 PROCEDURE — 11056 PARNG/CUTG B9 HYPRKR LES 2-4: CPT | Performed by: PODIATRIST

## 2023-06-19 RX ORDER — SODIUM CHLORIDE 9 MG/ML
8 INJECTION INTRAVENOUS ONCE
Status: COMPLETED | OUTPATIENT
Start: 2023-06-19 | End: 2023-06-19

## 2023-06-19 RX ORDER — LIDOCAINE HYDROCHLORIDE 10 MG/ML
5 INJECTION, SOLUTION INFILTRATION; PERINEURAL ONCE
Status: COMPLETED | OUTPATIENT
Start: 2023-06-19 | End: 2023-06-19

## 2023-06-19 RX ORDER — BETAMETHASONE SODIUM PHOSPHATE AND BETAMETHASONE ACETATE 3; 3 MG/ML; MG/ML
6 INJECTION, SUSPENSION INTRA-ARTICULAR; INTRALESIONAL; INTRAMUSCULAR; SOFT TISSUE ONCE
Status: COMPLETED | OUTPATIENT
Start: 2023-06-19 | End: 2023-06-19

## 2023-06-19 RX ADMIN — BETAMETHASONE SODIUM PHOSPHATE AND BETAMETHASONE ACETATE 6 MG: 3; 3 INJECTION, SUSPENSION INTRA-ARTICULAR; INTRALESIONAL; INTRAMUSCULAR; SOFT TISSUE at 12:32

## 2023-06-19 RX ADMIN — SODIUM CHLORIDE 8 ML: 9 INJECTION INTRAVENOUS at 12:30

## 2023-06-19 RX ADMIN — LIDOCAINE HYDROCHLORIDE 5 ML: 10 INJECTION, SOLUTION INFILTRATION; PERINEURAL at 12:31

## 2023-06-19 ASSESSMENT — ENCOUNTER SYMPTOMS
BACK PAIN: 1
VOMITING: 0
NAUSEA: 0
SHORTNESS OF BREATH: 0

## 2023-06-19 NOTE — PROGRESS NOTES
This procedure was 50% more difficult and required 50% more work secondary to the patient's habitus. The patient has a BMI of 41.2. This required increased work for safe and proper positioning upon the fluoroscopy table, increased needle passes for safe and appropriate needle placement, and increased fluoroscopy time and radiation exposure for proper visualization.

## 2023-06-19 NOTE — PROGRESS NOTES
Carpal Tunnel Corticosteroid Injection under Ultrasound Guidance    Patient Name: Tisha Miller   : 1975  Date: 2023    Provider: Mitesh Roque MD        PROCEDURE: Bilateral carpal tunnel corticosteroid injection under ultrasound guidance    INDICATIONS: Discussed the risks of the steroid injection procedure includes but is not limited to bleeding, infection, local skin irritation, skin atrophy, calcification, skin color and pigmentation changes, worsened pain and irritation, burning, damage to surrounding structures, side effects, toxicity, allergic reactions to medications used, immune and stress-response dysfunction, fat necrosis, decreased bone mineralization, increased fracture risk, blood sugar elevation, need for surgery, premature damage or degeneration of the nearby joints, as well as catastrophic injury such as vision loss, paralysis, stroke, loss of use of the wrist and use of the hand, and death. Discussed the risks, benefits, alternative procedures, and alternatives to the procedure including no procedure at all. Discussed that we cannot undo any permanent neurologic or orthopaedic damage or change the course of any underlying disease. After thorough discussion, patient expressed complete understanding and willingness to proceed. Description of Procedure: The sites were marked. A time-out was performed. Ultrasound was used to visualize the pertinent anatomy and identify any critical neurovascular structures. The sites were then prepped in a sterile manner with Betadine three times and alcohol. Attention was turned to the left wrist.  Then a 27-gauge 1.5 inch needle was advanced under direct ultrasound guidance up to the median nerve. Needle is advanced in an out-of-plane transverse approach. Injection of medicine was used for hydrodissection around the ulnar and radial aspect of the nerve with fair success.  The needle was advanced further in a in plane longitudinal approach

## 2023-06-19 NOTE — PROGRESS NOTES
Comments: No open lesions noted bilaterally, callus formation noted at the medial nail groove of the bilateral second toe, there are no signs of tinea pedis noted. .  Normal skin turgor noted bilaterally. Xerotic skin texture noted bilaterally, diffuse hyperkeratotic lesion noted to the the bilateral hallux IPJ, bilateral first BJ, and the bilateral heel. Nail plates are well groomed bilaterally. Neurological:      Mental Status: She is alert and oriented to person, place, and time. Deep Tendon Reflexes: Babinski sign absent on the right side. Babinski sign absent on the left side. Reflex Scores:       Patellar reflexes are 2+ on the right side and 2+ on the left side. Achilles reflexes are 2+ on the right side and 2+ on the left side. Comments: No ankle clonus noted bilaterally. Sharp versus dull discrimination is intact bilaterally. No radiating pain elicited with percussion of cutaneous nerves bilateral lower extremity. Diminished vibratory sensation noted bilaterally. Psychiatric:         Mood and Affect: Mood normal.         Behavior: Behavior normal.       Assessment:      Diagnosis Orders   1. Pain in both feet  TN PARING/CUTTING BENIGN HYPERKERATOTIC LESION 2-4      2. Polyneuropathy in other diseases classified elsewhere ([facility] [address]  TN PARING/CUTTING BENIGN HYPERKERATOTIC LESION 2-4      3. Lumbar radiculopathy  TN PARING/CUTTING BENIGN HYPERKERATOTIC LESION 2-4      4. Primary osteoarthritis of both feet        5. Hallux abducto valgus, bilateral        6. Tailor's bunionette, bilateral        7. Hammertoe, bilateral        8. Congenital pes planus of both feet        9. Callus  TN PARING/CUTTING BENIGN HYPERKERATOTIC LESION 2-4          Plan:     Painful foot deformities: I have recommended continued use of the orthotic appliances since there has been such marked reduction in their symptomatology over the relatively short time of their use.   Continue with all other

## 2023-07-03 ENCOUNTER — OFFICE VISIT (OUTPATIENT)
Dept: FAMILY MEDICINE CLINIC | Age: 48
End: 2023-07-03
Payer: COMMERCIAL

## 2023-07-03 VITALS
HEIGHT: 64 IN | OXYGEN SATURATION: 98 % | SYSTOLIC BLOOD PRESSURE: 115 MMHG | WEIGHT: 240 LBS | BODY MASS INDEX: 40.97 KG/M2 | HEART RATE: 58 BPM | DIASTOLIC BLOOD PRESSURE: 82 MMHG

## 2023-07-03 DIAGNOSIS — R60.9 PERIPHERAL EDEMA: ICD-10-CM

## 2023-07-03 DIAGNOSIS — R60.9 PERIPHERAL EDEMA: Primary | ICD-10-CM

## 2023-07-03 LAB
ALBUMIN SERPL-MCNC: 3.8 G/DL (ref 3.5–4.6)
ALP SERPL-CCNC: 60 U/L (ref 40–130)
ALT SERPL-CCNC: 15 U/L (ref 0–33)
ANION GAP SERPL CALCULATED.3IONS-SCNC: 13 MEQ/L (ref 9–15)
AST SERPL-CCNC: 9 U/L (ref 0–35)
BASOPHILS # BLD: 0.1 K/UL (ref 0–0.2)
BASOPHILS NFR BLD: 1 %
BILIRUB SERPL-MCNC: 0.3 MG/DL (ref 0.2–0.7)
BUN SERPL-MCNC: 15 MG/DL (ref 6–20)
CALCIUM SERPL-MCNC: 8.9 MG/DL (ref 8.5–9.9)
CHLORIDE SERPL-SCNC: 105 MEQ/L (ref 95–107)
CO2 SERPL-SCNC: 25 MEQ/L (ref 20–31)
CREAT SERPL-MCNC: 0.72 MG/DL (ref 0.5–0.9)
EOSINOPHIL # BLD: 0.2 K/UL (ref 0–0.7)
EOSINOPHIL NFR BLD: 3.2 %
ERYTHROCYTE [DISTWIDTH] IN BLOOD BY AUTOMATED COUNT: 13.1 % (ref 11.5–14.5)
ERYTHROCYTE [SEDIMENTATION RATE] IN BLOOD BY WESTERGREN METHOD: 9 MM (ref 0–20)
GLOBULIN SER CALC-MCNC: 2.3 G/DL (ref 2.3–3.5)
GLUCOSE SERPL-MCNC: 79 MG/DL (ref 70–99)
HCT VFR BLD AUTO: 37.2 % (ref 37–47)
HGB BLD-MCNC: 12.3 G/DL (ref 12–16)
LYMPHOCYTES # BLD: 1.6 K/UL (ref 1–4.8)
LYMPHOCYTES NFR BLD: 22.6 %
MCH RBC QN AUTO: 30.2 PG (ref 27–31.3)
MCHC RBC AUTO-ENTMCNC: 33 % (ref 33–37)
MCV RBC AUTO: 91.5 FL (ref 79.4–94.8)
MONOCYTES # BLD: 0.6 K/UL (ref 0.2–0.8)
MONOCYTES NFR BLD: 7.8 %
NEUTROPHILS # BLD: 4.8 K/UL (ref 1.4–6.5)
NEUTS SEG NFR BLD: 65.4 %
PLATELET # BLD AUTO: 280 K/UL (ref 130–400)
POTASSIUM SERPL-SCNC: 4.4 MEQ/L (ref 3.4–4.9)
PROT SERPL-MCNC: 6.1 G/DL (ref 6.3–8)
RBC # BLD AUTO: 4.07 M/UL (ref 4.2–5.4)
SODIUM SERPL-SCNC: 143 MEQ/L (ref 135–144)
WBC # BLD AUTO: 7.3 K/UL (ref 4.8–10.8)

## 2023-07-03 PROCEDURE — 3074F SYST BP LT 130 MM HG: CPT | Performed by: NURSE PRACTITIONER

## 2023-07-03 PROCEDURE — G8427 DOCREV CUR MEDS BY ELIG CLIN: HCPCS | Performed by: NURSE PRACTITIONER

## 2023-07-03 PROCEDURE — 99213 OFFICE O/P EST LOW 20 MIN: CPT | Performed by: NURSE PRACTITIONER

## 2023-07-03 PROCEDURE — G8417 CALC BMI ABV UP PARAM F/U: HCPCS | Performed by: NURSE PRACTITIONER

## 2023-07-03 PROCEDURE — 3078F DIAST BP <80 MM HG: CPT | Performed by: NURSE PRACTITIONER

## 2023-07-03 PROCEDURE — 1036F TOBACCO NON-USER: CPT | Performed by: NURSE PRACTITIONER

## 2023-07-03 NOTE — PROGRESS NOTES
suggest follow up with PCP  Orders Placed This Encounter   Procedures    CBC with Auto Differential     Standing Status:   Future     Number of Occurrences:   1     Standing Expiration Date:   7/3/2024    Comprehensive Metabolic Panel     Standing Status:   Future     Number of Occurrences:   1     Standing Expiration Date:   7/3/2024    Sedimentation Rate     Standing Status:   Future     Number of Occurrences:   1     Standing Expiration Date:   7/3/2024    SHYANNE Screen With Reflex     Standing Status:   Future     Number of Occurrences:   1     Standing Expiration Date:   7/3/2024     Orders Placed This Encounter   Medications    Compression Stockings MISC     Sig: by Does not apply route     Dispense:  1 each     Refill:  0     There are no discontinued medications. Return for worsening of condition, if symptoms do not improve in 3-5 days. Reviewed with the patient/family: current clinical status & medications. Side effects of the medication prescribed today, as well as treatment plan/rationale and result expectations have been discussed with the patient/family who expresses understanding. Patient will be discharged home in stable condition. Follow up with PCP to evaluate treatment results or return if symptoms worsen or fail to improve. Discussed signs and symptoms which require immediate follow-up in ED/call to 911. Understanding verbalized. I have reviewed the patient's medical history in detail and updated the computerized patient record.     Oneyda Villegas, APRN - CNP

## 2023-07-06 ASSESSMENT — ENCOUNTER SYMPTOMS
NAUSEA: 0
CHEST TIGHTNESS: 0
COUGH: 0
VOMITING: 0
SHORTNESS OF BREATH: 0

## 2023-07-07 LAB — NUCLEAR IGG SER QL IA: NORMAL

## 2023-07-11 DIAGNOSIS — J44.9 CHRONIC OBSTRUCTIVE PULMONARY DISEASE, UNSPECIFIED COPD TYPE (HCC): ICD-10-CM

## 2023-07-11 DIAGNOSIS — J45.40 MODERATE PERSISTENT ASTHMA, UNSPECIFIED WHETHER COMPLICATED: ICD-10-CM

## 2023-07-11 RX ORDER — LORATADINE 10 MG/1
TABLET ORAL
Qty: 90 TABLET | Refills: 0 | Status: SHIPPED | OUTPATIENT
Start: 2023-07-11

## 2023-07-11 RX ORDER — MULTIVITAMIN
TABLET ORAL
Qty: 90 TABLET | Refills: 0 | Status: SHIPPED | OUTPATIENT
Start: 2023-07-11

## 2023-07-11 RX ORDER — LOSARTAN POTASSIUM 50 MG/1
TABLET ORAL
Qty: 90 TABLET | Refills: 0 | Status: SHIPPED | OUTPATIENT
Start: 2023-07-11

## 2023-07-11 RX ORDER — ROSUVASTATIN CALCIUM 10 MG/1
TABLET, COATED ORAL
Qty: 90 TABLET | Refills: 0 | Status: SHIPPED | OUTPATIENT
Start: 2023-07-11

## 2023-07-11 RX ORDER — ALBUTEROL SULFATE 90 UG/1
AEROSOL, METERED RESPIRATORY (INHALATION)
Qty: 18 G | Refills: 2 | Status: SHIPPED | OUTPATIENT
Start: 2023-07-11

## 2023-07-11 NOTE — TELEPHONE ENCOUNTER
Rx requested:  Requested Prescriptions     Pending Prescriptions Disp Refills    albuterol sulfate HFA (VENTOLIN HFA) 108 (90 Base) MCG/ACT inhaler [Pharmacy Med Name: Ventolin HFA 90 mcg/actuation aerosol inhaler] 18 g 2     Sig: INHALE 2 PUFFS into the lungs EVERY 6 HOURS AS NEEDED FOR WHEEZING or SHORTNESS OF BREATH       Last Office Visit:   5/23/2023      Next Visit Date:  Future Appointments   Date Time Provider [address]   7/19/2023  8:20 AM NGUYỄN MAMMO ROOM 3 [facility] NATTY [facility] Fac RAD   7/19/2023  9:00 AM DIRK Moore - ANNETTA CELAYA PM [facility] EMERGENCY [facility] AT [place]   9/25/2023  8:30 AM Oumou Smart DPM MLOX OP POD [facility]   9/26/2023  3:15 PM [id number] Yovana Weeks MD [facility]

## 2023-07-18 ENCOUNTER — HOSPITAL ENCOUNTER (OUTPATIENT)
Dept: CT IMAGING | Age: 48
Discharge: HOME | End: 2023-07-20
Payer: COMMERCIAL

## 2023-07-18 ENCOUNTER — OFFICE VISIT (OUTPATIENT)
Dept: FAMILY MEDICINE CLINIC | Age: 48
End: 2023-07-18
Payer: COMMERCIAL

## 2023-07-18 VITALS
HEIGHT: 64 IN | BODY MASS INDEX: 41.83 KG/M2 | TEMPERATURE: 97.3 F | WEIGHT: 245 LBS | RESPIRATION RATE: 16 BRPM | DIASTOLIC BLOOD PRESSURE: 80 MMHG | OXYGEN SATURATION: 97 % | SYSTOLIC BLOOD PRESSURE: 130 MMHG | HEART RATE: 86 BPM

## 2023-07-18 DIAGNOSIS — R10.32 ABDOMINAL PAIN, LEFT LOWER QUADRANT: ICD-10-CM

## 2023-07-18 DIAGNOSIS — R19.7 VOMITING AND DIARRHEA: ICD-10-CM

## 2023-07-18 DIAGNOSIS — R11.10 VOMITING AND DIARRHEA: ICD-10-CM

## 2023-07-18 DIAGNOSIS — E86.0 MILD DEHYDRATION: ICD-10-CM

## 2023-07-18 DIAGNOSIS — R19.7 VOMITING AND DIARRHEA: Primary | ICD-10-CM

## 2023-07-18 DIAGNOSIS — R11.10 VOMITING AND DIARRHEA: Primary | ICD-10-CM

## 2023-07-18 PROBLEM — M70.62 TROCHANTERIC BURSITIS OF LEFT HIP: Status: ACTIVE | Noted: 2023-05-05

## 2023-07-18 LAB
ALBUMIN SERPL-MCNC: 3.9 G/DL (ref 3.5–4.6)
ALP SERPL-CCNC: 65 U/L (ref 40–130)
ALT SERPL-CCNC: 13 U/L (ref 0–33)
ANION GAP SERPL CALCULATED.3IONS-SCNC: 8 MEQ/L (ref 9–15)
AST SERPL-CCNC: 9 U/L (ref 0–35)
BASOPHILS # BLD: 0 K/UL (ref 0–0.2)
BASOPHILS NFR BLD: 0.8 %
BILIRUB SERPL-MCNC: 0.3 MG/DL (ref 0.2–0.7)
BILIRUBIN, POC: NORMAL
BLOOD URINE, POC: NORMAL
BUN SERPL-MCNC: 13 MG/DL (ref 6–20)
CALCIUM SERPL-MCNC: 8.5 MG/DL (ref 8.5–9.9)
CHLORIDE SERPL-SCNC: 103 MEQ/L (ref 95–107)
CLARITY, POC: CLEAR
CO2 SERPL-SCNC: 26 MEQ/L (ref 20–31)
COLOR, POC: YELLOW
CREAT SERPL-MCNC: 0.58 MG/DL (ref 0.5–0.9)
EOSINOPHIL # BLD: 0.2 K/UL (ref 0–0.7)
EOSINOPHIL NFR BLD: 3.7 %
ERYTHROCYTE [DISTWIDTH] IN BLOOD BY AUTOMATED COUNT: 13.3 % (ref 11.5–14.5)
GLOBULIN SER CALC-MCNC: 2.7 G/DL (ref 2.3–3.5)
GLUCOSE SERPL-MCNC: 97 MG/DL (ref 70–99)
GLUCOSE URINE, POC: NORMAL
HCT VFR BLD AUTO: 35.9 % (ref 37–47)
HGB BLD-MCNC: 12.3 G/DL (ref 12–16)
KETONES, POC: NORMAL
LEUKOCYTE EST, POC: NORMAL
LYMPHOCYTES # BLD: 1.3 K/UL (ref 1–4.8)
LYMPHOCYTES NFR BLD: 28.9 %
MCH RBC QN AUTO: 30.7 PG (ref 27–31.3)
MCHC RBC AUTO-ENTMCNC: 34.1 % (ref 33–37)
MCV RBC AUTO: 90 FL (ref 79.4–94.8)
MONOCYTES # BLD: 0.5 K/UL (ref 0.2–0.8)
MONOCYTES NFR BLD: 11.7 %
NEUTROPHILS # BLD: 2.4 K/UL (ref 1.4–6.5)
NEUTS SEG NFR BLD: 54.9 %
NITRITE, POC: NORMAL
PH, POC: 6
PLATELET # BLD AUTO: 290 K/UL (ref 130–400)
POTASSIUM SERPL-SCNC: 3.7 MEQ/L (ref 3.4–4.9)
PROT SERPL-MCNC: 6.6 G/DL (ref 6.3–8)
PROTEIN, POC: 30
RBC # BLD AUTO: 3.99 M/UL (ref 4.2–5.4)
SODIUM SERPL-SCNC: 137 MEQ/L (ref 135–144)
SPECIFIC GRAVITY, POC: 1.02
UROBILINOGEN, POC: 0.2
WBC # BLD AUTO: 4.4 K/UL (ref 4.8–10.8)

## 2023-07-18 PROCEDURE — 3079F DIAST BP 80-89 MM HG: CPT | Performed by: FAMILY MEDICINE

## 2023-07-18 PROCEDURE — 3075F SYST BP GE 130 - 139MM HG: CPT | Performed by: FAMILY MEDICINE

## 2023-07-18 PROCEDURE — 74176 CT ABD & PELVIS W/O CONTRAST: CPT

## 2023-07-18 PROCEDURE — 99214 OFFICE O/P EST MOD 30 MIN: CPT | Performed by: FAMILY MEDICINE

## 2023-07-18 PROCEDURE — G8427 DOCREV CUR MEDS BY ELIG CLIN: HCPCS | Performed by: FAMILY MEDICINE

## 2023-07-18 PROCEDURE — G8417 CALC BMI ABV UP PARAM F/U: HCPCS | Performed by: FAMILY MEDICINE

## 2023-07-18 PROCEDURE — 81002 URINALYSIS NONAUTO W/O SCOPE: CPT | Performed by: FAMILY MEDICINE

## 2023-07-18 PROCEDURE — 1036F TOBACCO NON-USER: CPT | Performed by: FAMILY MEDICINE

## 2023-07-18 RX ORDER — ONDANSETRON 4 MG/1
TABLET, FILM COATED ORAL
COMMUNITY
Start: 2023-07-13

## 2023-07-18 RX ORDER — FLUOXETINE 10 MG/1
10 CAPSULE ORAL DAILY
COMMUNITY
Start: 2023-07-13

## 2023-07-18 RX ORDER — ARIPIPRAZOLE 5 MG/1
5 TABLET ORAL NIGHTLY
COMMUNITY
Start: 2023-07-13

## 2023-07-18 ASSESSMENT — ENCOUNTER SYMPTOMS
NAUSEA: 1
SORE THROAT: 0
ABDOMINAL PAIN: 1
DIARRHEA: 1

## 2023-07-18 NOTE — PROGRESS NOTES
Subjective:      Patient ID: Mayank Bae is a 50 y.o. female    Nausea & Vomiting  This is a new problem. The current episode started in the past 7 days. Associated symptoms include abdominal pain and nausea. Pertinent negatives include no chills, rash or sore throat. Diarrhea   Associated symptoms include abdominal pain. Pertinent negatives include no chills. Here in follow up from urgent care last Thursday. Emesis and diarrhea started 2 days prior-has had some abdominal pain as well intermittently. No blood in urine-looks dark. Has noted some dysuria intermittently as well. .   No fever. No sore throat. Given zofran which has been helpful. Diarrhea yesterday. Review of Systems   Constitutional:  Positive for appetite change. Negative for chills. HENT:  Negative for sore throat. Gastrointestinal:  Positive for abdominal pain, diarrhea and nausea. Skin:  Negative for rash. Reviewed allergy, medical, social, surgical, family and med list changes and updated   Files     Social History     Socioeconomic History    Marital status: Single    Number of children: 2   Occupational History    Occupation: dog grooming business, self employed   Tobacco Use    Smoking status: Former     Packs/day: 0.50     Types: Cigarettes     Quit date: 2018     Years since quittin.0    Smokeless tobacco: Never   Vaping Use    Vaping Use: Never used   Substance and Sexual Activity    Alcohol use:  Yes     Alcohol/week: 1.0 standard drink     Types: 1 Standard drinks or equivalent per week     Comment: socially    Drug use: Yes     Frequency: 1.0 times per week     Types: Marijuana Cece Foss)     Comment: marijuana, quit 2013    Sexual activity: Yes     Partners: Male     Birth control/protection: Condom   Social History Narrative    Born in [place], has 1 brother one sister    Never , was in a bad relationship    Lives with daughter age 16 (2017) in a house in [place]    2 daughters, one

## 2023-07-19 ENCOUNTER — HOSPITAL ENCOUNTER (OUTPATIENT)
Dept: WOMENS IMAGING | Age: 48
Discharge: HOME | End: 2023-07-21
Payer: COMMERCIAL

## 2023-07-19 ENCOUNTER — OFFICE VISIT (OUTPATIENT)
Dept: PAIN MANAGEMENT | Age: 48
End: 2023-07-19
Payer: COMMERCIAL

## 2023-07-19 VITALS
BODY MASS INDEX: 41.54 KG/M2 | HEART RATE: 75 BPM | SYSTOLIC BLOOD PRESSURE: 124 MMHG | DIASTOLIC BLOOD PRESSURE: 72 MMHG | WEIGHT: 242 LBS

## 2023-07-19 DIAGNOSIS — G56.03 BILATERAL CARPAL TUNNEL SYNDROME: Primary | ICD-10-CM

## 2023-07-19 DIAGNOSIS — R19.7 VOMITING AND DIARRHEA: ICD-10-CM

## 2023-07-19 DIAGNOSIS — Z12.31 BREAST CANCER SCREENING BY MAMMOGRAM: ICD-10-CM

## 2023-07-19 DIAGNOSIS — M46.1 BILATERAL SACROILIITIS (HCC): ICD-10-CM

## 2023-07-19 DIAGNOSIS — R11.10 VOMITING AND DIARRHEA: ICD-10-CM

## 2023-07-19 DIAGNOSIS — R10.32 ABDOMINAL PAIN, LEFT LOWER QUADRANT: ICD-10-CM

## 2023-07-19 LAB
ADV 40+41 DNA STL QL NAA+NON-PROBE: NOT DETECTED
C CAYETANENSIS DNA STL QL NAA+NON-PROBE: NOT DETECTED
C COLI+JEJ+UPSA DNA STL QL NAA+NON-PROBE: NOT DETECTED
CRYPTOSP DNA STL QL NAA+NON-PROBE: NOT DETECTED
E HISTOLYT DNA STL QL NAA+NON-PROBE: NOT DETECTED
EAEC PAA PLAS AGGR+AATA ST NAA+NON-PRB: NOT DETECTED
EC STX1+STX2 GENES STL QL NAA+NON-PROBE: NOT DETECTED
EPEC EAE GENE STL QL NAA+NON-PROBE: NOT DETECTED
ETEC LTA+ST1A+ST1B TOX ST NAA+NON-PROBE: NOT DETECTED
G LAMBLIA DNA STL QL NAA+NON-PROBE: NOT DETECTED
GI PATH DNA+RNA PNL STL NAA+NON-PROBE: NOT DETECTED
NOROVIRUS GI+II RNA STL QL NAA+NON-PROBE: NOT DETECTED
P SHIGELLOIDES DNA STL QL NAA+NON-PROBE: NOT DETECTED
RVA RNA STL QL NAA+NON-PROBE: NOT DETECTED
S ENT+BONG DNA STL QL NAA+NON-PROBE: NOT DETECTED
SAPO I+II+IV+V RNA STL QL NAA+NON-PROBE: NOT DETECTED
SHIGELLA SP+EIEC IPAH ST NAA+NON-PROBE: NOT DETECTED
V CHOL+PARA+VUL DNA STL QL NAA+NON-PROBE: NOT DETECTED
V CHOLERAE DNA STL QL NAA+NON-PROBE: NOT DETECTED
Y ENTEROCOL DNA STL QL NAA+NON-PROBE: NOT DETECTED

## 2023-07-19 PROCEDURE — G8417 CALC BMI ABV UP PARAM F/U: HCPCS | Performed by: NURSE PRACTITIONER

## 2023-07-19 PROCEDURE — 3078F DIAST BP <80 MM HG: CPT | Performed by: NURSE PRACTITIONER

## 2023-07-19 PROCEDURE — 1036F TOBACCO NON-USER: CPT | Performed by: NURSE PRACTITIONER

## 2023-07-19 PROCEDURE — G8427 DOCREV CUR MEDS BY ELIG CLIN: HCPCS | Performed by: NURSE PRACTITIONER

## 2023-07-19 PROCEDURE — 3074F SYST BP LT 130 MM HG: CPT | Performed by: NURSE PRACTITIONER

## 2023-07-19 PROCEDURE — 77063 BREAST TOMOSYNTHESIS BI: CPT

## 2023-07-19 PROCEDURE — 99214 OFFICE O/P EST MOD 30 MIN: CPT | Performed by: NURSE PRACTITIONER

## 2023-07-19 RX ORDER — MELOXICAM 7.5 MG/1
7.5 TABLET ORAL DAILY
Qty: 30 TABLET | Refills: 0 | Status: SHIPPED | OUTPATIENT
Start: 2023-07-19 | End: 2023-08-18

## 2023-07-19 ASSESSMENT — ENCOUNTER SYMPTOMS
DIARRHEA: 0
CONSTIPATION: 0
BACK PAIN: 1
RESPIRATORY NEGATIVE: 1

## 2023-07-19 NOTE — PROGRESS NOTES
injection. Anatomic model of pathology was shown. Risks and benefits of the procedure were discussed. All questions were answered and patient understands and agrees with the plan. Follow up:  Return in about 4 weeks (around 8/16/2023) for review meds and reassess pain.     Gina Tineo, APRN - CNP

## 2023-07-20 ENCOUNTER — TELEPHONE (OUTPATIENT)
Dept: PAIN MANAGEMENT | Age: 48
End: 2023-07-20

## 2023-07-20 LAB — BACTERIA UR CULT: NORMAL

## 2023-07-25 ENCOUNTER — OFFICE VISIT (OUTPATIENT)
Dept: FAMILY MEDICINE CLINIC | Age: 48
End: 2023-07-25
Payer: COMMERCIAL

## 2023-07-25 VITALS
SYSTOLIC BLOOD PRESSURE: 120 MMHG | HEIGHT: 64 IN | DIASTOLIC BLOOD PRESSURE: 82 MMHG | OXYGEN SATURATION: 98 % | BODY MASS INDEX: 41.32 KG/M2 | HEART RATE: 70 BPM | RESPIRATION RATE: 16 BRPM | WEIGHT: 242 LBS | TEMPERATURE: 97.6 F

## 2023-07-25 DIAGNOSIS — K52.9 ACUTE GASTROENTERITIS: Primary | ICD-10-CM

## 2023-07-25 PROCEDURE — G8417 CALC BMI ABV UP PARAM F/U: HCPCS | Performed by: FAMILY MEDICINE

## 2023-07-25 PROCEDURE — 3079F DIAST BP 80-89 MM HG: CPT | Performed by: FAMILY MEDICINE

## 2023-07-25 PROCEDURE — 3074F SYST BP LT 130 MM HG: CPT | Performed by: FAMILY MEDICINE

## 2023-07-25 PROCEDURE — G8427 DOCREV CUR MEDS BY ELIG CLIN: HCPCS | Performed by: FAMILY MEDICINE

## 2023-07-25 PROCEDURE — 99213 OFFICE O/P EST LOW 20 MIN: CPT | Performed by: FAMILY MEDICINE

## 2023-07-25 PROCEDURE — 1036F TOBACCO NON-USER: CPT | Performed by: FAMILY MEDICINE

## 2023-07-25 NOTE — PROGRESS NOTES
Subjective:      Patient ID: Jarod Courtney is a 50 y.o. female    HPI  Here in follow up for diarrhea and occasional emesis and testing. Stools have been normal over the last week. No emesis. No fever. Eating habits return to normal     Review of Systems   Constitutional:  Negative for chills. Skin:  Negative for rash. Neurological:  Negative for weakness. Reviewed allergy, medical, social, surgical, family and med list changes and updated   Files--reviewed ct abdomen-neg and stool cultures neg and blood work with mild leucopenia      Social History     Socioeconomic History    Marital status: Single    Number of children: 2   Occupational History    Occupation: [employer] business, self employed   Tobacco Use    Smoking status: Former     Packs/day: 0.50     Types: Cigarettes     Quit date: 2018     Years since quittin.0    Smokeless tobacco: Never   Vaping Use    Vaping Use: Never used   Substance and Sexual Activity    Alcohol use:  Yes     Alcohol/week: 1.0 standard drink     Types: 1 Standard drinks or equivalent per week     Comment: socially    Drug use: Yes     Frequency: 1.0 times per week     Types: Marijuana Arabella Larve)     Comment: marijuana, quit 2013    Sexual activity: Yes     Partners: Male     Birth control/protection: Condom   Social History Narrative    Born in [place], has 1 brother one sister    Never , was in a bad relationship    Lives with daughter age 16 (2017) in a house in [place]    2 daughters, one daughter with problems, one in college    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

## 2023-08-09 DIAGNOSIS — E55.9 VITAMIN D DEFICIENCY: ICD-10-CM

## 2023-08-09 RX ORDER — CHOLECALCIFEROL TAB 125 MCG (5000 UNIT) 125 MCG (5000 UT)
TAB
Qty: 30 TABLET | Refills: 0 | Status: SHIPPED | OUTPATIENT
Start: 2023-08-09

## 2023-08-09 NOTE — TELEPHONE ENCOUNTER
Rx requested:  Requested Prescriptions     Pending Prescriptions Disp Refills    NATURAL VITAMIN D-3 125 MCG (5000 UT) TABS tablet [Pharmacy Med Name: cholecalciferol (vitamin D3) 125 mcg (5,000 unit) tablet] 30 tablet 0     Sig: Take 1 tablet by mouth daily         Last Office Visit:   7/25/2023      Next Visit Date:  Future Appointments   Date Time Provider [address]   8/16/2023  8:30 AM DIRK Nolasco - CNP BELIA PM [facility] EMERGENCY [facility] [place]   9/19/2023  9:00 AM Doris Rouse MD [address] [place] [address]   9/25/2023  8:30 AM Jennifer Bennett DPM MLOX OP POD [facility] EMERGENCY [facility] AT [place]   9/26/2023  3:15 PM Chente Leslie MD [facility]

## 2023-08-10 DIAGNOSIS — I10 ESSENTIAL HYPERTENSION: Primary | ICD-10-CM

## 2023-08-10 DIAGNOSIS — E78.5 HYPERLIPIDEMIA WITH TARGET LDL LESS THAN 130: ICD-10-CM

## 2023-08-10 DIAGNOSIS — R06.02 SHORTNESS OF BREATH: ICD-10-CM

## 2023-08-10 DIAGNOSIS — E66.9 OBESITY, CLASS II, BMI 35-39.9: ICD-10-CM

## 2023-08-10 DIAGNOSIS — R07.9 CHEST PAIN, UNSPECIFIED TYPE: ICD-10-CM

## 2023-08-10 DIAGNOSIS — E66.9 OBESITY (BMI 30-39.9): ICD-10-CM

## 2023-08-21 ENCOUNTER — TELEPHONE (OUTPATIENT)
Dept: PAIN MANAGEMENT | Age: 48
End: 2023-08-21

## 2023-08-21 NOTE — TELEPHONE ENCOUNTER
BILAT SI JOINT INJECTION    AUTH APPROVED FROM 8/15/23-12/15/23    REFERRAL # [id number]  OK to schedule procedure approved as above. Please note sides/levels approved and date range. (If applicable, sides/levels approved may differ from those ordered)    TO BE SCHEDULED WITH DR. Mendel Eland

## 2023-08-25 ENCOUNTER — OFFICE VISIT (OUTPATIENT)
Dept: CARDIOLOGY CLINIC | Age: 48
End: 2023-08-25
Payer: COMMERCIAL

## 2023-08-25 VITALS
SYSTOLIC BLOOD PRESSURE: 128 MMHG | HEART RATE: 80 BPM | DIASTOLIC BLOOD PRESSURE: 74 MMHG | BODY MASS INDEX: 43.4 KG/M2 | OXYGEN SATURATION: 97 % | HEIGHT: 64 IN | WEIGHT: 254.2 LBS

## 2023-08-25 DIAGNOSIS — I10 HYPERTENSION, UNSPECIFIED TYPE: Primary | ICD-10-CM

## 2023-08-25 DIAGNOSIS — E78.5 DYSLIPIDEMIA: ICD-10-CM

## 2023-08-25 PROCEDURE — 99204 OFFICE O/P NEW MOD 45 MIN: CPT | Performed by: INTERNAL MEDICINE

## 2023-08-25 PROCEDURE — G8427 DOCREV CUR MEDS BY ELIG CLIN: HCPCS | Performed by: INTERNAL MEDICINE

## 2023-08-25 PROCEDURE — 1036F TOBACCO NON-USER: CPT | Performed by: INTERNAL MEDICINE

## 2023-08-25 PROCEDURE — 3078F DIAST BP <80 MM HG: CPT | Performed by: INTERNAL MEDICINE

## 2023-08-25 PROCEDURE — 3074F SYST BP LT 130 MM HG: CPT | Performed by: INTERNAL MEDICINE

## 2023-08-25 PROCEDURE — G8417 CALC BMI ABV UP PARAM F/U: HCPCS | Performed by: INTERNAL MEDICINE

## 2023-08-25 RX ORDER — FLUOXETINE HYDROCHLORIDE 20 MG/1
20 CAPSULE ORAL 2 TIMES DAILY
COMMUNITY
Start: 2023-08-05

## 2023-08-25 ASSESSMENT — ENCOUNTER SYMPTOMS
COUGH: 0
GASTROINTESTINAL NEGATIVE: 1
BLOOD IN STOOL: 0
SHORTNESS OF BREATH: 0
CHEST TIGHTNESS: 0
NAUSEA: 0
WHEEZING: 0
EYES NEGATIVE: 1
RESPIRATORY NEGATIVE: 1
STRIDOR: 0

## 2023-08-25 NOTE — PROGRESS NOTES
11/03/2021     Lab Results   Component Value Date    TRIG 306 (H) 04/03/2023    TRIG 180 (H) 07/12/2022    TRIG 169 (H) 11/03/2021     Lab Results   Component Value Date    HDL 43 04/03/2023    HDL 51 07/12/2022    HDL 42 11/03/2021     Lab Results   Component Value Date    LDLCALC 80 04/03/2023    LDLCALC 98 07/12/2022    LDLCALC 102 11/03/2021     No results found for: LABVLDL, VLDL  No results found for: CHOLHDLRATIO  CMP:    Lab Results   Component Value Date/Time     07/18/2023 10:12 AM    K 3.7 07/18/2023 10:12 AM     07/18/2023 10:12 AM    CO2 26 07/18/2023 10:12 AM    BUN 13 07/18/2023 10:12 AM    CREATININE 0.58 07/18/2023 10:12 AM    GFRAA >60.0 07/12/2022 10:52 AM    AGRATIO 1.4 03/01/2019 01:50 AM    LABGLOM >60.0 07/18/2023 10:12 AM    GLUCOSE 97 07/18/2023 10:12 AM    GLUCOSE 159 03/01/2019 01:50 AM    PROT 6.6 07/18/2023 10:12 AM    LABALBU 3.9 07/18/2023 10:12 AM    LABALBU 4.1 03/01/2019 01:50 AM    CALCIUM 8.5 07/18/2023 10:12 AM    BILITOT 0.3 07/18/2023 10:12 AM    ALKPHOS 65 07/18/2023 10:12 AM    AST 9 07/18/2023 10:12 AM    ALT 13 07/18/2023 10:12 AM     BMP:    Lab Results   Component Value Date/Time     07/18/2023 10:12 AM    K 3.7 07/18/2023 10:12 AM     07/18/2023 10:12 AM    CO2 26 07/18/2023 10:12 AM    BUN 13 07/18/2023 10:12 AM    LABALBU 3.9 07/18/2023 10:12 AM    LABALBU 4.1 03/01/2019 01:50 AM    CREATININE 0.58 07/18/2023 10:12 AM    CALCIUM 8.5 07/18/2023 10:12 AM    GFRAA >60.0 07/12/2022 10:52 AM    LABGLOM >60.0 07/18/2023 10:12 AM    GLUCOSE 97 07/18/2023 10:12 AM    GLUCOSE 159 03/01/2019 01:50 AM     Magnesium:  No results found for: MG  TSH:  Lab Results   Component Value Date    TSH 1.620 01/12/2017       Patient Active Problem List   Diagnosis    History of sleep apnea    Obesity, morbid (more than 100 lbs over ideal weight or BMI > 40) (HCC)    Keratosis pilaris    Bilateral bunions    Chronic left shoulder pain    Brachial neuralgia    Migraine

## 2023-09-05 ENCOUNTER — PROCEDURE VISIT (OUTPATIENT)
Dept: PAIN MANAGEMENT | Age: 48
End: 2023-09-05
Payer: COMMERCIAL

## 2023-09-05 DIAGNOSIS — M46.1 SACROILIITIS (HCC): ICD-10-CM

## 2023-09-05 DIAGNOSIS — M99.04 SOMATIC DYSFUNCTION OF SACROILIAC JOINT: Primary | ICD-10-CM

## 2023-09-05 PROCEDURE — 27096 INJECT SACROILIAC JOINT: CPT | Performed by: PHYSICAL MEDICINE & REHABILITATION

## 2023-09-05 RX ORDER — BETAMETHASONE SODIUM PHOSPHATE AND BETAMETHASONE ACETATE 3; 3 MG/ML; MG/ML
6 INJECTION, SUSPENSION INTRA-ARTICULAR; INTRALESIONAL; INTRAMUSCULAR; SOFT TISSUE ONCE
Status: COMPLETED | OUTPATIENT
Start: 2023-09-05 | End: 2023-09-05

## 2023-09-05 RX ORDER — LIDOCAINE HYDROCHLORIDE 10 MG/ML
5 INJECTION, SOLUTION INFILTRATION; PERINEURAL ONCE
Status: COMPLETED | OUTPATIENT
Start: 2023-09-05 | End: 2023-09-05

## 2023-09-05 RX ADMIN — BETAMETHASONE SODIUM PHOSPHATE AND BETAMETHASONE ACETATE 6 MG: 3; 3 INJECTION, SUSPENSION INTRA-ARTICULAR; INTRALESIONAL; INTRAMUSCULAR; SOFT TISSUE at 16:46

## 2023-09-05 RX ADMIN — LIDOCAINE HYDROCHLORIDE 5 ML: 10 INJECTION, SOLUTION INFILTRATION; PERINEURAL at 16:53

## 2023-09-05 RX ADMIN — Medication 0.5 MEQ: at 16:47

## 2023-09-05 NOTE — PROGRESS NOTES
This procedure was 50% more difficult and required 50% more work secondary to the patient's habitus. The patient has a BMI of 43.6. This required increased work for safe and proper positioning upon the fluoroscopy table, increased needle passes for safe and appropriate needle placement, and increased fluoroscopy time and radiation exposure for proper visualization.
JOINT:  Bilateral  A 27 gauge 1.5 inch needle was used to anesthetize the skin and subcutaneous tissue with 1 mL of 1% preservative-free lidocaine and sodium bicarbonate. Then a 25 gauge 3.5 inch spinal needle was advanced to the sacroiliac joints. Oblique and lateral views were obtained. Negative aspiration was achieved. In total, approximately 1 mL of 6 mg of Betamethasone and 2 mL of 1% preservative free Lidocaine was injected without difficulty and divided equally between both sides. Patient tolerated the procedure well, no obvious complications occurred during the procedure. Patient was appropriately monitored and discharged home in stable condition with their usual motor strength. Postoperative instructions were provided.          [address] [facility], [address] [facility],[address], [address]  Phone [phone]/[employer] [phone]

## 2023-09-06 DIAGNOSIS — E55.9 VITAMIN D DEFICIENCY: ICD-10-CM

## 2023-09-07 RX ORDER — PANTOPRAZOLE SODIUM 40 MG/1
TABLET, DELAYED RELEASE ORAL
Qty: 45 TABLET | Refills: 0 | Status: SHIPPED | OUTPATIENT
Start: 2023-09-07

## 2023-09-07 RX ORDER — AMMONIUM LACTATE 12 G/100G
CREAM TOPICAL
Qty: 385 G | Refills: 5 | Status: SHIPPED | OUTPATIENT
Start: 2023-09-07

## 2023-09-07 RX ORDER — CHOLECALCIFEROL TAB 125 MCG (5000 UNIT) 125 MCG (5000 UT)
TAB
Qty: 30 TABLET | Refills: 0 | Status: SHIPPED | OUTPATIENT
Start: 2023-09-07

## 2023-09-07 NOTE — TELEPHONE ENCOUNTER
Requested Prescriptions     Pending Prescriptions Disp Refills    ammonium lactate (AMLACTIN) 12 % cream [Pharmacy Med Name: ammonium lactate 12 % topical cream] 385 g 5     Sig: Apply topically daily, avoid applying between the toes.        Patient last seen on: 06/19/23  Date of last refill: 03/20/23  Future appts: 09/25/23

## 2023-09-20 ENCOUNTER — OFFICE VISIT (OUTPATIENT)
Dept: PAIN MANAGEMENT | Age: 48
End: 2023-09-20
Payer: COMMERCIAL

## 2023-09-20 VITALS
DIASTOLIC BLOOD PRESSURE: 68 MMHG | SYSTOLIC BLOOD PRESSURE: 122 MMHG | TEMPERATURE: 97.2 F | WEIGHT: 245 LBS | HEIGHT: 65 IN | BODY MASS INDEX: 40.82 KG/M2

## 2023-09-20 DIAGNOSIS — G56.03 BILATERAL CARPAL TUNNEL SYNDROME: Primary | ICD-10-CM

## 2023-09-20 PROCEDURE — 1036F TOBACCO NON-USER: CPT | Performed by: NURSE PRACTITIONER

## 2023-09-20 PROCEDURE — 99214 OFFICE O/P EST MOD 30 MIN: CPT | Performed by: NURSE PRACTITIONER

## 2023-09-20 PROCEDURE — G8427 DOCREV CUR MEDS BY ELIG CLIN: HCPCS | Performed by: NURSE PRACTITIONER

## 2023-09-20 PROCEDURE — 3074F SYST BP LT 130 MM HG: CPT | Performed by: NURSE PRACTITIONER

## 2023-09-20 PROCEDURE — 3078F DIAST BP <80 MM HG: CPT | Performed by: NURSE PRACTITIONER

## 2023-09-20 PROCEDURE — G8417 CALC BMI ABV UP PARAM F/U: HCPCS | Performed by: NURSE PRACTITIONER

## 2023-09-20 ASSESSMENT — ENCOUNTER SYMPTOMS
RESPIRATORY NEGATIVE: 1
BACK PAIN: 0
DIARRHEA: 0
CONSTIPATION: 0

## 2023-09-21 ENCOUNTER — TELEPHONE (OUTPATIENT)
Dept: PAIN MANAGEMENT | Age: 48
End: 2023-09-21

## 2023-09-21 NOTE — TELEPHONE ENCOUNTER
BILAT CTS INJ    NO AUTH REQUIRED    OK to schedule procedure approved as above. Please note sides/levels approved and date range.    (If applicable, sides/levels approved may differ from those [address]

## 2023-10-04 ENCOUNTER — OFFICE VISIT (OUTPATIENT)
Dept: PULMONOLOGY | Age: 48
End: 2023-10-04
Payer: COMMERCIAL

## 2023-10-04 VITALS
OXYGEN SATURATION: 98 % | DIASTOLIC BLOOD PRESSURE: 80 MMHG | BODY MASS INDEX: 42.27 KG/M2 | HEART RATE: 78 BPM | WEIGHT: 254 LBS | SYSTOLIC BLOOD PRESSURE: 134 MMHG | TEMPERATURE: 96.9 F

## 2023-10-04 DIAGNOSIS — J45.20 MILD INTERMITTENT ASTHMA WITHOUT COMPLICATION: ICD-10-CM

## 2023-10-04 DIAGNOSIS — E66.9 OBESITY (BMI 30-39.9): ICD-10-CM

## 2023-10-04 DIAGNOSIS — J06.9 ACUTE UPPER RESPIRATORY INFECTION: Primary | ICD-10-CM

## 2023-10-04 DIAGNOSIS — G47.33 OSA (OBSTRUCTIVE SLEEP APNEA): ICD-10-CM

## 2023-10-04 PROCEDURE — G8417 CALC BMI ABV UP PARAM F/U: HCPCS | Performed by: INTERNAL MEDICINE

## 2023-10-04 PROCEDURE — G8427 DOCREV CUR MEDS BY ELIG CLIN: HCPCS | Performed by: INTERNAL MEDICINE

## 2023-10-04 PROCEDURE — 1036F TOBACCO NON-USER: CPT | Performed by: INTERNAL MEDICINE

## 2023-10-04 PROCEDURE — 99214 OFFICE O/P EST MOD 30 MIN: CPT | Performed by: INTERNAL MEDICINE

## 2023-10-04 PROCEDURE — 3079F DIAST BP 80-89 MM HG: CPT | Performed by: INTERNAL MEDICINE

## 2023-10-04 PROCEDURE — G8484 FLU IMMUNIZE NO ADMIN: HCPCS | Performed by: INTERNAL MEDICINE

## 2023-10-04 PROCEDURE — 3075F SYST BP GE 130 - 139MM HG: CPT | Performed by: INTERNAL MEDICINE

## 2023-10-04 RX ORDER — AZITHROMYCIN 250 MG/1
250 TABLET, FILM COATED ORAL SEE ADMIN INSTRUCTIONS
Qty: 6 TABLET | Refills: 1 | Status: SHIPPED | OUTPATIENT
Start: 2023-10-04 | End: 2023-10-09

## 2023-10-04 ASSESSMENT — ENCOUNTER SYMPTOMS
VOICE CHANGE: 0
EYE DISCHARGE: 0
SORE THROAT: 0
CHEST TIGHTNESS: 0
SHORTNESS OF BREATH: 1
TROUBLE SWALLOWING: 0
RHINORRHEA: 0
WHEEZING: 1
NAUSEA: 0
ABDOMINAL PAIN: 0
EYE ITCHING: 0
VOMITING: 0
COUGH: 1
DIARRHEA: 0
SINUS PRESSURE: 0

## 2023-10-04 NOTE — PROGRESS NOTES
Subjective:     Ally Parada is a 50 y.o. female who complains today of:     Chief Complaint   Patient presents with    Follow-up     4m f/u on asthma        HPI  Patient was having chest congestion , but now feeling better than before . She was having fever 2 days. She also felt she was feeling mild panic attack at that time. She felt she was stressed at work. She took prednisone for skin rash on rt. Leg and it helped breathing. C/o  mild shortness of breath  with exertion. C/o Wheezing  off and on   C/o  Cough with white Sputum. No Chest tightness   No Chest pain with radiation  or pleuritic pain  C/o Rhinorrhea and postnasal drip. She was smoking 1 and 1/2 ppd , quit  about 5  yrs ago    She is using albuterol HFA and neb with albuterol prn .      4/21/23 Spirometry is normal with no significant response to bronchodilator  Lung volumes shows air trapping, hyperinflation   Diffusion capacity is normal     PFT 2020 show moderately severe COPD  FVC 2.70 , 71%. FEV1 1.67  55%, significant response to bronchodilator. She shows mild sleep apnea with AHI 7, she said she could not use CPAP     Allergies:  Patient has no known allergies.   Past Medical History:   Diagnosis Date    Abnormal Pap smear of cervix     Allergic rhinitis     several allergens    Asthma     since shildhood    Bariatric surgery status 01/02/2020    [employer], Dr Pa Stone     Brachial neuralgia 1/23/2014    Chronic left shoulder pain     COPD (chronic obstructive pulmonary disease) ([address]) 2019    Dr Amando Fernandez    Depression     H/O colonoscopy 2014    Dr. Chai Aldana    Hyperlipidemia     Hypertension 2010    Migraine headache     Obesity, morbid (more than 100 lbs over ideal weight or BMI > 40) ([facility])     SEJAL on CPAP 10/2018    Sleep apnea      Past Surgical History:   Procedure Laterality Date    BREAST BIOPSY Left 2022    B-9    BREAST REDUCTION SURGERY Bilateral 2007    BREAST SURGERY  2007    reduction, Dr King Barrientos

## 2023-10-05 DIAGNOSIS — E55.9 VITAMIN D DEFICIENCY: ICD-10-CM

## 2023-10-05 DIAGNOSIS — J44.9 CHRONIC OBSTRUCTIVE PULMONARY DISEASE, UNSPECIFIED COPD TYPE (HCC): ICD-10-CM

## 2023-10-05 DIAGNOSIS — J45.40 MODERATE PERSISTENT ASTHMA, UNSPECIFIED WHETHER COMPLICATED: ICD-10-CM

## 2023-10-06 RX ORDER — CHOLECALCIFEROL TAB 125 MCG (5000 UNIT) 125 MCG (5000 UT)
TAB
Qty: 30 TABLET | Refills: 0 | Status: SHIPPED | OUTPATIENT
Start: 2023-10-06

## 2023-10-06 RX ORDER — LORATADINE 10 MG/1
TABLET ORAL
Qty: 90 TABLET | Refills: 0 | Status: SHIPPED | OUTPATIENT
Start: 2023-10-06

## 2023-10-06 RX ORDER — LOSARTAN POTASSIUM 50 MG/1
TABLET ORAL
Qty: 90 TABLET | Refills: 0 | Status: SHIPPED | OUTPATIENT
Start: 2023-10-06

## 2023-10-06 RX ORDER — ROSUVASTATIN CALCIUM 10 MG/1
TABLET, COATED ORAL
Qty: 90 TABLET | Refills: 0 | Status: SHIPPED | OUTPATIENT
Start: 2023-10-06

## 2023-10-06 RX ORDER — FLUOXETINE HYDROCHLORIDE 20 MG/1
20 CAPSULE ORAL 2 TIMES DAILY
Qty: 180 CAPSULE | Refills: 0 | Status: SHIPPED | OUTPATIENT
Start: 2023-10-06

## 2023-10-06 RX ORDER — MULTIVITAMIN
TABLET ORAL
Qty: 90 TABLET | Refills: 0 | Status: SHIPPED | OUTPATIENT
Start: 2023-10-06

## 2023-10-06 RX ORDER — ALBUTEROL SULFATE 90 UG/1
AEROSOL, METERED RESPIRATORY (INHALATION)
Qty: 18 G | Refills: 2 | Status: SHIPPED | OUTPATIENT
Start: 2023-10-06

## 2023-10-06 NOTE — TELEPHONE ENCOUNTER
Rx requested:  Requested Prescriptions     Pending Prescriptions Disp Refills    albuterol sulfate HFA (PROVENTIL;VENTOLIN;PROAIR) 108 (90 Base) MCG/ACT inhaler [Pharmacy Med Name: albuterol sulfate HFA 90 mcg/actuation aerosol inhaler] 18 g 2     Sig: INHALE 2 PUFFS into the lungs EVERY 6 HOURS AS NEEDED FOR WHEEZING or SHORTNESS OF BREATH       Last Office Visit:   10/4/2023      Next Visit Date:  Future Appointments   Date Time Provider [address] [place] [address]   10/9/2023  3:15 PM MD BELIA Khan [facility]   10/18/2023  3:00 PM [facility]   10/30/2023  1:45 PM Kunal Briceño MD [facility]   12/4/2023  9:15 AM Quinn Jackson DPM MLOX OP POD [facility]   2/7/2024  8:30 AM Richar Harp MD [facility]

## 2023-10-08 NOTE — SEDATION DOCUMENTATION
1314 Patient arrived to [facility] with steady gait. 1326 Reviewed procedure with patient and patient verbalizes understanding. VS taken. Consent signed. Reviewed history, medications, and allergies. 1337 Patient assisted into mamm room and into stereo chair. Tech taking films. [address] arrived to [facility] talking with patient and looking at films. Area of concern located and cleaned with chloraprep x 3.  1341 Dr. Katz Numbers then injected lido 2% into left breast to numb area and then lido 1% with epi deeper into tissue to numb/  Eviva [id number] lot [id number] exp 09- used during the procedure. He then made small incision using scalpel. Then he took samples of tissue using Eviva [id number] lot [id number] exp 06-. Patient tolerated well. Tissue out at   0345 74 47 21 and onto grid. Film of specimen done to check for cals and cals were seen. He then inserted Perzo Eviva 2S-13 lot [id number] exp 04-. Film taken. Compression held to site for 10 minutes. Steri strips, gauze, and tegaderm applied. Patient assisted out of stereo chair and into mamm room for post bx mamm. 1420 Post bx mamm completed. Dressing clean, dry, and intact. Chest area wrapped with ace wrap with ice pack to site. Discharge instructions reviewed with patient and patient verbalizes understanding. VS taken. Patient left [facility] with steady gait accompanied by daughter. Encouraged to call with any questions or concerns.
[phone] (home) [phone] (cell)

## 2023-10-09 ENCOUNTER — PROCEDURE VISIT (OUTPATIENT)
Dept: PAIN MANAGEMENT | Age: 48
End: 2023-10-09

## 2023-10-09 DIAGNOSIS — G56.03 BILATERAL CARPAL TUNNEL SYNDROME: Primary | ICD-10-CM

## 2023-10-09 RX ORDER — LIDOCAINE HYDROCHLORIDE 10 MG/ML
5 INJECTION, SOLUTION EPIDURAL; INFILTRATION; INTRACAUDAL; PERINEURAL ONCE
Status: SHIPPED | OUTPATIENT
Start: 2023-10-09

## 2023-10-09 RX ORDER — BETAMETHASONE SODIUM PHOSPHATE AND BETAMETHASONE ACETATE 3; 3 MG/ML; MG/ML
6 INJECTION, SUSPENSION INTRA-ARTICULAR; INTRALESIONAL; INTRAMUSCULAR; SOFT TISSUE ONCE
Status: SHIPPED | OUTPATIENT
Start: 2023-10-09

## 2023-10-09 RX ORDER — SODIUM CHLORIDE 9 MG/ML
8 INJECTION INTRAVENOUS ONCE
Status: SHIPPED | OUTPATIENT
Start: 2023-10-09

## 2023-10-09 NOTE — PROGRESS NOTES
This procedure was 75% more difficult and required 75% more work secondary to the patient's habitus. The patient has a BMI of 42.3. This required increased work for safe and proper positioning upon the fluoroscopy table, increased needle passes for safe and appropriate needle placement, and increased fluoroscopy time and radiation exposure for proper visualization.
[phone]/Fax [phone]

## 2023-10-16 ENCOUNTER — OFFICE VISIT (OUTPATIENT)
Dept: PAIN MANAGEMENT | Age: 48
End: 2023-10-16
Payer: COMMERCIAL

## 2023-10-16 VITALS
HEIGHT: 65 IN | SYSTOLIC BLOOD PRESSURE: 126 MMHG | DIASTOLIC BLOOD PRESSURE: 62 MMHG | WEIGHT: 240 LBS | BODY MASS INDEX: 39.99 KG/M2

## 2023-10-16 DIAGNOSIS — G56.03 BILATERAL CARPAL TUNNEL SYNDROME: Primary | ICD-10-CM

## 2023-10-16 PROCEDURE — 3078F DIAST BP <80 MM HG: CPT | Performed by: NURSE PRACTITIONER

## 2023-10-16 PROCEDURE — 3074F SYST BP LT 130 MM HG: CPT | Performed by: NURSE PRACTITIONER

## 2023-10-16 PROCEDURE — 99213 OFFICE O/P EST LOW 20 MIN: CPT | Performed by: NURSE PRACTITIONER

## 2023-10-16 PROCEDURE — G8417 CALC BMI ABV UP PARAM F/U: HCPCS | Performed by: NURSE PRACTITIONER

## 2023-10-16 PROCEDURE — 1036F TOBACCO NON-USER: CPT | Performed by: NURSE PRACTITIONER

## 2023-10-16 PROCEDURE — G8427 DOCREV CUR MEDS BY ELIG CLIN: HCPCS | Performed by: NURSE PRACTITIONER

## 2023-10-16 PROCEDURE — G8484 FLU IMMUNIZE NO ADMIN: HCPCS | Performed by: NURSE PRACTITIONER

## 2023-10-16 ASSESSMENT — ENCOUNTER SYMPTOMS
SHORTNESS OF BREATH: 0
EYES NEGATIVE: 1
DIARRHEA: 0
CONSTIPATION: 0
COUGH: 0
TROUBLE SWALLOWING: 0
GASTROINTESTINAL NEGATIVE: 1

## 2023-10-16 NOTE — PROGRESS NOTES
Gena Monique  (1975)    10/16/2023    Subjective:     Gena Monique is 50 y.o. female who complains today of:    Chief Complaint   Patient presents with    Follow-up    Other         Allergies:  Patient has no known allergies. Past Medical History:   Diagnosis Date    Abnormal Pap smear of cervix     Allergic rhinitis     several allergens    Asthma     since shildhood    Bariatric surgery status 2020    [facility], Dr Anila Candelario     Brachial neuralgia 2014    Chronic left shoulder pain     COPD (chronic obstructive pulmonary disease) ([address])     Dr Tanmay Santana    Depression     H/O colonoscopy     Dr. Rosa Gonzalez    Hyperlipidemia     Hypertension     Migraine headache     Obesity, morbid (more than 100 lbs over ideal weight or BMI > 40) ([facility])     SEJAL on CPAP 10/2018    Sleep apnea      Past Surgical History:   Procedure Laterality Date    BREAST BIOPSY Left     B-9    BREAST REDUCTION SURGERY Bilateral 2007    BREAST SURGERY      reduction, Dr Nicole Giang  10/20/2015    DR. Scott Chadwick Right     Dr Shamika Pimentel    SLEEVE GASTRECTOMY  2020    [facility], Dr Lyssa Cheung History   Problem Relation Age of Onset    Osteoarthritis Mother     Other Mother         hyperlipidemia    Hypertension Mother     High Cholesterol Mother     Arthritis Mother     Heart Disease Mother     Diabetes Mother     No Known Problems Father      Social History     Socioeconomic History    Marital status: Single     Spouse name: Not on file    Number of children: 2    Years of education: Not on file    Highest education level: Not on file   Occupational History    Occupation: dog grooming business, self employed   Tobacco Use    Smoking status: Former     Packs/day: .5     Types: Cigarettes     Quit date: 2018     Years since quittin.2    Smokeless tobacco: Never   Vaping Use    Vaping Use: Never used

## 2023-10-23 ENCOUNTER — OFFICE VISIT (OUTPATIENT)
Dept: FAMILY MEDICINE CLINIC | Age: 48
End: 2023-10-23
Payer: COMMERCIAL

## 2023-10-23 VITALS
HEIGHT: 65 IN | HEART RATE: 79 BPM | DIASTOLIC BLOOD PRESSURE: 86 MMHG | TEMPERATURE: 96.8 F | WEIGHT: 230 LBS | BODY MASS INDEX: 38.32 KG/M2 | OXYGEN SATURATION: 98 % | SYSTOLIC BLOOD PRESSURE: 142 MMHG

## 2023-10-23 DIAGNOSIS — J02.9 ACUTE PHARYNGITIS, UNSPECIFIED ETIOLOGY: Primary | ICD-10-CM

## 2023-10-23 DIAGNOSIS — T36.95XA ANTIBIOTIC-INDUCED YEAST INFECTION: ICD-10-CM

## 2023-10-23 DIAGNOSIS — B37.9 ANTIBIOTIC-INDUCED YEAST INFECTION: ICD-10-CM

## 2023-10-23 DIAGNOSIS — J00 ACUTE RHINITIS: ICD-10-CM

## 2023-10-23 LAB
Lab: NORMAL
PERFORMING INSTRUMENT: NORMAL
QC PASS/FAIL: NORMAL
S PYO AG THROAT QL: NORMAL
SARS-COV-2, POC: NORMAL

## 2023-10-23 PROCEDURE — 87426 SARSCOV CORONAVIRUS AG IA: CPT | Performed by: NURSE PRACTITIONER

## 2023-10-23 PROCEDURE — G8427 DOCREV CUR MEDS BY ELIG CLIN: HCPCS | Performed by: NURSE PRACTITIONER

## 2023-10-23 PROCEDURE — G8484 FLU IMMUNIZE NO ADMIN: HCPCS | Performed by: NURSE PRACTITIONER

## 2023-10-23 PROCEDURE — 3077F SYST BP >= 140 MM HG: CPT | Performed by: NURSE PRACTITIONER

## 2023-10-23 PROCEDURE — G8417 CALC BMI ABV UP PARAM F/U: HCPCS | Performed by: NURSE PRACTITIONER

## 2023-10-23 PROCEDURE — 3079F DIAST BP 80-89 MM HG: CPT | Performed by: NURSE PRACTITIONER

## 2023-10-23 PROCEDURE — 87880 STREP A ASSAY W/OPTIC: CPT | Performed by: NURSE PRACTITIONER

## 2023-10-23 PROCEDURE — 99213 OFFICE O/P EST LOW 20 MIN: CPT | Performed by: NURSE PRACTITIONER

## 2023-10-23 PROCEDURE — 1036F TOBACCO NON-USER: CPT | Performed by: NURSE PRACTITIONER

## 2023-10-23 RX ORDER — TRIAMCINOLONE ACETONIDE 40 MG/ML
40 INJECTION, SUSPENSION INTRA-ARTICULAR; INTRAMUSCULAR ONCE
Status: COMPLETED | OUTPATIENT
Start: 2023-10-23 | End: 2023-10-23

## 2023-10-23 RX ORDER — CEFDINIR 300 MG/1
300 CAPSULE ORAL 2 TIMES DAILY
Qty: 20 CAPSULE | Refills: 0 | Status: SHIPPED | OUTPATIENT
Start: 2023-10-23 | End: 2023-11-02

## 2023-10-23 RX ORDER — FLUCONAZOLE 150 MG/1
150 TABLET ORAL
Qty: 2 TABLET | Refills: 0 | Status: SHIPPED | OUTPATIENT
Start: 2023-10-23 | End: 2023-10-29

## 2023-10-23 RX ADMIN — TRIAMCINOLONE ACETONIDE 40 MG: 40 INJECTION, SUSPENSION INTRA-ARTICULAR; INTRAMUSCULAR at 14:38

## 2023-10-23 ASSESSMENT — ENCOUNTER SYMPTOMS
CHEST TIGHTNESS: 0
ABDOMINAL PAIN: 0
NAUSEA: 0
SORE THROAT: 1
COUGH: 1
TROUBLE SWALLOWING: 0
DIARRHEA: 0
RHINORRHEA: 0
SHORTNESS OF BREATH: 0

## 2023-10-23 ASSESSMENT — VISUAL ACUITY: OU: 1

## 2023-10-23 NOTE — PROGRESS NOTES
Subjective  Manny Chen, 50 y.o. female presents today with:  Chief Complaint   Patient presents with    URI     1x month - sore throat, congestion, drainage, fatigue       HPI  Presents to [facility] for sore throat   Soret throat affecting sleep   PND   Fatigued   One month prior nasal drainage & cough. Improved. Gabby Laird. Denies feeling dizzy/lightheaded   Denies N/V/D  Eating and drinking   Discomfort with swallowing   Mucinex and Flonase  Allergy/sinus/ OTC                 Past Medical History:   Diagnosis Date    Abnormal Pap smear of cervix     Allergic rhinitis     several allergens    Asthma     since shildhood    Bariatric surgery status 01/02/2020    [facility], Dr Mariely Mann     Brachial neuralgia 1/23/2014    Chronic left shoulder pain     COPD (chronic obstructive pulmonary disease) ([address] [facility]) 2019    Dr Trisha Miles    Depression     H/O colonoscopy 2014    Dr. Violeta Prater    Hyperlipidemia     Hypertension 2010    Migraine headache     Obesity, morbid (more than 100 lbs over ideal weight or BMI > 40) (HCC)     SEJAL on CPAP 10/2018    Sleep apnea       Past Surgical History:   Procedure Laterality Date    BREAST BIOPSY Left 2022    B-9    BREAST REDUCTION SURGERY Bilateral 2007    BREAST SURGERY  2007    reduction, Dr Pauly Price  10/20/2015    DR. Faustina Medrano Right     Dr Yates Dates    SLEEVE GASTRECTOMY  01/20/2020    [facility], Dr Phyllis Lebron       Family History   Problem Relation Age of Onset    Osteoarthritis Mother     Other Mother         hyperlipidemia    Hypertension Mother     High Cholesterol Mother     Arthritis Mother     Heart Disease Mother     Diabetes Mother     No Known Problems Father              Review of Systems   Constitutional:  Positive for fatigue. Negative for activity change, appetite change, chills and diaphoresis. HENT:  Positive for congestion and sore throat.  Negative for ear pain, rhinorrhea and trouble

## 2023-10-25 ENCOUNTER — OFFICE VISIT (OUTPATIENT)
Dept: FAMILY MEDICINE CLINIC | Age: 48
End: 2023-10-25
Payer: COMMERCIAL

## 2023-10-25 VITALS
BODY MASS INDEX: 41.48 KG/M2 | TEMPERATURE: 97.8 F | SYSTOLIC BLOOD PRESSURE: 122 MMHG | DIASTOLIC BLOOD PRESSURE: 78 MMHG | OXYGEN SATURATION: 96 % | HEART RATE: 80 BPM | WEIGHT: 249 LBS | HEIGHT: 65 IN

## 2023-10-25 DIAGNOSIS — B37.0 THRUSH: Primary | ICD-10-CM

## 2023-10-25 DIAGNOSIS — B37.0 THRUSH: ICD-10-CM

## 2023-10-25 PROCEDURE — G8417 CALC BMI ABV UP PARAM F/U: HCPCS | Performed by: PHYSICIAN ASSISTANT

## 2023-10-25 PROCEDURE — 3074F SYST BP LT 130 MM HG: CPT | Performed by: PHYSICIAN ASSISTANT

## 2023-10-25 PROCEDURE — 3078F DIAST BP <80 MM HG: CPT | Performed by: PHYSICIAN ASSISTANT

## 2023-10-25 PROCEDURE — 99213 OFFICE O/P EST LOW 20 MIN: CPT | Performed by: PHYSICIAN ASSISTANT

## 2023-10-25 PROCEDURE — 1036F TOBACCO NON-USER: CPT | Performed by: PHYSICIAN ASSISTANT

## 2023-10-25 PROCEDURE — G8484 FLU IMMUNIZE NO ADMIN: HCPCS | Performed by: PHYSICIAN ASSISTANT

## 2023-10-25 PROCEDURE — G8427 DOCREV CUR MEDS BY ELIG CLIN: HCPCS | Performed by: PHYSICIAN ASSISTANT

## 2023-10-25 ASSESSMENT — ENCOUNTER SYMPTOMS
SORE THROAT: 1
COUGH: 1
SINUS PRESSURE: 1
SINUS PAIN: 1

## 2023-10-28 LAB — BACTERIA THROAT AEROBE CULT: NORMAL

## 2023-11-06 DIAGNOSIS — E55.9 VITAMIN D DEFICIENCY: ICD-10-CM

## 2023-11-06 DIAGNOSIS — R73.03 PRE-DIABETES: ICD-10-CM

## 2023-11-06 DIAGNOSIS — I10 ESSENTIAL HYPERTENSION: ICD-10-CM

## 2023-11-06 DIAGNOSIS — E78.00 PURE HYPERCHOLESTEROLEMIA: ICD-10-CM

## 2023-11-06 LAB
CHOLEST SERPL-MCNC: 181 MG/DL (ref 0–199)
GLUCOSE FASTING: 115 MG/DL (ref 70–99)
HBA1C MFR BLD: 5.9 % (ref 4.8–5.9)
HDLC SERPL-MCNC: 65 MG/DL (ref 40–59)
LDLC SERPL CALC-MCNC: 62 MG/DL (ref 0–129)
TRIGL SERPL-MCNC: 272 MG/DL (ref 0–150)

## 2023-11-06 NOTE — TELEPHONE ENCOUNTER
Future Appointments    Encounter Information    Provider Department Appt Notes   [facility] [place] [facility] Appt Reason: Routine Existing Condition Follow Up   follow up for lab   Booking Code: [id number]   12/4/2023 [facility] 3 months follow up   2/7/2024 Magda Ramos MD [facility] Pulmonology 4M F/U     Past Visits    Date Provider Specialty Visit Type Primary Dx   10/25/2023 Dalton Betancur Family Medicine Office Visit Suzi Mcconnell

## 2023-11-08 ENCOUNTER — OFFICE VISIT (OUTPATIENT)
Dept: FAMILY MEDICINE CLINIC | Age: 48
End: 2023-11-08
Payer: COMMERCIAL

## 2023-11-08 VITALS
TEMPERATURE: 97.6 F | OXYGEN SATURATION: 98 % | DIASTOLIC BLOOD PRESSURE: 82 MMHG | WEIGHT: 250 LBS | HEART RATE: 76 BPM | RESPIRATION RATE: 16 BRPM | HEIGHT: 65 IN | SYSTOLIC BLOOD PRESSURE: 120 MMHG | BODY MASS INDEX: 41.65 KG/M2

## 2023-11-08 DIAGNOSIS — R09.81 CHRONIC NASAL CONGESTION: ICD-10-CM

## 2023-11-08 DIAGNOSIS — Z98.84 HISTORY OF BARIATRIC SURGERY: ICD-10-CM

## 2023-11-08 DIAGNOSIS — E78.00 PURE HYPERCHOLESTEROLEMIA: ICD-10-CM

## 2023-11-08 DIAGNOSIS — I10 ESSENTIAL HYPERTENSION: Primary | ICD-10-CM

## 2023-11-08 DIAGNOSIS — R73.03 PRE-DIABETES: ICD-10-CM

## 2023-11-08 PROCEDURE — 3079F DIAST BP 80-89 MM HG: CPT | Performed by: FAMILY MEDICINE

## 2023-11-08 PROCEDURE — G8427 DOCREV CUR MEDS BY ELIG CLIN: HCPCS | Performed by: FAMILY MEDICINE

## 2023-11-08 PROCEDURE — G8484 FLU IMMUNIZE NO ADMIN: HCPCS | Performed by: FAMILY MEDICINE

## 2023-11-08 PROCEDURE — 3074F SYST BP LT 130 MM HG: CPT | Performed by: FAMILY MEDICINE

## 2023-11-08 PROCEDURE — 1036F TOBACCO NON-USER: CPT | Performed by: FAMILY MEDICINE

## 2023-11-08 PROCEDURE — 99214 OFFICE O/P EST MOD 30 MIN: CPT | Performed by: FAMILY MEDICINE

## 2023-11-08 PROCEDURE — G8417 CALC BMI ABV UP PARAM F/U: HCPCS | Performed by: FAMILY MEDICINE

## 2023-11-08 ASSESSMENT — ENCOUNTER SYMPTOMS
VOMITING: 0
NAUSEA: 0

## 2023-11-08 NOTE — PROGRESS NOTES
Subjective:      Patient ID: Lc Freed is a 50 y.o. female    Hypertension  The current episode started more than 1 year ago. Past treatments include angiotensin blockers. The current treatment provides moderate improvement. Hyperlipidemia  The current episode started more than 1 year ago. Current antihyperlipidemic treatment includes statins. Here in follow up for htn and lipids and pre diabetes. Weight up few pounds since last time. No missed doses of medication. Did see psychiatry -does need to return for follow up. Continues to have persistent nasal drainage. Several rounds of antibiotics completed recently    Review of Systems   Constitutional:  Negative for activity change. Gastrointestinal:  Negative for nausea and vomiting. Skin:  Negative for rash. Neurological:  Negative for weakness. Reviewed allergy, medical, social, surgical, family and med list changes and updated   Files--reviewed blood work which is stable      Social History     Socioeconomic History    Marital status: Single     Spouse name: None    Number of children: 2    Years of education: None    Highest education level: None   Occupational History    Occupation: dog grooming business, self employed   Tobacco Use    Smoking status: Former     Packs/day: 0.50     Years: 30.00     Additional pack years: 0.00     Total pack years: 15.00     Types: Cigarettes     Quit date: 2018     Years since quittin.3     Passive exposure: Past    Smokeless tobacco: Never   Vaping Use    Vaping Use: Never used   Substance and Sexual Activity    Alcohol use:  Yes     Alcohol/week: 1.0 standard drink of alcohol     Types: 1 Standard drinks or equivalent per week     Comment: socially    Drug use: Yes     Frequency: 1.0 times per week     Types: Marijuana Meaghan Ellis     Comment: marijuana, quit 2013    Sexual activity: Yes     Partners: Male     Birth control/protection: Condom   Social History Narrative    Born in [place],

## 2023-11-30 DIAGNOSIS — E55.9 VITAMIN D DEFICIENCY: ICD-10-CM

## 2023-12-01 RX ORDER — PANTOPRAZOLE SODIUM 40 MG/1
TABLET, DELAYED RELEASE ORAL
Qty: 45 TABLET | Refills: 0 | Status: SHIPPED | OUTPATIENT
Start: 2023-12-01

## 2023-12-01 NOTE — TELEPHONE ENCOUNTER
Future Appointments    Encounter Information    Provider Department Appt Notes   12/4/2023 Juan Benavides [facility] 3 months follow up   2/7/2024 Bud Nesbitt  E [facility] Pulmonology 4M F/U   5/8/2024 Israel Ramos MD [facility] AT [place] [facility] 6 Month follow up     Past Visits    Date Provider Specialty Visit Type Primary Dx   11/08/2023 Israel Ramos MD Family Medicine Office Visit Essential hypertension

## 2023-12-04 ENCOUNTER — OFFICE VISIT (OUTPATIENT)
Dept: PODIATRY | Age: 48
End: 2023-12-04
Payer: COMMERCIAL

## 2023-12-04 VITALS — BODY MASS INDEX: 39.99 KG/M2 | TEMPERATURE: 97.1 F | HEIGHT: 65 IN | WEIGHT: 240 LBS

## 2023-12-04 DIAGNOSIS — M54.16 LUMBAR RADICULOPATHY: ICD-10-CM

## 2023-12-04 DIAGNOSIS — M79.671 PAIN IN BOTH FEET: ICD-10-CM

## 2023-12-04 DIAGNOSIS — G63 POLYNEUROPATHY IN OTHER DISEASES CLASSIFIED ELSEWHERE (HCC): Primary | ICD-10-CM

## 2023-12-04 DIAGNOSIS — M79.672 PAIN IN BOTH FEET: ICD-10-CM

## 2023-12-04 DIAGNOSIS — L84 CALLUS: ICD-10-CM

## 2023-12-04 PROCEDURE — 99999 PR OFFICE/OUTPT VISIT,PROCEDURE ONLY: CPT | Performed by: PODIATRIST

## 2023-12-04 PROCEDURE — 11056 PARNG/CUTG B9 HYPRKR LES 2-4: CPT | Performed by: PODIATRIST

## 2023-12-04 ASSESSMENT — ENCOUNTER SYMPTOMS
NAUSEA: 0
BACK PAIN: 1
VOMITING: 0
SHORTNESS OF BREATH: 0

## 2023-12-04 NOTE — PROGRESS NOTES
Chase Turcios MD           Past Medical History:   Diagnosis Date    Abnormal Pap smear of cervix     Allergic rhinitis     several allergens    Asthma     since shildhood    Bariatric surgery status 2020    Dr Chay Wne     Brachial neuralgia 2014    Chronic left shoulder pain     COPD (chronic obstructive pulmonary disease) ([address])     Dr Trudi Pritchard    Depression     H/O colonoscopy     Dr. Toñito Gray    Hyperlipidemia     Hypertension 2010    Migraine headache     Obesity, morbid (more than 100 lbs over ideal weight or BMI > 40) ([facility])     SEJLA on CPAP 10/2018    Sleep apnea      Past Surgical History:   Procedure Laterality Date    BREAST BIOPSY Left     B-9    BREAST REDUCTION SURGERY Bilateral 2007    BREAST SURGERY  2007    reduction, Dr Sola Landry  10/20/2015    DR. Miki Rios Right     Dr Plunkett Viviane    SLEEVE GASTRECTOMY  2020    [facility], Dr April Flores History     Socioeconomic History    Marital status: Single     Spouse name: Not on file    Number of children: 2    Years of education: Not on file    Highest education level: Not on file   Occupational History    Occupation: dog grooming business, self employed   Tobacco Use    Smoking status: Former     Packs/day: 0.50     Years: 30.00     Additional pack years: 0.00     Total pack years: 15.00     Types: Cigarettes     Quit date: 2018     Years since quittin.4     Passive exposure: Past    Smokeless tobacco: Never   Vaping Use    Vaping Use: Never used   Substance and Sexual Activity    Alcohol use:  Yes     Alcohol/week: 1.0 standard drink of alcohol     Types: 1 Standard drinks or equivalent per week     Comment: socially    Drug use: Yes     Frequency: 1.0 times per week     Types: Marijuana Axel Nelson     Comment: marijuana, quit 2013    Sexual activity: Yes     Partners: Male     Birth control/protection: Condom   Other

## 2024-01-01 DIAGNOSIS — J45.40 MODERATE PERSISTENT ASTHMA, UNSPECIFIED WHETHER COMPLICATED: ICD-10-CM

## 2024-01-01 DIAGNOSIS — J44.9 CHRONIC OBSTRUCTIVE PULMONARY DISEASE, UNSPECIFIED COPD TYPE (HCC): ICD-10-CM

## 2024-01-01 NOTE — TELEPHONE ENCOUNTER
Future Appointments    Encounter Information   Provider Department Appt Notes   2/7/2024 Josef Reeves MD [facility] Pulmonology 4M F/U   3/6/2024 Rivas Reid DPM [facility] Podiatry 3 month follow up   5/8/2024 Rodolfo Souza MD [facility] 6 Month follow up     Past Visits    Date Provider Specialty Visit Type Primary Dx   12/04/2023 Rivas Reid DPM Podiatry Office Visit Polyneuropathy in other diseases classified elsewhere (HCC)   11/08/2023 Rodolfo Souza MD Family Medicine Office Visit Essential hypertens

## 2024-01-02 RX ORDER — ROSUVASTATIN CALCIUM 10 MG/1
TABLET, COATED ORAL
Qty: 90 TABLET | Refills: 0 | Status: SHIPPED | OUTPATIENT
Start: 2024-01-02

## 2024-01-02 RX ORDER — LOSARTAN POTASSIUM 50 MG/1
TABLET ORAL
Qty: 90 TABLET | Refills: 0 | Status: SHIPPED | OUTPATIENT
Start: 2024-01-02

## 2024-01-02 RX ORDER — MULTIVITAMIN
TABLET ORAL
Qty: 90 TABLET | Refills: 0 | Status: SHIPPED | OUTPATIENT
Start: 2024-01-02

## 2024-01-02 RX ORDER — FLUOXETINE HYDROCHLORIDE 20 MG/1
20 CAPSULE ORAL 2 TIMES DAILY
Qty: 180 CAPSULE | Refills: 0 | Status: SHIPPED | OUTPATIENT
Start: 2024-01-02

## 2024-01-02 RX ORDER — LORATADINE 10 MG/1
TABLET ORAL
Qty: 90 TABLET | Refills: 0 | Status: SHIPPED | OUTPATIENT
Start: 2024-01-02

## 2024-01-02 RX ORDER — ALBUTEROL SULFATE 90 UG/1
AEROSOL, METERED RESPIRATORY (INHALATION)
Qty: 18 G | Refills: 2 | Status: SHIPPED | OUTPATIENT
Start: 2024-01-02

## 2024-01-02 NOTE — TELEPHONE ENCOUNTER
Rx requested:  Requested Prescriptions     Pending Prescriptions Disp Refills    albuterol sulfate HFA (PROVENTIL;VENTOLIN;PROAIR) 108 (90 Base) MCG/ACT inhaler [Pharmacy Med Name: albuterol sulfate HFA 90 mcg/actuation aerosol inhaler] 18 g 2     Sig: INHALE 2 PUFFS into the lungs EVERY 6 HOURS AS NEEDED FOR WHEEZING or SHORTNESS OF BREATH       Last Office Visit:   10/4/2023      Next Visit Date:  Future Appointments   Date Time Provider Department Center   2/7/2024  8:30 AM Josef Reeves MD [facility]   3/6/2024  9:00 AM Rivas Reid DPM [facility]   5/8/2024  8:15 AM Rodolfo Souza MD [facility]

## 2024-02-07 ENCOUNTER — OFFICE VISIT (OUTPATIENT)
Dept: PULMONOLOGY | Age: 49
End: 2024-02-07
Payer: COMMERCIAL

## 2024-02-07 VITALS
SYSTOLIC BLOOD PRESSURE: 120 MMHG | WEIGHT: 240.08 LBS | TEMPERATURE: 97.6 F | DIASTOLIC BLOOD PRESSURE: 78 MMHG | HEIGHT: 65 IN | HEART RATE: 81 BPM | BODY MASS INDEX: 40 KG/M2 | OXYGEN SATURATION: 98 %

## 2024-02-07 DIAGNOSIS — E66.9 OBESITY (BMI 30-39.9): ICD-10-CM

## 2024-02-07 DIAGNOSIS — J45.40 MODERATE PERSISTENT ASTHMA, UNSPECIFIED WHETHER COMPLICATED: Primary | ICD-10-CM

## 2024-02-07 DIAGNOSIS — G47.33 OSA (OBSTRUCTIVE SLEEP APNEA): ICD-10-CM

## 2024-02-07 PROCEDURE — G8417 CALC BMI ABV UP PARAM F/U: HCPCS | Performed by: INTERNAL MEDICINE

## 2024-02-07 PROCEDURE — 3074F SYST BP LT 130 MM HG: CPT | Performed by: INTERNAL MEDICINE

## 2024-02-07 PROCEDURE — G8427 DOCREV CUR MEDS BY ELIG CLIN: HCPCS | Performed by: INTERNAL MEDICINE

## 2024-02-07 PROCEDURE — G8484 FLU IMMUNIZE NO ADMIN: HCPCS | Performed by: INTERNAL MEDICINE

## 2024-02-07 PROCEDURE — 99214 OFFICE O/P EST MOD 30 MIN: CPT | Performed by: INTERNAL MEDICINE

## 2024-02-07 PROCEDURE — 3078F DIAST BP <80 MM HG: CPT | Performed by: INTERNAL MEDICINE

## 2024-02-07 PROCEDURE — 1036F TOBACCO NON-USER: CPT | Performed by: INTERNAL MEDICINE

## 2024-02-07 ASSESSMENT — ENCOUNTER SYMPTOMS
DIARRHEA: 0
EYE ITCHING: 0
NAUSEA: 0
CHEST TIGHTNESS: 0
TROUBLE SWALLOWING: 0
VOMITING: 0
VOICE CHANGE: 0
SHORTNESS OF BREATH: 1
EYE DISCHARGE: 0
SORE THROAT: 0
ABDOMINAL PAIN: 0
COUGH: 1
SINUS PRESSURE: 0
WHEEZING: 1
RHINORRHEA: 0

## 2024-02-07 NOTE — PROGRESS NOTES
Benzocaine-Menthol (CEPACOL EXTRA STRENGTH) 15-2.6 MG LOZG lozenge Take 1 lozenge by mouth every 2 hours as needed for Sore Throat 18 lozenge 0    Ibuprofen POWD       etodolac (LODINE) 400 MG tablet Take 1 tablet by mouth 2 times daily 14 tablet 0    meloxicam (MOBIC) 7.5 MG tablet Take 1 tablet by mouth daily Take with food in am 30 tablet 0    albuterol (PROVENTIL) (5 MG/ML) 0.5% nebulizer solution Take 0.5 mLs by nebulization every 6 hours as needed for Wheezing 120 vial 5     Current Facility-Administered Medications   Medication Dose Route Frequency Provider Last Rate Last Admin    betamethasone acetate-betamethasone sodium phosphate (CELESTONE) injection 6 mg  6 mg Other Once Donnie Tanner MD        lidocaine PF 1 % injection 5 mL  5 mL IntraDERmal Once Donnie Tanner MD        sodium chloride (PF) 0.9 % injection 8 mL  8 mL Other Once Donnie Tanner MD        methylPREDNISolone acetate (DEPO-MEDROL) injection 80 mg  80 mg Intra-artICUlar Once Mahamed Hickey MD        iopamidol (ISOVUE-300) 61 % injection 2 mL  2 mL Other ONCE PRN Donnie Tanner MD           Results for orders placed in visit on 08/18/22    XR CHEST STANDARD (2 VW)    Narrative  EXAMINATION:  CHEST.    CLINICAL HISTORY:  GENERALIZED WEAKNESS.    COMPARISONS:  10/23/2020.    TECHNIQUE:  PA and lateral.    FINDINGS:  Heart size and contour are within normal limits. Pulmonary vascularity is normal. No infiltrate or effusion is seen. No evidence of a mass or adenopathy. No significant bony abnormality.    Impression  NORMAL CHEST.      Results for orders placed during the hospital encounter of 09/11/20    XR CHEST (2 VW)    Narrative  XR CHEST (2 VW)    Clinical History:   cough and sob    Other .  Comparison:  3/16/2010    RESULT:        Lungs and pleura:  No consolidation. No pleural effusion. No pneumothorax. Normal pulmonary vascular pattern.    Cardiomediastinal silhouette:  Normal.    Other:  No acute osseous findings. Degenerative

## 2024-02-24 DIAGNOSIS — E55.9 VITAMIN D DEFICIENCY: ICD-10-CM

## 2024-02-25 NOTE — TELEPHONE ENCOUNTER
Pharmacy requesting medication refill. Please approve or deny this request.    Rx requested:  Requested Prescriptions     Pending Prescriptions Disp Refills    pantoprazole (PROTONIX) 40 MG tablet [Pharmacy Med Name: pantoprazole 40 mg tablet,delayed release] 45 tablet 0     Sig: TAKE 1 TABLET BY MOUTH EVERY OTHER DAY BEFORE a meal    NATURAL VITAMIN D-3 125 MCG (5000 UT) TABS tablet [Pharmacy Med Name: cholecalciferol (vitamin D3) 125 mcg (5,000 unit) tablet] 90 tablet 0     Sig: Take 1 tablet by mouth daily         Last Office Visit:   11/8/2023      Next Visit Date:  Future Appointments   Date Time Provider Department Center   3/6/2024  9:00 AM Rivas Reid DPM [facility]   5/7/2024  8:45 AM Josef Reeves MD [facility]   5/8/2024  8:15 AM Rodolfo Souza MD [facility]

## 2024-02-26 RX ORDER — PANTOPRAZOLE SODIUM 40 MG/1
TABLET, DELAYED RELEASE ORAL
Qty: 45 TABLET | Refills: 0 | Status: SHIPPED | OUTPATIENT
Start: 2024-02-26

## 2024-02-26 RX ORDER — CHOLECALCIFEROL TAB 125 MCG (5000 UNIT) 125 MCG (5000 UT)
1 TAB DAILY
Qty: 90 TABLET | Refills: 0 | Status: SHIPPED | OUTPATIENT
Start: 2024-02-26

## 2024-02-26 RX ORDER — AMMONIUM LACTATE 12 G/100G
CREAM TOPICAL
Qty: 385 G | Refills: 5 | Status: SHIPPED | OUTPATIENT
Start: 2024-02-26

## 2024-02-26 NOTE — TELEPHONE ENCOUNTER
Health Maintenance Due   Topic Date Due   • Diabetes Eye Exam  08/25/1988   • DTaP/Tdap/Td Vaccine (1 - Tdap) 08/25/1989   • Breast Cancer Screening  08/25/2015   • Pneumococcal Vaccine 19-64 Highest Risk (2 of 3 - PCV13) 11/05/2017   • Diabetes Foot Exam  06/15/2018   • Diabetes A1C  07/25/2018   • Influenza Vaccine (1) 08/01/2018       Patient is due for topics as listed above but declines Immunization(s) Influenza at this time.  Orders placed for  Immunization(s) Influenza.           Last Appt:12/4/23  Next Appt:3/6/24  Last Refill: 9/7/23 with 5 refills

## 2024-03-06 ENCOUNTER — OFFICE VISIT (OUTPATIENT)
Dept: PODIATRY | Age: 49
End: 2024-03-06
Payer: COMMERCIAL

## 2024-03-06 VITALS — TEMPERATURE: 97.1 F | WEIGHT: 248 LBS | HEIGHT: 64 IN | BODY MASS INDEX: 42.34 KG/M2

## 2024-03-06 DIAGNOSIS — M54.16 LUMBAR RADICULOPATHY: ICD-10-CM

## 2024-03-06 DIAGNOSIS — M79.672 PAIN IN BOTH FEET: ICD-10-CM

## 2024-03-06 DIAGNOSIS — M79.671 PAIN IN BOTH FEET: ICD-10-CM

## 2024-03-06 DIAGNOSIS — L84 CALLUS: ICD-10-CM

## 2024-03-06 DIAGNOSIS — G63 POLYNEUROPATHY IN OTHER DISEASES CLASSIFIED ELSEWHERE (HCC): Primary | ICD-10-CM

## 2024-03-06 PROCEDURE — 99999 PR OFFICE/OUTPT VISIT,PROCEDURE ONLY: CPT | Performed by: PODIATRIST

## 2024-03-06 PROCEDURE — 11057 PARNG/CUTG B9 HYPRKR LES >4: CPT | Performed by: PODIATRIST

## 2024-03-06 ASSESSMENT — ENCOUNTER SYMPTOMS
SHORTNESS OF BREATH: 0
NAUSEA: 0
VOMITING: 0
BACK PAIN: 1

## 2024-03-06 NOTE — PROGRESS NOTES
Procedures    [place] PARING/CUTTING BENIGN HYPERKERATOTIC LESION >4           Follow up:  Return in about 9 weeks (around 5/8/2024).    Rivas Reid DPM      Please note that this report has been partially produced using speech recognition software which may cause errors including grammar, punctuation, and spelling or words and phrases that may seem inappropriate. If there are questions or concerns please feel free to contact me for clarification.

## 2024-03-21 ENCOUNTER — OFFICE VISIT (OUTPATIENT)
Dept: FAMILY MEDICINE CLINIC | Age: 49
End: 2024-03-21
Payer: COMMERCIAL

## 2024-03-21 VITALS
SYSTOLIC BLOOD PRESSURE: 156 MMHG | DIASTOLIC BLOOD PRESSURE: 88 MMHG | BODY MASS INDEX: 43.24 KG/M2 | HEIGHT: 64 IN | HEART RATE: 83 BPM | OXYGEN SATURATION: 99 % | WEIGHT: 253.3 LBS | TEMPERATURE: 97.9 F

## 2024-03-21 DIAGNOSIS — R11.10 VOMITING AND DIARRHEA: ICD-10-CM

## 2024-03-21 DIAGNOSIS — R10.30 LOWER ABDOMINAL PAIN: ICD-10-CM

## 2024-03-21 DIAGNOSIS — K62.89 PERIRECTAL DISCOMFORT: Primary | ICD-10-CM

## 2024-03-21 DIAGNOSIS — R19.7 VOMITING AND DIARRHEA: ICD-10-CM

## 2024-03-21 PROCEDURE — 99214 OFFICE O/P EST MOD 30 MIN: CPT | Performed by: FAMILY MEDICINE

## 2024-03-21 PROCEDURE — 1036F TOBACCO NON-USER: CPT | Performed by: FAMILY MEDICINE

## 2024-03-21 PROCEDURE — 3077F SYST BP >= 140 MM HG: CPT | Performed by: FAMILY MEDICINE

## 2024-03-21 PROCEDURE — G8427 DOCREV CUR MEDS BY ELIG CLIN: HCPCS | Performed by: FAMILY MEDICINE

## 2024-03-21 PROCEDURE — G8417 CALC BMI ABV UP PARAM F/U: HCPCS | Performed by: FAMILY MEDICINE

## 2024-03-21 PROCEDURE — G8484 FLU IMMUNIZE NO ADMIN: HCPCS | Performed by: FAMILY MEDICINE

## 2024-03-21 PROCEDURE — 3079F DIAST BP 80-89 MM HG: CPT | Performed by: FAMILY MEDICINE

## 2024-03-21 RX ORDER — CHOLESTYRAMINE 4 G/9G
1 POWDER, FOR SUSPENSION ORAL 2 TIMES DAILY
Qty: 10 PACKET | Refills: 0 | Status: SHIPPED | OUTPATIENT
Start: 2024-03-21

## 2024-03-21 RX ORDER — AMOXICILLIN AND CLAVULANATE POTASSIUM 875; 125 MG/1; MG/1
1 TABLET, FILM COATED ORAL 2 TIMES DAILY
Qty: 20 TABLET | Refills: 0 | Status: SHIPPED | OUTPATIENT
Start: 2024-03-21 | End: 2024-03-31

## 2024-03-21 SDOH — ECONOMIC STABILITY: INCOME INSECURITY: HOW HARD IS IT FOR YOU TO PAY FOR THE VERY BASICS LIKE FOOD, HOUSING, MEDICAL CARE, AND HEATING?: NOT HARD AT ALL

## 2024-03-21 SDOH — ECONOMIC STABILITY: FOOD INSECURITY: WITHIN THE PAST 12 MONTHS, YOU WORRIED THAT YOUR FOOD WOULD RUN OUT BEFORE YOU GOT MONEY TO BUY MORE.: NEVER TRUE

## 2024-03-21 SDOH — ECONOMIC STABILITY: FOOD INSECURITY: WITHIN THE PAST 12 MONTHS, THE FOOD YOU BOUGHT JUST DIDN'T LAST AND YOU DIDN'T HAVE MONEY TO GET MORE.: NEVER TRUE

## 2024-03-21 ASSESSMENT — PATIENT HEALTH QUESTIONNAIRE - PHQ9
SUM OF ALL RESPONSES TO PHQ9 QUESTIONS 1 & 2: 0
SUM OF ALL RESPONSES TO PHQ QUESTIONS 1-9: 0
9. THOUGHTS THAT YOU WOULD BE BETTER OFF DEAD, OR OF HURTING YOURSELF: NOT AT ALL
3. TROUBLE FALLING OR STAYING ASLEEP: NOT AT ALL
5. POOR APPETITE OR OVEREATING: NOT AT ALL
2. FEELING DOWN, DEPRESSED OR HOPELESS: NOT AT ALL
6. FEELING BAD ABOUT YOURSELF - OR THAT YOU ARE A FAILURE OR HAVE LET YOURSELF OR YOUR FAMILY DOWN: NOT AT ALL
SUM OF ALL RESPONSES TO PHQ QUESTIONS 1-9: 0
8. MOVING OR SPEAKING SO SLOWLY THAT OTHER PEOPLE COULD HAVE NOTICED. OR THE OPPOSITE, BEING SO FIGETY OR RESTLESS THAT YOU HAVE BEEN MOVING AROUND A LOT MORE THAN USUAL: NOT AT ALL
SUM OF ALL RESPONSES TO PHQ QUESTIONS 1-9: 0
SUM OF ALL RESPONSES TO PHQ QUESTIONS 1-9: 0
1. LITTLE INTEREST OR PLEASURE IN DOING THINGS: NOT AT ALL
4. FEELING TIRED OR HAVING LITTLE ENERGY: NOT AT ALL
10. IF YOU CHECKED OFF ANY PROBLEMS, HOW DIFFICULT HAVE THESE PROBLEMS MADE IT FOR YOU TO DO YOUR WORK, TAKE CARE OF THINGS AT HOME, OR GET ALONG WITH OTHER PEOPLE: NOT DIFFICULT AT ALL
7. TROUBLE CONCENTRATING ON THINGS, SUCH AS READING THE NEWSPAPER OR WATCHING TELEVISION: NOT AT ALL

## 2024-03-21 NOTE — PROGRESS NOTES
MG per tablet     Sig: Take 1 tablet by mouth 2 times daily for 10 days     Dispense:  20 tablet     Refill:  0     There are no discontinued medications.  No follow-ups on file.        Reviewed with the patient: current clinical status, medications, activities and diet.     Side effects, adverse effects of the medication prescribed today, as well as treatment plan/ rationale and result expectations have been discussed with the patient who expresses understanding and desires to proceed.    Close follow up to evaluate treatment results and for coordination of care.  I have reviewed the patient's medical history in detail and updated the computerized patient record.    BRENDON NOBLES, DO

## 2024-03-22 RX ORDER — MULTIVITAMIN
TABLET ORAL
Qty: 90 TABLET | Refills: 0 | Status: SHIPPED | OUTPATIENT
Start: 2024-03-22

## 2024-04-02 ENCOUNTER — OFFICE VISIT (OUTPATIENT)
Dept: FAMILY MEDICINE CLINIC | Age: 49
End: 2024-04-02
Payer: COMMERCIAL

## 2024-04-02 VITALS
DIASTOLIC BLOOD PRESSURE: 80 MMHG | TEMPERATURE: 97.2 F | OXYGEN SATURATION: 96 % | SYSTOLIC BLOOD PRESSURE: 130 MMHG | HEIGHT: 64 IN | WEIGHT: 253 LBS | BODY MASS INDEX: 43.19 KG/M2 | HEART RATE: 68 BPM

## 2024-04-02 DIAGNOSIS — K61.1 PERIRECTAL ABSCESS: Primary | ICD-10-CM

## 2024-04-02 DIAGNOSIS — K62.89 PERIRECTAL DISCOMFORT: ICD-10-CM

## 2024-04-02 PROCEDURE — 3075F SYST BP GE 130 - 139MM HG: CPT | Performed by: FAMILY MEDICINE

## 2024-04-02 PROCEDURE — 1036F TOBACCO NON-USER: CPT | Performed by: FAMILY MEDICINE

## 2024-04-02 PROCEDURE — G8417 CALC BMI ABV UP PARAM F/U: HCPCS | Performed by: FAMILY MEDICINE

## 2024-04-02 PROCEDURE — G8427 DOCREV CUR MEDS BY ELIG CLIN: HCPCS | Performed by: FAMILY MEDICINE

## 2024-04-02 PROCEDURE — 99213 OFFICE O/P EST LOW 20 MIN: CPT | Performed by: FAMILY MEDICINE

## 2024-04-02 PROCEDURE — 3079F DIAST BP 80-89 MM HG: CPT | Performed by: FAMILY MEDICINE

## 2024-04-02 RX ORDER — AMOXICILLIN AND CLAVULANATE POTASSIUM 875; 125 MG/1; MG/1
1 TABLET, FILM COATED ORAL 2 TIMES DAILY
Qty: 20 TABLET | Refills: 0 | Status: SHIPPED | OUTPATIENT
Start: 2024-04-02 | End: 2024-04-12

## 2024-04-02 NOTE — PROGRESS NOTES
every 6 hours as needed for Wheezing 120 vial 5     Current Facility-Administered Medications   Medication Dose Route Frequency Provider Last Rate Last Admin    betamethasone acetate-betamethasone sodium phosphate (CELESTONE) injection 6 mg  6 mg Other Once Donnie Tanner MD        lidocaine PF 1 % injection 5 mL  5 mL IntraDERmal Once Donnie Tanner MD        sodium chloride (PF) 0.9 % injection 8 mL  8 mL Other Once Donnie Tanner MD        methylPREDNISolone acetate (DEPO-MEDROL) injection 80 mg  80 mg Intra-artICUlar Once Mahamed Hickey MD        iopamidol (ISOVUE-300) 61 % injection 2 mL  2 mL Other ONCE PRN Donnie Tanner MD           PMH, Surgical Hx, Family Hx, and Social Hx reviewed and updated.  Health Maintenance reviewed.    Objective  Vitals:    04/02/24 1612   BP: 130/80   Pulse: 68   Temp: 97.2 °F (36.2 °C)   TempSrc: Temporal   SpO2: 96%   Weight: 114.8 kg (253 lb)   Height: 1.626 m (5' 4\")     BP Readings from Last 3 Encounters:   04/02/24 130/80   03/21/24 (!) 156/88   02/07/24 120/78     Wt Readings from Last 3 Encounters:   04/02/24 114.8 kg (253 lb)   03/21/24 114.9 kg (253 lb 4.8 oz)   03/06/24 112.5 kg (248 lb)       Lab Results   Component Value Date    LABA1C 5.9 11/06/2023    LABA1C 5.5 07/12/2022    LABA1C 5.6 11/03/2021     Lab Results   Component Value Date    CREATININE 0.58 07/18/2023     Lab Results   Component Value Date    ALT 13 07/18/2023    AST 9 07/18/2023     Lab Results   Component Value Date    CHOL 181 11/06/2023    TRIG 272 (H) 11/06/2023    HDL 65 (H) 11/06/2023    LDLCALC 62 11/06/2023            Assessment & Plan   1. Perirectal abscess  -     Ambulatory referral to Colorectal Surgery  -     amoxicillin-clavulanate (AUGMENTIN) 875-125 MG per tablet; Take 1 tablet by mouth 2 times daily for 10 days, Disp-20 tablet, R-0Normal  2. Perirectal discomfort  -     Ambulatory referral to Colorectal Surgery  -     amoxicillin-clavulanate (AUGMENTIN) 875-125 MG per tablet;

## 2024-04-23 DIAGNOSIS — J45.40 MODERATE PERSISTENT ASTHMA, UNSPECIFIED WHETHER COMPLICATED: ICD-10-CM

## 2024-04-23 DIAGNOSIS — J44.9 CHRONIC OBSTRUCTIVE PULMONARY DISEASE, UNSPECIFIED COPD TYPE (HCC): ICD-10-CM

## 2024-04-23 RX ORDER — LORATADINE 10 MG/1
TABLET ORAL
Qty: 90 TABLET | Refills: 0 | Status: SHIPPED | OUTPATIENT
Start: 2024-04-23

## 2024-04-23 RX ORDER — ROSUVASTATIN CALCIUM 10 MG/1
TABLET, COATED ORAL
Qty: 90 TABLET | Refills: 0 | Status: SHIPPED | OUTPATIENT
Start: 2024-04-23

## 2024-04-23 RX ORDER — LOSARTAN POTASSIUM 50 MG/1
TABLET ORAL
Qty: 90 TABLET | Refills: 0 | Status: SHIPPED | OUTPATIENT
Start: 2024-04-23

## 2024-04-23 RX ORDER — FLUOXETINE HYDROCHLORIDE 20 MG/1
20 CAPSULE ORAL 2 TIMES DAILY
Qty: 180 CAPSULE | Refills: 0 | Status: SHIPPED | OUTPATIENT
Start: 2024-04-23

## 2024-04-23 NOTE — TELEPHONE ENCOUNTER
Rx requested:  Requested Prescriptions     Pending Prescriptions Disp Refills    albuterol sulfate HFA (PROVENTIL;VENTOLIN;PROAIR) 108 (90 Base) MCG/ACT inhaler [Pharmacy Med Name: albuterol sulfate HFA 90 mcg/actuation aerosol inhaler] 18 g 2     Sig: INHALE 2 PUFFS into the lungs EVERY 6 HOURS AS NEEDED FOR WHEEZING or SHORTNESS OF BREATH       Last Office Visit:   2/7/2024      Next Visit Date:  Future Appointments   Date Time Provider Department Center   4/30/2024  3:00 PM [facility]GUY[facility]N PFT ROOM 1 [facility]   5/7/2024  8:45 AM Josef Reeves MD [facility]   5/8/2024  8:15 AM Rodolfo Souza MD [facility]   6/5/2024 11:30 AM Rivas Reid DPJOSE EDUARDO [facility]

## 2024-04-24 RX ORDER — ALBUTEROL SULFATE 90 UG/1
AEROSOL, METERED RESPIRATORY (INHALATION)
Qty: 18 G | Refills: 2 | Status: SHIPPED | OUTPATIENT
Start: 2024-04-24

## 2024-04-30 ENCOUNTER — HOSPITAL ENCOUNTER (OUTPATIENT)
Dept: GENERAL RADIOLOGY | Age: 49
Discharge: HOME | End: 2024-05-02
Attending: INTERNAL MEDICINE
Payer: COMMERCIAL

## 2024-04-30 ENCOUNTER — HOSPITAL ENCOUNTER (OUTPATIENT)
Dept: PULMONOLOGY | Age: 49
Discharge: HOME | End: 2024-04-30
Payer: COMMERCIAL

## 2024-04-30 DIAGNOSIS — J45.40 MODERATE PERSISTENT ASTHMA, UNSPECIFIED WHETHER COMPLICATED: ICD-10-CM

## 2024-04-30 PROCEDURE — 94726 PLETHYSMOGRAPHY LUNG VOLUMES: CPT

## 2024-04-30 PROCEDURE — 71046 X-RAY EXAM CHEST 2 VIEWS: CPT

## 2024-04-30 PROCEDURE — 94060 EVALUATION OF WHEEZING: CPT

## 2024-04-30 PROCEDURE — 94729 DIFFUSING CAPACITY: CPT

## 2024-04-30 PROCEDURE — 6360000002 HC RX W HCPCS

## 2024-04-30 RX ORDER — ALBUTEROL SULFATE 2.5 MG/3ML
SOLUTION RESPIRATORY (INHALATION)
Status: COMPLETED
Start: 2024-04-30 | End: 2024-04-30

## 2024-04-30 RX ADMIN — ALBUTEROL SULFATE 2.5 MG: 2.5 SOLUTION RESPIRATORY (INHALATION) at 15:13

## 2024-05-01 PROCEDURE — 94729 DIFFUSING CAPACITY: CPT | Performed by: INTERNAL MEDICINE

## 2024-05-01 PROCEDURE — 94060 EVALUATION OF WHEEZING: CPT | Performed by: INTERNAL MEDICINE

## 2024-05-01 PROCEDURE — 94726 PLETHYSMOGRAPHY LUNG VOLUMES: CPT | Performed by: INTERNAL MEDICINE

## 2024-05-01 NOTE — PROCEDURES
[address] [address]                    PULMONARY FUNCTION  Angeli WASHINGTON Arvinrhiannonal   48 y.o.   female     Test interpretation     Spirometry essentially normal with significant response to bronchodilator  Lung volume shows air trapping  ERV is reduced due to obesity  Diffusion capacity is normal  Consider asthma in light of significant response to bronchodilator.    Clinical correlation is recommended     Clarisa Curry MD [facility], 5/1/2024 12:29 PM

## 2024-05-08 ENCOUNTER — OFFICE VISIT (OUTPATIENT)
Dept: FAMILY MEDICINE CLINIC | Age: 49
End: 2024-05-08
Payer: COMMERCIAL

## 2024-05-08 VITALS
HEART RATE: 92 BPM | HEIGHT: 64 IN | DIASTOLIC BLOOD PRESSURE: 84 MMHG | TEMPERATURE: 97.9 F | BODY MASS INDEX: 42.85 KG/M2 | SYSTOLIC BLOOD PRESSURE: 130 MMHG | WEIGHT: 251 LBS | OXYGEN SATURATION: 97 %

## 2024-05-08 DIAGNOSIS — E55.9 VITAMIN D DEFICIENCY: ICD-10-CM

## 2024-05-08 DIAGNOSIS — R73.03 PRE-DIABETES: ICD-10-CM

## 2024-05-08 DIAGNOSIS — F31.75 BIPOLAR DISORDER, IN PARTIAL REMISSION, MOST RECENT EPISODE DEPRESSED (HCC): ICD-10-CM

## 2024-05-08 DIAGNOSIS — E78.00 PURE HYPERCHOLESTEROLEMIA: ICD-10-CM

## 2024-05-08 DIAGNOSIS — J44.9 CHRONIC OBSTRUCTIVE PULMONARY DISEASE, UNSPECIFIED COPD TYPE (HCC): ICD-10-CM

## 2024-05-08 DIAGNOSIS — I10 ESSENTIAL HYPERTENSION: Primary | ICD-10-CM

## 2024-05-08 PROCEDURE — G8427 DOCREV CUR MEDS BY ELIG CLIN: HCPCS | Performed by: FAMILY MEDICINE

## 2024-05-08 PROCEDURE — 99214 OFFICE O/P EST MOD 30 MIN: CPT | Performed by: FAMILY MEDICINE

## 2024-05-08 PROCEDURE — 1036F TOBACCO NON-USER: CPT | Performed by: FAMILY MEDICINE

## 2024-05-08 PROCEDURE — 3075F SYST BP GE 130 - 139MM HG: CPT | Performed by: FAMILY MEDICINE

## 2024-05-08 PROCEDURE — 3079F DIAST BP 80-89 MM HG: CPT | Performed by: FAMILY MEDICINE

## 2024-05-08 PROCEDURE — G8417 CALC BMI ABV UP PARAM F/U: HCPCS | Performed by: FAMILY MEDICINE

## 2024-05-08 PROCEDURE — 3023F SPIROM DOC REV: CPT | Performed by: FAMILY MEDICINE

## 2024-05-08 ASSESSMENT — ENCOUNTER SYMPTOMS: COUGH: 0

## 2024-05-08 NOTE — PROGRESS NOTES
130/84   Pulse 92   Temp 97.9 °F (36.6 °C)   Ht 1.626 m (5' 4\")   Wt 113.9 kg (251 lb)   SpO2 97%   BMI 43.08 kg/m²     Physical Exam  Neck:no carotid bruits.  No masses.  No adenopathy. No thyroid asymmetry.    Lungs:clear and equal breath sounds.  No wheezes or rales.    Heart:rate reg. No murmur.  No gallops   Pulses:Radials 2+ equal               Poster tib 1+ equal  Extremities: trace pitting edema of both legs   Gen:   In no acute distress  Abdomen;  B.S present. Soft  Non tender. No hepatosplenomegaly. No masses    Assessment:       Diagnosis Orders   1. Essential hypertension        2. Pure hypercholesterolemia        3. Pre-diabetes        4. Vitamin D deficiency        5. Bipolar disorder, in partial remission, most recent episode depressed (HCC)        6. Chronic obstructive pulmonary disease, unspecified COPD type (HCC)               Plan:    Continue crestor for lipids and losartan for htn and otc vitamin d supplement   Bipolar disorder fairly stable-continue current tx thru psychiatry  Copd stable and followed by pulmonary-continue current tx  Continue to watch diet and increase activity   Fasting blood work soon-f/u after done

## 2024-05-21 DIAGNOSIS — E55.9 VITAMIN D DEFICIENCY: ICD-10-CM

## 2024-05-22 DIAGNOSIS — I10 ESSENTIAL HYPERTENSION: ICD-10-CM

## 2024-05-22 DIAGNOSIS — Z98.84 HISTORY OF BARIATRIC SURGERY: ICD-10-CM

## 2024-05-22 DIAGNOSIS — R73.03 PRE-DIABETES: ICD-10-CM

## 2024-05-22 DIAGNOSIS — E78.00 PURE HYPERCHOLESTEROLEMIA: ICD-10-CM

## 2024-05-22 LAB
ALBUMIN SERPL-MCNC: 4.1 G/DL (ref 3.5–4.6)
ALP SERPL-CCNC: 68 U/L (ref 40–130)
ALT SERPL-CCNC: 12 U/L (ref 0–33)
ANION GAP SERPL CALCULATED.3IONS-SCNC: 11 MEQ/L (ref 9–15)
AST SERPL-CCNC: 6 U/L (ref 0–35)
BASOPHILS # BLD: 0.1 K/UL (ref 0–0.2)
BASOPHILS NFR BLD: 1.1 %
BILIRUB SERPL-MCNC: 0.5 MG/DL (ref 0.2–0.7)
BUN SERPL-MCNC: 16 MG/DL (ref 6–20)
CALCIUM SERPL-MCNC: 9 MG/DL (ref 8.5–9.9)
CHLORIDE SERPL-SCNC: 103 MEQ/L (ref 95–107)
CHOLEST SERPL-MCNC: 173 MG/DL (ref 0–199)
CO2 SERPL-SCNC: 25 MEQ/L (ref 20–31)
CREAT SERPL-MCNC: 0.64 MG/DL (ref 0.5–0.9)
EOSINOPHIL # BLD: 0.3 K/UL (ref 0–0.7)
EOSINOPHIL NFR BLD: 4.1 %
ERYTHROCYTE [DISTWIDTH] IN BLOOD BY AUTOMATED COUNT: 12.8 % (ref 11.5–14.5)
ESTIMATED AVERAGE GLUCOSE: 117 MG/DL
GLOBULIN SER CALC-MCNC: 2.8 G/DL (ref 2.3–3.5)
GLUCOSE SERPL-MCNC: 124 MG/DL (ref 70–99)
HBA1C MFR BLD: 5.7 % (ref 4–6)
HCT VFR BLD AUTO: 37.9 % (ref 37–47)
HDLC SERPL-MCNC: 32 MG/DL (ref 40–59)
HGB BLD-MCNC: 12.4 G/DL (ref 12–16)
LDLC SERPL CALC-MCNC: 66 MG/DL (ref 0–129)
LYMPHOCYTES # BLD: 1.4 K/UL (ref 1–4.8)
LYMPHOCYTES NFR BLD: 22.5 %
MCH RBC QN AUTO: 28.3 PG (ref 27–31.3)
MCHC RBC AUTO-ENTMCNC: 32.7 % (ref 33–37)
MCV RBC AUTO: 86.5 FL (ref 79.4–94.8)
MONOCYTES # BLD: 0.5 K/UL (ref 0.2–0.8)
MONOCYTES NFR BLD: 8.1 %
NEUTROPHILS # BLD: 3.9 K/UL (ref 1.4–6.5)
NEUTS SEG NFR BLD: 64 %
PLATELET # BLD AUTO: 313 K/UL (ref 130–400)
POTASSIUM SERPL-SCNC: 4 MEQ/L (ref 3.4–4.9)
PROT SERPL-MCNC: 6.9 G/DL (ref 6.3–8)
RBC # BLD AUTO: 4.38 M/UL (ref 4.2–5.4)
SODIUM SERPL-SCNC: 139 MEQ/L (ref 135–144)
TRIGL SERPL-MCNC: 373 MG/DL (ref 0–150)
WBC # BLD AUTO: 6.1 K/UL (ref 4.8–10.8)

## 2024-05-22 RX ORDER — CHOLECALCIFEROL TAB 125 MCG (5000 UNIT) 125 MCG (5000 UT)
1 TAB DAILY
Qty: 90 TABLET | Refills: 0 | Status: SHIPPED | OUTPATIENT
Start: 2024-05-22

## 2024-05-23 LAB
FOLATE: 9.8 NG/ML (ref 4.8–24.2)
VITAMIN B-12: 628 PG/ML (ref 232–1245)
VITAMIN D 25-HYDROXY: 33.4 NG/ML (ref 30–100)

## 2024-05-30 ENCOUNTER — OFFICE VISIT (OUTPATIENT)
Dept: OBGYN CLINIC | Age: 49
End: 2024-05-30
Payer: COMMERCIAL

## 2024-05-30 VITALS
DIASTOLIC BLOOD PRESSURE: 84 MMHG | WEIGHT: 251 LBS | BODY MASS INDEX: 41.82 KG/M2 | SYSTOLIC BLOOD PRESSURE: 132 MMHG | HEIGHT: 65 IN

## 2024-05-30 DIAGNOSIS — N76.0 OTHER VAGINITIS: ICD-10-CM

## 2024-05-30 DIAGNOSIS — Z11.3 ROUTINE SCREENING FOR STI (SEXUALLY TRANSMITTED INFECTION): ICD-10-CM

## 2024-05-30 DIAGNOSIS — Z12.31 SCREENING MAMMOGRAM FOR BREAST CANCER: ICD-10-CM

## 2024-05-30 DIAGNOSIS — Z12.4 CERVICAL CANCER SCREENING: ICD-10-CM

## 2024-05-30 DIAGNOSIS — Z01.419 ENCOUNTER FOR WELL WOMAN EXAM WITH ROUTINE GYNECOLOGICAL EXAM: ICD-10-CM

## 2024-05-30 DIAGNOSIS — Z11.51 SCREENING FOR HUMAN PAPILLOMAVIRUS: ICD-10-CM

## 2024-05-30 DIAGNOSIS — Z72.51 UNPROTECTED SEX: ICD-10-CM

## 2024-05-30 DIAGNOSIS — Z01.419 ENCOUNTER FOR WELL WOMAN EXAM WITH ROUTINE GYNECOLOGICAL EXAM: Primary | ICD-10-CM

## 2024-05-30 DIAGNOSIS — N92.1 PROLONGED MENSTRUAL CYCLE: ICD-10-CM

## 2024-05-30 PROCEDURE — 99396 PREV VISIT EST AGE 40-64: CPT | Performed by: OBSTETRICS & GYNECOLOGY

## 2024-05-30 PROCEDURE — G8417 CALC BMI ABV UP PARAM F/U: HCPCS | Performed by: OBSTETRICS & GYNECOLOGY

## 2024-05-30 PROCEDURE — 3079F DIAST BP 80-89 MM HG: CPT | Performed by: OBSTETRICS & GYNECOLOGY

## 2024-05-30 PROCEDURE — G8427 DOCREV CUR MEDS BY ELIG CLIN: HCPCS | Performed by: OBSTETRICS & GYNECOLOGY

## 2024-05-30 PROCEDURE — 1036F TOBACCO NON-USER: CPT | Performed by: OBSTETRICS & GYNECOLOGY

## 2024-05-30 PROCEDURE — 99213 OFFICE O/P EST LOW 20 MIN: CPT | Performed by: OBSTETRICS & GYNECOLOGY

## 2024-05-30 PROCEDURE — 3075F SYST BP GE 130 - 139MM HG: CPT | Performed by: OBSTETRICS & GYNECOLOGY

## 2024-05-30 ASSESSMENT — VISUAL ACUITY: OU: 1

## 2024-05-30 ASSESSMENT — ENCOUNTER SYMPTOMS
ALLERGIC/IMMUNOLOGIC NEGATIVE: 1
RECTAL PAIN: 0
ANAL BLEEDING: 0
VOMITING: 0
ABDOMINAL PAIN: 0
BLOOD IN STOOL: 0
ABDOMINAL DISTENTION: 0
CONSTIPATION: 0
EYES NEGATIVE: 1
RESPIRATORY NEGATIVE: 1
NAUSEA: 0
DIARRHEA: 0

## 2024-05-30 NOTE — PROGRESS NOTES
The patient was asked if she would like a chaperone present for her intimate exam. She  Declined the chaperone. Terell Barba CMA (AAMA)    
normal...
PAP SMEAR      2. Cervical cancer screening  PAP SMEAR      3. Screening for human papillomavirus  PAP SMEAR      4. Screening mammogram for breast cancer  XIN DALIA DIGITAL SCREEN BILATERAL      5. Other vaginitis  Wet prep, genital    C.trachomatis N.gonorrhoeae DNA      6. Unprotected sex  Wet prep, genital    C.trachomatis N.gonorrhoeae DNA      7. Routine screening for STI (sexually transmitted infection)  Wet prep, genital    C.trachomatis N.gonorrhoeae DNA      8. Prolonged menstrual cycle              Plan:      Medications placedthis encounter:  No orders of the defined types were placed in this encounter.        Orders placedthis encounter:  Orders Placed This Encounter   Procedures    Wet prep, genital     Standing Status:   Future     Standing Expiration Date:   2025    C.trachomatis N.gonorrhoeae DNA     Standing Status:   Future     Standing Expiration Date:   2025    XIN DALIA DIGITAL SCREEN BILATERAL     Standing Status:   Future     Standing Expiration Date:   2025    PAP SMEAR     Patient History:    No LMP recorded.  OBGYN Status: Having periods  Past Surgical History:  : BREAST BIOPSY; Left      Comment:  B-9  : BREAST REDUCTION SURGERY; Bilateral  : BREAST SURGERY      Comment:  Dr Humberto lopez  10/20/2015: COLONOSCOPY      Comment:    No date: GYNECOLOGIC CRYOSURGERY  No date: HERNIA REPAIR; Right      Comment:  Obdulio Ferris  2020: SLEEVE GASTRECTOMY      Comment:  Dr Calista Baird  No date: TUBAL LIGATION      Social History    Tobacco Use      Smoking status: Former        Packs/day: 0.00        Years: 0.5 packs/day for 30.0 years (15.0 ttl pk-yrs)        Types: Cigarettes        Start date: 1988        Quit date: 2018        Years since quittin.8        Passive exposure: Past      Smokeless tobacco: Never       Standing Status:   Future     Standing Expiration Date:   2025     Order Specific Question:   Collection

## 2024-05-31 LAB
CLUE CELLS VAG QL WET PREP: NORMAL
T VAGINALIS VAG QL WET PREP: NORMAL
TRICHOMONAS VAGINALIS SCREEN: NEGATIVE
YEAST VAG QL WET PREP: NORMAL

## 2024-06-04 LAB
C TRACH DNA CVX QL NAA+PROBE: NEGATIVE
HPV HR 12 DNA SPEC QL NAA+PROBE: DETECTED
HPV16 DNA SPEC QL NAA+PROBE: NOT DETECTED
HPV16+18+H RISK 12 DNA SPEC-IMP: ABNORMAL
HPV18 DNA SPEC QL NAA+PROBE: NOT DETECTED
N GONORRHOEA DNA CERV MUCUS QL NAA+PROBE: NEGATIVE

## 2024-06-05 ENCOUNTER — OFFICE VISIT (OUTPATIENT)
Dept: PODIATRY | Age: 49
End: 2024-06-05
Payer: COMMERCIAL

## 2024-06-05 VITALS — WEIGHT: 250 LBS | BODY MASS INDEX: 42.68 KG/M2 | TEMPERATURE: 97.1 F | HEIGHT: 64 IN

## 2024-06-05 DIAGNOSIS — L84 CALLUS: ICD-10-CM

## 2024-06-05 DIAGNOSIS — M54.16 LUMBAR RADICULOPATHY: ICD-10-CM

## 2024-06-05 DIAGNOSIS — M79.671 PAIN IN BOTH FEET: ICD-10-CM

## 2024-06-05 DIAGNOSIS — M79.672 PAIN IN BOTH FEET: ICD-10-CM

## 2024-06-05 DIAGNOSIS — G63 POLYNEUROPATHY IN OTHER DISEASES CLASSIFIED ELSEWHERE (HCC): Primary | ICD-10-CM

## 2024-06-05 PROCEDURE — 99999 PR OFFICE/OUTPT VISIT,PROCEDURE ONLY: CPT | Performed by: PODIATRIST

## 2024-06-05 PROCEDURE — 11057 PARNG/CUTG B9 HYPRKR LES >4: CPT | Performed by: PODIATRIST

## 2024-06-05 ASSESSMENT — ENCOUNTER SYMPTOMS
VOMITING: 0
BACK PAIN: 1
NAUSEA: 0
SHORTNESS OF BREATH: 0

## 2024-06-05 NOTE — PROGRESS NOTES
alert and oriented to person, place, and time.      Deep Tendon Reflexes: Babinski sign absent on the right side. Babinski sign absent on the left side.      Reflex Scores:       Patellar reflexes are 2+ on the right side and 2+ on the left side.       Achilles reflexes are 2+ on the right side and 2+ on the left side.     Comments: No ankle clonus noted bilaterally.  Sharp versus dull discrimination is intact bilaterally.    No radiating pain elicited with percussion of cutaneous nerves bilateral lower extremity.  Diminished vibratory sensation noted bilaterally.   Psychiatric:         Mood and Affect: Mood normal.         Behavior: Behavior normal.         Assessment:      Diagnosis Orders   1. Polyneuropathy in other diseases classified elsewhere (HCC)  MO PARING/CUTTING BENIGN HYPERKERATOTIC LESION >4      2. Lumbar radiculopathy  MO PARING/CUTTING BENIGN HYPERKERATOTIC LESION >4      3. Callus  MO PARING/CUTTING BENIGN HYPERKERATOTIC LESION >4      4. Pain in both feet  MO PARING/CUTTING BENIGN HYPERKERATOTIC LESION >4          Plan:     Calluses: The hyperkeratotic lesions located at the hallux IPJ, first MPJ, second MPJ, and fifth MPJ bilateral foot were pared to the level of normal skin with a #15 blade scalpel without incident. The patient is instructed to monitor for signs of infection and call immediately if these arise. The patient is instructed to apply previously prescribed keratolytic cream to the callussed areas daily.  Patient instructed to continue use of the custom orthotics daily.    Orders Placed This Encounter   Procedures    MO PARING/CUTTING BENIGN HYPERKERATOTIC LESION >4           Follow up:  Return in about 3 months (around 9/5/2024).    Rivas Reid DPM      Please note that this report has been partially produced using speech recognition software which may cause errors including grammar, punctuation, and spelling or words and phrases that may seem inappropriate. If there are questions

## 2024-06-13 ENCOUNTER — OFFICE VISIT (OUTPATIENT)
Dept: FAMILY MEDICINE CLINIC | Age: 49
End: 2024-06-13
Payer: COMMERCIAL

## 2024-06-13 VITALS
TEMPERATURE: 97.6 F | DIASTOLIC BLOOD PRESSURE: 82 MMHG | HEART RATE: 97 BPM | HEIGHT: 64 IN | BODY MASS INDEX: 42.68 KG/M2 | OXYGEN SATURATION: 99 % | SYSTOLIC BLOOD PRESSURE: 120 MMHG | WEIGHT: 250 LBS

## 2024-06-13 DIAGNOSIS — E78.00 PURE HYPERCHOLESTEROLEMIA: Primary | ICD-10-CM

## 2024-06-13 DIAGNOSIS — R73.03 PRE-DIABETES: ICD-10-CM

## 2024-06-13 PROCEDURE — G8417 CALC BMI ABV UP PARAM F/U: HCPCS | Performed by: FAMILY MEDICINE

## 2024-06-13 PROCEDURE — 1036F TOBACCO NON-USER: CPT | Performed by: FAMILY MEDICINE

## 2024-06-13 PROCEDURE — 99213 OFFICE O/P EST LOW 20 MIN: CPT | Performed by: FAMILY MEDICINE

## 2024-06-13 PROCEDURE — 3074F SYST BP LT 130 MM HG: CPT | Performed by: FAMILY MEDICINE

## 2024-06-13 PROCEDURE — 3079F DIAST BP 80-89 MM HG: CPT | Performed by: FAMILY MEDICINE

## 2024-06-13 PROCEDURE — G8427 DOCREV CUR MEDS BY ELIG CLIN: HCPCS | Performed by: FAMILY MEDICINE

## 2024-06-13 RX ORDER — ROSUVASTATIN CALCIUM 20 MG/1
20 TABLET, COATED ORAL NIGHTLY
Qty: 90 TABLET | Refills: 0 | Status: SHIPPED | OUTPATIENT
Start: 2024-06-13

## 2024-06-13 ASSESSMENT — ENCOUNTER SYMPTOMS
ABDOMINAL PAIN: 0
VOMITING: 0

## 2024-06-13 NOTE — PROGRESS NOTES
lidocaine PF 1 % injection 5 mL  5 mL IntraDERmal Once Donnie Tanner MD        sodium chloride (PF) 0.9 % injection 8 mL  8 mL Other Once Donnie Tanner MD        methylPREDNISolone acetate (DEPO-MEDROL) injection 80 mg  80 mg Intra-artICUlar Once Mahamed Hickey MD        iopamidol (ISOVUE-300) 61 % injection 2 mL  2 mL Other ONCE PRN Donnie Tanner MD         Family History   Problem Relation Age of Onset    Osteoarthritis Mother     Other Mother         hyperlipidemia    Hypertension Mother     High Cholesterol Mother     Arthritis Mother     Heart Disease Mother     Diabetes Mother     No Known Problems Father      Past Medical History:   Diagnosis Date    Abnormal Pap smear of cervix     Allergic rhinitis     several allergens    Asthma     since shildhood    Bariatric surgery status 01/02/2020    Dr Calista Baird     Brachial neuralgia 1/23/2014    Chronic left shoulder pain     COPD (chronic obstructive pulmonary disease) ([facility]) 2019    Dr Stein    Depression     H/O colonoscopy 2014    Dr. De Los Santos    Hyperlipidemia     Hypertension 2010    Migraine headache     Obesity, morbid (more than 100 lbs over ideal weight or BMI > 40) ([facility])     SEJAL on CPAP 10/2018    Sleep apnea      Objective:   /82   Pulse 97   Temp 97.6 °F (36.4 °C)   Ht 1.626 m (5' 4\")   Wt 113.4 kg (250 lb)   LMP 05/09/2024   SpO2 99%   BMI 42.91 kg/m²     Physical Exam  No exam -reviewed vitals   Assessment:       Diagnosis Orders   1. Pure hypercholesterolemia  Lipid Panel    Comprehensive Metabolic Panel      2. Pre-diabetes  Comprehensive Metabolic Panel    Hemoglobin A1C             Plan:      Patient to work on diet and increase activity  Increase crestor   Orders Placed This Encounter   Medications    rosuvastatin (CRESTOR) 20 MG tablet     Sig: Take 1 tablet by mouth nightly     Dispense:  90 tablet     Refill:  0    Fasting blood work in 3 months and f/u after done

## 2024-07-08 RX ORDER — PANTOPRAZOLE SODIUM 40 MG/1
TABLET, DELAYED RELEASE ORAL
Qty: 45 TABLET | Refills: 0 | Status: SHIPPED | OUTPATIENT
Start: 2024-07-08

## 2024-07-08 RX ORDER — MULTIVITAMIN
TABLET ORAL
Qty: 90 TABLET | Refills: 0 | Status: SHIPPED | OUTPATIENT
Start: 2024-07-08

## 2024-07-31 ENCOUNTER — TELEPHONE (OUTPATIENT)
Dept: PAIN MANAGEMENT | Age: 49
End: 2024-07-31

## 2024-07-31 ENCOUNTER — OFFICE VISIT (OUTPATIENT)
Dept: FAMILY MEDICINE CLINIC | Age: 49
End: 2024-07-31
Payer: COMMERCIAL

## 2024-07-31 VITALS
OXYGEN SATURATION: 97 % | DIASTOLIC BLOOD PRESSURE: 78 MMHG | BODY MASS INDEX: 42.91 KG/M2 | HEIGHT: 64 IN | HEART RATE: 78 BPM | SYSTOLIC BLOOD PRESSURE: 120 MMHG | TEMPERATURE: 97.1 F

## 2024-07-31 DIAGNOSIS — R20.2 BILATERAL ARM NUMBNESS AND TINGLING WHILE SLEEPING: ICD-10-CM

## 2024-07-31 DIAGNOSIS — R20.0 BILATERAL ARM NUMBNESS AND TINGLING WHILE SLEEPING: ICD-10-CM

## 2024-07-31 DIAGNOSIS — G56.03 BILATERAL CARPAL TUNNEL SYNDROME: ICD-10-CM

## 2024-07-31 DIAGNOSIS — M54.2 NECK PAIN ON RIGHT SIDE: Primary | ICD-10-CM

## 2024-07-31 DIAGNOSIS — Z98.84 HISTORY OF BARIATRIC SURGERY: ICD-10-CM

## 2024-07-31 PROCEDURE — 1036F TOBACCO NON-USER: CPT | Performed by: FAMILY MEDICINE

## 2024-07-31 PROCEDURE — G8427 DOCREV CUR MEDS BY ELIG CLIN: HCPCS | Performed by: FAMILY MEDICINE

## 2024-07-31 PROCEDURE — 3074F SYST BP LT 130 MM HG: CPT | Performed by: FAMILY MEDICINE

## 2024-07-31 PROCEDURE — 99213 OFFICE O/P EST LOW 20 MIN: CPT | Performed by: FAMILY MEDICINE

## 2024-07-31 PROCEDURE — G8417 CALC BMI ABV UP PARAM F/U: HCPCS | Performed by: FAMILY MEDICINE

## 2024-07-31 PROCEDURE — 3078F DIAST BP <80 MM HG: CPT | Performed by: FAMILY MEDICINE

## 2024-07-31 RX ORDER — CYCLOBENZAPRINE HCL 10 MG
10 TABLET ORAL NIGHTLY PRN
Qty: 10 TABLET | Refills: 0 | Status: SHIPPED | OUTPATIENT
Start: 2024-07-31 | End: 2024-08-10

## 2024-08-01 DIAGNOSIS — J45.40 MODERATE PERSISTENT ASTHMA, UNSPECIFIED WHETHER COMPLICATED: ICD-10-CM

## 2024-08-01 DIAGNOSIS — J44.9 CHRONIC OBSTRUCTIVE PULMONARY DISEASE, UNSPECIFIED COPD TYPE (HCC): ICD-10-CM

## 2024-08-06 RX ORDER — ROSUVASTATIN CALCIUM 10 MG/1
TABLET, COATED ORAL
Qty: 90 TABLET | Refills: 0 | Status: SHIPPED | OUTPATIENT
Start: 2024-08-06

## 2024-08-06 RX ORDER — LOSARTAN POTASSIUM 50 MG/1
TABLET ORAL
Qty: 90 TABLET | Refills: 0 | Status: SHIPPED | OUTPATIENT
Start: 2024-08-06

## 2024-08-06 RX ORDER — FLUOXETINE HYDROCHLORIDE 20 MG/1
20 CAPSULE ORAL 2 TIMES DAILY
Qty: 180 CAPSULE | Refills: 0 | Status: SHIPPED | OUTPATIENT
Start: 2024-08-06

## 2024-08-06 RX ORDER — LORATADINE 10 MG/1
TABLET ORAL
Qty: 90 TABLET | Refills: 0 | Status: SHIPPED | OUTPATIENT
Start: 2024-08-06

## 2024-08-06 NOTE — TELEPHONE ENCOUNTER
Future Appointments    Encounter Information   Provider Department Appt Notes   8/21/2024 Donnie Tanner MD [facility] Pain Management C:  M54.2 (ICD-10-CM) - Neck pain on right side  R20.0, R20.2 (ICD-10-CM) - Bilateral arm numbness and tingling while sleeping   8/22/2024 Christina Keyes MD [facility] Neurosurgery, a department of [facility] C: Santiago Rojas Bilateral carpal tunnel syndrome  NEW LOCATION CONFIRMED/ MYCHART SENT   9/11/2024 Rivas Reid DPM [facility] Podiatry 3 month follow u   9/13/2024 Rodolfo Souza MD [facility] Primary and Specialty Care 3 Month Follow Up   6/3/2025 Ira Williamson MD [facility] OB/Gyn pap     Past Visits    Date Provider Specialty Visit Type Primary Dx   07/31/2024 Rodolfo Souza MD Family Medicine Office Visit Neck pain on right side   06/13/2024 Rodolfo Souza MD Family Medicine Office Visit Pure hypercholesterolemia   06/05/2024 Rivas Reid DPM Podiatry Office Visit Polyneuropathy in other diseases classified elsewhere (HCC)   05/30/2024 Ira Williamson MD Obstetrics and Gynecology Office Visit Encounter for well woman exam with routine gynecological exam   05/08/2024 Rodolfo Souza MD Family Medicine Office Visit Essential hypertension

## 2024-08-20 NOTE — PROGRESS NOTES
Patient Name: Angeli Padilla : 1975        Date: 2024      Type of Appt: Consult    Reason for appt: C: Per Maye Lea Bilateral carpal tunnel syndrome     Referred by: MAYE LEA    Studies done: 24 [facility]ray [facility] Cerv[facility]    Physical therapy:  no    Smoking:  No                        [facility]  [address] [address]  P: [phone]  F: [phone]          Patient:  Angeli Padilla  YOB: 1975  Date: 2024    The patient is a 49 y.o. female who presents today for evaluation of the following problems:     Chief Complaint   Patient presents with    Consultation    Carpal Tunnel        Referred by Maye Lea APRN *      PAST MEDICAL, FAMILY AND SOCIAL HISTORY:  Past Medical History:   Diagnosis Date    Abnormal Pap smear of cervix     Allergic rhinitis     several allergens    Asthma     since shildhood    Bariatric surgery status 2020    Dr Calista Baird     Brachial neuralgia 2014    Chronic left shoulder pain     COPD (chronic obstructive pulmonary disease) ([facility])     Dr Stein    Depression     H/O colonoscopy     Dr. De Los Santos    Hyperlipidemia     Hypertension     Migraine headache     Obesity, morbid (more than 100 lbs over ideal weight or BMI > 40) ([facility])     SEJAL on CPAP 10/2018    Sleep apnea      Past Surgical History:   Procedure Laterality Date    BREAST BIOPSY Left     B-9    BREAST REDUCTION SURGERY Bilateral 2007    BREAST SURGERY  2007    reduction, Dr Paul    COLONOSCOPY  10/20/2015        GYNECOLOGIC CRYOSURGERY      HERNIA REPAIR Right     Obdulio Ferris    SLEEVE GASTRECTOMY  2020    Dr Calista Baird    TUBAL LIGATION       Family History   Problem Relation Age of Onset    Osteoarthritis Mother     Other Mother         hyperlipidemia    Hypertension Mother     High Cholesterol Mother     Arthritis Mother     Heart Disease Mother

## 2024-08-21 ENCOUNTER — OFFICE VISIT (OUTPATIENT)
Dept: PAIN MANAGEMENT | Age: 49
End: 2024-08-21
Payer: COMMERCIAL

## 2024-08-21 VITALS
SYSTOLIC BLOOD PRESSURE: 146 MMHG | TEMPERATURE: 97.1 F | HEIGHT: 64 IN | DIASTOLIC BLOOD PRESSURE: 90 MMHG | BODY MASS INDEX: 43.02 KG/M2 | WEIGHT: 252 LBS

## 2024-08-21 DIAGNOSIS — M47.817 LUMBOSACRAL SPONDYLOSIS WITHOUT MYELOPATHY: ICD-10-CM

## 2024-08-21 DIAGNOSIS — Z90.3 H/O GASTRIC SLEEVE: ICD-10-CM

## 2024-08-21 DIAGNOSIS — R29.818 LHERMITTE'S SIGN POSITIVE: ICD-10-CM

## 2024-08-21 DIAGNOSIS — M47.812 CERVICAL SPONDYLOSIS WITHOUT MYELOPATHY: ICD-10-CM

## 2024-08-21 DIAGNOSIS — M79.601 RIGHT ARM PAIN: ICD-10-CM

## 2024-08-21 DIAGNOSIS — M79.602 LEFT ARM PAIN: ICD-10-CM

## 2024-08-21 DIAGNOSIS — M43.10 RETROLISTHESIS OF VERTEBRAE: ICD-10-CM

## 2024-08-21 DIAGNOSIS — R20.0 BILATERAL HAND NUMBNESS: ICD-10-CM

## 2024-08-21 DIAGNOSIS — G56.03 BILATERAL CARPAL TUNNEL SYNDROME: Primary | ICD-10-CM

## 2024-08-21 PROCEDURE — G8417 CALC BMI ABV UP PARAM F/U: HCPCS | Performed by: PHYSICAL MEDICINE & REHABILITATION

## 2024-08-21 PROCEDURE — 3080F DIAST BP >= 90 MM HG: CPT | Performed by: PHYSICAL MEDICINE & REHABILITATION

## 2024-08-21 PROCEDURE — G8427 DOCREV CUR MEDS BY ELIG CLIN: HCPCS | Performed by: PHYSICAL MEDICINE & REHABILITATION

## 2024-08-21 PROCEDURE — 99214 OFFICE O/P EST MOD 30 MIN: CPT | Performed by: PHYSICAL MEDICINE & REHABILITATION

## 2024-08-21 PROCEDURE — 1036F TOBACCO NON-USER: CPT | Performed by: PHYSICAL MEDICINE & REHABILITATION

## 2024-08-21 PROCEDURE — 3077F SYST BP >= 140 MM HG: CPT | Performed by: PHYSICAL MEDICINE & REHABILITATION

## 2024-08-21 ASSESSMENT — ENCOUNTER SYMPTOMS
SHORTNESS OF BREATH: 0
DIARRHEA: 0
NAUSEA: 0
CONSTIPATION: 0
BACK PAIN: 1

## 2024-08-21 NOTE — PROGRESS NOTES
bracing is not a replacement for core strengthening or muscle stabilization.    Discussed the elevated risks of excessive sedation while on pain medications. Advised her against driving or operating heavy machinery or performing any activities where she may harm herself or others while on pain medications. Particular caution was emphasized especially during dose adjustments and medication changes. Discussed the elevated risks of respiratory depression and death while on opioid medications, especially when combined with other sedative substances.     Discussed the risks of temporary disability, permanent disability, morbidity, and mortality with poorly-managed or undiagnosed medical conditions and comorbidities. Emphasized the importance of timely medical evaluation and treatment as previously recommended by us or other medical professionals. Risks of not pursing these recommendations were emphasized. The patient was offered a treatment at our facility. The physician and patient have discussed in detail the risk of exposure to and/or potential harm posed by the COVID-19 virus with having office visits and procedures at this time versus the risk of delaying the visits and procedures. It is not possible to know either the risk of delaying the visits or procedure or chance of getting an infection with perfect accuracy, but a joint decision was made between the patient and the physician to proceed at this time with the scheduled visits and procedures.    Advised her that any lab testing, imaging, or other diagnostic test results are best discussed in person in the office so that we can provide a clear explanation of their significance and best treatment based upon these results. It is her responsibility to make and keep a follow up appointment to discuss these test results in person to discuss the significance of the findings and appropriate follow-up steps. She expressed complete understanding and agreement with the

## 2024-08-22 ENCOUNTER — INITIAL CONSULT (OUTPATIENT)
Age: 49
End: 2024-08-22
Payer: COMMERCIAL

## 2024-08-22 ENCOUNTER — TELEPHONE (OUTPATIENT)
Dept: PAIN MANAGEMENT | Age: 49
End: 2024-08-22

## 2024-08-22 VITALS — WEIGHT: 252 LBS | TEMPERATURE: 97.6 F | BODY MASS INDEX: 43.02 KG/M2 | HEIGHT: 64 IN

## 2024-08-22 DIAGNOSIS — G56.02 CARPAL TUNNEL SYNDROME ON LEFT: ICD-10-CM

## 2024-08-22 DIAGNOSIS — G56.01 CARPAL TUNNEL SYNDROME ON RIGHT: Primary | ICD-10-CM

## 2024-08-22 PROCEDURE — G8427 DOCREV CUR MEDS BY ELIG CLIN: HCPCS | Performed by: NEUROLOGICAL SURGERY

## 2024-08-22 PROCEDURE — 99214 OFFICE O/P EST MOD 30 MIN: CPT | Performed by: NEUROLOGICAL SURGERY

## 2024-08-22 PROCEDURE — 1036F TOBACCO NON-USER: CPT | Performed by: NEUROLOGICAL SURGERY

## 2024-08-22 PROCEDURE — G8417 CALC BMI ABV UP PARAM F/U: HCPCS | Performed by: NEUROLOGICAL SURGERY

## 2024-08-22 ASSESSMENT — ENCOUNTER SYMPTOMS
CONSTIPATION: 0
BACK PAIN: 0
NAUSEA: 0
SHORTNESS OF BREATH: 0
EYE PAIN: 0

## 2024-08-22 NOTE — TELEPHONE ENCOUNTER
RISA CTS INJ     NO AUTH REQUIRED    OK to schedule procedure approved as above.   Please note sides/levels approved and date range.   (If applicable, sides/levels approved may differ from those ordered)    TO BE SCHEDULED WITH DR BAKER

## 2024-08-26 ENCOUNTER — OFFICE VISIT (OUTPATIENT)
Dept: PAIN MANAGEMENT | Age: 49
End: 2024-08-26
Payer: COMMERCIAL

## 2024-08-26 DIAGNOSIS — G56.03 BILATERAL CARPAL TUNNEL SYNDROME: Primary | ICD-10-CM

## 2024-08-26 PROCEDURE — 76942 ECHO GUIDE FOR BIOPSY: CPT | Performed by: PHYSICAL MEDICINE & REHABILITATION

## 2024-08-26 PROCEDURE — 20526 THER INJECTION CARP TUNNEL: CPT | Performed by: PHYSICAL MEDICINE & REHABILITATION

## 2024-08-26 RX ORDER — ALBUTEROL SULFATE 90 UG/1
INHALANT RESPIRATORY (INHALATION)
Qty: 18 G | Refills: 2 | OUTPATIENT
Start: 2024-08-26

## 2024-08-26 RX ORDER — BETAMETHASONE SODIUM PHOSPHATE AND BETAMETHASONE ACETATE 3; 3 MG/ML; MG/ML
12 INJECTION, SUSPENSION INTRA-ARTICULAR; INTRALESIONAL; INTRAMUSCULAR; SOFT TISSUE ONCE
Status: COMPLETED | OUTPATIENT
Start: 2024-08-26 | End: 2024-08-26

## 2024-08-26 RX ORDER — LIDOCAINE HYDROCHLORIDE 10 MG/ML
8 INJECTION, SOLUTION EPIDURAL; INFILTRATION; INTRACAUDAL; PERINEURAL ONCE
Status: COMPLETED | OUTPATIENT
Start: 2024-08-26 | End: 2024-08-26

## 2024-08-26 RX ADMIN — LIDOCAINE HYDROCHLORIDE 8 ML: 10 INJECTION, SOLUTION EPIDURAL; INFILTRATION; INTRACAUDAL; PERINEURAL at 16:30

## 2024-08-26 RX ADMIN — BETAMETHASONE SODIUM PHOSPHATE AND BETAMETHASONE ACETATE 12 MG: 3; 3 INJECTION, SUSPENSION INTRA-ARTICULAR; INTRALESIONAL; INTRAMUSCULAR; SOFT TISSUE at 16:29

## 2024-08-26 NOTE — PROGRESS NOTES
This procedure was 75% more difficult and required 75% more work secondary to the patient's habitus. The patient has a BMI of 43.3. This required increased work for safe and proper positioning upon the procedure table, increased needle passes for safe and appropriate needle placement, and increased ultrasound time for proper visualization.

## 2024-08-26 NOTE — PROGRESS NOTES
Carpal Tunnel Corticosteroid Injection under Ultrasound Guidance    Patient Name: Angeli Padilla   : 1975  Date: 2024    Provider: Donnie Tanner MD        PROCEDURE: Bilateral carpal tunnel corticosteroid injection under ultrasound guidance    INDICATIONS: Discussed the risks of the steroid injection procedure includes but is not limited to bleeding, infection, local skin irritation, nerve damage, skin atrophy, calcification, skin color and pigmentation changes, worsened pain and irritation, burning, damage to surrounding structures, side effects, toxicity, allergic reactions to medications used, immune and stress-response dysfunction, fat necrosis, decreased bone mineralization, increased fracture risk, blood sugar elevation, need for surgery, premature damage or degeneration of the nearby joints, as well as catastrophic injury such as vision loss, paralysis, stroke, loss of use of the wrist and use of the hand, and death. Discussed the risks, benefits, alternative procedures, and alternatives to the procedure including no procedure at all. Discussed that we cannot undo any permanent neurologic or orthopaedic damage or change the course of any underlying disease. After thorough discussion, patient expressed complete understanding and willingness to proceed.    Description of Procedure:  The sites were marked. A time-out was performed. Ultrasound was used to visualize the pertinent anatomy and identify any critical neurovascular structures. The sites were then prepped in a sterile manner with Betadine three times and alcohol.     Attention was turned to the left wrist. Then a 27-gauge 1.5 inch needle was advanced under direct ultrasound guidance up to the median nerve.  The nerve appeared to be bifid in appearance and enlarged. Needle is advanced in an out-of-plane transverse approach. Injection of medicine was used for hydrodissection around the radial and ulnar aspect of the nerve with fair  success. The needle was advanced further in a in plane longitudinal approach towards the transverse carpal ligament. Aspiration for blood was negative. Then a 5 mL injectate containing 1 mL of 6 mg of betamethasone and 4 mL of 1% preservative-free lidocaine was injected without resistance or difficulty. Appropriate spread of the injectate was directly visualized under ultrasound. The needle was flushed and removed. The site was hemostatic. The site was cleaned and appropriately dressed.      Attention was turned to the right wrist.  Then a 27-gauge 1.5 inch needle was advanced under direct ultrasound guidance up to the median nerve. The nerve was enlarged. Needle is advanced in an out-of-plane transverse approach. Injection of medicine was used for hydrodissection around the radial aspect of the nerve with good success especially volar to the nerve. The needle was advanced further in an in-plane longitudinal approach towards the transverse carpal ligament. Aspiration for blood was negative. Then a 5 mL injectate containing 1 mL of 6 mg of betamethasone and 4 mL of 1% preservative-free lidocaine was injected without resistance or difficulty. A total of 12 mg of betamethasone was used for today's procedure. Appropriate spread of the injectate was directly visualized under ultrasound. The needle was flushed and removed. The site was hemostatic. The site was cleaned and appropriately dressed.      The patient tolerated the procedure well. There were no immediate post procedure complications. Post procedure instructions were given. The patient will follow-up as previously instructed. Strongly encouraged surgical evaluation.       [address] [facility], [address] [place] [address]  Phone [phone]/Fax [phone]

## 2024-09-05 ENCOUNTER — TELEPHONE (OUTPATIENT)
Age: 49
End: 2024-09-05

## 2024-09-08 DIAGNOSIS — E55.9 VITAMIN D DEFICIENCY: ICD-10-CM

## 2024-09-09 RX ORDER — CHOLECALCIFEROL TAB 125 MCG (5000 UNIT) 125 MCG (5000 UT)
1 TAB DAILY
Qty: 90 TABLET | Refills: 0 | Status: SHIPPED | OUTPATIENT
Start: 2024-09-09

## 2024-09-17 DIAGNOSIS — J45.40 MODERATE PERSISTENT ASTHMA, UNSPECIFIED WHETHER COMPLICATED: ICD-10-CM

## 2024-09-17 DIAGNOSIS — J44.9 CHRONIC OBSTRUCTIVE PULMONARY DISEASE, UNSPECIFIED COPD TYPE (HCC): ICD-10-CM

## 2024-09-18 RX ORDER — ALBUTEROL SULFATE 90 UG/1
INHALANT RESPIRATORY (INHALATION)
Qty: 18 G | Refills: 2 | OUTPATIENT
Start: 2024-09-18

## 2024-10-07 RX ORDER — AMMONIUM LACTATE 12 G/100G
CREAM TOPICAL
Qty: 385 G | Refills: 5 | Status: SHIPPED | OUTPATIENT
Start: 2024-10-07

## 2024-10-14 ENCOUNTER — TELEPHONE (OUTPATIENT)
Age: 49
End: 2024-10-14

## 2024-10-14 NOTE — TELEPHONE ENCOUNTER
XR L-spine 11/30/2020 completed. Is this useable?   If not, please encourage to obtain XR LS Spine flex ext as ordered on 8/21/24

## 2024-10-15 NOTE — TELEPHONE ENCOUNTER
PATIENT WILL NEED TO COMPLETE UPDATED XR THAT WAS ORDERED.  AT THIS TIME PATIENT WILL ALSO NEED TO COME IN FOR A MORE RECENT EXAM IF SHE IS WANTING TO HAVE THE LUMBAR MBB THAT WAS ORDERED 8/21/24.    PATIENTS [employer] INSURANCE IS ALSO TERMED AT THIS TIME,WE WILL NEED TO KNOW HER  UPDATED INSURANCE INFORMATION.

## 2024-10-17 DIAGNOSIS — J45.40 MODERATE PERSISTENT ASTHMA, UNSPECIFIED WHETHER COMPLICATED: ICD-10-CM

## 2024-10-17 DIAGNOSIS — J44.9 CHRONIC OBSTRUCTIVE PULMONARY DISEASE, UNSPECIFIED COPD TYPE (HCC): ICD-10-CM

## 2024-10-18 RX ORDER — ALBUTEROL SULFATE 90 UG/1
INHALANT RESPIRATORY (INHALATION)
Qty: 18 G | Refills: 2 | OUTPATIENT
Start: 2024-10-18

## 2024-11-06 RX ORDER — PANTOPRAZOLE SODIUM 40 MG/1
TABLET, DELAYED RELEASE ORAL
Qty: 45 TABLET | Refills: 0 | Status: SHIPPED | OUTPATIENT
Start: 2024-11-06

## 2024-11-06 RX ORDER — MULTIVITAMIN
TABLET ORAL
Qty: 90 TABLET | Refills: 0 | Status: SHIPPED | OUTPATIENT
Start: 2024-11-06

## 2024-11-25 DIAGNOSIS — J45.40 MODERATE PERSISTENT ASTHMA, UNSPECIFIED WHETHER COMPLICATED: ICD-10-CM

## 2024-11-25 DIAGNOSIS — J44.9 CHRONIC OBSTRUCTIVE PULMONARY DISEASE, UNSPECIFIED COPD TYPE (HCC): ICD-10-CM

## 2024-11-25 RX ORDER — ALBUTEROL SULFATE 90 UG/1
INHALANT RESPIRATORY (INHALATION)
Qty: 18 G | Refills: 2 | OUTPATIENT
Start: 2024-11-25

## 2024-12-11 ENCOUNTER — TELEMEDICINE (OUTPATIENT)
Dept: PULMONOLOGY | Age: 49
End: 2024-12-11
Payer: COMMERCIAL

## 2024-12-11 DIAGNOSIS — G47.33 OSA (OBSTRUCTIVE SLEEP APNEA): ICD-10-CM

## 2024-12-11 DIAGNOSIS — J45.20 MILD INTERMITTENT ASTHMA WITHOUT COMPLICATION: Primary | ICD-10-CM

## 2024-12-11 DIAGNOSIS — J06.9 ACUTE UPPER RESPIRATORY INFECTION: ICD-10-CM

## 2024-12-11 DIAGNOSIS — E66.9 OBESITY (BMI 30-39.9): ICD-10-CM

## 2024-12-11 PROCEDURE — 99443 PR PHYS/QHP TELEPHONE EVALUATION 21-30 MIN: CPT | Performed by: INTERNAL MEDICINE

## 2024-12-11 RX ORDER — ALBUTEROL SULFATE 90 UG/1
2 INHALANT RESPIRATORY (INHALATION) EVERY 6 HOURS PRN
Qty: 18 G | Refills: 3 | Status: SHIPPED | OUTPATIENT
Start: 2024-12-11

## 2024-12-11 RX ORDER — AZITHROMYCIN 250 MG/1
250 TABLET, FILM COATED ORAL SEE ADMIN INSTRUCTIONS
Qty: 6 TABLET | Refills: 1 | Status: SHIPPED | OUTPATIENT
Start: 2024-12-11 | End: 2024-12-16

## 2024-12-11 NOTE — PROGRESS NOTES
Angeli Reayvonne, was evaluated through a synchronous (real-time) telephone encounter. The patient (or guardian if applicable) is aware that this is a billable service, which includes applicable co-pays. This Virtual Visit was conducted with patient's (and/or legal guardian's) consent. Patient identification was verified, and a caregiver was present when appropriate.   The patient was located at Home: [address] [place] [address].  [id number]  [facility] [id number]  Provider was located at Facility (Appt Dept): [address] [facility]  [address]  [place] [address]  Yes, I confirm.         Subjective     HPI  Last time seen 2/24     She is using albuterol HFA prn  and neb with albuterol prn.   She said she is feeling sick for 3 weeks   C/o  Mild Shortness of breath with exertion.   C/o Wheezing  off and on   C/o  Cough with clear Sputum. c/o sore throat x 2 days   C/o Occasional Chest tightness   No Chest pain with radiation  or pleuritic pain  C/o Rhinorrhea and postnasal drip.    She was smoking 1 and 1/2 ppd , quit  about 5  yrs ago, waping nicotine     4/21/23 Spirometry is normal with no significant response to bronchodilator  Lung volumes shows air trapping, hyperinflation   Diffusion capacity is normal     PFT 2020 show moderately severe COPD  FVC 2.70 , 71%. FEV1 1.67  55%, significant response to bronchodilator.      She shows mild sleep apnea with AHI 7, she said she could not use CPAP     Current Outpatient Medications on File Prior to Visit   Medication Sig Dispense Refill    pantoprazole (PROTONIX) 40 MG tablet TAKE 1 TABLET BY MOUTH EVERY OTHER DAY before a meal 45 tablet 0    Multiple Vitamin (MULTIVITAMIN) TABS TAKE 1 TABLET BY MOUTH DAILY 90 tablet 0    ammonium lactate (AMLACTIN) 12 % cream APPLY TO THE AFFECTED AREA(S) DAILY, avoid applying between toes 385 g 5    NATURAL VITAMIN D-3 125 MCG (5000 UT) TABS tablet TAKE 1 TABLET BY MOUTH ONCE DAILY 90 tablet 0    FLUoxetine (PROZAC) 20 MG capsule TAKE 1 CAPSULE BY MOUTH

## 2024-12-15 RX ORDER — ROSUVASTATIN CALCIUM 10 MG/1
TABLET, COATED ORAL
Qty: 90 TABLET | Refills: 0 | Status: SHIPPED | OUTPATIENT
Start: 2024-12-15 | End: 2025-01-15

## 2024-12-15 RX ORDER — LOSARTAN POTASSIUM 50 MG/1
TABLET ORAL
Qty: 90 TABLET | Refills: 0 | Status: SHIPPED | OUTPATIENT
Start: 2024-12-15 | End: 2025-01-15 | Stop reason: SDUPTHER

## 2024-12-15 RX ORDER — LORATADINE 10 MG/1
TABLET ORAL
Qty: 90 TABLET | Refills: 0 | Status: SHIPPED | OUTPATIENT
Start: 2024-12-15 | End: 2025-01-15 | Stop reason: SDUPTHER

## 2024-12-15 NOTE — TELEPHONE ENCOUNTER
Future Appointments    Encounter Information   Provider Department Appt Notes   1/22/2025 Josef Reeves MD [facility] Pulmonology FU   6/3/2025 Ira Williamson MD [facility] OB/Gyn pap     Past Visits    Date Provider Specialty Visit Type Primary Dx   12/11/2024 Josef Reeves MD Pulmonology Telemedicine Mild intermittent asthma without complication   08/26/2024 Donnie Tanner MD Pain Management Office Visit Bilateral carpal tunnel syndrome   08/22/2024 Christina Keyes MD Neurosurgery Initial consult Carpal tunnel syndrome on right   08/21/2024 Donnie Tanner MD Pain Management Office Visit Bilateral carpal tunnel syndrome   07/31/2024 Rodolfo Souza MD Family Medicine Office Visit Neck pain on right side

## 2024-12-18 ENCOUNTER — TELEPHONE (OUTPATIENT)
Age: 49
End: 2024-12-18

## 2024-12-18 NOTE — TELEPHONE ENCOUNTER
Patient scheduled appointment for 1/15/25  She has labs that were in chart for 24 to be done by September but she did not have insurance.  She would like to get these done prior to the appt on 1/15/25 and has insurance now.    Can we make sure these do not  or add new if needed?    Thank  you.

## 2025-01-02 DIAGNOSIS — J06.9 ACUTE UPPER RESPIRATORY INFECTION: ICD-10-CM

## 2025-01-06 RX ORDER — AZITHROMYCIN 250 MG/1
TABLET, FILM COATED ORAL
Qty: 6 TABLET | Refills: 1 | Status: SHIPPED | OUTPATIENT
Start: 2025-01-06

## 2025-01-06 NOTE — TELEPHONE ENCOUNTER
Rx requested:  Requested Prescriptions     Pending Prescriptions Disp Refills    azithromycin (ZITHROMAX) 250 MG tablet [Pharmacy Med Name: azithromycin 250 mg tablet] 6 tablet 1     Sig: TAKE 2 TABLETS by mouth today, THEN take 1 TABLET once a day FOR the next 4 DAYS.       Last Office Visit:   12/11/2024      Next Visit Date:  Future Appointments   Date Time Provider Department Center   1/15/2025  8:30 AM Rodolfo Souza MD [facility]   1/22/2025  3:15 PM Josef Reeves MD [facility]   6/3/2025  8:15 AM Clay, Ira A, MD [facility]

## 2025-01-09 DIAGNOSIS — E78.00 PURE HYPERCHOLESTEROLEMIA: ICD-10-CM

## 2025-01-09 DIAGNOSIS — R73.03 PRE-DIABETES: ICD-10-CM

## 2025-01-09 LAB
ALBUMIN SERPL-MCNC: 4.2 G/DL (ref 3.5–4.6)
ALP SERPL-CCNC: 77 U/L (ref 40–130)
ALT SERPL-CCNC: 12 U/L (ref 0–33)
ANION GAP SERPL CALCULATED.3IONS-SCNC: 13 MEQ/L (ref 9–15)
AST SERPL-CCNC: 10 U/L (ref 0–35)
BILIRUB SERPL-MCNC: 0.6 MG/DL (ref 0.2–0.7)
BUN SERPL-MCNC: 12 MG/DL (ref 6–20)
CALCIUM SERPL-MCNC: 8.9 MG/DL (ref 8.5–9.9)
CHLORIDE SERPL-SCNC: 101 MEQ/L (ref 95–107)
CHOLEST SERPL-MCNC: 192 MG/DL (ref 0–199)
CO2 SERPL-SCNC: 25 MEQ/L (ref 20–31)
CREAT SERPL-MCNC: 0.59 MG/DL (ref 0.5–0.9)
GLOBULIN SER CALC-MCNC: 2.6 G/DL (ref 2.3–3.5)
GLUCOSE SERPL-MCNC: 126 MG/DL (ref 70–99)
HDLC SERPL-MCNC: 41 MG/DL (ref 40–59)
LDLC SERPL CALC-MCNC: 81 MG/DL (ref 0–129)
POTASSIUM SERPL-SCNC: 4.1 MEQ/L (ref 3.4–4.9)
PROT SERPL-MCNC: 6.8 G/DL (ref 6.3–8)
SODIUM SERPL-SCNC: 139 MEQ/L (ref 135–144)
TRIGL SERPL-MCNC: 350 MG/DL (ref 0–150)

## 2025-01-10 LAB
ESTIMATED AVERAGE GLUCOSE: 126 MG/DL
HBA1C MFR BLD: 6 % (ref 4–6)

## 2025-01-15 ENCOUNTER — OFFICE VISIT (OUTPATIENT)
Age: 50
End: 2025-01-15
Payer: COMMERCIAL

## 2025-01-15 VITALS
TEMPERATURE: 97.6 F | DIASTOLIC BLOOD PRESSURE: 70 MMHG | OXYGEN SATURATION: 98 % | SYSTOLIC BLOOD PRESSURE: 130 MMHG | BODY MASS INDEX: 43.36 KG/M2 | HEART RATE: 97 BPM | HEIGHT: 64 IN | WEIGHT: 254 LBS

## 2025-01-15 DIAGNOSIS — R73.03 PRE-DIABETES: ICD-10-CM

## 2025-01-15 DIAGNOSIS — E55.9 VITAMIN D DEFICIENCY: ICD-10-CM

## 2025-01-15 DIAGNOSIS — I10 ESSENTIAL HYPERTENSION: Primary | ICD-10-CM

## 2025-01-15 DIAGNOSIS — G63 POLYNEUROPATHY IN OTHER DISEASES CLASSIFIED ELSEWHERE (HCC): ICD-10-CM

## 2025-01-15 DIAGNOSIS — E78.00 PURE HYPERCHOLESTEROLEMIA: ICD-10-CM

## 2025-01-15 DIAGNOSIS — F31.75 BIPOLAR DISORDER, IN PARTIAL REMISSION, MOST RECENT EPISODE DEPRESSED (HCC): ICD-10-CM

## 2025-01-15 DIAGNOSIS — K21.9 GASTROESOPHAGEAL REFLUX DISEASE WITHOUT ESOPHAGITIS: ICD-10-CM

## 2025-01-15 PROBLEM — J44.9 CHRONIC OBSTRUCTIVE PULMONARY DISEASE (HCC): Status: RESOLVED | Noted: 2018-08-02 | Resolved: 2025-01-15

## 2025-01-15 PROCEDURE — 3075F SYST BP GE 130 - 139MM HG: CPT | Performed by: FAMILY MEDICINE

## 2025-01-15 PROCEDURE — 99214 OFFICE O/P EST MOD 30 MIN: CPT | Performed by: FAMILY MEDICINE

## 2025-01-15 PROCEDURE — 99213 OFFICE O/P EST LOW 20 MIN: CPT | Performed by: FAMILY MEDICINE

## 2025-01-15 PROCEDURE — G8427 DOCREV CUR MEDS BY ELIG CLIN: HCPCS | Performed by: FAMILY MEDICINE

## 2025-01-15 PROCEDURE — G8417 CALC BMI ABV UP PARAM F/U: HCPCS | Performed by: FAMILY MEDICINE

## 2025-01-15 PROCEDURE — 1036F TOBACCO NON-USER: CPT | Performed by: FAMILY MEDICINE

## 2025-01-15 PROCEDURE — 3078F DIAST BP <80 MM HG: CPT | Performed by: FAMILY MEDICINE

## 2025-01-15 RX ORDER — PANTOPRAZOLE SODIUM 40 MG/1
TABLET, DELAYED RELEASE ORAL
Qty: 45 TABLET | Refills: 1 | Status: SHIPPED | OUTPATIENT
Start: 2025-01-15

## 2025-01-15 RX ORDER — LORATADINE 10 MG/1
TABLET ORAL
Qty: 90 TABLET | Refills: 0 | Status: SHIPPED | OUTPATIENT
Start: 2025-01-15

## 2025-01-15 RX ORDER — ROSUVASTATIN CALCIUM 20 MG/1
20 TABLET, COATED ORAL NIGHTLY
Qty: 90 TABLET | Refills: 0 | Status: SHIPPED | OUTPATIENT
Start: 2025-01-15

## 2025-01-15 RX ORDER — CHOLECALCIFEROL TAB 125 MCG (5000 UNIT) 125 MCG (5000 UT)
1 TAB DAILY
Qty: 90 TABLET | Refills: 0 | Status: SHIPPED | OUTPATIENT
Start: 2025-01-15

## 2025-01-15 RX ORDER — LOSARTAN POTASSIUM 50 MG/1
TABLET ORAL
Qty: 90 TABLET | Refills: 1 | Status: SHIPPED | OUTPATIENT
Start: 2025-01-15

## 2025-01-15 SDOH — ECONOMIC STABILITY: FOOD INSECURITY: WITHIN THE PAST 12 MONTHS, THE FOOD YOU BOUGHT JUST DIDN'T LAST AND YOU DIDN'T HAVE MONEY TO GET MORE.: NEVER TRUE

## 2025-01-15 SDOH — ECONOMIC STABILITY: FOOD INSECURITY: WITHIN THE PAST 12 MONTHS, YOU WORRIED THAT YOUR FOOD WOULD RUN OUT BEFORE YOU GOT MONEY TO BUY MORE.: NEVER TRUE

## 2025-01-15 ASSESSMENT — PATIENT HEALTH QUESTIONNAIRE - PHQ9
SUM OF ALL RESPONSES TO PHQ QUESTIONS 1-9: 0
8. MOVING OR SPEAKING SO SLOWLY THAT OTHER PEOPLE COULD HAVE NOTICED. OR THE OPPOSITE, BEING SO FIGETY OR RESTLESS THAT YOU HAVE BEEN MOVING AROUND A LOT MORE THAN USUAL: NOT AT ALL
SUM OF ALL RESPONSES TO PHQ9 QUESTIONS 1 & 2: 0
6. FEELING BAD ABOUT YOURSELF - OR THAT YOU ARE A FAILURE OR HAVE LET YOURSELF OR YOUR FAMILY DOWN: NOT AT ALL
SUM OF ALL RESPONSES TO PHQ QUESTIONS 1-9: 0
4. FEELING TIRED OR HAVING LITTLE ENERGY: NOT AT ALL
3. TROUBLE FALLING OR STAYING ASLEEP: NOT AT ALL
5. POOR APPETITE OR OVEREATING: NOT AT ALL
2. FEELING DOWN, DEPRESSED OR HOPELESS: NOT AT ALL
SUM OF ALL RESPONSES TO PHQ QUESTIONS 1-9: 0
9. THOUGHTS THAT YOU WOULD BE BETTER OFF DEAD, OR OF HURTING YOURSELF: NOT AT ALL
10. IF YOU CHECKED OFF ANY PROBLEMS, HOW DIFFICULT HAVE THESE PROBLEMS MADE IT FOR YOU TO DO YOUR WORK, TAKE CARE OF THINGS AT HOME, OR GET ALONG WITH OTHER PEOPLE: NOT DIFFICULT AT ALL
SUM OF ALL RESPONSES TO PHQ QUESTIONS 1-9: 0
7. TROUBLE CONCENTRATING ON THINGS, SUCH AS READING THE NEWSPAPER OR WATCHING TELEVISION: NOT AT ALL
1. LITTLE INTEREST OR PLEASURE IN DOING THINGS: NOT AT ALL

## 2025-01-15 ASSESSMENT — ENCOUNTER SYMPTOMS
COUGH: 0
VOMITING: 0
DIARRHEA: 0

## 2025-01-21 ENCOUNTER — OFFICE VISIT (OUTPATIENT)
Age: 50
End: 2025-01-21
Payer: COMMERCIAL

## 2025-01-21 VITALS
SYSTOLIC BLOOD PRESSURE: 132 MMHG | TEMPERATURE: 97.1 F | WEIGHT: 254 LBS | OXYGEN SATURATION: 98 % | HEART RATE: 99 BPM | DIASTOLIC BLOOD PRESSURE: 80 MMHG | BODY MASS INDEX: 43.36 KG/M2 | HEIGHT: 64 IN

## 2025-01-21 DIAGNOSIS — J01.90 ACUTE BACTERIAL SINUSITIS: Primary | ICD-10-CM

## 2025-01-21 DIAGNOSIS — B96.89 ACUTE BACTERIAL SINUSITIS: Primary | ICD-10-CM

## 2025-01-21 DIAGNOSIS — J45.21 MILD INTERMITTENT ASTHMA WITH EXACERBATION: ICD-10-CM

## 2025-01-21 PROCEDURE — 3079F DIAST BP 80-89 MM HG: CPT | Performed by: NURSE PRACTITIONER

## 2025-01-21 PROCEDURE — G8427 DOCREV CUR MEDS BY ELIG CLIN: HCPCS | Performed by: NURSE PRACTITIONER

## 2025-01-21 PROCEDURE — G8417 CALC BMI ABV UP PARAM F/U: HCPCS | Performed by: NURSE PRACTITIONER

## 2025-01-21 PROCEDURE — 1036F TOBACCO NON-USER: CPT | Performed by: NURSE PRACTITIONER

## 2025-01-21 PROCEDURE — 99213 OFFICE O/P EST LOW 20 MIN: CPT | Performed by: NURSE PRACTITIONER

## 2025-01-21 PROCEDURE — 3075F SYST BP GE 130 - 139MM HG: CPT | Performed by: NURSE PRACTITIONER

## 2025-01-21 RX ORDER — PREDNISONE 20 MG/1
20 TABLET ORAL 2 TIMES DAILY
Qty: 10 TABLET | Refills: 0 | Status: SHIPPED | OUTPATIENT
Start: 2025-01-21 | End: 2025-01-26

## 2025-01-21 RX ORDER — FLUCONAZOLE 150 MG/1
150 TABLET ORAL ONCE
Qty: 1 TABLET | Refills: 0 | Status: SHIPPED | OUTPATIENT
Start: 2025-01-21 | End: 2025-01-21

## 2025-01-21 ASSESSMENT — ENCOUNTER SYMPTOMS
EYE ITCHING: 0
RHINORRHEA: 1
TROUBLE SWALLOWING: 0
EYE REDNESS: 0
STRIDOR: 0
NAUSEA: 0
PHOTOPHOBIA: 0
SINUS PRESSURE: 1
SHORTNESS OF BREATH: 1
EYE DISCHARGE: 0
SORE THROAT: 0
ABDOMINAL PAIN: 0
COUGH: 1
SINUS PAIN: 1
EYE PAIN: 0
VOICE CHANGE: 0
DIARRHEA: 0
WHEEZING: 1
CHEST TIGHTNESS: 1
VOMITING: 0

## 2025-01-21 NOTE — PROGRESS NOTES
Subjective:      Patient ID: Angeli Padilla is a 49 y.o. female who presents today for:  Chief Complaint   Patient presents with    Cold Symptoms     Pt presents with Cough/Shortness of Breath, Ear Pain, Headache since last Wednesday, she was taking antibiotics which last dose was yesterday, she states she feels more sick.        HPI  Patient is here with cough and SOB for the last few weeks.   She was seen on 1/6/25 by pulmonology and given azith.   Says she has has sinus sx for the last 10 days.  Patient say she feels worse.  She has HA and ear pain now as well .  Denies any fever.  Says she is taking Sudafed and Mucinex as well.   Says she is using her nebulizer.     Past Medical History:   Diagnosis Date    Abnormal Pap smear of cervix     Allergic rhinitis     several allergens    Asthma     since shildhood    Bariatric surgery status 01/02/2020    Dr Calista Baird     Brachial neuralgia 1/23/2014    Chronic left shoulder pain     COPD (chronic obstructive pulmonary disease) ([facility]) 2019    Dr Stein    Depression     H/O colonoscopy 2014    Dr. De Los Santos    Hyperlipidemia     Hypertension 2010    Migraine headache     Obesity, morbid (more than 100 lbs over ideal weight or BMI > 40)     SEJAL on CPAP 10/2018    Sleep apnea      Past Surgical History:   Procedure Laterality Date    BREAST BIOPSY Left 2022    B-9    BREAST REDUCTION SURGERY Bilateral 2007    BREAST SURGERY  2007    reduction, Dr Paul    COLONOSCOPY  10/20/2015        GYNECOLOGIC CRYOSURGERY      HERNIA REPAIR Right     Obdulio Ferris    SLEEVE GASTRECTOMY  01/20/2020    Dr Calista Baird    TUBAL LIGATION       Social History     Socioeconomic History    Marital status: Single     Spouse name: Not on file    Number of children: 2    Years of education: Not on file    Highest education level: Not on file   Occupational History    Occupation: dog grooming business, self employed   Tobacco Use    Smoking status: Former

## 2025-02-05 ENCOUNTER — OFFICE VISIT (OUTPATIENT)
Dept: PULMONOLOGY | Age: 50
End: 2025-02-05
Payer: COMMERCIAL

## 2025-02-05 VITALS
HEART RATE: 74 BPM | SYSTOLIC BLOOD PRESSURE: 130 MMHG | TEMPERATURE: 97.6 F | DIASTOLIC BLOOD PRESSURE: 74 MMHG | HEIGHT: 64 IN | BODY MASS INDEX: 43.54 KG/M2 | WEIGHT: 255 LBS | OXYGEN SATURATION: 98 %

## 2025-02-05 DIAGNOSIS — J45.40 MODERATE PERSISTENT ASTHMA, UNSPECIFIED WHETHER COMPLICATED: ICD-10-CM

## 2025-02-05 DIAGNOSIS — J44.9 CHRONIC OBSTRUCTIVE PULMONARY DISEASE, UNSPECIFIED COPD TYPE (HCC): ICD-10-CM

## 2025-02-05 DIAGNOSIS — J01.10 ACUTE FRONTAL SINUSITIS, RECURRENCE NOT SPECIFIED: Primary | ICD-10-CM

## 2025-02-05 PROCEDURE — 1036F TOBACCO NON-USER: CPT | Performed by: INTERNAL MEDICINE

## 2025-02-05 PROCEDURE — 3075F SYST BP GE 130 - 139MM HG: CPT | Performed by: INTERNAL MEDICINE

## 2025-02-05 PROCEDURE — 3078F DIAST BP <80 MM HG: CPT | Performed by: INTERNAL MEDICINE

## 2025-02-05 PROCEDURE — 99214 OFFICE O/P EST MOD 30 MIN: CPT | Performed by: INTERNAL MEDICINE

## 2025-02-05 PROCEDURE — G8427 DOCREV CUR MEDS BY ELIG CLIN: HCPCS | Performed by: INTERNAL MEDICINE

## 2025-02-05 PROCEDURE — 3023F SPIROM DOC REV: CPT | Performed by: INTERNAL MEDICINE

## 2025-02-05 PROCEDURE — G8417 CALC BMI ABV UP PARAM F/U: HCPCS | Performed by: INTERNAL MEDICINE

## 2025-02-05 RX ORDER — NYSTATIN 100000 [USP'U]/ML
500000 SUSPENSION ORAL 4 TIMES DAILY
Qty: 200 ML | Refills: 0 | Status: SHIPPED | OUTPATIENT
Start: 2025-02-05 | End: 2025-02-15

## 2025-02-05 RX ORDER — ALBUTEROL SULFATE 90 UG/1
INHALANT RESPIRATORY (INHALATION)
Qty: 18 G | Refills: 3 | Status: SHIPPED | OUTPATIENT
Start: 2025-02-05

## 2025-02-05 NOTE — PROGRESS NOTES
(PROZAC) 20 MG capsule Take 1 capsule by mouth 2 times daily 180 capsule 0    pantoprazole (PROTONIX) 40 MG tablet TAKE 1 TABLET BY MOUTH EVERY OTHER DAY before a meal 45 tablet 1    rosuvastatin (CRESTOR) 20 MG tablet Take 1 tablet by mouth nightly 90 tablet 0    azithromycin (ZITHROMAX) 250 MG tablet TAKE 2 TABLETS by mouth today, THEN take 1 TABLET once a day FOR the next 4 DAYS. 6 tablet 1    albuterol sulfate HFA (PROVENTIL;VENTOLIN;PROAIR) 108 (90 Base) MCG/ACT inhaler Inhale 2 puffs into the lungs every 6 hours as needed for Wheezing 18 g 3    Multiple Vitamin (MULTIVITAMIN) TABS TAKE 1 TABLET BY MOUTH DAILY 90 tablet 0    ammonium lactate (AMLACTIN) 12 % cream APPLY TO THE AFFECTED AREA(S) DAILY, avoid applying between toes 385 g 5    nystatin (MYCOSTATIN) 220945 UNIT/ML suspension Take 5 mLs by mouth 4 times daily 200 mL 1    albuterol (PROVENTIL) (5 MG/ML) 0.5% nebulizer solution Take 0.5 mLs by nebulization every 6 hours as needed for Wheezing 120 vial 5     Current Facility-Administered Medications   Medication Dose Route Frequency Provider Last Rate Last Admin    betamethasone acetate-betamethasone sodium phosphate (CELESTONE) injection 6 mg  6 mg Other Once Donnie Tanner MD        lidocaine PF 1 % injection 5 mL  5 mL IntraDERmal Once Donnie Tanner MD        sodium chloride (PF) 0.9 % injection 8 mL  8 mL Other Once Donnie Tanner MD        methylPREDNISolone acetate (DEPO-MEDROL) injection 80 mg  80 mg Intra-artICUlar Once Mahamed Hickey MD        iopamidol (ISOVUE-300) 61 % injection 2 mL  2 mL Other ONCE PRN Donnie Tanner MD           Results for orders placed during the hospital encounter of 04/30/24    XR CHEST (2 VW)    Narrative  EXAMINATION:  TWO XRAY VIEWS OF THE CHEST    4/30/2024 3:27 pm    COMPARISON:  February 8, 2023    HISTORY:  ORDERING SYSTEM PROVIDED HISTORY: Moderate persistent asthma, unspecified  whether complicated  TECHNOLOGIST PROVIDED HISTORY:  Reason for

## 2025-02-12 ENCOUNTER — HOSPITAL ENCOUNTER (OUTPATIENT)
Dept: GENERAL RADIOLOGY | Age: 50
Discharge: HOME | End: 2025-02-14
Payer: COMMERCIAL

## 2025-02-12 ENCOUNTER — OFFICE VISIT (OUTPATIENT)
Age: 50
End: 2025-02-12
Payer: COMMERCIAL

## 2025-02-12 VITALS
BODY MASS INDEX: 43.77 KG/M2 | HEIGHT: 64 IN | DIASTOLIC BLOOD PRESSURE: 80 MMHG | SYSTOLIC BLOOD PRESSURE: 132 MMHG | TEMPERATURE: 97.2 F

## 2025-02-12 DIAGNOSIS — M47.817 LUMBOSACRAL SPONDYLOSIS WITHOUT MYELOPATHY: ICD-10-CM

## 2025-02-12 DIAGNOSIS — M79.605 PAIN OF LEFT LOWER EXTREMITY: Primary | ICD-10-CM

## 2025-02-12 DIAGNOSIS — M25.562 LEFT KNEE PAIN, UNSPECIFIED CHRONICITY: ICD-10-CM

## 2025-02-12 DIAGNOSIS — M79.605 PAIN OF LEFT LOWER EXTREMITY: ICD-10-CM

## 2025-02-12 PROCEDURE — 3075F SYST BP GE 130 - 139MM HG: CPT | Performed by: NURSE PRACTITIONER

## 2025-02-12 PROCEDURE — 73560 X-RAY EXAM OF KNEE 1 OR 2: CPT

## 2025-02-12 PROCEDURE — 99214 OFFICE O/P EST MOD 30 MIN: CPT | Performed by: NURSE PRACTITIONER

## 2025-02-12 PROCEDURE — 1036F TOBACCO NON-USER: CPT | Performed by: NURSE PRACTITIONER

## 2025-02-12 PROCEDURE — G8417 CALC BMI ABV UP PARAM F/U: HCPCS | Performed by: NURSE PRACTITIONER

## 2025-02-12 PROCEDURE — G8427 DOCREV CUR MEDS BY ELIG CLIN: HCPCS | Performed by: NURSE PRACTITIONER

## 2025-02-12 PROCEDURE — 3079F DIAST BP 80-89 MM HG: CPT | Performed by: NURSE PRACTITIONER

## 2025-02-12 PROCEDURE — 72110 X-RAY EXAM L-2 SPINE 4/>VWS: CPT

## 2025-02-12 RX ORDER — METHYLPREDNISOLONE 4 MG/1
TABLET ORAL
Qty: 1 KIT | Refills: 0 | Status: SHIPPED | OUTPATIENT
Start: 2025-02-12 | End: 2025-02-18

## 2025-02-12 ASSESSMENT — ENCOUNTER SYMPTOMS
EYES NEGATIVE: 1
DIARRHEA: 0
TROUBLE SWALLOWING: 0
GASTROINTESTINAL NEGATIVE: 1
SHORTNESS OF BREATH: 0
BACK PAIN: 1
COUGH: 0
CONSTIPATION: 0

## 2025-02-12 NOTE — PROGRESS NOTES
amoxicillin-clavulanate (AUGMENTIN) 875-125 MG per tablet Take 1 tablet by mouth 2 times daily for 10 days 20 tablet 0    nystatin (MYCOSTATIN) 676514 UNIT/ML suspension Take 5 mLs by mouth 4 times daily for 10 days Retain in mouth as long as possible 200 mL 0    NATURAL VITAMIN D-3 125 MCG (5000 UT) TABS tablet TAKE 1 TABLET BY MOUTH ONCE DAILY 90 tablet 0    losartan (COZAAR) 50 MG tablet TAKE 1 TABLET BY MOUTH EVERY DAY 90 tablet 1    vitamin D3 (NATURAL VITAMIN D-3) 125 MCG (5000 UT) TABS tablet Take 1 tablet by mouth daily 90 tablet 0    loratadine (ALLERGY RELIEF) 10 MG tablet TAKE 1 TABLET BY MOUTH DAILY AS NEEDED for allergies 90 tablet 0    FLUoxetine (PROZAC) 20 MG capsule Take 1 capsule by mouth 2 times daily 180 capsule 0    pantoprazole (PROTONIX) 40 MG tablet TAKE 1 TABLET BY MOUTH EVERY OTHER DAY before a meal 45 tablet 1    rosuvastatin (CRESTOR) 20 MG tablet Take 1 tablet by mouth nightly 90 tablet 0    azithromycin (ZITHROMAX) 250 MG tablet TAKE 2 TABLETS by mouth today, THEN take 1 TABLET once a day FOR the next 4 DAYS. 6 tablet 1    albuterol sulfate HFA (PROVENTIL;VENTOLIN;PROAIR) 108 (90 Base) MCG/ACT inhaler Inhale 2 puffs into the lungs every 6 hours as needed for Wheezing 18 g 3    Multiple Vitamin (MULTIVITAMIN) TABS TAKE 1 TABLET BY MOUTH DAILY 90 tablet 0    ammonium lactate (AMLACTIN) 12 % cream APPLY TO THE AFFECTED AREA(S) DAILY, avoid applying between toes 385 g 5    nystatin (MYCOSTATIN) 786602 UNIT/ML suspension Take 5 mLs by mouth 4 times daily 200 mL 1    albuterol (PROVENTIL) (5 MG/ML) 0.5% nebulizer solution Take 0.5 mLs by nebulization every 6 hours as needed for Wheezing 120 vial 5     Current Facility-Administered Medications on File Prior to Visit   Medication Dose Route Frequency Provider Last Rate Last Admin    betamethasone acetate-betamethasone sodium phosphate (CELESTONE) injection 6 mg  6 mg Other Once Donnie Tanner MD        lidocaine PF 1 % injection 5 mL  5 mL

## 2025-02-14 RX ORDER — MULTIVITAMIN
TABLET ORAL
Qty: 90 TABLET | Refills: 0 | Status: SHIPPED | OUTPATIENT
Start: 2025-02-14

## 2025-02-19 ENCOUNTER — TELEPHONE (OUTPATIENT)
Age: 50
End: 2025-02-19

## 2025-02-19 NOTE — TELEPHONE ENCOUNTER
----- Message from DIRK Puente CNP sent at 2/12/2025  2:18 PM EST -----  X-ray report of low back from 12/12/2025 reviewed finding mild degenerative changes L3-4 through L5-S1 with small osteophytes.  3 mm retrolisthesis L2-3 noted.  X-ray report of left knee  with same date shows alignment normal without acute fractures, no significant abnormalities.    MA, please tell patient x-ray report of low back shows some arthritis and some small bone spurs in lower spine and x-ray report of left knee shows no significant findings.

## 2025-02-19 NOTE — TELEPHONE ENCOUNTER
MA, please tell patient x-ray report of low back shows some arthritis and some small bone spurs in lower spine and x-ray report of left knee shows no significant findings.

## 2025-02-20 ENCOUNTER — HOSPITAL ENCOUNTER (EMERGENCY)
Age: 50
Discharge: HOME | End: 2025-02-20
Attending: STUDENT IN AN ORGANIZED HEALTH CARE EDUCATION/TRAINING PROGRAM
Payer: COMMERCIAL

## 2025-02-20 ENCOUNTER — APPOINTMENT (OUTPATIENT)
Dept: GENERAL RADIOLOGY | Age: 50
End: 2025-02-20
Payer: COMMERCIAL

## 2025-02-20 ENCOUNTER — APPOINTMENT (OUTPATIENT)
Dept: ULTRASOUND IMAGING | Age: 50
End: 2025-02-20
Payer: COMMERCIAL

## 2025-02-20 VITALS
SYSTOLIC BLOOD PRESSURE: 170 MMHG | HEIGHT: 64 IN | OXYGEN SATURATION: 98 % | RESPIRATION RATE: 18 BRPM | DIASTOLIC BLOOD PRESSURE: 84 MMHG | WEIGHT: 257 LBS | TEMPERATURE: 97.7 F | HEART RATE: 82 BPM | BODY MASS INDEX: 43.87 KG/M2

## 2025-02-20 DIAGNOSIS — M54.32 SCIATICA OF LEFT SIDE: Primary | ICD-10-CM

## 2025-02-20 PROCEDURE — 99284 EMERGENCY DEPT VISIT MOD MDM: CPT

## 2025-02-20 PROCEDURE — 93971 EXTREMITY STUDY: CPT

## 2025-02-20 PROCEDURE — 96372 THER/PROPH/DIAG INJ SC/IM: CPT

## 2025-02-20 PROCEDURE — 6360000002 HC RX W HCPCS: Performed by: STUDENT IN AN ORGANIZED HEALTH CARE EDUCATION/TRAINING PROGRAM

## 2025-02-20 PROCEDURE — 73502 X-RAY EXAM HIP UNI 2-3 VIEWS: CPT

## 2025-02-20 RX ORDER — KETOROLAC TROMETHAMINE 30 MG/ML
30 INJECTION, SOLUTION INTRAMUSCULAR; INTRAVENOUS ONCE
Status: COMPLETED | OUTPATIENT
Start: 2025-02-20 | End: 2025-02-20

## 2025-02-20 RX ORDER — IBUPROFEN 800 MG/1
800 TABLET, FILM COATED ORAL EVERY 8 HOURS PRN
Qty: 42 TABLET | Refills: 0 | Status: SHIPPED | OUTPATIENT
Start: 2025-02-20 | End: 2025-03-06

## 2025-02-20 RX ADMIN — KETOROLAC TROMETHAMINE 30 MG: 30 INJECTION, SOLUTION INTRAMUSCULAR at 19:42

## 2025-02-20 ASSESSMENT — PAIN DESCRIPTION - DESCRIPTORS
DESCRIPTORS: ACHING;SHARP;SHOOTING
DESCRIPTORS: ACHING;BURNING;SHARP;SHOOTING;STABBING

## 2025-02-20 ASSESSMENT — PAIN DESCRIPTION - LOCATION
LOCATION: LEG
LOCATION: LEG

## 2025-02-20 ASSESSMENT — PAIN - FUNCTIONAL ASSESSMENT: PAIN_FUNCTIONAL_ASSESSMENT: 0-10

## 2025-02-20 ASSESSMENT — PAIN SCALES - GENERAL
PAINLEVEL_OUTOF10: 8
PAINLEVEL_OUTOF10: 8

## 2025-02-20 ASSESSMENT — LIFESTYLE VARIABLES
HOW MANY STANDARD DRINKS CONTAINING ALCOHOL DO YOU HAVE ON A TYPICAL DAY: PATIENT DOES NOT DRINK
HOW OFTEN DO YOU HAVE A DRINK CONTAINING ALCOHOL: NEVER

## 2025-02-20 ASSESSMENT — PAIN DESCRIPTION - PAIN TYPE: TYPE: CHRONIC PAIN

## 2025-02-20 ASSESSMENT — PAIN DESCRIPTION - FREQUENCY: FREQUENCY: CONTINUOUS

## 2025-02-20 ASSESSMENT — PAIN DESCRIPTION - ORIENTATION
ORIENTATION: LEFT
ORIENTATION: LEFT

## 2025-02-20 NOTE — ED TRIAGE NOTES
Pt states that she has been having left leg pain x2 weeks. Pt states that she has been seen at pain management for the pain and it has continued to get worse

## 2025-02-21 NOTE — ED PROVIDER NOTES
[facility] EMERGENCY DEPARTMENT  Emergency Department Encounter  Emergency Medicine      Pt Name: Angeli Padilla  MRN:[id number]  Birthdate 1975  Date of evaluation: 2/20/25  Time: 7:25 PM EST  PCP:  Rodolfo Souza MD    CHIEF COMPLAINT       Chief Complaint   Patient presents with    Leg Pain       HISTORY OF PRESENT ILLNESS  (Location/Symptom, Timing/Onset, Context/Setting, Quality, Duration, ModifyingFactors, Severity.)      Angeli Padilla is a 49 y.o. female presents with left leg pain.  Started couple weeks ago.  She has seen pain management for what originally started out as left knee pain, had x-rays that were negative and was given a steroid pack for 5 days which she completed 3 days ago.  Said it helped only a small amount.  She started physical therapy this week and has been to 2 sessions but said it feels like it is getting worse.  She denies having much back pain, she has pain that is going down the entire posterior side of her left leg.  Feels like a shooting sensation, it is intermittent.  No trauma no falls denies any skin color changes no rash no fevers no swelling.  She has not tried any other medications aside from IcyHot and the short course of steroids.  Never had symptoms like this before.  Denies any loss of bowel or bladder control no dysuria hematuria no diarrhea.  No history of DVT.    PAST MEDICAL / SURGICAL / SOCIAL /FAMILY HISTORY      has a past medical history of Abnormal Pap smear of cervix, Allergic rhinitis, Asthma, Bariatric surgery status, Brachial neuralgia, Chronic left shoulder pain, COPD (chronic obstructive pulmonary disease) (HCC), Depression, H/O colonoscopy, Hyperlipidemia, Hypertension, Migraine headache, Obesity, morbid (more than 100 lbs over ideal weight or BMI > 40), SEJAL on CPAP, and Sleep apnea.  No other pertinent PMH on review with patient/guardian.     has a past surgical history that includes Tubal ligation; hernia repair (Right); Colonoscopy

## 2025-02-21 NOTE — DISCHARGE INSTRUCTIONS
until the left knee contacts the axillary fold (arm pit region). Repeat with right leg forward and left leg back.     7. Squat. Stand with both feet parallel, about shoulder's width apart. Attempting to maintain the trunk as perpendicular as possible to the floor, eyes focused ahead, and feet flat on the floor, the subject slowly lowers his body by flexing his knees    Return to the Emergency Department for inability to move legs, worsening of pain, tingling / loss of sensation, groin numbness, loss of bowel or bladder control, painful urination or blood in urine, inability to walk, abdominal pain, lightheadedness, fevers or chills, any other care or concern.

## 2025-02-23 ENCOUNTER — HOSPITAL ENCOUNTER (EMERGENCY)
Age: 50
Discharge: HOME | End: 2025-02-23
Attending: EMERGENCY MEDICINE
Payer: COMMERCIAL

## 2025-02-23 VITALS
HEART RATE: 107 BPM | WEIGHT: 245 LBS | RESPIRATION RATE: 21 BRPM | TEMPERATURE: 97.7 F | OXYGEN SATURATION: 99 % | DIASTOLIC BLOOD PRESSURE: 87 MMHG | HEIGHT: 64 IN | BODY MASS INDEX: 41.83 KG/M2 | SYSTOLIC BLOOD PRESSURE: 179 MMHG

## 2025-02-23 DIAGNOSIS — M79.605 LEFT LEG PAIN: Primary | ICD-10-CM

## 2025-02-23 DIAGNOSIS — M54.32 SCIATICA OF LEFT SIDE: ICD-10-CM

## 2025-02-23 PROCEDURE — 96372 THER/PROPH/DIAG INJ SC/IM: CPT

## 2025-02-23 PROCEDURE — 6360000002 HC RX W HCPCS: Performed by: EMERGENCY MEDICINE

## 2025-02-23 PROCEDURE — 6370000000 HC RX 637 (ALT 250 FOR IP): Performed by: EMERGENCY MEDICINE

## 2025-02-23 PROCEDURE — 99284 EMERGENCY DEPT VISIT MOD MDM: CPT

## 2025-02-23 RX ORDER — TRAMADOL HYDROCHLORIDE 50 MG/1
50 TABLET ORAL ONCE
Status: COMPLETED | OUTPATIENT
Start: 2025-02-23 | End: 2025-02-23

## 2025-02-23 RX ORDER — TRAMADOL HYDROCHLORIDE 50 MG/1
50 TABLET ORAL EVERY 8 HOURS PRN
Qty: 20 TABLET | Refills: 0 | Status: SHIPPED | OUTPATIENT
Start: 2025-02-23 | End: 2025-03-02

## 2025-02-23 RX ORDER — METHYLPREDNISOLONE ACETATE 40 MG/ML
40 INJECTION, SUSPENSION INTRA-ARTICULAR; INTRALESIONAL; INTRAMUSCULAR; SOFT TISSUE ONCE
Status: COMPLETED | OUTPATIENT
Start: 2025-02-23 | End: 2025-02-23

## 2025-02-23 RX ORDER — DIPHENHYDRAMINE HCL 25 MG
25 CAPSULE ORAL
Status: COMPLETED | OUTPATIENT
Start: 2025-02-23 | End: 2025-02-23

## 2025-02-23 RX ADMIN — TRAMADOL HYDROCHLORIDE 50 MG: 50 TABLET ORAL at 13:17

## 2025-02-23 RX ADMIN — METHYLPREDNISOLONE ACETATE 40 MG: 40 INJECTION, SUSPENSION INTRA-ARTICULAR; INTRALESIONAL; INTRAMUSCULAR; INTRASYNOVIAL; SOFT TISSUE at 13:17

## 2025-02-23 RX ADMIN — DIPHENHYDRAMINE HYDROCHLORIDE 25 MG: 25 CAPSULE ORAL at 13:23

## 2025-02-23 ASSESSMENT — PAIN DESCRIPTION - LOCATION
LOCATION: LEG
LOCATION: LEG

## 2025-02-23 ASSESSMENT — PAIN DESCRIPTION - FREQUENCY: FREQUENCY: CONTINUOUS

## 2025-02-23 ASSESSMENT — LIFESTYLE VARIABLES
HOW MANY STANDARD DRINKS CONTAINING ALCOHOL DO YOU HAVE ON A TYPICAL DAY: 1 OR 2
HOW OFTEN DO YOU HAVE A DRINK CONTAINING ALCOHOL: MONTHLY OR LESS

## 2025-02-23 ASSESSMENT — PAIN DESCRIPTION - ORIENTATION
ORIENTATION: LEFT
ORIENTATION: LEFT

## 2025-02-23 ASSESSMENT — PAIN - FUNCTIONAL ASSESSMENT
PAIN_FUNCTIONAL_ASSESSMENT: 0-10
PAIN_FUNCTIONAL_ASSESSMENT: PREVENTS OR INTERFERES SOME ACTIVE ACTIVITIES AND ADLS

## 2025-02-23 ASSESSMENT — PAIN DESCRIPTION - ONSET: ONSET: GRADUAL

## 2025-02-23 ASSESSMENT — PAIN SCALES - GENERAL
PAINLEVEL_OUTOF10: 9
PAINLEVEL_OUTOF10: 9

## 2025-02-23 ASSESSMENT — PAIN DESCRIPTION - PAIN TYPE: TYPE: CHRONIC PAIN

## 2025-02-23 ASSESSMENT — PAIN DESCRIPTION - DESCRIPTORS: DESCRIPTORS: BURNING;OTHER (COMMENT)

## 2025-02-23 NOTE — ED PROVIDER NOTES
Endocrine: Negative for cold intolerance, polyphagia and polyuria.   Genitourinary:  Negative for dysuria, flank pain, frequency, genital sores and urgency.   Musculoskeletal:  Positive for arthralgias and myalgias. Negative for back pain, joint swelling, neck pain and neck stiffness.   Skin:  Negative for pallor and rash.   Neurological:  Negative for tremors, seizures, syncope, weakness, numbness and headaches.   Hematological:  Negative for adenopathy. Does not bruise/bleed easily.   Psychiatric/Behavioral:  Negative for agitation, behavioral problems, hallucinations and sleep disturbance. The patient is nervous/anxious. The patient is not hyperactive.    All other systems reviewed and are negative.      Except as noted above the remainder of the review of systems was reviewed and negative.       PAST MEDICAL HISTORY     Past Medical History:   Diagnosis Date    Abnormal Pap smear of cervix     Allergic rhinitis     several allergens    Asthma     since shildhood    Bariatric surgery status 01/02/2020    Dr Calista Baird     Brachial neuralgia 1/23/2014    Chronic left shoulder pain     COPD (chronic obstructive pulmonary disease) ([facility]) 2019    Dr Stein    Depression     H/O colonoscopy 2014    Dr. De Los Santos    Hyperlipidemia     Hypertension 2010    Migraine headache     Obesity, morbid (more than 100 lbs over ideal weight or BMI > 40)     SEJAL on CPAP 10/2018    Sleep apnea          SURGICALHISTORY       Past Surgical History:   Procedure Laterality Date    BREAST BIOPSY Left 2022    B-9    BREAST REDUCTION SURGERY Bilateral 2007    BREAST SURGERY  2007    reduction, Dr Paul    COLONOSCOPY  10/20/2015        GYNECOLOGIC CRYOSURGERY      HERNIA REPAIR Right     Obdulio Ferris    SLEEVE GASTRECTOMY  01/20/2020    Dr Calista Baird    TUBAL LIGATION           CURRENT MEDICATIONS       Previous Medications    ALBUTEROL (PROVENTIL) (5 MG/ML) 0.5% NEBULIZER SOLUTION    Take 0.5 mLs by

## 2025-02-25 ENCOUNTER — OFFICE VISIT (OUTPATIENT)
Dept: ORTHOPEDIC SURGERY | Facility: CLINIC | Age: 50
End: 2025-02-25
Payer: COMMERCIAL

## 2025-02-25 ENCOUNTER — HOSPITAL ENCOUNTER (OUTPATIENT)
Dept: RADIOLOGY | Facility: EXTERNAL LOCATION | Age: 50
Discharge: HOME | End: 2025-02-25

## 2025-02-25 DIAGNOSIS — M22.2X2 PATELLOFEMORAL PAIN SYNDROME OF LEFT KNEE: ICD-10-CM

## 2025-02-25 DIAGNOSIS — M17.12 LOCALIZED OSTEOARTHRITIS OF LEFT KNEE: Primary | ICD-10-CM

## 2025-02-25 PROCEDURE — L1812 KO ELASTIC W/JOINTS PRE OTS: HCPCS | Performed by: STUDENT IN AN ORGANIZED HEALTH CARE EDUCATION/TRAINING PROGRAM

## 2025-02-25 PROCEDURE — 99204 OFFICE O/P NEW MOD 45 MIN: CPT | Performed by: STUDENT IN AN ORGANIZED HEALTH CARE EDUCATION/TRAINING PROGRAM

## 2025-02-25 PROCEDURE — 20610 DRAIN/INJ JOINT/BURSA W/O US: CPT | Performed by: STUDENT IN AN ORGANIZED HEALTH CARE EDUCATION/TRAINING PROGRAM

## 2025-02-25 RX ORDER — LIDOCAINE HYDROCHLORIDE 10 MG/ML
6 INJECTION, SOLUTION INFILTRATION; PERINEURAL
Status: COMPLETED | OUTPATIENT
Start: 2025-02-25 | End: 2025-02-25

## 2025-02-25 RX ORDER — BETAMETHASONE SODIUM PHOSPHATE AND BETAMETHASONE ACETATE 3; 3 MG/ML; MG/ML
18 INJECTION, SUSPENSION INTRA-ARTICULAR; INTRALESIONAL; INTRAMUSCULAR; SOFT TISSUE
Status: COMPLETED | OUTPATIENT
Start: 2025-02-25 | End: 2025-02-25

## 2025-02-25 RX ADMIN — LIDOCAINE HYDROCHLORIDE 6 ML: 10 INJECTION, SOLUTION INFILTRATION; PERINEURAL at 12:13

## 2025-02-25 RX ADMIN — BETAMETHASONE SODIUM PHOSPHATE AND BETAMETHASONE ACETATE 18 MG: 3; 3 INJECTION, SUSPENSION INTRA-ARTICULAR; INTRALESIONAL; INTRAMUSCULAR; SOFT TISSUE at 12:13

## 2025-02-25 NOTE — PROGRESS NOTES
Acute Injury New Patient Visit    HPI: Giuliana is a 49 y.o.female who presents today with new complaints of left knee pain.  She denies any recent falls or injuries.  She has been seen at the [facility] ED twice for this, once on 2/20/2025 and again on 2/23/2025, had x-rays of the lumbar spine, left hip, left knee, and duplex ultrasound of the left lower extremity.  Duplex was negative for acute DVT.  Left knee x-rays negative for acute fracture or dislocation.  Left hip negative for acute fracture or dislocation.  Lumbar spine shows some signs of degenerative disc disease with a minimal retrolisthesis of L2 on L3, known from prior MRI.  She is currently being treated by pain management for her lumbar issues.  She is not physical therapy currently.  She states that the pain is in the posterior knee and radiates down the calf into the ankle and foot.  This is a burning pain.  She does also have some pain anteriorly when she is walking up and down the steps.  The pain has been getting worse despite Medrol Dosepak.    Plan: For this left knee patellofemoral syndrome and mild medial compartment OA, provided her with a corticosteroid injection.  Unfortunately, the patient jerked her left knee violently when we were attempting the injection guided by ultrasound, causing the needle to come out of her skin, but luckily not causing a needlestick back into her skin or mine.  Discussed with her that we do not need to continue if it is too painful for her.  Asked her 3 times if she wanted to proceed.  She would like to proceed.  We proceeded with a nonultrasound-guided injection by landmark, which was tolerated well with no complications.  Both areas were bandaged with sterile bandage and postinjection instructions were given.  Also provided her with a free sport knee brace and will add patellofemoral syndrome to her physical therapy order.    Additionally, discussed conservative treatment measures including rest, ice, elevation,  compression, and over-the-counter analgesia as needed and as appropriate.  Risks of NSAID use, steroid use, and muscle relaxers discussed in depth and considered in light of medical comorbidities.  Patient, and parent/guardian as applicable, understand agree with plan.  Follow-up: 6 weeks  X-rays on follow-up: None      Assessment:   Problem List Items Addressed This Visit    None  Visit Diagnoses       Localized osteoarthritis of left knee    -  Primary    Relevant Orders    Point of Care Ultrasound (Completed)    Referral to Physical Therapy    Lateral Knee Stabilizer w/ Hinge    Patellofemoral pain syndrome of left knee        Relevant Orders    Point of Care Ultrasound (Completed)    Referral to Physical Therapy    Lateral Knee Stabilizer w/ Hinge            Diagnostics: Reviewed all relevant imaging including x-ray, MRI, CT, and US.      Procedure:  L Inj/Asp: L knee on 2/25/2025 12:13 PM  Indications: pain and joint swelling  Details: 22 G needle, anterolateral approach  Medications: 6 mL lidocaine 10 mg/mL (1 %); 18 mg betamethasone acet,sod phos 6 mg/mL  Outcome: tolerated well, no immediate complications  Procedure, treatment alternatives, risks and benefits explained, specific risks discussed. Consent was given by the patient. Immediately prior to procedure a time out was called to verify the correct patient, procedure, equipment, support staff and site/side marked as required. Patient was prepped and draped in the usual sterile fashion.           Physical Exam:  GENERAL:  No obvious acute distress.  NEURO:  Distally neurovascularly intact.  Sensation intact to light touch.  Extremity: Left knee examination shows:  Skin is intact;  No erythema or warmth;  No edema or ecchymosis;  No effusion;  Can flex the left knee to 110 degrees;  Full extension at 0 degrees;  No pain over the patella;  POSITIVE patellar grind test;  No pain over the medial joint line;  No pain over the lateral joint line;  No pain over  the patellar or quadricep tendon;  No pain over the proximal tibia;  No pain over the popliteal fossa;  Negative valgus stress test;  Negative varus stress test;  Negative Rosie's test medially with no instability;  Negative Rosie's test laterally with no instability;  Negative Lachman's test;  Patellar and quadricep mechanism intact;  Negative anterior and posterior drawer test;  Negative patellar apprehension test;  Distal pulses are palpable;  Neurovascularly intact; and  Walking with no significant antalgic gait.    Orders Placed This Encounter    L Inj/Asp    Lateral Knee Stabilizer w/ Hinge    Point of Care Ultrasound    Referral to Physical Therapy      At the conclusion of the visit there were no further questions by the patient/family regarding their plan of care.  Patient was instructed to call or return with any issues, questions, or concerns regarding their injury and/or treatment plan described above.     02/25/25 at 12:14 PM - Tien Gaviria, DO    Office: [phone]    This note was prepared using voice recognition software.  The details of this note are correct and have been reviewed, and corrected to the best of my ability.  Some grammatical errors may persist related to the Dragon software.

## 2025-03-16 DIAGNOSIS — M54.16 LUMBAR RADICULOPATHY: ICD-10-CM

## 2025-03-17 NOTE — TELEPHONE ENCOUNTER
Requested Prescriptions     Pending Prescriptions Disp Refills    gabapentin (NEURONTIN) 300 MG capsule [Pharmacy Med Name: gabapentin 300 mg capsule] 30 capsule 0     Sig: Take 1 capsule by mouth nightly for 30 days.       Patient last seen on:  02/12/2025    Date of last refill:  02/24/2025    Reason for request:  my chart request    Next office visit date: Visit date not found    Patient has no known allergies..

## 2025-03-18 RX ORDER — GABAPENTIN 300 MG/1
300 CAPSULE ORAL NIGHTLY
Qty: 30 CAPSULE | Refills: 0 | OUTPATIENT
Start: 2025-03-18 | End: 2025-04-17

## 2025-03-18 RX ORDER — GABAPENTIN 300 MG/1
300 CAPSULE ORAL NIGHTLY
Qty: 30 CAPSULE | Refills: 0 | Status: SHIPPED | OUTPATIENT
Start: 2025-03-18 | End: 2025-04-17

## 2025-03-19 ENCOUNTER — TELEPHONE (OUTPATIENT)
Age: 50
End: 2025-03-19

## 2025-03-19 ENCOUNTER — OFFICE VISIT (OUTPATIENT)
Age: 50
End: 2025-03-19
Payer: COMMERCIAL

## 2025-03-19 VITALS
TEMPERATURE: 97.5 F | SYSTOLIC BLOOD PRESSURE: 130 MMHG | BODY MASS INDEX: 43.54 KG/M2 | DIASTOLIC BLOOD PRESSURE: 82 MMHG | WEIGHT: 255 LBS | HEART RATE: 97 BPM | OXYGEN SATURATION: 99 % | HEIGHT: 64 IN

## 2025-03-19 DIAGNOSIS — Z12.11 COLON CANCER SCREENING: ICD-10-CM

## 2025-03-19 DIAGNOSIS — K62.89 MASS OF PERIRECTAL SOFT TISSUE: Primary | ICD-10-CM

## 2025-03-19 PROCEDURE — 99213 OFFICE O/P EST LOW 20 MIN: CPT | Performed by: PHYSICIAN ASSISTANT

## 2025-03-19 PROCEDURE — 3079F DIAST BP 80-89 MM HG: CPT | Performed by: PHYSICIAN ASSISTANT

## 2025-03-19 PROCEDURE — 1036F TOBACCO NON-USER: CPT | Performed by: PHYSICIAN ASSISTANT

## 2025-03-19 PROCEDURE — G8417 CALC BMI ABV UP PARAM F/U: HCPCS | Performed by: PHYSICIAN ASSISTANT

## 2025-03-19 PROCEDURE — G8427 DOCREV CUR MEDS BY ELIG CLIN: HCPCS | Performed by: PHYSICIAN ASSISTANT

## 2025-03-19 PROCEDURE — 3075F SYST BP GE 130 - 139MM HG: CPT | Performed by: PHYSICIAN ASSISTANT

## 2025-03-19 PROCEDURE — 99212 OFFICE O/P EST SF 10 MIN: CPT | Performed by: PHYSICIAN ASSISTANT

## 2025-03-19 RX ORDER — FLUCONAZOLE 150 MG/1
150 TABLET ORAL ONCE
Qty: 1 TABLET | Refills: 1 | Status: SHIPPED | OUTPATIENT
Start: 2025-03-19 | End: 2025-03-19

## 2025-03-19 RX ORDER — HYDROCORTISONE ACETATE 25 MG/1
25 SUPPOSITORY RECTAL 2 TIMES DAILY PRN
Qty: 10 SUPPOSITORY | Refills: 0 | Status: SHIPPED | OUTPATIENT
Start: 2025-03-19 | End: 2025-03-24

## 2025-03-19 NOTE — TELEPHONE ENCOUNTER
PATIENT CALLED REGARDING MRI LUMBAR NOT SCHEDULED. PATIENT WAS GIVEN [facility] SCHEDULING NUMBER TO CALL AND SET UP APPT FOR MRI.

## 2025-03-19 NOTE — PROGRESS NOTES
Diagnosis Orders   1. Mass of perirectal soft tissue  amoxicillin-clavulanate (AUGMENTIN) 875-125 MG per tablet    hydrocortisone (ANUSOL-HC) 25 MG suppository      2. Colon cancer screening  Ambulatory referral to Gastroenterology            Orders Placed This Encounter   Procedures    Ambulatory referral to Gastroenterology     Referral Priority:   Routine     Referral Type:   Eval and Treat     Referral Reason:   Specialty Services Required     Referred to Provider:   Petrona De Los Santos MD     Requested Specialty:   Gastroenterology     Number of Visits Requested:   1     Orders Placed This Encounter   Medications    amoxicillin-clavulanate (AUGMENTIN) 875-125 MG per tablet     Sig: Take 1 tablet by mouth 2 times daily for 10 days     Dispense:  20 tablet     Refill:  0    hydrocortisone (ANUSOL-HC) 25 MG suppository     Sig: Place 1 suppository rectally 2 times daily as needed for Hemorrhoids     Dispense:  10 suppository     Refill:  0     There are no discontinued medications.  Return for follow up with your PCP as directed.    Kim Elias PA-C

## 2025-03-20 ENCOUNTER — TELEPHONE (OUTPATIENT)
Age: 50
End: 2025-03-20

## 2025-03-20 NOTE — TELEPHONE ENCOUNTER
B/L L4/5 TFESI UNDER XR   AUTH # [id number] VALID 3/12/2025-6/12/2025  REFERRAL # [id number]    OK to schedule procedure approved as above.   Please note sides/levels approved and date range.   (If applicable, sides/levels approved may differ from those ordered)    TO BE SCHEDULED WITH DR BAKER

## 2025-03-24 ENCOUNTER — PROCEDURE VISIT (OUTPATIENT)
Age: 50
End: 2025-03-24
Payer: COMMERCIAL

## 2025-03-24 VITALS
HEART RATE: 102 BPM | BODY MASS INDEX: 43.54 KG/M2 | OXYGEN SATURATION: 99 % | HEIGHT: 64 IN | SYSTOLIC BLOOD PRESSURE: 136 MMHG | TEMPERATURE: 97.5 F | WEIGHT: 255 LBS | DIASTOLIC BLOOD PRESSURE: 70 MMHG

## 2025-03-24 DIAGNOSIS — M54.16 LUMBAR RADICULOPATHY: Primary | ICD-10-CM

## 2025-03-24 PROCEDURE — 64483 NJX AA&/STRD TFRM EPI L/S 1: CPT | Performed by: PAIN MEDICINE

## 2025-03-24 RX ORDER — DEXAMETHASONE SODIUM PHOSPHATE 10 MG/ML
10 INJECTION, SOLUTION INTRAMUSCULAR; INTRAVENOUS ONCE
Status: COMPLETED | OUTPATIENT
Start: 2025-03-24 | End: 2025-03-24

## 2025-03-24 RX ORDER — DIAZEPAM 5 MG/1
5 TABLET ORAL DAILY PRN
Qty: 1 TABLET | Refills: 0 | Status: SHIPPED | OUTPATIENT
Start: 2025-03-24 | End: 2025-03-25

## 2025-03-24 RX ADMIN — DEXAMETHASONE SODIUM PHOSPHATE 10 MG: 10 INJECTION, SOLUTION INTRAMUSCULAR; INTRAVENOUS at 15:37

## 2025-03-24 ASSESSMENT — PAIN DESCRIPTION - LOCATION
LOCATION: BACK
LOCATION: BACK

## 2025-03-24 ASSESSMENT — PAIN SCALES - GENERAL
PAINLEVEL_OUTOF10: 8
PAINLEVEL_OUTOF10: 6

## 2025-03-24 NOTE — PROGRESS NOTES
[email or web address].  Spine Surgery  Advanced Pain Management      Patient Name: Angeli Padilla : 1975  Date: 3/24/2025   Physician: CAITLYN DANGELO DO      Angeli Padilla  is here today for interventional pain management.    Preoperatively, the patient presents with radicular pain in the affected area as per history and exam. Patient has failed NSAIDs, PT, and conservative treatment. Patient has significant psychological and functional impairment due to this condition.  Standard ASIPP guidelines were followed and sterile technique used.  Area was cleaned with Betadine x3.  Informed consent was obtained.  Fluoroscopic guidance was used for this procedure.    TRANSFORAMINAL EPIDURAL  There was appropriate spread of contrast in the anterior epidural space and around the exiting nerve root. The 6 o’clock position of the pedicle was identified. Multiple views of fluoroscopy including lateral were used to confirm accurate needle placement of depth. Live fluoroscopy was used when injecting contrast to ensure no subdural or vascular spread. In total, approximately 5 mg of Dexamethasone and 1.0 ml of 0.9cc of normal saline was injected slowly without difficulty.This procedure was 40% more difficult and required 40% more work secondary to the patient's habitus. The patient has a BMI of 44 and has comorbidities of hypertension and hyperlipidemia. This required increased work for safe and proper positioning upon the fluoroscopy table, increased needle passes for safe and appropriate needle placement, and increased fluoroscopy time and radiation exposure for proper visualization.      Only the left side was done today secondary to patient having a lot of discomfort and spasms during procedure.  Will give Valium 5 mg 1 hour prior to the right side.      Patient tolerated the procedure well, no obvious complications occurred during the procedure.  Patient was appropriately monitored and discharged home in stable

## 2025-03-31 ENCOUNTER — PROCEDURE VISIT (OUTPATIENT)
Age: 50
End: 2025-03-31
Payer: COMMERCIAL

## 2025-03-31 VITALS
DIASTOLIC BLOOD PRESSURE: 70 MMHG | SYSTOLIC BLOOD PRESSURE: 138 MMHG | HEART RATE: 102 BPM | WEIGHT: 255 LBS | HEIGHT: 64 IN | TEMPERATURE: 97.8 F | OXYGEN SATURATION: 99 % | BODY MASS INDEX: 43.54 KG/M2

## 2025-03-31 DIAGNOSIS — M54.16 LUMBAR RADICULOPATHY: Primary | ICD-10-CM

## 2025-03-31 PROCEDURE — 64483 NJX AA&/STRD TFRM EPI L/S 1: CPT | Performed by: PAIN MEDICINE

## 2025-03-31 RX ORDER — DEXAMETHASONE SODIUM PHOSPHATE 10 MG/ML
10 INJECTION, SOLUTION INTRAMUSCULAR; INTRAVENOUS ONCE
Status: COMPLETED | OUTPATIENT
Start: 2025-03-31 | End: 2025-03-31

## 2025-03-31 RX ADMIN — DEXAMETHASONE SODIUM PHOSPHATE 10 MG: 10 INJECTION, SOLUTION INTRAMUSCULAR; INTRAVENOUS at 15:53

## 2025-03-31 ASSESSMENT — PAIN DESCRIPTION - LOCATION
LOCATION: BACK
LOCATION: BACK

## 2025-03-31 ASSESSMENT — PAIN SCALES - GENERAL
PAINLEVEL_OUTOF10: 10
PAINLEVEL_OUTOF10: 9

## 2025-03-31 NOTE — PROGRESS NOTES
[facility].  Spine Surgery  Advanced Pain Management      Patient Name: Angeli Padilla : 1975  Date: 3/31/2025   Physician: CAITLYN DANGELO DO      Angeli Padilla  is here today for interventional pain management.    Preoperatively, the patient presents with radicular pain in the affected area as per history and exam. Patient has failed NSAIDs, PT, and conservative treatment. Patient has significant psychological and functional impairment due to this condition.  Standard ASIPP guidelines were followed and sterile technique used.  Area was cleaned with Betadine x3.  Informed consent was obtained.  Fluoroscopic guidance was used for this procedure.    TRANSFORAMINAL EPIDURAL  There was appropriate spread of contrast in the anterior epidural space and around the exiting nerve root. The 6 o’clock position of the pedicle was identified. Multiple views of fluoroscopy including lateral were used to confirm accurate needle placement of depth. Live fluoroscopy was used when injecting contrast to ensure no subdural or vascular spread. In total, approximately 5 mg of Dexamethasone and 1.0 ml of 0.9cc of normal saline was injected slowly without difficulty.This procedure was 40% more difficult and required 40% more work secondary to the patient's habitus. The patient has a BMI of 44 and has comorbidities of hypertension and hyperlipidemia. This required increased work for safe and proper positioning upon the fluoroscopy table, increased needle passes for safe and appropriate needle placement, and increased fluoroscopy time and radiation exposure for proper visualization.        Patient tolerated the procedure well, no obvious complications occurred during the procedure.  Patient was appropriately monitored and discharged home in stable condition with their usual motor strength. Post Op instructions were given to patient. Patient will resume blood thinners after procedure if they stopped them before

## 2025-04-07 ENCOUNTER — TELEPHONE (OUTPATIENT)
Age: 50
End: 2025-04-07

## 2025-04-07 DIAGNOSIS — F41.0 PANIC ATTACKS: Primary | ICD-10-CM

## 2025-04-07 NOTE — TELEPHONE ENCOUNTER
Patient called asking if she could be prescribed a valium for her MRI of her lumbar spine? She says she has panic attacks. She also wanted to schedule an MRI for her knee, I do not see an order for this. Please advise.

## 2025-04-08 RX ORDER — DIAZEPAM 5 MG/1
5 TABLET ORAL 2 TIMES DAILY PRN
Qty: 2 TABLET | Refills: 0 | Status: SHIPPED | OUTPATIENT
Start: 2025-04-08 | End: 2025-04-09

## 2025-04-08 NOTE — TELEPHONE ENCOUNTER
Diazepam Valium 5 mg #2 no refills for panic attacks  Please schedule follow up with me in pain management to discuss her MRI Knee request and knee concerns     Controlled Substance Monitoring:    Acute and Chronic Pain Monitoring:   RX Monitoring Periodic Controlled Substance Monitoring   4/8/2025   5:52 PM Obtaining appropriate analgesic effect of treatment.

## 2025-04-09 ENCOUNTER — HOSPITAL ENCOUNTER (OUTPATIENT)
Dept: RADIOLOGY | Facility: HOSPITAL | Age: 50
Discharge: HOME | End: 2025-04-09
Payer: COMMERCIAL

## 2025-04-09 ENCOUNTER — APPOINTMENT (OUTPATIENT)
Dept: ORTHOPEDIC SURGERY | Facility: CLINIC | Age: 50
End: 2025-04-09
Payer: COMMERCIAL

## 2025-04-09 DIAGNOSIS — M22.2X2 PATELLOFEMORAL PAIN SYNDROME OF LEFT KNEE: ICD-10-CM

## 2025-04-09 DIAGNOSIS — S83.91XA SPRAIN OF RIGHT KNEE, INITIAL ENCOUNTER: ICD-10-CM

## 2025-04-09 DIAGNOSIS — M23.92 INTERNAL DERANGEMENT OF LEFT KNEE: ICD-10-CM

## 2025-04-09 DIAGNOSIS — M22.2X1 PATELLOFEMORAL PAIN SYNDROME OF RIGHT KNEE: ICD-10-CM

## 2025-04-09 DIAGNOSIS — M76.892 PES ANSERINUS TENDINITIS OF BOTH LOWER EXTREMITIES: ICD-10-CM

## 2025-04-09 DIAGNOSIS — M76.891 PES ANSERINUS TENDINITIS OF BOTH LOWER EXTREMITIES: ICD-10-CM

## 2025-04-09 DIAGNOSIS — S83.91XA SPRAIN OF RIGHT KNEE, INITIAL ENCOUNTER: Primary | ICD-10-CM

## 2025-04-09 DIAGNOSIS — S83.242A TEAR OF MEDIAL MENISCUS OF LEFT KNEE, CURRENT, UNSPECIFIED TEAR TYPE, INITIAL ENCOUNTER: ICD-10-CM

## 2025-04-09 PROCEDURE — 73564 X-RAY EXAM KNEE 4 OR MORE: CPT | Mod: RIGHT SIDE | Performed by: RADIOLOGY

## 2025-04-09 PROCEDURE — L1812 KO ELASTIC W/JOINTS PRE OTS: HCPCS | Performed by: STUDENT IN AN ORGANIZED HEALTH CARE EDUCATION/TRAINING PROGRAM

## 2025-04-09 PROCEDURE — 99214 OFFICE O/P EST MOD 30 MIN: CPT | Performed by: STUDENT IN AN ORGANIZED HEALTH CARE EDUCATION/TRAINING PROGRAM

## 2025-04-09 PROCEDURE — 73564 X-RAY EXAM KNEE 4 OR MORE: CPT | Mod: RT

## 2025-04-09 RX ORDER — DICLOFENAC SODIUM 10 MG/G
4 GEL TOPICAL 3 TIMES DAILY PRN
Qty: 100 G | Refills: 1 | Status: SHIPPED | OUTPATIENT
Start: 2025-04-09 | End: 2025-06-08

## 2025-04-09 NOTE — PROGRESS NOTES
"  Acute Injury New Patient Visit    HPI: Giuliana is a 49 y.o.female who presents today for follow-up of her left knee patellofemoral syndrome and mild medial compartment OA.  She is status post corticosteroid injection on 2/25/2025.  Unfortunately, she states that she is only 10% better overall.  Her pain is anterior and medial.  She denies any interval falls or injuries, but does state that after several sessions of physical therapy, at least 3 directed just towards the knee, and other sessions directed towards the lumbar region as well, she states that she would have worsening pain after the sessions.  She has been doing her home exercises including stretches.  She states that her left knee \"lili and cracks\".  She denies any swelling at the knee, but did states she noticed some swelling at the left ankle.  She had negative duplex ultrasound at the ER at the end of February.  She has been wearing her brace at work.  1 to 2 weeks ago, she went to [facility] pain management, and had 2 lumbar injections.  This did not seem to help either.  They are getting an MRI and placed her on gabapentin.  She is also having some right knee pain today.  She feels like she is overcompensating and overusing her right knee.  She has medial pain on the right.  She denies any swelling or bruising.  She denies any falls or injuries on the right knee.    On 2/25/2025, she presented with new complaints of left knee pain.  She denies any recent falls or injuries.  She has been seen at the [facility] ED twice for this, once on 2/20/2025 and again on 2/23/2025, had x-rays of the lumbar spine, left hip, left knee, and duplex ultrasound of the left lower extremity.  Duplex was negative for acute DVT.  Left knee x-rays negative for acute fracture or dislocation.  Left hip negative for acute fracture or dislocation.  Lumbar spine shows some signs of degenerative disc disease with a minimal retrolisthesis of L2 on L3, known from prior MRI.  She is " currently being treated by pain management for her lumbar issues.  She is not physical therapy currently.  She states that the pain is in the posterior knee and radiates down the calf into the ankle and foot.  This is a burning pain.  She does also have some pain anteriorly when she is walking up and down the steps.  The pain has been getting worse despite Medrol Dosepak.    Plan: Today, on 4/9/2025, we will obtain x-rays of the right knee for further evaluation given her increasing pain on the right, localizing medially.  For her left knee, given her lack of significant improvement despite conservative treatment measures including physical therapy, home exercises, corticosteroid injection, we will obtain MRI of the left knee for further characterization of her pathology, namely concern for possible medial meniscus tear given her continued medial joint line tenderness on exam.  Unfortunately, she had gastric sleeve surgery, so cannot do oral NSAIDs, but we will start her on topical diclofenac for both knees.  She has an element of pes anserine tendinitis bilaterally.  X-rays of the right knee show some mild to moderate medial compartment as well as patellofemoral compartment osteoarthritis with joint space narrowing and osteophytosis as well as lateral tracking of the patella, concerning for an element of patellofemoral syndrome as well as patellofemoral and medial compartment osteoarthritis, likely contributing to her overall pain and resultant tendinopathy at the Pez anserine insertion as well.  For this, we will fit her with a free sport knee brace for the right knee as well.  Encouraged her to continue with her home exercise stretches and using pain as her guide for what exercises work for her.  Continue to use her free sport knee brace on the left.    On 2/25/2025, for this left knee patellofemoral syndrome and mild medial compartment OA, provided her with a corticosteroid injection.  Unfortunately, the patient  jerked her left knee violently when we were attempting the injection guided by ultrasound, causing the needle to come out of her skin, but luckily not causing a needlestick back into her skin or mine.  Discussed with her that we do not need to continue if it is too painful for her.  Asked her 3 times if she wanted to proceed.  She would like to proceed.  We proceeded with a nonultrasound-guided injection by landmark, which was tolerated well with no complications.  Both areas were bandaged with sterile bandage and postinjection instructions were given.  Also provided her with a free sport knee brace and will add patellofemoral syndrome to her physical therapy order.    Additionally, discussed conservative treatment measures including rest, ice, elevation, compression, and over-the-counter analgesia as needed and as appropriate.  Risks of NSAID use, steroid use, and muscle relaxers discussed in depth and considered in light of medical comorbidities.  Patient, and parent/guardian as applicable, understand agree with plan.  Follow-up: With me with MRI results of the left knee  X-rays on follow-up: None      Assessment:   Problem List Items Addressed This Visit    None  Visit Diagnoses       Sprain of right knee, initial encounter    -  Primary    Relevant Medications    diclofenac sodium (Voltaren) 1 % gel    Other Relevant Orders    XR knee right 4+ views    Lateral Knee Stabilizer w/ Hinge    Patellofemoral pain syndrome of left knee        Relevant Medications    diclofenac sodium (Voltaren) 1 % gel    Other Relevant Orders    MR knee left wo IV contrast    Lateral Knee Stabilizer w/ Hinge    Tear of medial meniscus of left knee, current, unspecified tear type, initial encounter        Relevant Medications    diclofenac sodium (Voltaren) 1 % gel    Other Relevant Orders    MR knee left wo IV contrast    Lateral Knee Stabilizer w/ Hinge    Internal derangement of left knee        Relevant Medications    diclofenac  sodium (Voltaren) 1 % gel    Other Relevant Orders    MR knee left wo IV contrast    Lateral Knee Stabilizer w/ Hinge    Pes anserinus tendinitis of both lower extremities        Relevant Medications    diclofenac sodium (Voltaren) 1 % gel    Other Relevant Orders    Lateral Knee Stabilizer w/ Hinge    Patellofemoral pain syndrome of right knee                  Diagnostics: Reviewed all relevant imaging including x-ray, MRI, CT, and US.      Procedure:  Procedures    Physical Exam:  GENERAL:  No obvious acute distress.  NEURO:  Distally neurovascularly intact.  Sensation intact to light touch.  Extremity: Left knee examination shows:  Skin is intact;  No erythema or warmth;  No edema or ecchymosis;  No effusion;  Can flex the left knee to 110 degrees;  Full extension at 0 degrees;  No pain over the patella;  POSITIVE patellar grind test;  TENDER over the medial joint line and at the pes anserine insertion;  No pain over the lateral joint line;  No pain over the patellar or quadricep tendon;  No pain over the proximal tibia;  No pain over the popliteal fossa;  Negative valgus stress test;  Negative varus stress test;  Negative Rosie's test medially with no instability;  Negative Rosie's test laterally with no instability;  Negative Lachman's test;  Patellar and quadricep mechanism intact;  Negative anterior and posterior drawer test;  Negative patellar apprehension test;  Distal pulses are palpable;  Neurovascularly intact; and  Walking with no significant antalgic gait.  Negative Homans test no significant swelling, bruising, erythema, warmth, or tenderness over the posterior calf.    Right knee examination shows:  Skin is intact;  No erythema or warmth;  No edema or ecchymosis;  No effusion;  Can flex the left knee to 120 degrees;  Full extension at 0 degrees;  No pain over the patella;  Negative patellar grind test;  TENDER over the medial joint line;  No pain over the lateral joint line;  No pain over the  patellar or quadricep tendon;  No pain over the proximal tibia;  No pain over the popliteal fossa;  Negative valgus stress test;  Negative varus stress test;  Negative Rosie's test medially with no instability;  Negative Rosie's test laterally with no instability;  Negative Lachman's test;  Patellar and quadricep mechanism intact;  Negative anterior and posterior drawer test;  Negative patellar apprehension test;  Distal pulses are palpable;  Neurovascularly intact; and  Walking with no significant antalgic gait.    Orders Placed This Encounter    Lateral Knee Stabilizer w/ Hinge    MR knee left wo IV contrast    XR knee right 4+ views    diclofenac sodium (Voltaren) 1 % gel      At the conclusion of the visit there were no further questions by the patient/family regarding their plan of care.  Patient was instructed to call or return with any issues, questions, or concerns regarding their injury and/or treatment plan described above.     04/09/25 at 9:45 AM - Tine Gaviria DO    Office: [phone]    This note was prepared using voice recognition software.  The details of this note are correct and have been reviewed, and corrected to the best of my ability.  Some grammatical errors may persist related to the Dragon software.

## 2025-04-14 RX ORDER — ROSUVASTATIN CALCIUM 20 MG/1
20 TABLET, COATED ORAL NIGHTLY
Qty: 90 TABLET | Refills: 0 | Status: SHIPPED | OUTPATIENT
Start: 2025-04-14

## 2025-04-21 DIAGNOSIS — M54.16 LUMBAR RADICULOPATHY: ICD-10-CM

## 2025-04-22 RX ORDER — GABAPENTIN 300 MG/1
300 CAPSULE ORAL NIGHTLY
Qty: 30 CAPSULE | Refills: 0 | OUTPATIENT
Start: 2025-04-22 | End: 2025-05-22

## 2025-04-22 NOTE — TELEPHONE ENCOUNTER
I called and spoke with patient to let her know Maye would like her to follow up in office before prescribing Gabapentin for her. Pt understood, she wants to schedule her MRI then call us to follow up.

## 2025-04-25 ENCOUNTER — TELEPHONE (OUTPATIENT)
Age: 50
End: 2025-04-25

## 2025-04-25 NOTE — TELEPHONE ENCOUNTER
Patient called in stating that her leg is swelling all the way from her foot to her thigh.    She is inquiring if she should still be wearing the knee brace. States the swelling looks in her words, gross and she fears the brace might give her a blood clot.    Please advise.

## 2025-04-26 ENCOUNTER — APPOINTMENT (OUTPATIENT)
Dept: URGENT CARE | Age: 50
End: 2025-04-26
Payer: COMMERCIAL

## 2025-05-02 ENCOUNTER — HOSPITAL ENCOUNTER (OUTPATIENT)
Dept: RADIOLOGY | Facility: HOSPITAL | Age: 50
Discharge: HOME | End: 2025-05-02
Payer: COMMERCIAL

## 2025-05-02 DIAGNOSIS — M22.2X2 PATELLOFEMORAL PAIN SYNDROME OF LEFT KNEE: ICD-10-CM

## 2025-05-02 DIAGNOSIS — S83.242A TEAR OF MEDIAL MENISCUS OF LEFT KNEE, CURRENT, UNSPECIFIED TEAR TYPE, INITIAL ENCOUNTER: ICD-10-CM

## 2025-05-02 DIAGNOSIS — M23.92 INTERNAL DERANGEMENT OF LEFT KNEE: ICD-10-CM

## 2025-05-02 PROCEDURE — 73721 MRI JNT OF LWR EXTRE W/O DYE: CPT | Mod: LT,RSC

## 2025-05-12 RX ORDER — MULTIVITAMIN
1 TABLET ORAL DAILY
Qty: 90 TABLET | Refills: 0 | Status: SHIPPED | OUTPATIENT
Start: 2025-05-12

## 2025-05-16 DIAGNOSIS — F41.0 PANIC ATTACKS: ICD-10-CM

## 2025-05-16 NOTE — TELEPHONE ENCOUNTER
Requested Prescriptions     Pending Prescriptions Disp Refills    diazePAM (VALIUM) 5 MG tablet 2 tablet 0     Sig: Take 1 tablet by mouth 2 times daily as needed for Anxiety or Sleep for up to 2 days. Take 1 tablet 1 hour prior to MRI. May take another tablet at time of MRI if needed. Max Daily Amount: 10 mg       Patient last seen on:  03/31/2025    Date of last surgery:  n/a    Date of last refill:  04/08/2025    Reason for request:  GETTING MRI DONE ON 05/22/2025    Request date for pharmacy pick-up:  05/20/2025    Next office visit date: Visit date not found    Patient has no known allergies.

## 2025-05-17 RX ORDER — DIAZEPAM 5 MG/1
5 TABLET ORAL 2 TIMES DAILY PRN
Qty: 2 TABLET | Refills: 0 | OUTPATIENT
Start: 2025-05-20 | End: 2025-05-22

## 2025-05-17 NOTE — TELEPHONE ENCOUNTER
Valium sent on 4/8/25 for mri  Filled on 4/8/25  Also received valium #2 filled on 5/5/25    -please clarify request for valium for mri, appears this was already fulfilled. Also patient obtaining valium from another provider , please clarify

## 2025-05-19 ENCOUNTER — TELEPHONE (OUTPATIENT)
Dept: ORTHOPEDIC SURGERY | Facility: CLINIC | Age: 50
End: 2025-05-19
Payer: COMMERCIAL

## 2025-05-19 RX ORDER — AMMONIUM LACTATE 12 G/100G
CREAM TOPICAL
Qty: 385 G | Refills: 5 | Status: SHIPPED | OUTPATIENT
Start: 2025-05-19

## 2025-05-19 NOTE — TELEPHONE ENCOUNTER
Patient called stating she accidentally took the two valium she was given.  Patients wanted to get two more for her coming up MRI on 5/24

## 2025-05-19 NOTE — TELEPHONE ENCOUNTER
TRIED TO CALL PATIENT REGARDING DR BAKER'S RESPONSE. UNABLE TO LEAVE VOICEMAIL DUE TO MAILBOX FULL.

## 2025-05-20 NOTE — TELEPHONE ENCOUNTER
Spoke to patient and she stated that she did try to get MRI done about 3 weeks ago for knee MRI, she used 1 of the refills then and she had panic attack and MRI was stopped.     MRI was rescheduled for Saturday, patient took the RX from ,she went again to get the MRI done and she showed up on the wrong day.    She still has 1 pill from Dr. Tanner and is waiting for [facility] to call and schedule next MRI attempt.

## 2025-05-21 ENCOUNTER — TELEPHONE (OUTPATIENT)
Age: 50
End: 2025-05-21

## 2025-05-21 NOTE — TELEPHONE ENCOUNTER
[facility] AUTHORIZATION DEPARTMENT IS CALLING REGARDING PATIENTS MRI TODAY  CASE # [id number]  PA NEEDS CLINICAL SUPPORTIVE NOTES IN ORDER TO PROCESS THE PRIOR AUTH  PLEASE -[phone]

## 2025-05-24 ENCOUNTER — HOSPITAL ENCOUNTER (OUTPATIENT)
Dept: RADIOLOGY | Facility: HOSPITAL | Age: 50
Discharge: HOME | End: 2025-05-24
Payer: COMMERCIAL

## 2025-05-24 DIAGNOSIS — M23.92 INTERNAL DERANGEMENT OF LEFT KNEE: ICD-10-CM

## 2025-05-24 DIAGNOSIS — M22.2X1 PATELLOFEMORAL PAIN SYNDROME OF RIGHT KNEE: ICD-10-CM

## 2025-05-24 DIAGNOSIS — S83.242A TEAR OF MEDIAL MENISCUS OF LEFT KNEE, CURRENT, UNSPECIFIED TEAR TYPE, INITIAL ENCOUNTER: ICD-10-CM

## 2025-05-24 PROCEDURE — 73721 MRI JNT OF LWR EXTRE W/O DYE: CPT | Mod: LEFT SIDE | Performed by: STUDENT IN AN ORGANIZED HEALTH CARE EDUCATION/TRAINING PROGRAM

## 2025-05-24 PROCEDURE — 73721 MRI JNT OF LWR EXTRE W/O DYE: CPT | Mod: LT

## 2025-05-24 RX ORDER — DIAZEPAM 5 MG/1
5 TABLET ORAL 2 TIMES DAILY PRN
Qty: 2 TABLET | Refills: 0 | Status: SHIPPED | OUTPATIENT
Start: 2025-05-24 | End: 2025-05-25

## 2025-05-24 NOTE — TELEPHONE ENCOUNTER
Diazepam Valium 5 mg #2 no refills for panic attacks 5/24/25. Do not drive while on this medication.     Controlled Substance Monitoring:    Acute and Chronic Pain Monitoring:   RX Monitoring Periodic Controlled Substance Monitoring   5/24/2025   4:33 PM Obtaining appropriate analgesic effect of treatment.

## 2025-05-28 ENCOUNTER — APPOINTMENT (OUTPATIENT)
Dept: ORTHOPEDIC SURGERY | Facility: CLINIC | Age: 50
End: 2025-05-28
Payer: COMMERCIAL

## 2025-05-28 ENCOUNTER — TELEPHONE (OUTPATIENT)
Age: 50
End: 2025-05-28

## 2025-05-28 DIAGNOSIS — S83.419A GRADE 2 SPRAIN OF MEDIAL COLLATERAL LIGAMENT OF KNEE, INITIAL ENCOUNTER: ICD-10-CM

## 2025-05-28 DIAGNOSIS — S83.242A ACUTE MEDIAL MENISCUS TEAR OF LEFT KNEE, INITIAL ENCOUNTER: Primary | ICD-10-CM

## 2025-05-28 DIAGNOSIS — S83.242A TEAR OF MEDIAL MENISCUS OF LEFT KNEE, CURRENT, UNSPECIFIED TEAR TYPE, INITIAL ENCOUNTER: ICD-10-CM

## 2025-05-28 PROCEDURE — 99213 OFFICE O/P EST LOW 20 MIN: CPT | Performed by: STUDENT IN AN ORGANIZED HEALTH CARE EDUCATION/TRAINING PROGRAM

## 2025-05-28 NOTE — PROGRESS NOTES
Follow-Up Patient Visit  Patient ID: Giuliana Ferris is a 49 y.o. female.    History of Present Illness  The patient is a 49-year-old female who presents for follow-up of a left knee injury and MRI results. She was initially seen on 02/25/2025 and last seen on 04/09/2025 for left knee pain.     Left Knee Pain  - Underwent physical therapy, home exercises, and corticosteroid injection.  - Concern for medial meniscus tear.  - Severe left knee pain necessitated multiple ER visits and pain management consultations.  - Pain persisted despite interventions, leading to the current visit.  - Physical therapy exacerbated pain, requiring time off work.  - MRI delayed 2 weeks due to panic attack.  - Has not reviewed MRI results.  - Stopped physical therapy due to pain but performs home exercises; brace provides significant relief.  - Experiences cracking sensation when straightening knee, especially at the beginning and end of the day, and occasionally during sleep.  - No knee locking.  - Pain improved with brace, less severe than 1-2 months ago.  - Corticosteroid injection did not provide relief.  - Prefers physical therapy over surgery.    Supplemental information: PAST SURGICAL HISTORY: Gastric sleeve surgery.    Assessment & Plan  1. Radial tear of medial meniscus posterior horn:  - Continue conservative treatment for 3-4 weeks  - Resume physical therapy through [facility]  - If no progress, consider surgical referral    2. Tricompartmental articular cartilage thinning:  - Continue conservative treatment for 3-4 weeks  - Resume physical therapy through [facility]  - If no progress, consider surgical referral    3. Small joint effusion with synovial thickening:  - Continue conservative treatment for 3-4 weeks  - Resume physical therapy through [facility]  - If no progress, consider surgical referral    4. Grade 1 medial collateral ligament sprain:  - Continue conservative treatment for 3-4 weeks  -  Resume physical therapy through [facility]  - If no progress, consider surgical referral    Follow-up:  - Follow up in 4 weeks       Orders Placed This Encounter    Referral to Physical Therapy       1. Tear of medial meniscus of left knee, current, unspecified tear type, initial encounter    2. Grade 2 sprain of medial collateral ligament of knee, initial encounter        Procedures    Physical Exam  - Musculoskeletal:    - Left knee:      - Pain in medial compartment      - Radial tear of medial meniscus posterior horn      - Tricompartmental articular cartilage thinning (medial femoral tibial and patellofemoral compartments)      - Small joint effusion with synovial thickening      - Grade 1 medial collateral ligament sprain    Results  Imaging   - MRI of left knee: Radial tear of medial meniscus posterior horn. Tricompartmental articular cartilage thinning (medial femoral tibial and patellofemoral compartments). Small joint effusion with synovial thickening, grade 1 medial collateral ligament sprain.    At the conclusion of the visit there were no further questions by the patient/family regarding their plan of care.  Patient was instructed to call or return with any issues, questions, or concerns regarding their injury and/or treatment plan described above.      This medical note was created with the assistance of artificial intelligence (AI) for documentation purposes. The content has been reviewed and confirmed by the healthcare provider for accuracy and completeness. Patient consented to the use of audio recording and use of AI during their visit.   05/28/25 at 4:35 PM - Tien Gaviria DO  Office:  [phone]

## 2025-05-28 NOTE — TELEPHONE ENCOUNTER
I called and spoke with patient. She stated that they are still waiting on authorization for MRI LS.  I also informed her that Dr. Tanner is referring her to Dr. King for MRI Left Knee with radial tear of medial meniscus.Pt stated that she is already going to ortho dr marques.

## 2025-05-28 NOTE — TELEPHONE ENCOUNTER
-Please check whether the patient obtained MRI LS Spine as ordered earlier.  -Please refer to Dr King for MRI left knee with radial tear of medial meniscus.

## 2025-06-04 ENCOUNTER — DOCUMENTATION (OUTPATIENT)
Dept: ORTHOPEDIC SURGERY | Facility: CLINIC | Age: 50
End: 2025-06-04
Payer: COMMERCIAL

## 2025-06-04 ENCOUNTER — TELEPHONE (OUTPATIENT)
Dept: ORTHOPEDIC SURGERY | Facility: CLINIC | Age: 50
End: 2025-06-04
Payer: COMMERCIAL

## 2025-06-04 DIAGNOSIS — M23.92 INTERNAL DERANGEMENT OF LEFT KNEE: ICD-10-CM

## 2025-06-04 DIAGNOSIS — S83.249A RADIAL TEAR OF MEDIAL MENISCUS: ICD-10-CM

## 2025-06-04 DIAGNOSIS — S83.249A ACUTE TEAR OF POSTERIOR HORN OF MEDIAL MENISCUS: Primary | ICD-10-CM

## 2025-06-04 DIAGNOSIS — M17.12 LOCALIZED OSTEOARTHRITIS OF LEFT KNEE: ICD-10-CM

## 2025-06-04 DIAGNOSIS — M25.562 ACUTE PAIN OF LEFT KNEE: ICD-10-CM

## 2025-06-04 NOTE — TELEPHONE ENCOUNTER
New order faxed to [facility]. I spoke with Denise she confirmed the fax has been received.

## 2025-06-04 NOTE — TELEPHONE ENCOUNTER
Physical therapy would like a new order with a different diagnosis code. Patient's insurance will not allow anything with Initial, sequela or subsequent. Please fax order to [phone].

## 2025-06-04 NOTE — PROGRESS NOTES
Open patient's chart to reorder physical therapy script with new diagnosis per P.T's request confirmed by Dr Gaviria.

## 2025-06-12 RX ORDER — LORATADINE 10 MG/1
10 TABLET ORAL DAILY PRN
Qty: 90 TABLET | Refills: 0 | Status: SHIPPED | OUTPATIENT
Start: 2025-06-12

## 2025-07-01 ENCOUNTER — APPOINTMENT (OUTPATIENT)
Dept: ORTHOPEDIC SURGERY | Facility: CLINIC | Age: 50
End: 2025-07-01
Payer: COMMERCIAL

## 2025-07-08 ENCOUNTER — OFFICE VISIT (OUTPATIENT)
Age: 50
End: 2025-07-08
Payer: COMMERCIAL

## 2025-07-08 VITALS
OXYGEN SATURATION: 98 % | DIASTOLIC BLOOD PRESSURE: 82 MMHG | BODY MASS INDEX: 44.73 KG/M2 | TEMPERATURE: 97.6 F | HEART RATE: 84 BPM | WEIGHT: 262 LBS | SYSTOLIC BLOOD PRESSURE: 130 MMHG | HEIGHT: 64 IN

## 2025-07-08 DIAGNOSIS — Z12.11 COLON CANCER SCREENING: ICD-10-CM

## 2025-07-08 DIAGNOSIS — K59.00 CONSTIPATION, UNSPECIFIED CONSTIPATION TYPE: ICD-10-CM

## 2025-07-08 DIAGNOSIS — K64.4 EXTERNAL HEMORRHOIDS WITHOUT COMPLICATION: ICD-10-CM

## 2025-07-08 DIAGNOSIS — K61.1 PERI-RECTAL ABSCESS: Primary | ICD-10-CM

## 2025-07-08 PROCEDURE — 3017F COLORECTAL CA SCREEN DOC REV: CPT | Performed by: PHYSICIAN ASSISTANT

## 2025-07-08 PROCEDURE — 1036F TOBACCO NON-USER: CPT | Performed by: PHYSICIAN ASSISTANT

## 2025-07-08 PROCEDURE — 99213 OFFICE O/P EST LOW 20 MIN: CPT | Performed by: PHYSICIAN ASSISTANT

## 2025-07-08 PROCEDURE — G8427 DOCREV CUR MEDS BY ELIG CLIN: HCPCS | Performed by: PHYSICIAN ASSISTANT

## 2025-07-08 PROCEDURE — 99212 OFFICE O/P EST SF 10 MIN: CPT | Performed by: PHYSICIAN ASSISTANT

## 2025-07-08 PROCEDURE — 3079F DIAST BP 80-89 MM HG: CPT | Performed by: PHYSICIAN ASSISTANT

## 2025-07-08 PROCEDURE — G8417 CALC BMI ABV UP PARAM F/U: HCPCS | Performed by: PHYSICIAN ASSISTANT

## 2025-07-08 PROCEDURE — 3075F SYST BP GE 130 - 139MM HG: CPT | Performed by: PHYSICIAN ASSISTANT

## 2025-07-08 RX ORDER — HYDROCORTISONE 25 MG/G
CREAM TOPICAL
Qty: 30 G | Refills: 3 | Status: SHIPPED | OUTPATIENT
Start: 2025-07-08

## 2025-07-08 RX ORDER — DOCUSATE SODIUM 100 MG/1
100 CAPSULE, LIQUID FILLED ORAL 2 TIMES DAILY
Qty: 60 CAPSULE | Refills: 2 | Status: SHIPPED | OUTPATIENT
Start: 2025-07-08 | End: 2025-10-06

## 2025-07-08 NOTE — PROGRESS NOTES
Subjective  Angeli Padilla, 50 y.o. female presents today with:  Chief Complaint   Patient presents with    Perirectal Abscess     Patient presents today with Abscess on the rectal area, and abdominal pain x 1 week.        HPI  H/o recurrent rectal abscess co perirectal pain and fullness  for the past week no melnena or gross rectal bleeding   She does complain of constipation  Review of Systems      Past Medical History:   Diagnosis Date    Abnormal Pap smear of cervix     Allergic rhinitis     several allergens    Asthma     since shildhood    Bariatric surgery status 2020    Dr Calista Baird     Brachial neuralgia 2014    Chronic left shoulder pain     COPD (chronic obstructive pulmonary disease) ([facility])     Dr Stein    Depression     H/O colonoscopy     Dr. De Los Santos    Hyperlipidemia     Hypertension 2010    Migraine headache     Obesity, morbid (more than 100 lbs over ideal weight or BMI > 40) ([facility])     SEJAL on CPAP 10/2018    Sleep apnea      Past Surgical History:   Procedure Laterality Date    BREAST BIOPSY Left     B-9    BREAST REDUCTION SURGERY Bilateral     BREAST SURGERY  2007    reduction, Dr Paul    COLONOSCOPY  10/20/2015        GYNECOLOGIC CRYOSURGERY      HERNIA REPAIR Right     Dr Webber, Obdulio    SLEEVE GASTRECTOMY  2020    Dr Calista Baird    TUBAL LIGATION       Social History     Socioeconomic History    Marital status: Single     Spouse name: Not on file    Number of children: 2    Years of education: Not on file    Highest education level: Not on file   Occupational History    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

## 2025-07-09 ENCOUNTER — APPOINTMENT (OUTPATIENT)
Dept: ORTHOPEDIC SURGERY | Facility: CLINIC | Age: 50
End: 2025-07-09
Payer: COMMERCIAL

## 2025-07-09 DIAGNOSIS — S83.249A ACUTE TEAR OF POSTERIOR HORN OF MEDIAL MENISCUS: Primary | ICD-10-CM

## 2025-07-09 PROCEDURE — 99213 OFFICE O/P EST LOW 20 MIN: CPT | Performed by: STUDENT IN AN ORGANIZED HEALTH CARE EDUCATION/TRAINING PROGRAM

## 2025-07-09 NOTE — PROGRESS NOTES
Follow-Up Patient Visit  Patient ID: Giuliana Ferris is a 50 y.o. female.    History of Present Illness  The patient is a 50-year-old female here for follow-up of her left knee injury.    She reports that physical therapy has been beneficial in strengthening both legs, but she continues to experience pain in her left knee, particularly when straightening or bending it. Despite this, she notes an improvement in her ability to ascend and descend stairs. However, she expresses a lack of confidence in her knee's stability, especially when carrying her baby up and down stairs, and feels reliant on the brace for support. She has not previously consulted with a surgeon regarding this issue.    Assessment & Plan  1. Left knee injury, medial meniscus tear and MCL sprain:  Persistent pain in the left knee is reported despite some improvement in strength and stair navigation. The pain is localized on the medial side and is exacerbated by straightening or bending the knee. Given the lack of significant improvement over the past 6 weeks, a referral to Dr. Javi Jones for a surgical consultation is recommended. Continue using the knee brace for support while navigating stairs and carrying heavy objects. Discussed the potential risks and benefits of surgery, and the importance of making an informed decision after the consultation.       No orders of the defined types were placed in this encounter.      1. Acute tear of posterior horn of medial meniscus        Procedures    Physical Exam  Musculoskeletal:  Left knee: Pain on the medial side, difficulty with stairs, requires brace for support    Results  Imaging   - MRI of the left knee: 05/28/2025, Radial tear of the medial meniscus of the posterior horn.    At the conclusion of the visit there were no further questions by the patient/family regarding their plan of care.  Patient was instructed to call or return with any issues, questions, or concerns regarding their  injury and/or treatment plan described above.      This medical note was created with the assistance of artificial intelligence (AI) for documentation purposes. The content has been reviewed and confirmed by the healthcare provider for accuracy and completeness. Patient consented to the use of audio recording and use of AI during their visit.   07/09/25 at 8:56 AM - Tien Gaviria DO  Office:  [phone]

## 2025-07-10 ENCOUNTER — ANESTHESIA EVENT (OUTPATIENT)
Dept: OPERATING ROOM | Age: 50
End: 2025-07-10
Payer: COMMERCIAL

## 2025-07-10 ENCOUNTER — ANESTHESIA (OUTPATIENT)
Dept: OPERATING ROOM | Age: 50
End: 2025-07-10
Payer: COMMERCIAL

## 2025-07-10 ENCOUNTER — HOSPITAL ENCOUNTER (EMERGENCY)
Age: 50
Discharge: ANOTHER HOSPITAL | End: 2025-07-10
Attending: EMERGENCY MEDICINE | Admitting: EMERGENCY MEDICINE
Payer: COMMERCIAL

## 2025-07-10 ENCOUNTER — APPOINTMENT (OUTPATIENT)
Dept: CT IMAGING | Age: 50
End: 2025-07-10
Payer: COMMERCIAL

## 2025-07-10 ENCOUNTER — HOSPITAL ENCOUNTER (EMERGENCY)
Age: 50
Discharge: HOME | End: 2025-07-10
Attending: INTERNAL MEDICINE
Payer: COMMERCIAL

## 2025-07-10 VITALS
RESPIRATION RATE: 16 BRPM | WEIGHT: 261 LBS | BODY MASS INDEX: 44.56 KG/M2 | HEIGHT: 64 IN | SYSTOLIC BLOOD PRESSURE: 164 MMHG | TEMPERATURE: 97.1 F | HEART RATE: 81 BPM | DIASTOLIC BLOOD PRESSURE: 76 MMHG | OXYGEN SATURATION: 95 %

## 2025-07-10 VITALS
DIASTOLIC BLOOD PRESSURE: 91 MMHG | SYSTOLIC BLOOD PRESSURE: 188 MMHG | RESPIRATION RATE: 18 BRPM | TEMPERATURE: 97.8 F | OXYGEN SATURATION: 99 % | HEART RATE: 90 BPM

## 2025-07-10 DIAGNOSIS — K61.1 PERIRECTAL ABSCESS: ICD-10-CM

## 2025-07-10 DIAGNOSIS — K61.0 PERIANAL ABSCESS: Primary | ICD-10-CM

## 2025-07-10 DIAGNOSIS — K61.1 PERIRECTAL ABSCESS: Primary | ICD-10-CM

## 2025-07-10 LAB
ALBUMIN SERPL-MCNC: 4 G/DL (ref 3.5–4.6)
ALP SERPL-CCNC: 93 U/L (ref 40–130)
ALT SERPL-CCNC: 13 U/L (ref 0–33)
ANION GAP SERPL CALCULATED.3IONS-SCNC: 10 MEQ/L (ref 9–15)
AST SERPL-CCNC: 11 U/L (ref 0–35)
BASOPHILS # BLD: 0 K/UL (ref 0–0.1)
BASOPHILS NFR BLD: 0.5 % (ref 0.1–1.2)
BILIRUB SERPL-MCNC: 0.7 MG/DL (ref 0.2–0.7)
BUN SERPL-MCNC: 14 MG/DL (ref 6–20)
CALCIUM SERPL-MCNC: 8.8 MG/DL (ref 8.5–9.9)
CHLORIDE SERPL-SCNC: 102 MEQ/L (ref 95–107)
CO2 SERPL-SCNC: 24 MEQ/L (ref 20–31)
CREAT SERPL-MCNC: 0.7 MG/DL (ref 0.5–0.9)
EOSINOPHIL # BLD: 0.3 K/UL (ref 0–0.4)
EOSINOPHIL NFR BLD: 3.4 % (ref 0.7–5.8)
ERYTHROCYTE [DISTWIDTH] IN BLOOD BY AUTOMATED COUNT: 12.8 % (ref 11.7–14.4)
GLOBULIN SER CALC-MCNC: 3 G/DL (ref 2.3–3.5)
GLUCOSE SERPL-MCNC: 145 MG/DL (ref 70–99)
HCG, URINE, POC: NEGATIVE
HCT VFR BLD AUTO: 35.4 % (ref 37–47)
HGB BLD-MCNC: 11.3 G/DL (ref 11.2–15.7)
IMM GRANULOCYTES # BLD: 0 K/UL
IMM GRANULOCYTES NFR BLD: 0.4 %
LYMPHOCYTES # BLD: 1 K/UL (ref 1.2–3.7)
LYMPHOCYTES NFR BLD: 12.7 %
Lab: NORMAL
MCH RBC QN AUTO: 27.6 PG (ref 25.6–32.2)
MCHC RBC AUTO-ENTMCNC: 31.9 % (ref 32.2–35.5)
MCV RBC AUTO: 86.6 FL (ref 79.4–94.8)
MONOCYTES # BLD: 0.7 K/UL (ref 0.2–0.9)
MONOCYTES NFR BLD: 9.1 % (ref 4.7–12.5)
NEGATIVE QC PASS/FAIL: NORMAL
NEUTROPHILS # BLD: 5.6 K/UL (ref 1.6–6.1)
NEUTS SEG NFR BLD: 73.9 % (ref 34–71.1)
PLATELET # BLD AUTO: 290 K/UL (ref 182–369)
POSITIVE QC PASS/FAIL: NORMAL
POTASSIUM SERPL-SCNC: 4.1 MEQ/L (ref 3.4–4.9)
PROT SERPL-MCNC: 7 G/DL (ref 6.3–8)
RBC # BLD AUTO: 4.09 M/UL (ref 3.93–5.22)
SODIUM SERPL-SCNC: 136 MEQ/L (ref 135–144)
WBC # BLD AUTO: 7.6 K/UL (ref 4–10)

## 2025-07-10 PROCEDURE — 80053 COMPREHEN METABOLIC PANEL: CPT

## 2025-07-10 PROCEDURE — 2580000003 HC RX 258: Performed by: EMERGENCY MEDICINE

## 2025-07-10 PROCEDURE — 7100000011 HC PHASE II RECOVERY - ADDTL 15 MIN: Performed by: SURGERY

## 2025-07-10 PROCEDURE — 6360000002 HC RX W HCPCS: Performed by: ANESTHESIOLOGY

## 2025-07-10 PROCEDURE — 6360000002 HC RX W HCPCS: Performed by: EMERGENCY MEDICINE

## 2025-07-10 PROCEDURE — 6370000000 HC RX 637 (ALT 250 FOR IP): Performed by: ANESTHESIOLOGY

## 2025-07-10 PROCEDURE — 2500000003 HC RX 250 WO HCPCS: Performed by: SURGERY

## 2025-07-10 PROCEDURE — 3700000001 HC ADD 15 MINUTES (ANESTHESIA): Performed by: SURGERY

## 2025-07-10 PROCEDURE — 6360000004 HC RX CONTRAST MEDICATION: Performed by: EMERGENCY MEDICINE

## 2025-07-10 PROCEDURE — 2709999900 HC NON-CHARGEABLE SUPPLY: Performed by: SURGERY

## 2025-07-10 PROCEDURE — 7100000001 HC PACU RECOVERY - ADDTL 15 MIN: Performed by: SURGERY

## 2025-07-10 PROCEDURE — 2580000003 HC RX 258: Performed by: STUDENT IN AN ORGANIZED HEALTH CARE EDUCATION/TRAINING PROGRAM

## 2025-07-10 PROCEDURE — 96368 THER/DIAG CONCURRENT INF: CPT

## 2025-07-10 PROCEDURE — 36415 COLL VENOUS BLD VENIPUNCTURE: CPT

## 2025-07-10 PROCEDURE — 85025 COMPLETE CBC W/AUTO DIFF WBC: CPT

## 2025-07-10 PROCEDURE — 6360000002 HC RX W HCPCS: Performed by: SURGERY

## 2025-07-10 PROCEDURE — 96374 THER/PROPH/DIAG INJ IV PUSH: CPT

## 2025-07-10 PROCEDURE — 2580000003 HC RX 258: Performed by: ANESTHESIOLOGY

## 2025-07-10 PROCEDURE — 96365 THER/PROPH/DIAG IV INF INIT: CPT

## 2025-07-10 PROCEDURE — 3700000000 HC ANESTHESIA ATTENDED CARE: Performed by: SURGERY

## 2025-07-10 PROCEDURE — 96375 TX/PRO/DX INJ NEW DRUG ADDON: CPT

## 2025-07-10 PROCEDURE — 6360000002 HC RX W HCPCS

## 2025-07-10 PROCEDURE — 7100000010 HC PHASE II RECOVERY - FIRST 15 MIN: Performed by: SURGERY

## 2025-07-10 PROCEDURE — 74177 CT ABD & PELVIS W/CONTRAST: CPT

## 2025-07-10 PROCEDURE — 3600000004 HC SURGERY LEVEL 4 BASE: Performed by: SURGERY

## 2025-07-10 PROCEDURE — 3600000014 HC SURGERY LEVEL 4 ADDTL 15MIN: Performed by: SURGERY

## 2025-07-10 PROCEDURE — 7100000000 HC PACU RECOVERY - FIRST 15 MIN: Performed by: SURGERY

## 2025-07-10 PROCEDURE — 99283 EMERGENCY DEPT VISIT LOW MDM: CPT

## 2025-07-10 PROCEDURE — 99285 EMERGENCY DEPT VISIT HI MDM: CPT

## 2025-07-10 RX ORDER — KETOROLAC TROMETHAMINE 30 MG/ML
30 INJECTION, SOLUTION INTRAMUSCULAR; INTRAVENOUS ONCE
Status: COMPLETED | OUTPATIENT
Start: 2025-07-10 | End: 2025-07-10

## 2025-07-10 RX ORDER — DIPHENHYDRAMINE HYDROCHLORIDE 50 MG/ML
12.5 INJECTION, SOLUTION INTRAMUSCULAR; INTRAVENOUS
Status: DISCONTINUED | OUTPATIENT
Start: 2025-07-10 | End: 2025-07-10 | Stop reason: HOSPADM

## 2025-07-10 RX ORDER — SODIUM CHLORIDE 0.9 % (FLUSH) 0.9 %
5-40 SYRINGE (ML) INJECTION EVERY 12 HOURS SCHEDULED
Status: DISCONTINUED | OUTPATIENT
Start: 2025-07-10 | End: 2025-07-10 | Stop reason: HOSPADM

## 2025-07-10 RX ORDER — SODIUM CHLORIDE 9 MG/ML
INJECTION, SOLUTION INTRAVENOUS CONTINUOUS
Status: DISCONTINUED | OUTPATIENT
Start: 2025-07-10 | End: 2025-07-10 | Stop reason: HOSPADM

## 2025-07-10 RX ORDER — OXYCODONE AND ACETAMINOPHEN 5; 325 MG/1; MG/1
1 TABLET ORAL EVERY 6 HOURS PRN
Qty: 12 TABLET | Refills: 0 | Status: SHIPPED | OUTPATIENT
Start: 2025-07-10 | End: 2025-07-13

## 2025-07-10 RX ORDER — LIDOCAINE HYDROCHLORIDE 20 MG/ML
INJECTION, SOLUTION EPIDURAL; INFILTRATION; INTRACAUDAL; PERINEURAL
Status: DISCONTINUED | OUTPATIENT
Start: 2025-07-10 | End: 2025-07-10 | Stop reason: SDUPTHER

## 2025-07-10 RX ORDER — FENTANYL CITRATE 0.05 MG/ML
50 INJECTION, SOLUTION INTRAMUSCULAR; INTRAVENOUS EVERY 10 MIN PRN
Status: DISCONTINUED | OUTPATIENT
Start: 2025-07-10 | End: 2025-07-10 | Stop reason: HOSPADM

## 2025-07-10 RX ORDER — ONDANSETRON 2 MG/ML
4 INJECTION INTRAMUSCULAR; INTRAVENOUS
Status: DISCONTINUED | OUTPATIENT
Start: 2025-07-10 | End: 2025-07-10 | Stop reason: HOSPADM

## 2025-07-10 RX ORDER — FENTANYL CITRATE 50 UG/ML
INJECTION, SOLUTION INTRAMUSCULAR; INTRAVENOUS
Status: DISCONTINUED | OUTPATIENT
Start: 2025-07-10 | End: 2025-07-10 | Stop reason: SDUPTHER

## 2025-07-10 RX ORDER — MEPERIDINE HYDROCHLORIDE 25 MG/ML
12.5 INJECTION INTRAMUSCULAR; INTRAVENOUS; SUBCUTANEOUS
Status: DISCONTINUED | OUTPATIENT
Start: 2025-07-10 | End: 2025-07-10 | Stop reason: HOSPADM

## 2025-07-10 RX ORDER — IOPAMIDOL 755 MG/ML
75 INJECTION, SOLUTION INTRAVASCULAR
Status: COMPLETED | OUTPATIENT
Start: 2025-07-10 | End: 2025-07-10

## 2025-07-10 RX ORDER — SODIUM CHLORIDE 0.9 % (FLUSH) 0.9 %
5-40 SYRINGE (ML) INJECTION PRN
Status: DISCONTINUED | OUTPATIENT
Start: 2025-07-10 | End: 2025-07-10 | Stop reason: HOSPADM

## 2025-07-10 RX ORDER — OXYCODONE HYDROCHLORIDE 5 MG/1
5 TABLET ORAL
Status: COMPLETED | OUTPATIENT
Start: 2025-07-10 | End: 2025-07-10

## 2025-07-10 RX ORDER — MIDAZOLAM HYDROCHLORIDE 1 MG/ML
INJECTION, SOLUTION INTRAMUSCULAR; INTRAVENOUS
Status: DISCONTINUED | OUTPATIENT
Start: 2025-07-10 | End: 2025-07-10 | Stop reason: SDUPTHER

## 2025-07-10 RX ORDER — PROPOFOL 10 MG/ML
INJECTION, EMULSION INTRAVENOUS
Status: DISCONTINUED | OUTPATIENT
Start: 2025-07-10 | End: 2025-07-10 | Stop reason: SDUPTHER

## 2025-07-10 RX ORDER — LABETALOL HYDROCHLORIDE 5 MG/ML
10 INJECTION, SOLUTION INTRAVENOUS ONCE
Status: COMPLETED | OUTPATIENT
Start: 2025-07-10 | End: 2025-07-10

## 2025-07-10 RX ORDER — ONDANSETRON 2 MG/ML
INJECTION INTRAMUSCULAR; INTRAVENOUS
Status: DISCONTINUED | OUTPATIENT
Start: 2025-07-10 | End: 2025-07-10 | Stop reason: SDUPTHER

## 2025-07-10 RX ORDER — DEXAMETHASONE SODIUM PHOSPHATE 10 MG/ML
INJECTION, SOLUTION INTRA-ARTICULAR; INTRALESIONAL; INTRAMUSCULAR; INTRAVENOUS; SOFT TISSUE
Status: DISCONTINUED | OUTPATIENT
Start: 2025-07-10 | End: 2025-07-10 | Stop reason: SDUPTHER

## 2025-07-10 RX ORDER — METOCLOPRAMIDE HYDROCHLORIDE 5 MG/ML
10 INJECTION INTRAMUSCULAR; INTRAVENOUS
Status: DISCONTINUED | OUTPATIENT
Start: 2025-07-10 | End: 2025-07-10 | Stop reason: HOSPADM

## 2025-07-10 RX ORDER — SODIUM CHLORIDE 9 MG/ML
INJECTION, SOLUTION INTRAVENOUS PRN
Status: DISCONTINUED | OUTPATIENT
Start: 2025-07-10 | End: 2025-07-10 | Stop reason: HOSPADM

## 2025-07-10 RX ORDER — MAGNESIUM HYDROXIDE 1200 MG/15ML
LIQUID ORAL CONTINUOUS PRN
Status: DISCONTINUED | OUTPATIENT
Start: 2025-07-10 | End: 2025-07-10 | Stop reason: HOSPADM

## 2025-07-10 RX ORDER — METRONIDAZOLE 500 MG/100ML
500 INJECTION, SOLUTION INTRAVENOUS ONCE
Status: COMPLETED | OUTPATIENT
Start: 2025-07-10 | End: 2025-07-10

## 2025-07-10 RX ADMIN — FENTANYL CITRATE 25 MCG: 50 INJECTION, SOLUTION INTRAMUSCULAR; INTRAVENOUS at 13:46

## 2025-07-10 RX ADMIN — DEXAMETHASONE SODIUM PHOSPHATE 5 MG: 10 INJECTION INTRAMUSCULAR; INTRAVENOUS at 13:46

## 2025-07-10 RX ADMIN — OXYCODONE 5 MG: 5 TABLET ORAL at 15:37

## 2025-07-10 RX ADMIN — PROPOFOL 40 MG: 10 INJECTION, EMULSION INTRAVENOUS at 14:04

## 2025-07-10 RX ADMIN — WATER 2000 MG: 1 INJECTION INTRAMUSCULAR; INTRAVENOUS; SUBCUTANEOUS at 12:31

## 2025-07-10 RX ADMIN — MIDAZOLAM HYDROCHLORIDE 2 MG: 1 INJECTION, SOLUTION INTRAMUSCULAR; INTRAVENOUS at 13:39

## 2025-07-10 RX ADMIN — FENTANYL CITRATE 50 MCG: 50 INJECTION INTRAMUSCULAR; INTRAVENOUS at 15:00

## 2025-07-10 RX ADMIN — PROPOFOL 70 MG: 10 INJECTION, EMULSION INTRAVENOUS at 14:03

## 2025-07-10 RX ADMIN — SODIUM CHLORIDE: 0.9 INJECTION, SOLUTION INTRAVENOUS at 13:39

## 2025-07-10 RX ADMIN — ONDANSETRON 4 MG: 2 INJECTION, SOLUTION INTRAMUSCULAR; INTRAVENOUS at 13:56

## 2025-07-10 RX ADMIN — IOPAMIDOL 75 ML: 755 INJECTION, SOLUTION INTRAVENOUS at 08:10

## 2025-07-10 RX ADMIN — LIDOCAINE HYDROCHLORIDE 60 MG: 20 INJECTION, SOLUTION EPIDURAL; INFILTRATION; INTRACAUDAL; PERINEURAL at 13:46

## 2025-07-10 RX ADMIN — LABETALOL HYDROCHLORIDE 10 MG: 5 INJECTION, SOLUTION INTRAVENOUS at 15:50

## 2025-07-10 RX ADMIN — METRONIDAZOLE 500 MG: 5 INJECTION, SOLUTION INTRAVENOUS at 08:22

## 2025-07-10 RX ADMIN — FENTANYL CITRATE 25 MCG: 50 INJECTION, SOLUTION INTRAMUSCULAR; INTRAVENOUS at 14:04

## 2025-07-10 RX ADMIN — FENTANYL CITRATE 25 MCG: 50 INJECTION, SOLUTION INTRAMUSCULAR; INTRAVENOUS at 14:23

## 2025-07-10 RX ADMIN — KETOROLAC TROMETHAMINE 30 MG: 30 INJECTION, SOLUTION INTRAMUSCULAR at 08:58

## 2025-07-10 RX ADMIN — CEFTRIAXONE SODIUM 2000 MG: 2 INJECTION, POWDER, FOR SOLUTION INTRAMUSCULAR; INTRAVENOUS at 07:46

## 2025-07-10 RX ADMIN — FENTANYL CITRATE 25 MCG: 50 INJECTION, SOLUTION INTRAMUSCULAR; INTRAVENOUS at 13:51

## 2025-07-10 RX ADMIN — PROPOFOL 40 MG: 10 INJECTION, EMULSION INTRAVENOUS at 14:08

## 2025-07-10 RX ADMIN — PROPOFOL 170 MG: 10 INJECTION, EMULSION INTRAVENOUS at 13:46

## 2025-07-10 RX ADMIN — SODIUM CHLORIDE: 0.9 INJECTION, SOLUTION INTRAVENOUS at 12:30

## 2025-07-10 RX ADMIN — FENTANYL CITRATE 50 MCG: 50 INJECTION INTRAMUSCULAR; INTRAVENOUS at 14:43

## 2025-07-10 RX ADMIN — PROPOFOL 30 MG: 10 INJECTION, EMULSION INTRAVENOUS at 13:51

## 2025-07-10 ASSESSMENT — ENCOUNTER SYMPTOMS
SHORTNESS OF BREATH: 0
EYE PAIN: 0
VOMITING: 0
RHINORRHEA: 0
ABDOMINAL PAIN: 0
BACK PAIN: 0
COUGH: 0
NAUSEA: 0
SORE THROAT: 0
DIARRHEA: 0

## 2025-07-10 ASSESSMENT — PAIN DESCRIPTION - PAIN TYPE
TYPE: ACUTE PAIN

## 2025-07-10 ASSESSMENT — PAIN DESCRIPTION - LOCATION
LOCATION: RECTUM

## 2025-07-10 ASSESSMENT — LIFESTYLE VARIABLES
HOW MANY STANDARD DRINKS CONTAINING ALCOHOL DO YOU HAVE ON A TYPICAL DAY: 1 OR 2
HOW OFTEN DO YOU HAVE A DRINK CONTAINING ALCOHOL: MONTHLY OR LESS
HOW OFTEN DO YOU HAVE A DRINK CONTAINING ALCOHOL: MONTHLY OR LESS

## 2025-07-10 ASSESSMENT — PAIN DESCRIPTION - DESCRIPTORS
DESCRIPTORS: TEARING
DESCRIPTORS: ACHING;PRESSURE
DESCRIPTORS: TEARING

## 2025-07-10 ASSESSMENT — PAIN - FUNCTIONAL ASSESSMENT
PAIN_FUNCTIONAL_ASSESSMENT: 0-10
PAIN_FUNCTIONAL_ASSESSMENT: 0-10

## 2025-07-10 ASSESSMENT — PAIN SCALES - GENERAL
PAINLEVEL_OUTOF10: 4
PAINLEVEL_OUTOF10: 9
PAINLEVEL_OUTOF10: 5
PAINLEVEL_OUTOF10: 6
PAINLEVEL_OUTOF10: 5
PAINLEVEL_OUTOF10: 5
PAINLEVEL_OUTOF10: 8
PAINLEVEL_OUTOF10: 7

## 2025-07-10 ASSESSMENT — COPD QUESTIONNAIRES: CAT_SEVERITY: MILD

## 2025-07-10 NOTE — OP NOTE
Operative Note      Patient: Angeli Padilla  YOB: 1975  MRN: [id number]    Date of Procedure: 7/10/2025    Pre-Op Diagnosis Codes:      * Perirectal abscess [K61.1]    Post-Op Diagnosis: Same       Procedure(s):  RECTAL PERIRECTAL INCISION AND DRAINAGE    Surgeon(s):  Tawana Tenorio MD    Assistant:   First Assistant: Chaya Thompson    Anesthesia: General    Estimated Blood Loss (mL): Minimal    Complications: None    Specimens:   None    Implants:  None      Drains: None    Findings:  Present At Time Of Surgery (PATOS) (choose all levels that have infection present):  - Superficial Infection (skin/subcutaneous) present as evidenced by purulent fluid  Other Findings: Perianal abscess    Detailed Description of Procedure:   Consent signed for  incision and drainge of perianal abscess after R/B/A discussed. Procedure was performed without complication. general anesthesia was given by anesthesia then a 3 cm incision made in the left side of the perianal area over the mass with 10 cc amount of purulent fluid drained. It was irrigated with betadine and  packed with betadine soaked \" nu-gauze. Instructed patient to watch for signs/ symptoms of continued/ worsening infection. Patient tolerated procedure well  Local wound care: Sitz baths 3 times a day  Follow up in clinic in 1 week, sooner if need be       Electronically signed by Tawana Tenorio MD on 7/10/2025 at 2:35 PM

## 2025-07-10 NOTE — PROGRESS NOTES
CLINICAL PHARMACY NOTE: MEDS TO BEDS    Total # of Prescriptions Filled: 1   The following medications were delivered to the patient:  Oxycodone/apap 5-325mg Tab     Additional Documentation:

## 2025-07-10 NOTE — ED NOTES
Dr. Donato spoke with Dr. Anderson at [facility], ED.  Dr. Anderson accepts pt ED to ED.

## 2025-07-10 NOTE — ED NOTES
[facility], is made aware pt is reading to transport ED to ED ([facility]).  Gigi voiced understanding without further questions.

## 2025-07-10 NOTE — H&P
GENERAL SURGERY  NEW PATIENT HISTORY AND PHYSICAL NOTE    Pt Name: Angeli Padilla  MRN: [id number]    Date: 7/10/2025    Primary Care Physician: Rodolfo Souaz MD    Reason for evaluation: Perianal abscess      SUBJECTIVE:     History of Chief Complaint:    Angeli is a 50 y.o. female who presents of perianal abscess in the left buttock.  Patient notes having pain in this area for the last week.  Patient states she previously had some swelling in the area over a month ago and was prescribed antibiotics and told to follow-up with gastroenterology for possible colonoscopy.  Patient did not follow-up as symptoms had improved.  Patient denies fever or chills.  Denies vomiting or diarrhea.  Patient denies possibility of pregnancy.    Past Medical History:   Diagnosis Date    Abnormal Pap smear of cervix     Allergic rhinitis     several allergens    Asthma     since shildhood    Bariatric surgery status 01/02/2020    Dr Calista Baird     Brachial neuralgia 1/23/2014    Chronic left shoulder pain     COPD (chronic obstructive pulmonary disease) ([facility]) 2019    Dr Stein    Depression     H/O colonoscopy 2014    Dr. De Los Santos    Hyperlipidemia     Hypertension 2010    Migraine headache     Obesity, morbid (more than 100 lbs over ideal weight or BMI > 40) ([facility])     SEJAL on CPAP 10/2018    Sleep apnea      Past Surgical History:   Procedure Laterality Date    BREAST BIOPSY Left 2022    B-9    BREAST REDUCTION SURGERY Bilateral 2007    BREAST SURGERY  2007    reduction, Dr Paul    COLONOSCOPY  10/20/2015        GYNECOLOGIC CRYOSURGERY      HERNIA REPAIR Right     Obdulio Ferris    SLEEVE GASTRECTOMY  01/20/2020    Dr Calista Baird    TUBAL LIGATION       Prior to Admission medications    Medication Sig Start Date End Date Taking? Authorizing Provider   docusate sodium (COLACE) 100 MG capsule Take 1 capsule by mouth 2 times daily 7/8/25 10/6/25 Yes Kim Elias PA-C   azithromycin

## 2025-07-10 NOTE — ED PROVIDER NOTES
CC/HPI: 50-year-old female to the emergency department chief complaint of swelling and discomfort to her perirectal area.  Patient states that several years ago she had something similar and had to have it drained by a surgeon at [facility].  Approximately a week ago she noticed some discomfort and swelling to her perirectal area.  Patient states its gradually worsened over the last week.  Was seen by her primary care physician 2 days ago and started on Augmentin.  Patient states she has had 4 doses but the area has gotten more swollen and more uncomfortable for her so she came to the emergency department.  No fever no chills no nausea no vomiting.  Has had some crampy abdominal discomfort.  No drainage.  Patient was also given a stool softener by her primary care physician and given a referral to GI.  NPO since yesterday evening    VITALS/PMH/PSH: Reviewed per nurses notes    REVIEW OF SYSTEMS: As in chief complaint history of present illness, otherwise all other systems are reviewed and negative the total 10 systems reviewed    PHYSICAL EXAM:  GEN: Pt alert and oriented, no acute distress  HEENT:         Normocephalic/Atramatic        PERRL, EOMI  NECK: Nontender, no signs of trauma, no lymphadenopathy  HEART: Reg S1/S2, without murmer, rub or gallop  LUNGS: Clear to auscultation bilaterally, respirations even and unlabored  ABDOMEN: soft, nondistended.  Non-tender to palpation.    Perirectal  area was examined with female nurse present.  Patient with an approximately 3 x 4 cm area of redness tenderness and edema with mild central fluctuance to the perirectal area on the left.  No drainage noted.  MUSCULOSKELETAL/EXTREMITITES:  No signs of trauma, cyanosis or edema.    LYMPH: no peripheral lympadenopathy noted  SKIN: Otherwise warm & dry, no rash  NEUROLOGIC:  Alert and oriented.  Speech clear    Medical decision making/ED course;  50-year-old female to the emergency department chief complaint    IV was

## 2025-07-10 NOTE — DISCHARGE INSTRUCTIONS
Home Going Instructions  Remove packing tomorrow   Symptoms or health problems to watch for after I leave the hospital:   -Increasing abdominal pain or swelling unrelieved by rest, medication, and ice.   -Severe or unrelenting nausea or vomiting.  -Fever greater than 101.5°F (38.6°C) or chills.  -Unable to take in liquids or solid foods for greater than 24 hours.  -Unusual drainage coming from the incision or any increased redness or warmth around the incision.   -Shortness of breath.  -Chest pain.  -Racing heartbeat.  -Difficulty urinating.  -Bloody, dark, or tarry stool.  For these or any other concerning symptom, please call immediately for advice      Activity: Walking is OK and encouraged, climbing stairs is acceptable.   Driving: No driving while taking narcotics. Pain from the incision may slow your reaction time.    Wound care: OK to shower daily letting warm soapy water run over your incisions. Afterwards gently pat dry the wound thoroughly. Please use mild soap (Dial or Ivory). Do not scrub or be abrasive with your incisions.   No soaking in a bath, tub, or pool until your incision is fully healed (Usually around 6-8 weeks).  Do not apply lotions, ointments, antibacterial creams or other topical products unless you have been specifically instructed to do so by your surgical team.   If there is any drainage place a dry gauze over the area and secure with tape. Clear, light yellow, pink, or slightly bloody drainage is OK. If the drainage is persistent, increased, or has a foul smell, please call for advice.  Monitor for signs of infection such as increased drainage, redness, warmth, swelling or worsening tenderness around the incision or a fever greater than 101 degrees F (38.4 degrees C).    Medications: Please refer to your discharge medication list for all current medications. If it is not on your medication list, do not take it.  Pain medication: you will be discharged on a multimodal pain control  Patient made aware of 24/7 emergency services.

## 2025-07-10 NOTE — ED NOTES
Israel, [facility] House Supervisor is made aware Dr. Tenorio would like the pt to go ED to ED.  Israel is aware CATHIE attending, Dr. Donato,  has not spoken to Roger's attending, yet.

## 2025-07-10 NOTE — ED PROVIDER NOTES
[facility] EMERGENCY DEPARTMENT  EMERGENCY DEPARTMENT ENCOUNTER      Pt Name: Angeli Padilla  MRN: [id number]  Birthdate 1975  Date of evaluation: 7/10/2025  Provider: Franko Anderson DO  Note Started: 7/10/25 10:06 AM EDT    CHIEF COMPLAINT       Chief Complaint   Patient presents with    Abscess         HISTORY OF PRESENT ILLNESS   (Location/Symptom, Timing/Onset, Context/Setting, Quality, Duration, Modifying Factors, Severity)  Note limiting factors.   Angeli Padilla is a 50 y.o. female who presents to the emergency department for perirectal abscess    Patient presenting for evaluation of perirectal abscess in the left buttock.  Patient notes having pain in this area for the last week.  Patient states she previously had some swelling in the area over a month ago and was prescribed antibiotics and told to follow-up with gastroenterology for possible colonoscopy.  Patient did not follow-up as symptoms had improved.  Patient denies fever or chills.  Denies vomiting or diarrhea.  Patient denies possibility of pregnancy.    Received call from [facility] indicating patient was to be transferred here for incision and drainage of perirectal abscess.    The history is provided by the patient.       Nursing Notes were reviewed.    REVIEW OF SYSTEMS    (2-9 systems for level 4, 10 or more for level 5)     Review of Systems   Constitutional:  Negative for chills and fever.   HENT:  Negative for rhinorrhea and sore throat.    Eyes:  Negative for pain and visual disturbance.   Respiratory:  Negative for cough and shortness of breath.    Cardiovascular:  Negative for chest pain and palpitations.   Gastrointestinal:  Negative for abdominal pain, diarrhea, nausea and vomiting.   Genitourinary:  Negative for difficulty urinating and dysuria.        Perirectal   Musculoskeletal:  Negative for back pain and neck pain.   Skin:  Negative for rash.   Neurological:  Negative for weakness, numbness and headaches.  SpO2   -- -- -- -- -- -- -- --      Height Weight         -- --             Physical Exam  Vitals and nursing note reviewed.   Constitutional:       General: She is not in acute distress.  HENT:      Head: Normocephalic and atraumatic.      Mouth/Throat:      Mouth: Mucous membranes are moist.      Pharynx: Oropharynx is clear.   Eyes:      Extraocular Movements: Extraocular movements intact.      Pupils: Pupils are equal, round, and reactive to light.   Cardiovascular:      Rate and Rhythm: Normal rate and regular rhythm.      Pulses: Normal pulses.      Heart sounds: Normal heart sounds.   Pulmonary:      Effort: Pulmonary effort is normal. No respiratory distress.      Breath sounds: Normal breath sounds.   Abdominal:      General: There is no distension.      Palpations: Abdomen is soft.      Tenderness: There is no abdominal tenderness. There is no guarding or rebound.   Genitourinary:     Rectum: Tenderness present.      Comments: Peritoneal abscess noted in the left buttock.  No active drainaige  Musculoskeletal:         General: No tenderness.      Cervical back: Normal range of motion and neck supple.      Right lower leg: No edema.      Left lower leg: No edema.   Skin:     General: Skin is warm and dry.      Capillary Refill: Capillary refill takes less than 2 seconds.   Neurological:      General: No focal deficit present.      Mental Status: She is alert and oriented to person, place, and time.   Psychiatric:         Mood and Affect: Mood normal.         Behavior: Behavior normal.         DIAGNOSTIC RESULTS     EKG: All EKG's are interpreted by the Emergency Department Physician who either signs or Co-signs this chart in the absence of a cardiologist.        RADIOLOGY:   Non-plain film images such as CT, Ultrasound and MRI are read by the radiologist. Plain radiographic images are visualized and preliminarily interpreted by the emergency physician with the below findings:        Interpretation per the

## 2025-07-10 NOTE — ED TRIAGE NOTES
Patient arrived by EMS from [facility].   Patient being seen for abscess near rectum, pt transferred to [facility] for surgical procedure.

## 2025-07-10 NOTE — ANESTHESIA POSTPROCEDURE EVALUATION
Department of Anesthesiology  Postprocedure Note    Patient: Angeli Padilla  MRN: [id number]  YOB: 1975  Date of evaluation: 7/10/2025    Procedure Summary       Date: 07/10/25 Room / Location: [address]    Anesthesia Start: 1341 Anesthesia Stop: 1425    Procedure: RECTAL PERIRECTAL INCISION AND DRAINAGE (Left: Rectum) Diagnosis:       Perirectal abscess      (Perirectal abscess [K61.1])    Surgeons: Tawana Tenorio MD Responsible Provider: Brando Beaulieu MD    Anesthesia Type: General ASA Status: 3 - Emergent            Anesthesia Type: General    Shruthi Phase I: Shruthi Score: 9    Shruthi Phase II:      Anesthesia Post Evaluation    Patient location during evaluation: PACU  Patient participation: complete - patient participated  Level of consciousness: sleepy but conscious  Pain score: 0  Airway patency: patent  Nausea & Vomiting: no nausea and no vomiting  Cardiovascular status: blood pressure returned to baseline and hemodynamically stable  Respiratory status: acceptable, spontaneous ventilation, nonlabored ventilation and face mask  Hydration status: euvolemic  Pain management: adequate        No notable events documented.

## 2025-07-10 NOTE — ED NOTES
Patient states last oral intake, food at midnight last night and drink at 0700 this morning.   Patient aware to stay NPO at this time.

## 2025-07-12 RX ORDER — ROSUVASTATIN CALCIUM 20 MG/1
20 TABLET, COATED ORAL NIGHTLY
Qty: 90 TABLET | Refills: 0 | Status: SHIPPED | OUTPATIENT
Start: 2025-07-12

## 2025-07-14 ENCOUNTER — OFFICE VISIT (OUTPATIENT)
Age: 50
End: 2025-07-14
Payer: COMMERCIAL

## 2025-07-14 VITALS
OXYGEN SATURATION: 100 % | SYSTOLIC BLOOD PRESSURE: 130 MMHG | BODY MASS INDEX: 44.56 KG/M2 | TEMPERATURE: 97.5 F | DIASTOLIC BLOOD PRESSURE: 80 MMHG | WEIGHT: 261 LBS | HEIGHT: 64 IN | HEART RATE: 95 BPM

## 2025-07-14 DIAGNOSIS — R73.03 PRE-DIABETES: ICD-10-CM

## 2025-07-14 DIAGNOSIS — I10 ESSENTIAL HYPERTENSION: Primary | ICD-10-CM

## 2025-07-14 DIAGNOSIS — E55.9 VITAMIN D DEFICIENCY: ICD-10-CM

## 2025-07-14 DIAGNOSIS — K61.1 PERIRECTAL ABSCESS: ICD-10-CM

## 2025-07-14 DIAGNOSIS — E78.00 PURE HYPERCHOLESTEROLEMIA: ICD-10-CM

## 2025-07-14 DIAGNOSIS — K21.9 GASTROESOPHAGEAL REFLUX DISEASE WITHOUT ESOPHAGITIS: ICD-10-CM

## 2025-07-14 DIAGNOSIS — F31.75 BIPOLAR DISORDER, IN PARTIAL REMISSION, MOST RECENT EPISODE DEPRESSED (HCC): ICD-10-CM

## 2025-07-14 PROCEDURE — G8427 DOCREV CUR MEDS BY ELIG CLIN: HCPCS | Performed by: FAMILY MEDICINE

## 2025-07-14 PROCEDURE — 3017F COLORECTAL CA SCREEN DOC REV: CPT | Performed by: FAMILY MEDICINE

## 2025-07-14 PROCEDURE — 3079F DIAST BP 80-89 MM HG: CPT | Performed by: FAMILY MEDICINE

## 2025-07-14 PROCEDURE — 3075F SYST BP GE 130 - 139MM HG: CPT | Performed by: FAMILY MEDICINE

## 2025-07-14 PROCEDURE — 99214 OFFICE O/P EST MOD 30 MIN: CPT | Performed by: FAMILY MEDICINE

## 2025-07-14 PROCEDURE — 99213 OFFICE O/P EST LOW 20 MIN: CPT | Performed by: FAMILY MEDICINE

## 2025-07-14 PROCEDURE — G8417 CALC BMI ABV UP PARAM F/U: HCPCS | Performed by: FAMILY MEDICINE

## 2025-07-14 PROCEDURE — 1036F TOBACCO NON-USER: CPT | Performed by: FAMILY MEDICINE

## 2025-07-14 RX ORDER — LOSARTAN POTASSIUM 50 MG/1
TABLET ORAL
Qty: 90 TABLET | Refills: 0 | Status: SHIPPED | OUTPATIENT
Start: 2025-07-14

## 2025-07-14 RX ORDER — MULTIVITAMIN
1 TABLET ORAL DAILY
Qty: 90 TABLET | Refills: 0 | Status: SHIPPED | OUTPATIENT
Start: 2025-07-14

## 2025-07-14 RX ORDER — PANTOPRAZOLE SODIUM 40 MG/1
TABLET, DELAYED RELEASE ORAL
Qty: 45 TABLET | Refills: 1 | Status: SHIPPED | OUTPATIENT
Start: 2025-07-14

## 2025-07-14 RX ORDER — ALBUTEROL SULFATE 90 UG/1
2 INHALANT RESPIRATORY (INHALATION) EVERY 6 HOURS PRN
Qty: 18 G | Refills: 0 | Status: SHIPPED | OUTPATIENT
Start: 2025-07-14

## 2025-07-14 RX ORDER — ROSUVASTATIN CALCIUM 20 MG/1
20 TABLET, COATED ORAL NIGHTLY
Qty: 90 TABLET | Refills: 0 | Status: SHIPPED | OUTPATIENT
Start: 2025-07-14

## 2025-07-14 ASSESSMENT — PATIENT HEALTH QUESTIONNAIRE - PHQ9
2. FEELING DOWN, DEPRESSED OR HOPELESS: SEVERAL DAYS
SUM OF ALL RESPONSES TO PHQ QUESTIONS 1-9: 2
1. LITTLE INTEREST OR PLEASURE IN DOING THINGS: SEVERAL DAYS
SUM OF ALL RESPONSES TO PHQ QUESTIONS 1-9: 2

## 2025-07-14 ASSESSMENT — ENCOUNTER SYMPTOMS
ABDOMINAL PAIN: 0
VOMITING: 0

## 2025-07-14 NOTE — PROGRESS NOTES
Subjective:      Patient ID: Angeli Padilla is a 50 y.o. female    Hypertension  The current episode started more than 1 year ago. Past treatments include angiotensin blockers. The current treatment provides moderate improvement.   Hyperlipidemia  The current episode started more than 1 year ago. The problem is controlled.   Gastroesophageal Reflux  She reports no abdominal pain. The current episode started more than 1 year ago. She has tried a PPI for the symptoms.   Other  The current episode started more than 1 year ago. Pertinent negatives include no abdominal pain, chills, vomiting or weakness.     Here in follow up for htn and lipids and gerd and bipolar and pre diabetes .  Did not get blood work done as requested.  In hospital recently for erica rectal abscess-still taking antibiotics-has follow up appt with surgery later this week.  No emesis or diarrhea.   No fever.  Weight up about 7 pounds since last time.   Did not follow thru with psychiatry referral as continues to struggle with depression    Review of Systems   Constitutional:  Negative for chills.   Gastrointestinal:  Negative for abdominal pain and vomiting.   Neurological:  Negative for weakness.     Reviewed allergy, medical, social, surgical, family and med list changes and updated   Files     Social History     Socioeconomic History    Marital status: Single    Number of children: 2   Occupational History    Occupation: dog grooming business, self employed   Tobacco Use    Smoking status: Former     Current packs/day: 0.00     Average packs/day: 0.5 packs/day for 30.0 years (15.0 ttl pk-yrs)     Types: Cigarettes     Start date: 1988     Quit date: 2018     Years since quittin.0     Passive exposure: Past    Smokeless tobacco: Never   Vaping Use    Vaping status: Never Used   Substance and Sexual Activity    Alcohol use: Yes     Comment: socially    Drug use: Yes     Frequency: 1.0 times per week     Types: Marijuana (Weed)

## 2025-07-16 ENCOUNTER — OFFICE VISIT (OUTPATIENT)
Dept: ORTHOPEDIC SURGERY | Facility: CLINIC | Age: 50
End: 2025-07-16
Payer: COMMERCIAL

## 2025-07-16 DIAGNOSIS — S83.242A ACUTE MEDIAL MENISCAL TEAR, LEFT, INITIAL ENCOUNTER: Primary | ICD-10-CM

## 2025-07-16 PROCEDURE — 99212 OFFICE O/P EST SF 10 MIN: CPT | Performed by: STUDENT IN AN ORGANIZED HEALTH CARE EDUCATION/TRAINING PROGRAM

## 2025-07-16 PROCEDURE — 99214 OFFICE O/P EST MOD 30 MIN: CPT | Performed by: STUDENT IN AN ORGANIZED HEALTH CARE EDUCATION/TRAINING PROGRAM

## 2025-07-16 NOTE — PROGRESS NOTES
Chief Complaint   Patient presents with    Left Knee - New Patient Visit     Referred by SHARON, MMT and MCL sprain, MRI 5/24/2025       DIXON Giordano is a 50-year-old female presenting today as a new patient referral by Dr. Gaviria.  Patient has had longstanding left knee pain for quite some time now.  No significant injury or traumatic inciting event which started this knee pain.  Describes a gradual onset.  Is very active with work does work 2 jobs and very active on her feet most of the day as a result.  She has attempted activity modifications, physical therapy, home exercises, bracing, intra-articular cortisone injection all with minimal relief.  Did obtain an MRI with Dr. Gaviria which did show some arthritis, grade 1 MCL sprain as well as a radial tear of the medial meniscus.  Patient is growing more frustrated with this persistent pain and limitations on her daily life.    Medical History[1]    Surgical History[2]     RX Allergies[3]     Physical exam    General: Alert and oriented to place, person, and time.  No acute distress and breathing comfortably; pleasant and cooperative with the examination.  HEENT: Head is normocephalic and atraumatic.  Neck: Supple, no visible swelling.  Cardiovascular: Good perfusion to the affected extremity.  Lungs: No audible wheezing or labored breathing.  Abdomen: Nondistended  HEME/Lymph : No visible abnormalities bilateral lower extremity    Extremity:  Left knee:  Skin healthy and intact  No gross swelling or ecchymosis  No significant varus or valgus malalignment  Effusion: Minimal  ROM:  Full flexion  Full extension  No pain with internal rotation of hip  Tenderness to palpation medial lateral joint line  No laxity to valgus stress  No laxity to varus stress  Negative Lachman  Negative anterior drawer  Negative posterior drawer  Positive Rosie's  Neurovascularly intact      Diagnostics:  I personally reviewed multiple view left knee radiographs dated 2/12/2025 as well as  MRI left knee dated 5/24/2025.  Agree with the following findings: Tricompartmental articular cartilage thinning worse in the medial compartment, radial tear involving the medial meniscus posterior horn.       Procedure:  Procedures    Assessment:  50-year-old female with left knee medial meniscus tear in the setting of mild arthritis    Treatment plan:  The natural history of the condition and its associated treatment alternatives including surgical and nonsurgical options were discussed with the patient at length.  Reviewed treatment modalities including physical therapy, bracing, oral anti-inflammatories, cortisone injections, ultimately knee arthroscopy, partial medial meniscectomy as well as risk benefits contraindications and alternatives to each  Given that patient has failed to improve despite conservative measures including activity modifications, bracing, physical therapy, oral anti-inflammatories, intra-articular cortisone injection we did further discuss knee arthroscopy today.  Patient would like to proceed.  We did discuss that anticipated time for recovery, postoperative restrictions, anticipated return to work.  Patient would like to look into taking time off work prior to scheduling surgery.  All of the patient's questions were answered.  Please refer to Dr. Jones's portion of his note for further surgical planning    Syed Hoang PA-C    In a face to face encounter, I evaluated the patient and performed a physical examination, discussed pertinent diagnostic studies if indicated and discussed diagnosis and management strategies with both the patient and physician assistant / nurse practitioner.  I reviewed the PA/NP's note and agree with the documented findings and plan of care.     50-year-old female with severe mechanical symptoms about her left knee affecting her everyday activities.  Works at [employer] on her feet all day.  Affecting her ability to enjoy activities.  Endorses clicking catching  popping is failed multiple forms of conservative treatment.  Presents today for discussion of MRI results and further discussion of potential surgical treatment.  Examination she has positive Kelly's tenderness palpation about the medial joint of the knee.  MRI reviewed x-rays reviewed consistent with complex tear medial meniscus as well as mild to moderate knee arthritis.  Constellation of findings discussed with patient using shared informed judgment she does wish to proceed with surgical treatment to include knee arthroscopy partial medial meniscectomy.  Discussed that this will not alleviate her arthritis.  However may help with some the mechanical symptoms that she is currently having.    We discussed the options of operative versus nonoperative management with the attendant risks and benefits and the patient is interested in surgical treatment. I have discussed the alternatives of continued nonoperative treatment with observation, physical therapy, and / or medication versus surgery with the patient and we have thoughtfully considered these options. The patient wishes to proceed with surgical treatment.     We will proceed with left knee diagnostic arthroscopy and partial medial meniscectomy    The planned surgical procedure includes examination under anesthesia. Anesthetic risks will be discussed with the patient by the anesthetist. Arthroscopic evaluation will be performed of the knee joint.  Then an arthroscopic procedure will be performed which may include debridement or repair of the the meniscus or cartilage, synovectomy, and removal of loose bodies.  In addition, if there are advanced degenerative changes I have advised the patient that this may indeed be a progressive process, ultimately resulting in further pain at some point in the future.    Risks of the procedure include but are not limited to infection, bleeding, persistent pain, compartment syndrome, neurologic injury, pressure sores or burns  related to positioning or equipment, failure of repair if performed, weakness, arthrofibrosis or stiffness of the joint, limited function and/or limited use of the extremity. The rare complication of death was discussed with the patient. Also, the possible need for revision surgery was discussed.     All this was discussed with the patient and consent obtained. All questions were answered. The patient understands and will consider the surgical options with the above risks in mind. If repair is performed of the meniscus a brace may be provided as  part of a treatment plan which will include wearing the immobilizer at all times.    Regarding DVT prophylaxis, recommend generalized ambulation, patient is low risk for DVT.    We will provide a prescription for Norco 5/325 15  tabs the day prior to surgery.  The patient will pick this up today before surgery in anticipation for surgery/postoperative pain control.    Javi Jones III, MD         [1] No past medical history on file.  [2] No past surgical history on file.  [3] No Known Allergies

## 2025-07-17 ENCOUNTER — OFFICE VISIT (OUTPATIENT)
Age: 50
End: 2025-07-17
Payer: COMMERCIAL

## 2025-07-17 VITALS
DIASTOLIC BLOOD PRESSURE: 74 MMHG | BODY MASS INDEX: 43.32 KG/M2 | HEIGHT: 65 IN | TEMPERATURE: 97.5 F | WEIGHT: 260 LBS | OXYGEN SATURATION: 99 % | HEART RATE: 93 BPM | SYSTOLIC BLOOD PRESSURE: 136 MMHG

## 2025-07-17 DIAGNOSIS — K61.0 PERIANAL ABSCESS: Primary | ICD-10-CM

## 2025-07-17 PROCEDURE — 99214 OFFICE O/P EST MOD 30 MIN: CPT | Performed by: SURGERY

## 2025-07-17 NOTE — PROGRESS NOTES
Take 1 tablet by mouth daily 7/14/25  Yes Rodolfo Souza MD   vitamin D3 (NATURAL VITAMIN D-3) 125 MCG (5000 UT) TABS tablet Take 1 tablet by mouth daily 7/14/25  Yes Rodolfo Souza MD   albuterol sulfate HFA (PROVENTIL;VENTOLIN;PROAIR) 108 (90 Base) MCG/ACT inhaler Inhale 2 puffs into the lungs every 6 hours as needed for Wheezing 7/14/25  Yes Rodolfo Souza MD   docusate sodium (COLACE) 100 MG capsule Take 1 capsule by mouth 2 times daily 7/8/25 10/6/25 Yes Kim Elias PA-C   amoxicillin-clavulanate (AUGMENTIN) 875-125 MG per tablet Take 1 tablet by mouth 2 times daily for 10 days 7/8/25 7/18/25 Yes Kim Elias PA-C   hydrocortisone (PROCTOSOL HC) 2.5 % CREA rectal cream Apply rectally twice daily for 7 days repeat as needed 7/8/25  Yes Kim Elias PA-C   ALLERGY RELIEF 10 MG tablet TAKE 1 TABLET BY MOUTH DAILY AS NEEDED for allergies 6/12/25  Yes Rodolfo Souza MD   ammonium lactate (AMLACTIN) 12 % cream APPLY TO THE AFFECTED AREA(S) DAILY. Avoid applying between toes 5/19/25  Yes Rivas Reid DPM   albuterol sulfate HFA (PROVENTIL;VENTOLIN;PROAIR) 108 (90 Base) MCG/ACT inhaler INHALE 2 PUFFS into the lungs EVERY 6 HOURS AS NEEDED FOR WHEEZING or SHORTNESS OF BREATH 2/5/25  Yes Josef Reeves MD   vitamin D3 (NATURAL VITAMIN D-3) 125 MCG (5000 UT) TABS tablet Take 1 tablet by mouth daily 1/15/25  Yes Rodolfo Souza MD   nystatin (MYCOSTATIN) 698593 UNIT/ML suspension Take 5 mLs by mouth 4 times daily 10/25/23  Yes Kim Elias PA-C   gabapentin (NEURONTIN) 300 MG capsule Take 1 capsule by mouth nightly for 30 days. 3/18/25 4/17/25  Maye Rojas APRN - CNP   albuterol (PROVENTIL) (5 MG/ML) 0.5% nebulizer solution Take 0.5 mLs by nebulization every 6 hours as needed for Wheezing 11/3/20 7/10/25  Darrel Stein MD     No Known Allergies  Family History   Problem Relation Age of Onset    Osteoarthritis Mother     Other Mother

## 2025-07-25 DIAGNOSIS — B37.0 THRUSH: Primary | ICD-10-CM

## 2025-07-25 RX ORDER — NYSTATIN 100000 [USP'U]/ML
500000 SUSPENSION ORAL 4 TIMES DAILY
Qty: 200 ML | Refills: 0 | Status: SHIPPED | OUTPATIENT
Start: 2025-07-25 | End: 2025-08-04

## 2025-07-26 ENCOUNTER — HOSPITAL ENCOUNTER (EMERGENCY)
Age: 50
Discharge: HOME | End: 2025-07-26
Attending: EMERGENCY MEDICINE
Payer: COMMERCIAL

## 2025-07-26 VITALS
WEIGHT: 260 LBS | DIASTOLIC BLOOD PRESSURE: 79 MMHG | RESPIRATION RATE: 16 BRPM | OXYGEN SATURATION: 98 % | HEART RATE: 92 BPM | BODY MASS INDEX: 44.39 KG/M2 | HEIGHT: 64 IN | SYSTOLIC BLOOD PRESSURE: 162 MMHG | TEMPERATURE: 97.7 F
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
## 2025-07-26 PROCEDURE — 90715 TDAP VACCINE 7 YRS/> IM: CPT | Performed by: EMERGENCY MEDICINE

## 2025-07-26 PROCEDURE — 6360000002 HC RX W HCPCS: Performed by: EMERGENCY MEDICINE

## 2025-07-26 PROCEDURE — 99284 EMERGENCY DEPT VISIT MOD MDM: CPT

## 2025-07-26 PROCEDURE — 6370000000 HC RX 637 (ALT 250 FOR IP): Performed by: EMERGENCY MEDICINE

## 2025-07-26 PROCEDURE — 90471 IMMUNIZATION ADMIN: CPT | Performed by: EMERGENCY MEDICINE

## 2025-07-26 RX ADMIN — AMOXICILLIN AND CLAVULANATE POTASSIUM 1 TABLET: 875; 125 TABLET, FILM COATED ORAL at 20:52

## 2025-07-26 RX ADMIN — TETANUS TOXOID, REDUCED DIPHTHERIA TOXOID AND ACELLULAR PERTUSSIS VACCINE, ADSORBED 0.5 ML: 5; 2.5; 8; 8; 2.5 SUSPENSION INTRAMUSCULAR at 20:53

## 2025-07-26 ASSESSMENT — PAIN DESCRIPTION - ORIENTATION: ORIENTATION: RIGHT

## 2025-07-26 ASSESSMENT — ENCOUNTER SYMPTOMS
VOMITING: 0
SHORTNESS OF BREATH: 0
ABDOMINAL PAIN: 0
BACK PAIN: 0
SORE THROAT: 0
NAUSEA: 0
EYE DISCHARGE: 0
EYE REDNESS: 0
COUGH: 0

## 2025-07-26 ASSESSMENT — PAIN DESCRIPTION - LOCATION: LOCATION: HAND

## 2025-07-26 ASSESSMENT — PAIN - FUNCTIONAL ASSESSMENT: PAIN_FUNCTIONAL_ASSESSMENT: 0-10

## 2025-07-26 ASSESSMENT — PAIN SCALES - GENERAL: PAINLEVEL_OUTOF10: 7

## 2025-07-26 ASSESSMENT — PAIN DESCRIPTION - PAIN TYPE: TYPE: ACUTE PAIN

## 2025-07-30 ENCOUNTER — OFFICE VISIT (OUTPATIENT)
Dept: GASTROENTEROLOGY | Age: 50
End: 2025-07-30

## 2025-07-30 VITALS
DIASTOLIC BLOOD PRESSURE: 76 MMHG | OXYGEN SATURATION: 98 % | HEIGHT: 65 IN | BODY MASS INDEX: 43.49 KG/M2 | SYSTOLIC BLOOD PRESSURE: 144 MMHG | WEIGHT: 261 LBS | HEART RATE: 96 BPM

## 2025-07-30 DIAGNOSIS — R19.4 ALTERED BOWEL HABITS: Primary | ICD-10-CM

## 2025-07-30 DIAGNOSIS — K61.1 PERIRECTAL ABSCESS: ICD-10-CM

## 2025-07-30 RX ORDER — POLYETHYLENE GLYCOL 3350, SODIUM CHLORIDE, SODIUM BICARBONATE, POTASSIUM CHLORIDE 420; 11.2; 5.72; 1.48 G/4L; G/4L; G/4L; G/4L
4000 POWDER, FOR SOLUTION ORAL ONCE
Qty: 4000 ML | Refills: 0 | Status: SHIPPED | OUTPATIENT
Start: 2025-07-30 | End: 2025-07-30

## 2025-07-30 RX ORDER — AMOXICILLIN 500 MG/1
CAPSULE ORAL
COMMUNITY
Start: 2025-05-07

## 2025-07-30 NOTE — PROGRESS NOTES
Gastroenterology Clinic Visit    Angeli Padilla  [id number]  Chief Complaint   Patient presents with    New Patient     LISA-RECTAL ABSCESS / COLON CANCER SCREENING/CONSTIPATION       HPI: 50 y.o. female presents to the clinic with history of perirectal abscess which was drained and was treated with antibiotics about 3 weeks ago.  Patient had similar issues about 8 years ago which was drained surgically.  Patient has history of altered bowel habits with constipation and soft frequent BM.  No blood or mucus in the stool.  No abdominal pain.  No pain in the perianal area.  No fever or chills.  No family history of IBD.  Social history does not smoke drinks alcohol occasionally    Review of Systems   Respiratory:          Asthma   Cardiovascular:         Hypertension   Gastrointestinal:         History of perirectal abscess, status post gastric sleeve, history of GERD on Protonix   Endocrine:        Hyperlipidemia        Past Medical History:   Diagnosis Date    Abnormal Pap smear of cervix     Allergic rhinitis     several allergens    Asthma     since shildhood    Bariatric surgery status 01/02/2020    Dr Calista Baird     Brachial neuralgia 1/23/2014    Chronic left shoulder pain     COPD (chronic obstructive pulmonary disease) ([facility]) 2019    Dr Stein    Depression     H/O colonoscopy 2014    Dr. De Los Santos    Hyperlipidemia     Hypertension 2010    Migraine headache     Obesity, morbid (more than 100 lbs over ideal weight or BMI > 40) ([facility])     SEJAL on CPAP 10/2018    Sleep apnea       Past Surgical History:   Procedure Laterality Date    BREAST BIOPSY Left 2022    B-9    BREAST REDUCTION SURGERY Bilateral 2007    BREAST SURGERY  2007    reduction, Dr Paul    COLONOSCOPY  10/20/2015        GYNECOLOGIC CRYOSURGERY      HERNIA REPAIR Right     Obdulio Ferris    RECTAL SURGERY Left 7/10/2025    RECTAL PERIRECTAL INCISION AND DRAINAGE performed by Tawana Tenorio MD at [facility]    SLEEVE GASTRECTOMY

## 2025-08-19 ENCOUNTER — ANESTHESIA EVENT (OUTPATIENT)
Dept: ENDOSCOPY | Age: 50
End: 2025-08-19
Payer: COMMERCIAL

## 2025-08-19 ASSESSMENT — ENCOUNTER SYMPTOMS: SHORTNESS OF BREATH: 1

## 2025-08-20 ENCOUNTER — ANESTHESIA (OUTPATIENT)
Dept: ENDOSCOPY | Age: 50
End: 2025-08-20
Payer: COMMERCIAL

## 2025-08-20 ENCOUNTER — HOSPITAL ENCOUNTER (OUTPATIENT)
Age: 50
Setting detail: OUTPATIENT SURGERY
Discharge: HOME | End: 2025-08-20
Attending: SPECIALIST | Admitting: SPECIALIST
Payer: COMMERCIAL

## 2025-08-20 VITALS
BODY MASS INDEX: 42.49 KG/M2 | RESPIRATION RATE: 18 BRPM | WEIGHT: 255 LBS | TEMPERATURE: 97 F | OXYGEN SATURATION: 98 % | DIASTOLIC BLOOD PRESSURE: 76 MMHG | HEIGHT: 65 IN | HEART RATE: 77 BPM | SYSTOLIC BLOOD PRESSURE: 155 MMHG

## 2025-08-20 DIAGNOSIS — K61.1 PERIRECTAL ABSCESS: ICD-10-CM

## 2025-08-20 DIAGNOSIS — R19.4 ALTERED BOWEL HABITS: ICD-10-CM

## 2025-08-20 PROCEDURE — 3609027000 HC COLONOSCOPY: Performed by: SPECIALIST

## 2025-08-20 PROCEDURE — 2500000003 HC RX 250 WO HCPCS: Performed by: SPECIALIST

## 2025-08-20 PROCEDURE — 3700000000 HC ANESTHESIA ATTENDED CARE: Performed by: SPECIALIST

## 2025-08-20 PROCEDURE — 7100000011 HC PHASE II RECOVERY - ADDTL 15 MIN: Performed by: SPECIALIST

## 2025-08-20 PROCEDURE — 88342 IMHCHEM/IMCYTCHM 1ST ANTB: CPT

## 2025-08-20 PROCEDURE — 45380 COLONOSCOPY AND BIOPSY: CPT | Performed by: SPECIALIST

## 2025-08-20 PROCEDURE — 2580000003 HC RX 258: Performed by: SPECIALIST

## 2025-08-20 PROCEDURE — 3700000001 HC ADD 15 MINUTES (ANESTHESIA): Performed by: SPECIALIST

## 2025-08-20 PROCEDURE — 6360000002 HC RX W HCPCS

## 2025-08-20 PROCEDURE — 2709999900 HC NON-CHARGEABLE SUPPLY: Performed by: SPECIALIST

## 2025-08-20 PROCEDURE — 7100000010 HC PHASE II RECOVERY - FIRST 15 MIN: Performed by: SPECIALIST

## 2025-08-20 PROCEDURE — 88305 TISSUE EXAM BY PATHOLOGIST: CPT

## 2025-08-20 RX ORDER — LIDOCAINE HYDROCHLORIDE 20 MG/ML
INJECTION, SOLUTION INFILTRATION; PERINEURAL
Status: DISCONTINUED | OUTPATIENT
Start: 2025-08-20 | End: 2025-08-20 | Stop reason: SDUPTHER

## 2025-08-20 RX ORDER — SODIUM CHLORIDE 0.9 % (FLUSH) 0.9 %
5-40 SYRINGE (ML) INJECTION PRN
Status: DISCONTINUED | OUTPATIENT
Start: 2025-08-20 | End: 2025-08-20 | Stop reason: HOSPADM

## 2025-08-20 RX ORDER — SODIUM CHLORIDE 0.9 % (FLUSH) 0.9 %
5-40 SYRINGE (ML) INJECTION EVERY 12 HOURS SCHEDULED
Status: DISCONTINUED | OUTPATIENT
Start: 2025-08-20 | End: 2025-08-20 | Stop reason: HOSPADM

## 2025-08-20 RX ORDER — SODIUM CHLORIDE 9 MG/ML
INJECTION, SOLUTION INTRAVENOUS PRN
Status: DISCONTINUED | OUTPATIENT
Start: 2025-08-20 | End: 2025-08-20 | Stop reason: HOSPADM

## 2025-08-20 RX ORDER — PROPOFOL 10 MG/ML
INJECTION, EMULSION INTRAVENOUS
Status: DISCONTINUED | OUTPATIENT
Start: 2025-08-20 | End: 2025-08-20 | Stop reason: SDUPTHER

## 2025-08-20 RX ORDER — LABETALOL HYDROCHLORIDE 5 MG/ML
INJECTION, SOLUTION INTRAVENOUS
Status: DISCONTINUED | OUTPATIENT
Start: 2025-08-20 | End: 2025-08-20 | Stop reason: SDUPTHER

## 2025-08-20 RX ORDER — SODIUM CHLORIDE 9 MG/ML
INJECTION, SOLUTION INTRAVENOUS CONTINUOUS
Status: DISCONTINUED | OUTPATIENT
Start: 2025-08-20 | End: 2025-08-20 | Stop reason: HOSPADM

## 2025-08-20 RX ADMIN — PROPOFOL 100 MG: 10 INJECTION, EMULSION INTRAVENOUS at 09:50

## 2025-08-20 RX ADMIN — PROPOFOL 100 MG: 10 INJECTION, EMULSION INTRAVENOUS at 09:51

## 2025-08-20 RX ADMIN — PROPOFOL 100 MG: 10 INJECTION, EMULSION INTRAVENOUS at 09:47

## 2025-08-20 RX ADMIN — SODIUM CHLORIDE: 0.9 INJECTION, SOLUTION INTRAVENOUS at 09:08

## 2025-08-20 RX ADMIN — PROPOFOL 100 MG: 10 INJECTION, EMULSION INTRAVENOUS at 09:54

## 2025-08-20 RX ADMIN — PROPOFOL 100 MG: 10 INJECTION, EMULSION INTRAVENOUS at 09:59

## 2025-08-20 RX ADMIN — LIDOCAINE HYDROCHLORIDE 60 MG: 20 INJECTION, SOLUTION INFILTRATION; PERINEURAL at 09:45

## 2025-08-20 RX ADMIN — PROPOFOL 100 MG: 10 INJECTION, EMULSION INTRAVENOUS at 09:56

## 2025-08-20 RX ADMIN — LABETALOL HYDROCHLORIDE 10 MG: 5 INJECTION, SOLUTION INTRAVENOUS at 09:54

## 2025-08-20 RX ADMIN — PROPOFOL 100 MG: 10 INJECTION, EMULSION INTRAVENOUS at 09:45

## 2025-08-20 ASSESSMENT — PAIN - FUNCTIONAL ASSESSMENT
PAIN_FUNCTIONAL_ASSESSMENT: 0-10
PAIN_FUNCTIONAL_ASSESSMENT: 0-10

## 2025-08-20 ASSESSMENT — LIFESTYLE VARIABLES: SMOKING_STATUS: 1

## 2025-08-28 DIAGNOSIS — I10 ESSENTIAL HYPERTENSION: ICD-10-CM

## 2025-08-28 DIAGNOSIS — E55.9 VITAMIN D DEFICIENCY: ICD-10-CM

## 2025-08-28 DIAGNOSIS — E78.00 PURE HYPERCHOLESTEROLEMIA: ICD-10-CM

## 2025-08-28 LAB
ALBUMIN SERPL-MCNC: 4.3 G/DL (ref 3.5–4.6)
ALP SERPL-CCNC: 94 U/L (ref 40–130)
ALT SERPL-CCNC: 14 U/L (ref 0–33)
ANION GAP SERPL CALCULATED.3IONS-SCNC: 11 MEQ/L (ref 9–15)
AST SERPL-CCNC: 14 U/L (ref 0–35)
BILIRUB SERPL-MCNC: 0.5 MG/DL (ref 0.2–0.7)
BUN SERPL-MCNC: 12 MG/DL (ref 6–20)
CALCIUM SERPL-MCNC: 9.2 MG/DL (ref 8.5–9.9)
CHLORIDE SERPL-SCNC: 103 MEQ/L (ref 95–107)
CHOLEST SERPL-MCNC: 177 MG/DL (ref 0–199)
CO2 SERPL-SCNC: 24 MEQ/L (ref 20–31)
CREAT SERPL-MCNC: 0.65 MG/DL (ref 0.5–0.9)
GLOBULIN SER CALC-MCNC: 2.9 G/DL (ref 2.3–3.5)
GLUCOSE SERPL-MCNC: 109 MG/DL (ref 70–99)
HDLC SERPL-MCNC: 39 MG/DL (ref 40–59)
LDLC SERPL CALC-MCNC: 72 MG/DL (ref 0–129)
POTASSIUM SERPL-SCNC: 4.2 MEQ/L (ref 3.4–4.9)
PROT SERPL-MCNC: 7.2 G/DL (ref 6.3–8)
SODIUM SERPL-SCNC: 138 MEQ/L (ref 135–144)
TRIGL SERPL-MCNC: 329 MG/DL (ref 0–150)

## 2025-08-29 LAB — VITAMIN D 25-HYDROXY: 50.1 NG/ML (ref 30–100)

## 2025-09-03 ENCOUNTER — OFFICE VISIT (OUTPATIENT)
Age: 50
End: 2025-09-03
Payer: COMMERCIAL

## 2025-09-03 VITALS
BODY MASS INDEX: 43.32 KG/M2 | TEMPERATURE: 97.2 F | WEIGHT: 260 LBS | HEIGHT: 65 IN | DIASTOLIC BLOOD PRESSURE: 90 MMHG | SYSTOLIC BLOOD PRESSURE: 140 MMHG | HEART RATE: 101 BPM | OXYGEN SATURATION: 97 %

## 2025-09-03 DIAGNOSIS — Z98.84 HISTORY OF BARIATRIC SURGERY: ICD-10-CM

## 2025-09-03 DIAGNOSIS — I10 ESSENTIAL HYPERTENSION: ICD-10-CM

## 2025-09-03 DIAGNOSIS — R73.03 PRE-DIABETES: Primary | ICD-10-CM

## 2025-09-03 PROCEDURE — 3080F DIAST BP >= 90 MM HG: CPT | Performed by: FAMILY MEDICINE

## 2025-09-03 PROCEDURE — 3077F SYST BP >= 140 MM HG: CPT | Performed by: FAMILY MEDICINE

## 2025-09-03 PROCEDURE — 99212 OFFICE O/P EST SF 10 MIN: CPT | Performed by: FAMILY MEDICINE

## 2025-09-03 PROCEDURE — 1036F TOBACCO NON-USER: CPT | Performed by: FAMILY MEDICINE

## 2025-09-03 PROCEDURE — 99213 OFFICE O/P EST LOW 20 MIN: CPT | Performed by: FAMILY MEDICINE

## 2025-09-03 PROCEDURE — G8427 DOCREV CUR MEDS BY ELIG CLIN: HCPCS | Performed by: FAMILY MEDICINE

## 2025-09-03 PROCEDURE — G8417 CALC BMI ABV UP PARAM F/U: HCPCS | Performed by: FAMILY MEDICINE

## 2025-09-03 PROCEDURE — 3017F COLORECTAL CA SCREEN DOC REV: CPT | Performed by: FAMILY MEDICINE

## 2025-09-03 RX ORDER — LOSARTAN POTASSIUM 100 MG/1
TABLET ORAL
Qty: 90 TABLET | Refills: 0 | Status: SHIPPED | OUTPATIENT
Start: 2025-09-03

## 2025-09-03 RX ORDER — ALBUTEROL SULFATE 90 UG/1
2 INHALANT RESPIRATORY (INHALATION) EVERY 6 HOURS PRN
Qty: 18 G | Refills: 0 | Status: CANCELLED | OUTPATIENT
Start: 2025-09-03

## (undated) DEVICE — GAUZE,PACKING STRIP,IODOFORM,1/4"X5YD,ST: Brand: CURAD

## (undated) DEVICE — TUBE SET 96 MM 64 MM H2O PERISTALTIC STD AUX CHANNEL

## (undated) DEVICE — NEPTUNE E-SEP SMOKE EVACUATION PENCIL, COATED, 70MM BLADE, PUSH BUTTON SWITCH: Brand: NEPTUNE E-SEP

## (undated) DEVICE — LABEL MED MINI W/ MARKER

## (undated) DEVICE — PACK,HEAVY DRAINAGE,STERILE: Brand: MEDLINE INDUSTRIES, INC.

## (undated) DEVICE — SKIN PREP TRAY 4 COMPARTM TRAY: Brand: MEDLINE INDUSTRIES, INC.

## (undated) DEVICE — GAUZE,SPONGE,FLUFF,6"X6.75",STRL,10/TRAY: Brand: MEDLINE

## (undated) DEVICE — DRAPE,UTILITY,TAPE,15X26,STERILE: Brand: MEDLINE

## (undated) DEVICE — SUPPLEMENT DIGESTIVE H2O SOL GI-EASE

## (undated) DEVICE — TUBING IRRIGATION 140/160/180/190 SER GI ENDOSCP SMARTCAP

## (undated) DEVICE — TUBING SCTION CONN 1/4X10 RIB

## (undated) DEVICE — BLADE,CARBON-STEEL,10,STRL,DISPOSABLE,TB: Brand: MEDLINE

## (undated) DEVICE — BRUSH ENDO CLN L90.5IN SHTH DIA1.7MM BRIST DIA5-7MM 2-6MM

## (undated) DEVICE — MANIFOLD SUCT SMK EVAC SGL PRT DISP NEPTUNE 2

## (undated) DEVICE — GLOVE ORANGE PI 7 1/2   MSG9075

## (undated) DEVICE — FORCEPS BX L240CM JAW DIA2.8MM L CAP W/ NDL MIC MESH TOOTH

## (undated) DEVICE — SURGICAL PROCEDURE KIT ENDOSCP CUST 883 CARRY-ON

## (undated) DEVICE — GENERAL MINOR: Brand: MEDLINE INDUSTRIES, INC.